# Patient Record
Sex: FEMALE | Race: WHITE | Employment: OTHER | URBAN - METROPOLITAN AREA
[De-identification: names, ages, dates, MRNs, and addresses within clinical notes are randomized per-mention and may not be internally consistent; named-entity substitution may affect disease eponyms.]

---

## 2017-01-09 ENCOUNTER — OFFICE VISIT (OUTPATIENT)
Dept: NEUROLOGY | Age: 72
End: 2017-01-09

## 2017-01-09 DIAGNOSIS — G45.9 TRANSIENT CEREBRAL ISCHEMIA, UNSPECIFIED TYPE: Primary | ICD-10-CM

## 2017-01-09 NOTE — PROCEDURES
EEG:      Date:  01/09/17    Requesting Physician:   Bull Harding     An EEG is requested in this 73 y.o. lady with TIA/paroxysms to evaluate for epileptiform abnormality. Her medications are listed as Norco, Nasonex, Proventil, Xanax, Midrin, Wellbutrin, Topiramate, Synthroid, Bumex, Zofran, Cordarone, Prilosec. This tracing is obtained during the awake state. During wakefulness, the background consists of diffuse low voltage fast frequency beta wave activity. Hyperventilation is not performed secondary to medical history. Intermittent photic stimulation induces symmetric posterior driving responses. Sleep is not obtained. Interpretation: This EEG recorded during the awake state is normal.  No epileptiform abnormalities are seen.

## 2017-02-22 RX ORDER — PROCHLORPERAZINE 25 MG
25 SUPPOSITORY, RECTAL RECTAL
Qty: 12 SUPPOSITORY | Refills: 2 | Status: SHIPPED | OUTPATIENT
Start: 2017-02-22 | End: 2018-05-10

## 2017-04-10 ENCOUNTER — OFFICE VISIT (OUTPATIENT)
Dept: NEUROLOGY | Age: 72
End: 2017-04-10

## 2017-04-10 VITALS
DIASTOLIC BLOOD PRESSURE: 70 MMHG | TEMPERATURE: 98.4 F | OXYGEN SATURATION: 98 % | HEART RATE: 67 BPM | BODY MASS INDEX: 33.7 KG/M2 | WEIGHT: 197.4 LBS | SYSTOLIC BLOOD PRESSURE: 134 MMHG | RESPIRATION RATE: 18 BRPM | HEIGHT: 64 IN

## 2017-04-10 DIAGNOSIS — G43.019 REFRACTORY MIGRAINE WITHOUT AURA: Primary | ICD-10-CM

## 2017-04-10 NOTE — PATIENT INSTRUCTIONS
[facility]   Statement to Patients  April 1, 2014      In an effort to ensure the large volume of patient prescription refills is processed in the most efficient and expeditious manner, we are asking our patients to assist us by calling your Pharmacy for all prescription refills, this will include also your  Mail Order Pharmacy. The pharmacy will contact our office electronically to continue the refill process. Please do not wait until the last minute to call your pharmacy. We need at least 48 hours (2days) to fill prescriptions. We also encourage you to call your pharmacy before going to  your prescription to make sure it is ready. With regard to controlled substance prescription refill requests (narcotic refills) that need to be picked up at our office, we ask your cooperation by providing us with at least 72 hours (3days) notice that you will need a refill. We will not refill narcotic prescription refill requests after 4:00pm on any weekday, Monday through Thursday, or after 2:00pm on Fridays, or on the weekends. We encourage everyone to explore another way of getting your prescription refill request processed using ShotSpotter, our patient web portal through our electronic medical record system. ShotSpotter is an efficient and effective way to communicate your medication request directly to the office and  downloadable as an gwen on your smart phone . ShotSpotter also features a review functionality that allows you to view your medication list as well as leave messages for your physician. Are you ready to get connected? If so please review the attatched instructions or speak to any of our staff to get you set up right away! Thank you so much for your cooperation. Should you have any questions please contact our Practice Administrator.     The Physicians and Staff,  [facility] [address]  What is a living will? A living will is a legal form you use to write down the kind of care you want at the end of your life. It is used by the health professionals who will treat you if you aren't able to decide for yourself. If you put your wishes in writing, your loved ones and others will know what kind of care you want. They won't need to guess. This can ease your mind and be helpful to others. A living will is not the same as an estate or property will. An estate will explains what you want to happen with your money and property after you die. Is a living will a legal document? A living will is a legal document. Each state has its own laws about living renner. If you move to another state, make sure that your living will is legal in the state where you now live. Or you might use a universal form that has been approved by many states. This kind of form can sometimes be completed and stored online. Your electronic copy will then be available wherever you have a connection to the Internet. In most cases, doctors will respect your wishes even if you have a form from a different state. · You don't need an  to complete a living will. But legal advice can be helpful if your state's laws are unclear, your health history is complicated, or your family can't agree on what should be in your living will. · You can change your living will at any time. Some people find that their wishes about end-of-life care change as their health changes. · In addition to making a living will, think about completing a medical power of  form. This form lets you name the person you want to make end-of-life treatment decisions for you (your \"health care agent\") if you're not able to. Many hospitals and nursing homes will give you the forms you need to complete a living will and a medical power of . · Your living will is used only if you can't make or communicate decisions for yourself anymore.  If you become able to make decisions again, you can accept or refuse any treatment, no matter what you wrote in your living will. · Your state may offer an online registry. This is a place where you can store your living will online so the doctors and nurses who need to treat you can find it right away. What should you think about when creating a living will? Talk about your end-of-life wishes with your family members and your doctor. Let them know what you want. That way the people making decisions for you won't be surprised by your choices. Think about these questions as you make your living will:  · Do you know enough about life support methods that might be used? If not, talk to your doctor so you know what might be done if you can't breathe on your own, your heart stops, or you're unable to swallow. · What things would you still want to be able to do after you receive life-support methods? Would you want to be able to walk? To speak? To eat on your own? To live without the help of machines? · If you have a choice, where do you want to be cared for? In your home? At a hospital or nursing home? · Do you want certain Yazidi practices performed if you become very ill? · If you have a choice at the end of your life, where would you prefer to die? At home? In a hospital or nursing home? Somewhere else? · Would you prefer to be buried or cremated? · Do you want your organs to be donated after you die? What should you do with your living will? · Make sure that your family members and your health care agent have copies of your living will. · Give your doctor a copy of your living will to keep in your medical record. If you have more than one doctor, make sure that each one has a copy. · You may want to put a copy of your living will where it can be easily found. Where can you learn more? Go to http://capri-ekaterina.info/. Enter R482 in the search box to learn more about \"Learning About Living Perobdulio. \"  Current as of: February 24, 2016  Content Version: 11.2  © 2744-8881 [employer] Incorporated. Care instructions adapted under license by [employer] (which disclaims liability or warranty for this information). If you have questions about a medical condition or this instruction, always ask your healthcare professional. [address] any warranty or liability for your use of this information.

## 2017-04-10 NOTE — PROGRESS NOTES
[address] [facility]. [facility] 91   [facility] 53 [address] [place], [facility] 57    [facility]   [phone] Fax         Chief Complaint   Patient presents with    Results     follow up    Migraine     follow up, Improved with medication     A 73year old female presents for migraine follow up. Recently weaned off Topamax in January and has been completely off Topamax for several months. She notes her cardiologist wanted her to wean off this medication. She is overall feeling well. Headaches have gotten better and she reports only one headache day since January. She has [address] for abortive headache treatment and has not used it since January 2017. Denies LOC, lightheadedness, numbness, visual changes, weakness, or falls. She has been traveling and keeping active. Her family is planning a camping trip out [address] [place] [address].     Past Medical History:   Diagnosis Date    Arrhythmia     Atrial fibrillation ([address]     CAD (coronary artery disease)     atrial fibrillation    Gastrointestinal disorder     GERD    GERD (gastroesophageal reflux disease)     Headache     Hypertension     Hypothyroid     Neurological disorder     migraines    Other ill-defined conditions migraine    Psychiatric disorder previous hx of depression       Past Surgical History:   Procedure Laterality Date    HX CHOLECYSTECTOMY      HX HYSTERECTOMY      HX ORTHOPAEDIC      Right shoulder surgery    HX PACEMAKER      HX ELIA AND BSO      RI ANESTH,SURGERY OF SHOULDER      left       Current Outpatient Prescriptions   Medication Sig Dispense Refill    prochlorperazine (COMPRO) 25 mg suppository Insert 1 Suppository into rectum every twelve (12) hours as needed for Nausea. 12 Suppository 2    clobetasol (TEMOVATE) 0.05 % ointment Apply  to affected area two (2) times a day.  loratadine 10 mg cap Take 10 mg by mouth daily.  biotin 2,500 mcg tab Take 500 mcg by mouth daily.       ISOMETHEPT/DICHLPHN/ACETAMINOP (MIDRIN PO) Take 4 mg by mouth as needed.  buPROPion SR (WELLBUTRIN SR) 150 mg SR tablet Take 150 mg by mouth two (2) times a day.  calcium-cholecalciferol, D3, (CALTRATE 600+D) tablet Take 1 Tab by mouth daily.  therapeutic multivitamin (THERAGRAN) tablet Take 1 Tab by mouth daily.  nebivolol (BYSTOLIC) 20 mg tablet Take 20 mg by mouth daily.  levothyroxine (SYNTHROID) 25 mcg tablet Take 25 mcg by mouth nightly.  olmesartan (BENICAR) 40 mg tablet Take 40 mg by mouth nightly.  potassium chloride (K-DUR, KLOR-CON) 20 mEq tablet Take 1 tablet by mouth two  times daily 60 Tab 0    XARELTO 20 mg tab tablet Take 1 tablet by mouth  daily (Patient taking differently: Take 1 tablet by mouth  daily with breakfast) 90 Tab 0    Diclofenac Potassium (CAMBIA) 50 mg pwpk 1 at HA onset and repeat in 2 hours x 1 PRN 9 Packet 3    amiodarone (CORDARONE) 200 mg tablet Take 1 Tab by mouth every other day. 45 Tab 3    bumetanide (BUMEX) 1 mg tablet Take 1 Tab by mouth daily. 90 Tab 3    ondansetron hcl (ZOFRAN) 4 mg tablet Take 1 Tab by mouth every eight (8) hours as needed for Nausea. 10 Tab 0    ascorbic acid (VITAMIN C) 1,000 mg tablet Take 1,000 mg by mouth nightly.  omeprazole (PRILOSEC) 20 mg capsule Take 20 mg by mouth daily. Indications: GASTROESOPHAGEAL REFLUX          No Known Allergies    Social History   Substance Use Topics    Smoking status: Former Smoker    Smokeless tobacco: Never Used      Comment: quit 30 years ago    Alcohol use No      Comment: social drinker       Family History   Problem Relation Age of Onset    Hypertension Other     Stroke Other     Heart Attack Other        Review of Systems  As above.  Remainder of comprehensive exam normal.    Examination  Visit Vitals    /70    Pulse 67    Temp 98.4 °F (36.9 °C) (Oral)    Resp 18    Ht 5' 4\" (1.626 m)    Wt 89.5 kg (197 lb 6.4 oz)    SpO2 98%    BMI 33.88 kg/m2 A 73year old female who is pleasant, well appearing. No icterus. Oropharynx clear. Heart regular. No murmur. No edema. No pronator drift. Finger to nose normal. Steady gait. Impression/Plan  Headaches are well controlled since she has tapered off the Topamax. One headache reported since January. Has Cambia and Midrin for abortive therapy. As long as headaches do not worsen we will follow up in 6 months. Main Peterson NP    I have reviewed the documentation provided by the nurse practitioner, Ms. Tr Frank, and we have discussed her findings and the clinical impression. I have formulated with her the proposed management plans for this patient. Additionally,  I have personally evaluated the patient to verify the history and to confirm physical findings. Below are my additional comments:  Very pleasant 73-year-old lady who has successfully weaned off Topamax. She says that her cardiologist raised the question about the dose and we discussed that Topamax has no cardiac effects. In any regard she is off Topamax. She has not had any headaches except for her yearly bad headaches she gets in the fall and she may have had one small one in January. She is happy with how she is doing. She is also under a lot of stress at home and still no headaches. She likewise has not had any further episodes of loss or alteration in consciousness. Nothing to suggest syncope or seizure. She is pleasant awake alert oriented and conversant with normal speech and language. Her gait is steady. We will maintain her with abortive therapy only and we did provide her with some extra Cambia today. She notes that she has a good supply of Midrin at home. She will follow in 6 months unless circumstances dictate otherwise. KATERYNA Jacobs MD    This note will not be viewable in [employer].

## 2017-04-10 NOTE — MR AVS SNAPSHOT
Visit Information Date & Time Provider Department Dept. Phone Encounter #  
 4/10/2017  3:40 PM Lacie Christensen MD Neurology [facility] [phone] Follow-up Instructions Return in about 6 months (around 10/10/2017). Upcoming Health Maintenance Date Due Hepatitis C Screening 1945 DTaP/Tdap/Td series (1 - Tdap) 7/21/1968 BREAST CANCER SCRN MAMMOGRAM 7/21/1997 FOBT Q 1 YEAR AGE 50-75 7/21/1997 ZOSTER VACCINE AGE 60> 7/21/2007 GLAUCOMA SCREENING Q2Y 7/21/2012 OSTEOPOROSIS SCREENING (DEXA) 7/21/2012 Pneumococcal 65+ Low/Medium Risk (1 of 2 - PCV13) 7/21/2012 MEDICARE YEARLY EXAM 7/21/2012 INFLUENZA AGE 9 TO ADULT 8/1/2016 Allergies as of 4/10/2017  Review Complete On: 4/10/2017 By: Donta Carrasquillo NP No Known Allergies Current Immunizations  Reviewed on 11/17/2012 No immunizations on file. Not reviewed this visit Vitals BP Pulse Temp Resp Height(growth percentile) Weight(growth percentile) 134/70 67 98.4 °F (36.9 °C) (Oral) 18 5' 4\" (1.626 m) 197 lb 6.4 oz (89.5 kg) SpO2 BMI OB Status Smoking Status 98% 33.88 kg/m2 Hysterectomy Former Smoker Vitals History BMI and BSA Data Body Mass Index Body Surface Area  
 33.88 kg/m 2 2.01 m 2 Preferred Pharmacy Pharmacy Name Phone [id number] [address] [facility], [address] [place] [address]  [phone] Your Updated Medication List  
  
   
This list is accurate as of: 4/10/17  4:36 PM.  Always use your most recent med list.  
  
  
  
  
 amiodarone 200 mg tablet Commonly known as:  CORDARONE Take 1 Tab by mouth every other day. BENICAR 40 mg tablet Generic drug:  olmesartan Take 40 mg by mouth nightly. biotin 2,500 mcg Tab Take 500 mcg by mouth daily. bumetanide 1 mg tablet Commonly known as:  Shirmolly Carvalhoch Take 1 Tab by mouth daily. buPROPion  mg SR tablet Commonly known as:  Aydin Roseggjolynn Take 150 mg by mouth two (2) times a day. calcium-cholecalciferol (D3) tablet Commonly known as:  CALTRATE 600+D Take 1 Tab by mouth daily. clobetasol 0.05 % ointment Commonly known as:  Rosea Melissa Apply  to affected area two (2) times a day. Diclofenac Potassium 50 mg Pwpk Commonly known as:  CAMBIA  
1 at HA onset and repeat in 2 hours x 1 PRN  
  
 levothyroxine 25 mcg tablet Commonly known as:  SYNTHROID Take 25 mcg by mouth nightly. loratadine 10 mg Cap Take 10 mg by mouth daily. MIDRIN PO Take 4 mg by mouth as needed. nebivolol 20 mg tablet Commonly known as:  BYSTOLIC Take 20 mg by mouth daily. ondansetron hcl 4 mg tablet Commonly known as:  ZOFRAN (AS HYDROCHLORIDE) Take 1 Tab by mouth every eight (8) hours as needed for Nausea. potassium chloride 20 mEq tablet Commonly known as:  K-DUR, KLOR-CON Take 1 tablet by mouth two  times daily PriLOSEC 20 mg capsule Generic drug:  omeprazole Take 20 mg by mouth daily. Indications: GASTROESOPHAGEAL REFLUX  
  
 prochlorperazine 25 mg suppository Commonly known as:  Velasquez Pen Insert 1 Suppository into rectum every twelve (12) hours as needed for Nausea. therapeutic multivitamin tablet Commonly known as:  [facility] Take 1 Tab by mouth daily. VITAMIN C 1,000 mg tablet Generic drug:  ascorbic acid (vitamin C) Take 1,000 mg by mouth nightly. XARELTO 20 mg Tab tablet Generic drug:  rivaroxaban Take 1 tablet by mouth  daily Follow-up Instructions Return in about 6 months (around 10/10/2017). Patient Instructions PRESCRIPTION REFILL POLICY [facility] Statement to Patients April 1, 2014 In an effort to ensure the large volume of patient prescription refills is processed in the most efficient and expeditious manner, we are asking our patients to assist us by calling your Pharmacy for all prescription refills, this will include also your  Mail Order Pharmacy. The pharmacy will contact our office electronically to continue the refill process. Please do not wait until the last minute to call your pharmacy. We need at least 48 hours (2days) to fill prescriptions. We also encourage you to call your pharmacy before going to  your prescription to make sure it is ready. With regard to controlled substance prescription refill requests (narcotic refills) that need to be picked up at our office, we ask your cooperation by providing us with at least 72 hours (3days) notice that you will need a refill. We will not refill narcotic prescription refill requests after 4:00pm on any weekday, Monday through Thursday, or after 2:00pm on Fridays, or on the weekends. We encourage everyone to explore another way of getting your prescription refill request processed using [employer] Systems, our patient web portal through our electronic medical record system. [employer] is an efficient and effective way to communicate your medication request directly to the office and  downloadable as an gwen on your smart phone . [employer] also features a review functionality that allows you to view your medication list as well as leave messages for your physician. Are you ready to get connected? If so please review the attatched instructions or speak to any of our staff to get you set up right away! Thank you so much for your cooperation. Should you have any questions please contact our Practice Administrator. The Physicians and Staff,  [facility] Nery Kimble [address] What is a living will? A living will is a legal form you use to write down the kind of care you want at the end of your life. It is used by the health professionals who will treat you if you aren't able to decide for yourself. If you put your wishes in writing, your loved ones and others will know what kind of care you want. They won't need to guess. This can ease your mind and be helpful to others. A living will is not the same as an estate or property will. An estate will explains what you want to happen with your money and property after you die. Is a living will a legal document? A living will is a legal document. Each state has its own laws about living renner. If you move to another state, make sure that your living will is legal in the state where you now live. Or you might use a universal form that has been approved by many states. This kind of form can sometimes be completed and stored online. Your electronic copy will then be available wherever you have a connection to the Internet. In most cases, doctors will respect your wishes even if you have a form from a different state. · You don't need an  to complete a living will. But legal advice can be helpful if your state's laws are unclear, your health history is complicated, or your family can't agree on what should be in your living will. · You can change your living will at any time. Some people find that their wishes about end-of-life care change as their health changes. · In addition to making a living will, think about completing a medical power of  form. This form lets you name the person you want to make end-of-life treatment decisions for you (your \"health care agent\") if you're not able to. Many hospitals and nursing homes will give you the forms you need to complete a living will and a medical power of . · Your living will is used only if you can't make or communicate decisions for yourself anymore. If you become able to make decisions again, you can accept or refuse any treatment, no matter what you wrote in your living will. · Your state may offer an online registry.  This is a place where you can store your living will online so the doctors and nurses who need to treat you can find it right away. What should you think about when creating a living will? Talk about your end-of-life wishes with your family members and your doctor. Let them know what you want. That way the people making decisions for you won't be surprised by your choices. Think about these questions as you make your living will: · Do you know enough about life support methods that might be used? If not, talk to your doctor so you know what might be done if you can't breathe on your own, your heart stops, or you're unable to swallow. · What things would you still want to be able to do after you receive life-support methods? Would you want to be able to walk? To speak? To eat on your own? To live without the help of machines? · If you have a choice, where do you want to be cared for? In your home? At a hospital or nursing home? · Do you want certain Spiritism practices performed if you become very ill? · If you have a choice at the end of your life, where would you prefer to die? At home? In a hospital or nursing home? Somewhere else? · Would you prefer to be buried or cremated? · Do you want your organs to be donated after you die? What should you do with your living will? · Make sure that your family members and your health care agent have copies of your living will. · Give your doctor a copy of your living will to keep in your medical record. If you have more than one doctor, make sure that each one has a copy. · You may want to put a copy of your living will where it can be easily found. Where can you learn more? Go to http://capri-ekaterina.info/. Enter K933 in the search box to learn more about \"Learning About Living Perrodavid. \" Current as of: February 24, 2016 Content Version: 11.2 © 8710-7369 idealista.com, Incorporated.  Care instructions adapted under license by [address] (which disclaims liability or warranty for this information). If you have questions about a medical condition or this instruction, always ask your healthcare professional. [address] any warranty or liability for your use of this information. Introducing [facility]! Dear Wilfrid Munoz: 
Thank you for requesting a Protein Bar account. Our records indicate that you already have an active Protein Bar account. You can access your account anytime at [email or web address] Did you know that you can access your hospital and ER discharge instructions at any time in Protein Bar? You can also review all of your test results from your hospital stay or ER visit. Additional Information If you have questions, please visit the Frequently Asked Questions section of the Protein Bar website at [email or web address] Remember, Protein Bar is NOT to be used for urgent needs. For medical emergencies, dial 911. Now available from your iPhone and Android! Please provide this summary of care documentation to your next provider. Your primary care clinician is listed as Lauren Oreilly. If you have any questions after today's visit, please call [phone].

## 2017-07-18 ENCOUNTER — TELEPHONE (OUTPATIENT)
Dept: CARDIOLOGY CLINIC | Age: 72
End: 2017-07-18

## 2017-07-18 NOTE — TELEPHONE ENCOUNTER
Spoke to pt & she wanted to know if she brought us the copy of the pt remote letter she received from her other cardiologist if we can do the MRI form. Explained to her that the form needs to be done by whomever is currently following her as they have the most up to date info. Gave her MRI dept ph# & informed her I have faxed back the form to them stating who her new Dr is.

## 2017-07-18 NOTE — TELEPHONE ENCOUNTER
Received a fax from [facility] stating pt is [facility] for 7/21 MRI. Pt not checked by our clinic since 2015. Called pt to [facility] overdue appt before we can do order. She stated she is now being followed by Dr Tiffanie Hernandez at [facility]. Informed [facility] that they need to contact them for order.

## 2017-07-26 ENCOUNTER — TELEPHONE (OUTPATIENT)
Dept: NEUROLOGY | Age: 72
End: 2017-07-26

## 2017-07-26 NOTE — TELEPHONE ENCOUNTER
----- Message from Carmen Hayes sent at 7/25/2017  5:15 PM EDT -----  Regarding: Dr. Won Godfrey [phone]        Pt would like a call back from the nurse regarding refills needed for Cambia to be sent to [phone]. In addition, a refill for Isomethept-Dichloralp/ACE to be sent to the [employer] at the . [place] [address] [phone].

## 2017-07-26 NOTE — TELEPHONE ENCOUNTER
Pt would like a call back from the nurse regarding refills needed for Cambia to be sent to [address] [phone]. In addition, a refill for Isomethept-Dichloralp/ACE to be sent to the [employer] at the [facility] [phone].

## 2017-07-27 ENCOUNTER — HOSPITAL ENCOUNTER (OUTPATIENT)
Dept: MRI IMAGING | Age: 72
Discharge: HOME | End: 2017-07-27
Attending: PHYSICAL MEDICINE & REHABILITATION
Payer: MEDICARE

## 2017-07-27 DIAGNOSIS — M54.17 LUMBOSACRAL RADICULOPATHY: ICD-10-CM

## 2017-07-27 DIAGNOSIS — M54.16 LUMBAR RADICULOPATHY: ICD-10-CM

## 2017-07-27 PROCEDURE — 72148 MRI LUMBAR SPINE W/O DYE: CPT

## 2017-07-27 NOTE — TELEPHONE ENCOUNTER
Message left for patient that Dr Elis Gu was out of office this week and refill request were sent to another provider. Luís was escribed to [employer], waiting on midrin to be approved. Call office with questions.

## 2017-07-31 ENCOUNTER — TELEPHONE (OUTPATIENT)
Dept: NEUROLOGY | Age: 72
End: 2017-07-31

## 2017-07-31 ENCOUNTER — PATIENT MESSAGE (OUTPATIENT)
Dept: NEUROLOGY | Age: 72
End: 2017-07-31

## 2017-07-31 NOTE — TELEPHONE ENCOUNTER
Spoke to Mika Koroma. @ [facility] office and asked that she fax script for Midrin to [facility] per patient request.

## 2017-07-31 NOTE — TELEPHONE ENCOUNTER
Patient in to  samples of Cambia. [place] of the [place] for 3 months. Waiting on prior authorization of Luís.

## 2017-08-11 ENCOUNTER — TELEPHONE (OUTPATIENT)
Dept: NEUROLOGY | Age: 72
End: 2017-08-11

## 2017-08-11 NOTE — TELEPHONE ENCOUNTER
Received [facility] SHAYNE MÉNDEZ appeal approval letter regarding Cambia 50 mg #9/30 days  AUth# [id number]  Effective 7/28/17-12/31/17    Taina min informed of this matter

## 2017-10-13 ENCOUNTER — HOSPITAL ENCOUNTER (EMERGENCY)
Age: 72
Discharge: HOME | End: 2017-10-13
Attending: EMERGENCY MEDICINE
Payer: MEDICARE

## 2017-10-13 VITALS
BODY MASS INDEX: 34.14 KG/M2 | OXYGEN SATURATION: 97 % | WEIGHT: 199.96 LBS | DIASTOLIC BLOOD PRESSURE: 87 MMHG | HEART RATE: 69 BPM | RESPIRATION RATE: 18 BRPM | TEMPERATURE: 98.7 F | HEIGHT: 64 IN | SYSTOLIC BLOOD PRESSURE: 178 MMHG

## 2017-10-13 DIAGNOSIS — R60.9 SUBMANDIBULAR GLAND SWELLING: Primary | ICD-10-CM

## 2017-10-13 LAB — DEPRECATED S PYO AG THROAT QL EIA: NEGATIVE

## 2017-10-13 PROCEDURE — 87070 CULTURE OTHR SPECIMN AEROBIC: CPT | Performed by: EMERGENCY MEDICINE

## 2017-10-13 PROCEDURE — 87880 STREP A ASSAY W/OPTIC: CPT | Performed by: EMERGENCY MEDICINE

## 2017-10-13 PROCEDURE — 99282 EMERGENCY DEPT VISIT SF MDM: CPT

## 2017-10-13 RX ORDER — GABAPENTIN 300 MG/1
600 CAPSULE ORAL 3 TIMES DAILY
COMMUNITY

## 2017-10-13 NOTE — ED NOTES
Patient discharged home after receiving discharge instructions from MD.  Patient voiced understanding and doesn't have any questions at this time. Patient in no distress at this time.

## 2017-10-13 NOTE — ED PROVIDER NOTES
HPI Comments: 72-year-old female who presents with right-sided neck swelling. Has been going on for about 2 days. Recently got back from out [facility] and states that she didn't have the problem until she returned. She denies any fevers but states that she is been somewhat cold. She's had no drooling or trismus. She denies any redness or rash. She has had a runny nose and sore throat. Patient is a 81 y.o. female presenting with neck swelling. The history is provided by the patient. Neck Swelling    Pertinent negatives include no chest pain and no headaches. Past Medical History:   Diagnosis Date    Arrhythmia     Atrial fibrillation ([address]     CAD (coronary artery disease)     atrial fibrillation    Gastrointestinal disorder     GERD    GERD (gastroesophageal reflux disease)     Headache(784.0)     Hypertension     Hypothyroid     Neurological disorder     migraines    Other ill-defined conditions(799.89) migraine    Psychiatric disorder previous hx of depression       Past Surgical History:   Procedure Laterality Date    HX CHOLECYSTECTOMY      HX HYSTERECTOMY      HX ORTHOPAEDIC      Right shoulder surgery    HX PACEMAKER      HX ELIA AND BSO      IN ANESTH,SURGERY OF SHOULDER      left         Family History:   Problem Relation Age of Onset    Hypertension Other     Stroke Other     Heart Attack Other        Social History     Social History    Marital status:      Spouse name: N/A    Number of children: N/A    Years of education: N/A     Occupational History    Not on file. Social History Main Topics    Smoking status: Former Smoker    Smokeless tobacco: Never Used      Comment: quit 30 years ago    Alcohol use Yes      Comment: social drinker    Drug use: No    Sexual activity: Not on file     Other Topics Concern    Not on file     Social History Narrative         ALLERGIES: Review of patient's allergies indicates no known allergies.     Review of Systems Constitutional: Negative for chills and fever. HENT: Negative for ear pain and sore throat. Eyes: Negative for pain. Respiratory: Negative for chest tightness and shortness of breath. Cardiovascular: Negative for chest pain and leg swelling. Gastrointestinal: Negative for abdominal pain, nausea and vomiting. Genitourinary: Negative for dysuria and flank pain. Musculoskeletal: Negative for back pain. Skin: Negative for rash. Neurological: Negative for headaches. All other systems reviewed and are negative. Vitals:    10/13/17 1112   BP: 178/87   Pulse: 69   Resp: 18   Temp: 98.7 °F (37.1 °C)   SpO2: 97%   Weight: 90.7 kg (199 lb 15.3 oz)   Height: 5' 4\" (1.626 m)            Physical Exam   Constitutional: She appears well-developed and well-nourished. HENT:   Head: Normocephalic and atraumatic. Mouth/Throat: Oropharynx is clear and moist.   Eyes: Conjunctivae and EOM are normal. Pupils are equal, round, and reactive to light. No scleral icterus. Neck: Neck supple. No tracheal deviation present. Non-specific swelling of right submandibular    Cardiovascular: Normal rate, regular rhythm, normal heart sounds and intact distal pulses. Pulmonary/Chest: Effort normal and breath sounds normal. No respiratory distress. Abdominal: Soft. She exhibits no distension. There is no tenderness. There is no rebound and no guarding. Genitourinary:   Genitourinary Comments: deferred   Musculoskeletal: She exhibits no edema. Neurological: She is alert. Skin: Skin is warm and dry. Psychiatric: She has a normal mood and affect. Nursing note and vitals reviewed. MDM  Number of Diagnoses or Management Options  Submandibular gland swelling:   Diagnosis management comments: Diff dx: salivary stone, sialadenitis, lymphadenitis      ED Course       Procedures      12:03 PM  The patient has been reevaluated. The patient is ready for discharge.  The patient's signs, symptoms, diagnosis, and discharge instructions have been discussed and the patient/ family has conveyed their understanding. The patient is to follow up as recommended or return to the ED should their symptoms worsen. Plan has been discussed and the patient is in agreement. LABORATORY TESTS:  Recent Results (from the past 12 hour(s))   STREP AG SCREEN, GROUP A    Collection Time: 10/13/17 11:38 AM   Result Value Ref Range    Group A Strep Ag ID NEGATIVE  NEG         IMAGING RESULTS:  No orders to display     No results found. MEDICATIONS GIVEN:  Medications - No data to display    IMPRESSION:  1. Submandibular gland swelling        PLAN:  1. Current Discharge Medication List        2. Follow-up Information     Follow up With Details Comments Anthony Bhandari MD Call today  Samantha Shantelle  8.  [phone]      Vinita Libman, MD Call today  [address]  [facility] [address] [address] [phone]      [facility] EMERGENCY DEPT  If symptoms worsen [facility] [address] [place] [address]  [phone]            Return to ED for new or worsening symptoms       Real Schilder.  Josh Olivares MD

## 2017-10-13 NOTE — ED TRIAGE NOTES
Pt noticed lump to right side of neck x 2 days. Pt c/o it being sore. Has been traveling over the past 2 months and experienced sore throat intermittently (thought it was d/t it being dry on the [address] [facility] where she was traveling). No fever.  No trouble swallowing or talking

## 2017-10-15 LAB
BACTERIA SPEC CULT: NORMAL
SERVICE CMNT-IMP: NORMAL

## 2017-11-02 ENCOUNTER — OFFICE VISIT (OUTPATIENT)
Dept: NEUROLOGY | Age: 72
End: 2017-11-02

## 2017-11-02 VITALS
TEMPERATURE: 98 F | HEIGHT: 64 IN | WEIGHT: 194 LBS | SYSTOLIC BLOOD PRESSURE: 138 MMHG | HEART RATE: 64 BPM | BODY MASS INDEX: 33.12 KG/M2 | DIASTOLIC BLOOD PRESSURE: 96 MMHG | RESPIRATION RATE: 17 BRPM | OXYGEN SATURATION: 95 %

## 2017-11-02 DIAGNOSIS — G43.019 REFRACTORY MIGRAINE WITHOUT AURA: Primary | ICD-10-CM

## 2017-11-02 RX ORDER — TOPIRAMATE 100 MG/1
100 TABLET, FILM COATED ORAL
Qty: 30 TAB | Refills: 3 | Status: SHIPPED | OUTPATIENT
Start: 2017-11-02 | End: 2018-05-10 | Stop reason: SDUPTHER

## 2017-11-02 RX ORDER — TOPIRAMATE 25 MG/1
TABLET ORAL
Qty: 42 TAB | Refills: 0 | Status: SHIPPED | OUTPATIENT
Start: 2017-11-02 | End: 2018-01-05

## 2017-11-02 NOTE — MR AVS SNAPSHOT
Visit Information Date & Time Provider Department Dept. Phone Encounter #  
 11/2/2017  2:40 PM Radha Ortiz MD [facility] [phone] [id number] Follow-up Instructions Return in about 2 months (around 1/2/2018). Follow-up and Disposition History Upcoming Health Maintenance Date Due Hepatitis C Screening 1945 DTaP/Tdap/Td series (1 - Tdap) 7/21/1968 BREAST CANCER SCRN MAMMOGRAM 7/21/1997 FOBT Q 1 YEAR AGE 50-75 7/21/1997 ZOSTER VACCINE AGE 60> 5/21/2007 GLAUCOMA SCREENING Q2Y 7/21/2012 OSTEOPOROSIS SCREENING (DEXA) 7/21/2012 Pneumococcal 65+ Low/Medium Risk (1 of 2 - PCV13) 7/21/2012 MEDICARE YEARLY EXAM 7/21/2012 INFLUENZA AGE 9 TO ADULT 8/1/2017 Allergies as of 11/2/2017  Review Complete On: 11/2/2017 By: Radha Ortiz MD  
 No Known Allergies Current Immunizations  Reviewed on 11/17/2012 No immunizations on file. Not reviewed this visit You Were Diagnosed With   
  
 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 
ICD-9-CM: 346.11 Vitals BP Pulse Temp Resp Height(growth percentile) Weight(growth percentile) (!) 138/96 64 98 °F (36.7 °C) 17 5' 4\" (1.626 m) 194 lb (88 kg) SpO2 BMI OB Status Smoking Status 95% 33.3 kg/m2 Hysterectomy Former Smoker Vitals History BMI and BSA Data Body Mass Index Body Surface Area  
 33.3 kg/m 2 1.99 m 2 Preferred Pharmacy Pharmacy Name Phone [facility] [address] [phone] Your Updated Medication List  
  
   
This list is accurate as of: 11/2/17  3:14 PM.  Always use your most recent med list.  
  
  
  
  
 amiodarone 200 mg tablet Commonly known as:  CORDARONE Take 1 Tab by mouth every other day. BENICAR 40 mg tablet Generic drug:  olmesartan Take 40 mg by mouth nightly. biotin 2,500 mcg Tab Take 500 mcg by mouth daily. bumetanide 1 mg tablet Commonly known as:  Bunestor Peterson Take 1 Tab by mouth daily. buPROPion  mg SR tablet Commonly known as:  Alexandra Coaster Take 150 mg by mouth two (2) times a day. calcium-cholecalciferol (D3) tablet Commonly known as:  CALTRATE 600+D Take 1 Tab by mouth daily. clobetasol 0.05 % ointment Commonly known as:  Marthenia Milton Apply  to affected area two (2) times a day. Diclofenac Potassium 50 mg Pwpk Commonly known as:  CAMBIA  
1 at HA onset and repeat in 2 hours x 1 PRN  
  
 gabapentin 300 mg capsule Commonly known as:  NEURONTIN Take 300 mg by mouth three (3) times daily. jbessqg-rxydyqiaw-kyjkiajyfitn -325 mg capsule Commonly known as:  Keisha Joe Take 1 Cap by mouth four (4) times daily as needed. Max Daily Amount: 4 Caps. levothyroxine 25 mcg tablet Commonly known as:  SYNTHROID Take 25 mcg by mouth nightly. loratadine 10 mg Cap Take 10 mg by mouth daily. nebivolol 20 mg tablet Commonly known as:  BYSTOLIC Take 20 mg by mouth daily. ondansetron hcl 4 mg tablet Commonly known as:  ZOFRAN (AS HYDROCHLORIDE) Take 1 Tab by mouth every eight (8) hours as needed for Nausea. potassium chloride 20 mEq tablet Commonly known as:  K-DUR, KLOR-CON Take 1 tablet by mouth two  times daily PriLOSEC 20 mg capsule Generic drug:  omeprazole Take 20 mg by mouth daily. Indications: GASTROESOPHAGEAL REFLUX  
  
 prochlorperazine 25 mg suppository Commonly known as:  Sylvia Salcedo Insert 1 Suppository into rectum every twelve (12) hours as needed for Nausea. therapeutic multivitamin tablet Commonly known as:  [facility] Take 1 Tab by mouth daily. * topiramate 25 mg tablet Commonly known as:  TOPAMAX 25 mg nightly for 1 week then, 50 mg nightly for 1 week then, 75 mg nightly for 1 week then, 100mg nightly thereafter. * topiramate 100 mg tablet Commonly known as:  TOPAMAX Take 1 Tab by mouth nightly. VITAMIN C 1,000 mg tablet Generic drug:  ascorbic acid (vitamin C) Take 1,000 mg by mouth nightly. XARELTO 20 mg Tab tablet Generic drug:  rivaroxaban Take 1 tablet by mouth  daily * Notice: This list has 2 medication(s) that are the same as other medications prescribed for you. Read the directions carefully, and ask your doctor or other care provider to review them with you. Prescriptions Printed Refills  
 dygaayr-lirpnlpzx-tpgiqflwblel (MIDRIN) -325 mg capsule 2 Sig: Take 1 Cap by mouth four (4) times daily as needed. Max Daily Amount: 4 Caps. Class: Print Route: Oral  
  
Prescriptions Sent to Pharmacy Refills Diclofenac Potassium (CAMBIA) 50 mg pwpk 3 Si at HA onset and repeat in 2 hours x 1 PRN Class: Normal  
 Pharmacy: [facility] AT [place] [address] Ph #: [phone]  
 topiramate (TOPAMAX) 25 mg tablet 0 Si mg nightly for 1 week then, 50 mg nightly for 1 week then, 75 mg nightly for 1 week then, 100mg nightly thereafter. Class: Normal  
 Pharmacy: [facility] AT [place] [address] Ph #: [phone]  
 topiramate (TOPAMAX) 100 mg tablet 3 Sig: Take 1 Tab by mouth nightly. Class: Normal  
 Pharmacy: [facility] AT [place] [address] Ph #: [phone] Route: Oral  
  
Follow-up Instructions Return in about 2 months (around 2018). Patient Instructions Information Regarding Testing If you have physican order for a test or a medication denied by your insurance company, this does not mean the test or medication is not appropriate for you as that is a medical decision, not a decision to be made by an insurance company representative or by an [address] [place] [address] who has not interviewed and examined you. This is a decision to be made between you and your physician. The denial of services is a contractual matter between you and your insurance company, not an issue between your physician and the insurance company. If your test or medication is denied, you can take the following steps to help resolve the issue: 1. File a complaint with the [facility] regarding your insurance company's denial of services ordered for you. You can do this either by calling them directly or by completing an on-line complaint form on the [employer]. This can be found at www.[place].[employer]ronic Systems 2. Also file a formal complaint with your insurance company and ask to have the name of the person denying the service so that you may explore a legal option should you be harmed by this denial of service. Again, the fact the insurance company will not pay for the service does not mean it is not medically necessary and I would encourage you to follow through with the plan that was made with your physician 3. File a written complaint with your employer so your employer and benefit manager is aware of the poor coverage they are providing their employees. If you have medicare/medicaid, complain to your representative in the House and to your [place] Santos. PRESCRIPTION REFILL POLICY [facility] Statement to Patients April 1, 2014 In an effort to ensure the large volume of patient prescription refills is processed in the most efficient and expeditious manner, we are asking our patients to assist us by calling your Pharmacy for all prescription refills, this will include also your  Mail Order Pharmacy. The pharmacy will contact our office electronically to continue the refill process. Please do not wait until the last minute to call your pharmacy. We need at least 48 hours (2days) to fill prescriptions. We also encourage you to call your pharmacy before going to  your prescription to make sure it is ready. With regard to controlled substance prescription refill requests (narcotic refills) that need to be picked up at our office, we ask your cooperation by providing us with at least 72 hours (3days) notice that you will need a refill. We will not refill narcotic prescription refill requests after 4:00pm on any weekday, Monday through Thursday, or after 2:00pm on Fridays, or on the weekends. We encourage everyone to explore another way of getting your prescription refill request processed using [employer], our patient web portal through our electronic medical record system. [employer] is an efficient and effective way to communicate your medication request directly to the office and  downloadable as an gwen on your smart phone . [employer] also features a review functionality that allows you to view your medication list as well as leave messages for your physician. Are you ready to get connected? If so please review the attatched instructions or speak to any of our staff to get you set up right away! Thank you so much for your cooperation. Should you have any questions please contact our Practice Administrator. The Physicians and Staff,  [facility] If we have ordered testing for you, we do not call patients with results and we do not give test results over the phone. We schedule follow up appointments so that your results can be discussed in person and any questions you have regarding them may be addressed. If something of concern is revealed on your test, we will call you for a sooner follow up appointment.   Additionally, results may be found by using the My Chart feature and one of our patient service representatives at the  can give you instructions on how to access this feature of our electronic medical record system. Nery Kimble 17[address] What is a living will? A living will is a legal form you use to write down the kind of care you want at the end of your life. It is used by the health professionals who will treat you if you aren't able to decide for yourself. If you put your wishes in writing, your loved ones and others will know what kind of care you want. They won't need to guess. This can ease your mind and be helpful to others. A living will is not the same as an estate or property will. An estate will explains what you want to happen with your money and property after you die. Is a living will a legal document? A living will is a legal document. Each state has its own laws about living renner. If you move to another state, make sure that your living will is legal in the state where you now live. Or you might use a universal form that has been approved by many states. This kind of form can sometimes be completed and stored online. Your electronic copy will then be available wherever you have a connection to the Internet. In most cases, doctors will respect your wishes even if you have a form from a different state. · You don't need an  to complete a living will. But legal advice can be helpful if your state's laws are unclear, your health history is complicated, or your family can't agree on what should be in your living will. · You can change your living will at any time. Some people find that their wishes about end-of-life care change as their health changes. · In addition to making a living will, think about completing a medical power of  form. This form lets you name the person you want to make end-of-life treatment decisions for you (your \"health care agent\") if you're not able to.  Many hospitals and nursing homes will give you the forms you need to complete a living will and a medical power of . · Your living will is used only if you can't make or communicate decisions for yourself anymore. If you become able to make decisions again, you can accept or refuse any treatment, no matter what you wrote in your living will. · Your state may offer an online registry. This is a place where you can store your living will online so the doctors and nurses who need to treat you can find it right away. What should you think about when creating a living will? Talk about your end-of-life wishes with your family members and your doctor. Let them know what you want. That way the people making decisions for you won't be surprised by your choices. Think about these questions as you make your living will: · Do you know enough about life support methods that might be used? If not, talk to your doctor so you know what might be done if you can't breathe on your own, your heart stops, or you're unable to swallow. · What things would you still want to be able to do after you receive life-support methods? Would you want to be able to walk? To speak? To eat on your own? To live without the help of machines? · If you have a choice, where do you want to be cared for? In your home? At a hospital or nursing home? · Do you want certain Yarsanism practices performed if you become very ill? · If you have a choice at the end of your life, where would you prefer to die? At home? In a hospital or nursing home? Somewhere else? · Would you prefer to be buried or cremated? · Do you want your organs to be donated after you die? What should you do with your living will? · Make sure that your family members and your health care agent have copies of your living will. · Give your doctor a copy of your living will to keep in your medical record. If you have more than one doctor, make sure that each one has a copy. · You may want to put a copy of your living will where it can be easily found. Where can you learn more? Go to http://capri-ekaterina.info/. Enter U511 in the search box to learn more about \"Learning About Living Manju. \" Current as of: September 24, 2016 Content Version: 11.4 © 7790-0774 [employer]. Care instructions adapted under license by [employer] (which disclaims liability or warranty for this information). If you have questions about a medical condition or this instruction, always ask your healthcare professional. [address] any warranty or liability for your use of this information. Patient Instructions History Introducing [facility]! Dear Kristy Eastman: 
Thank you for requesting a Anacomp account. Our records indicate that you already have an active Anacomp account. You can access your account anytime at [email or web address] Did you know that you can access your hospital and ER discharge instructions at any time in Anacomp? You can also review all of your test results from your hospital stay or ER visit. Additional Information If you have questions, please visit the Frequently Asked Questions section of the Anacomp website at [email or web address] Remember, Anacomp is NOT to be used for urgent needs. For medical emergencies, dial 911. Now available from your iPhone and Android! Please provide this summary of care documentation to your next provider. Your primary care clinician is listed as Lauren Oreilly. If you have any questions after today's visit, please call [phone].

## 2017-11-02 NOTE — PATIENT INSTRUCTIONS
Information Regarding Testing     If you have physican order for a test or a medication denied by your insurance company, this does not mean the test or medication is not appropriate for you as that is a medical decision, not a decision to be made by an insurance company representative or by an [facility] physician who has not interviewed and examined you. This is a decision to be made between you and your physician. The denial of services is a contractual matter between you and your insurance company, not an issue between your physician and the insurance company. If your test or medication is denied, you can take the following steps to help resolve the issue:    1. File a complaint with the [facility] regarding your insurance company's denial of services ordered for you. You can do this either by calling them directly or by completing an on-line complaint form on the [place]. This can be found at [email or web address]    2. Also file a formal complaint with your insurance company and ask to have the name of the person denying the service so that you may explore a legal option should you be harmed by this denial of service. Again, the fact the insurance company will not pay for the service does not mean it is not medically necessary and I would encourage you to follow through with the plan that was made with your physician    3. File a written complaint with your employer so your employer and benefit manager is aware of the poor coverage they are providing their employees. If you have medicare/medicaid, complain to your representative in the House and to your [place] Santos.     10 [facility]   Statement to Patients  April 1, 2014      In an effort to ensure the large volume of patient prescription refills is processed in the most efficient and expeditious manner, we are asking our patients to assist us by calling your Pharmacy for all prescription refills, this will include also your  Mail Order Pharmacy. The pharmacy will contact our office electronically to continue the refill process. Please do not wait until the last minute to call your pharmacy. We need at least 48 hours (2days) to fill prescriptions. We also encourage you to call your pharmacy before going to  your prescription to make sure it is ready. With regard to controlled substance prescription refill requests (narcotic refills) that need to be picked up at our office, we ask your cooperation by providing us with at least 72 hours (3days) notice that you will need a refill. We will not refill narcotic prescription refill requests after 4:00pm on any weekday, Monday through Thursday, or after 2:00pm on Fridays, or on the weekends. We encourage everyone to explore another way of getting your prescription refill request processed using [employer], our patient web portal through our electronic medical record system. [employer] is an efficient and effective way to communicate your medication request directly to the office and  downloadable as an gwen on your smart phone . [employer] also features a review functionality that allows you to view your medication list as well as leave messages for your physician. Are you ready to get connected? If so please review the attatched instructions or speak to any of our staff to get you set up right away! Thank you so much for your cooperation. Should you have any questions please contact our Practice Administrator. The Physicians and Staff,  [facility]     If we have ordered testing for you, we do not call patients with results and we do not give test results over the phone. We schedule follow up appointments so that your results can be discussed in person and any questions you have regarding them may be addressed.   If something of concern is revealed on your test, we will call you for a sooner follow up appointment. Additionally, results may be found by using the My Chart feature and one of our patient service representatives at the  can give you instructions on how to access this feature of our electronic medical record system. Learning About Living Manju  What is a living will? A living will is a legal form you use to write down the kind of care you want at the end of your life. It is used by the health professionals who will treat you if you aren't able to decide for yourself. If you put your wishes in writing, your loved ones and others will know what kind of care you want. They won't need to guess. This can ease your mind and be helpful to others. A living will is not the same as an estate or property will. An estate will explains what you want to happen with your money and property after you die. Is a living will a legal document? A living will is a legal document. Each state has its own laws about living renner. If you move to another state, make sure that your living will is legal in the state where you now live. Or you might use a universal form that has been approved by many states. This kind of form can sometimes be completed and stored online. Your electronic copy will then be available wherever you have a connection to the Internet. In most cases, doctors will respect your wishes even if you have a form from a different state. · You don't need an  to complete a living will. But legal advice can be helpful if your state's laws are unclear, your health history is complicated, or your family can't agree on what should be in your living will. · You can change your living will at any time. Some people find that their wishes about end-of-life care change as their health changes. · In addition to making a living will, think about completing a medical power of  form.  This form lets you name the person you want to make end-of-life treatment decisions for you (your \"health care agent\") if you're not able to. Many hospitals and nursing homes will give you the forms you need to complete a living will and a medical power of . · Your living will is used only if you can't make or communicate decisions for yourself anymore. If you become able to make decisions again, you can accept or refuse any treatment, no matter what you wrote in your living will. · Your state may offer an online registry. This is a place where you can store your living will online so the doctors and nurses who need to treat you can find it right away. What should you think about when creating a living will? Talk about your end-of-life wishes with your family members and your doctor. Let them know what you want. That way the people making decisions for you won't be surprised by your choices. Think about these questions as you make your living will:  · Do you know enough about life support methods that might be used? If not, talk to your doctor so you know what might be done if you can't breathe on your own, your heart stops, or you're unable to swallow. · What things would you still want to be able to do after you receive life-support methods? Would you want to be able to walk? To speak? To eat on your own? To live without the help of machines? · If you have a choice, where do you want to be cared for? In your home? At a hospital or nursing home? · Do you want certain Yazidi practices performed if you become very ill? · If you have a choice at the end of your life, where would you prefer to die? At home? In a hospital or nursing home? Somewhere else? · Would you prefer to be buried or cremated? · Do you want your organs to be donated after you die? What should you do with your living will? · Make sure that your family members and your health care agent have copies of your living will. · Give your doctor a copy of your living will to keep in your medical record.  If you have more than one doctor, make sure that each one has a copy. · You may want to put a copy of your living will where it can be easily found. Where can you learn more? Go to http://capri-ekaterina.info/. Enter W657 in the search box to learn more about \"Learning About Living [place]. \"  Current as of: September 24, 2016  Content Version: 11.4  © 6805-3747 [employer]. Care instructions adapted under license by [employer] (which disclaims liability or warranty for this information). If you have questions about a medical condition or this instruction, always ask your healthcare professional. [address] any warranty or liability for your use of this information.

## 2017-11-02 NOTE — PROGRESS NOTES
Follow up for headaches. Reports 3-5 headache days per month lasting 4 hours up to 3 days. No acute problems.

## 2017-11-02 NOTE — PROGRESS NOTES
[address] [facility] [address]   [place] [address]   [place], [address]   [phone] [facility] [place]   [phone] Fax               Chief Complaint   Patient presents with    Headache     follow up     Current Outpatient Prescriptions   Medication Sig Dispense Refill    gabapentin (NEURONTIN) 300 mg capsule Take 300 mg by mouth three (3) times daily.  tzjxqjh-jojstrefd-qfabdehucthw (MIDRIN) -325 mg capsule Take 1 Cap by mouth four (4) times daily as needed. Max Daily Amount: 4 Caps. 30 Cap 2    Diclofenac Potassium (CAMBIA) 50 mg pwpk 1 at HA onset and repeat in 2 hours x 1 PRN 9 Packet 3    prochlorperazine (COMPRO) 25 mg suppository Insert 1 Suppository into rectum every twelve (12) hours as needed for Nausea. 12 Suppository 2    clobetasol (TEMOVATE) 0.05 % ointment Apply  to affected area two (2) times a day.  loratadine 10 mg cap Take 10 mg by mouth daily.  biotin 2,500 mcg tab Take 500 mcg by mouth daily.  buPROPion SR (WELLBUTRIN SR) 150 mg SR tablet Take 150 mg by mouth two (2) times a day.  calcium-cholecalciferol, D3, (CALTRATE 600+D) tablet Take 1 Tab by mouth daily.  therapeutic multivitamin (THERAGRAN) tablet Take 1 Tab by mouth daily.  nebivolol (BYSTOLIC) 20 mg tablet Take 20 mg by mouth daily.  levothyroxine (SYNTHROID) 25 mcg tablet Take 25 mcg by mouth nightly.  olmesartan (BENICAR) 40 mg tablet Take 40 mg by mouth nightly.  potassium chloride (K-DUR, KLOR-CON) 20 mEq tablet Take 1 tablet by mouth two  times daily 60 Tab 0    XARELTO 20 mg tab tablet Take 1 tablet by mouth  daily (Patient taking differently: Take 1 tablet by mouth  daily with breakfast) 90 Tab 0    amiodarone (CORDARONE) 200 mg tablet Take 1 Tab by mouth every other day. 45 Tab 3    bumetanide (BUMEX) 1 mg tablet Take 1 Tab by mouth daily.  90 Tab 3    ondansetron hcl (ZOFRAN) 4 mg tablet Take 1 Tab by mouth every eight (8) hours as needed for Nausea. 10 Tab 0    ascorbic acid (VITAMIN C) 1,000 mg tablet Take 1,000 mg by mouth nightly.  omeprazole (PRILOSEC) 20 mg capsule Take 20 mg by mouth daily. Indications: GASTROESOPHAGEAL REFLUX      Diclofenac Potassium (CAMBIA) 50 mg pwpk As directed. 9 Packet 0      No Known Allergies  Social History   Substance Use Topics    Smoking status: Former Smoker    Smokeless tobacco: Never Used      Comment: quit 30 years ago    Alcohol use Yes      Comment: social drinker   Patient returns today for follow-up migraine headaches. In the past she has been on Topamax. She successfully weaned off of that and was doing well on her last visit back in April. She returns today noting that she has started to have increasing headaches once again. She is reporting 3-5 headache days per month. She notes that she was on a 2 month cross-country vacation type trip and RV and notes that she had increased headache frequency. She notes that she is able to abort them with the Cambia/Midrin. No medication side effects. Started on some gabapentin for low back pain and sees a spine doctor in the next week or so. She has not had any focal deficits. No loss or alteration in consciousness. No palpitations chest pain shortness of breath or fever. No injury. She does note that while she was on vacation she had an almost unremitting type headache in that she was having those so frequently. Has gotten a bit better since his come back but again intolerable for her as they are now. Review of systems  Pertinent positives and negatives are as noted above otherwise comprehensive systems review is negative    Examination  Visit Vitals    BP (!) 138/96    Pulse 64    Temp 98 °F (36.7 °C)    Resp 17    Ht 5' 4\" (1.626 m)    Wt 88 kg (194 lb)    SpO2 95%    BMI 33.3 kg/m2     She is a pleasant lady. She has appropriate dress grooming and affect. No icterus. No edema. Symmetric pulses. No bruit.   Intact cranial nerves II-XII. No nystagmus. No pronation or drift. Reflexes are symmetrical upper and lower extremities bilaterally. No ataxia. Gait is steady. Impression/Plan  Migraine headaches with increase in migraine frequency as noted above and at this point we discussed options. She is already on gabapentin 300 mg 3 times daily for her back discomfort and she thinks that she is having some perceived side effects from that so one option will be to increase the gabapentin but secondary to these procedures side effects will defer. We discussed her previous use of Topamax and the fact that it was quite efficacious. We will titrate her back onto Topamax in a customary fashion up to 100 mg nightly. Discussed administration, potential side effects, potential benefits as well as alternatives. Continue Cambia and Midrin for abortive therapy  Track headaches and bring that on a calendar each appointment  Follow-up in 2 months        This note was created using voice recognition software. Despite editing, there may be syntax errors. This note will not be viewable in [address]

## 2017-12-30 ENCOUNTER — HOSPITAL ENCOUNTER (EMERGENCY)
Age: 72
Discharge: HOME | End: 2017-12-30
Attending: STUDENT IN AN ORGANIZED HEALTH CARE EDUCATION/TRAINING PROGRAM
Payer: MEDICARE

## 2017-12-30 VITALS
SYSTOLIC BLOOD PRESSURE: 164 MMHG | DIASTOLIC BLOOD PRESSURE: 67 MMHG | HEIGHT: 64 IN | BODY MASS INDEX: 34.21 KG/M2 | RESPIRATION RATE: 16 BRPM | WEIGHT: 200.4 LBS | HEART RATE: 72 BPM | TEMPERATURE: 98.4 F | OXYGEN SATURATION: 96 %

## 2017-12-30 DIAGNOSIS — B34.9 VIRAL ILLNESS: Primary | ICD-10-CM

## 2017-12-30 PROCEDURE — 99282 EMERGENCY DEPT VISIT SF MDM: CPT

## 2017-12-30 NOTE — DISCHARGE INSTRUCTIONS
Viral Infections: Care Instructions  Your Care Instructions    You don't feel well, but it's not clear what's causing it. You may have a viral infection. Viruses cause many illnesses, such as the common cold, influenza, fever, rashes, and the diarrhea, nausea, and vomiting that are often called \"stomach flu. \" You may wonder if antibiotic medicines could make you feel better. But antibiotics only treat infections caused by bacteria. They don't work on viruses. The good news is that viral infections usually aren't serious. Most will go away in a few days without medical treatment. In the meantime, there are a few things you can do to make yourself more comfortable. Follow-up care is a key part of your treatment and safety. Be sure to make and go to all appointments, and call your doctor if you are having problems. It's also a good idea to know your test results and keep a list of the medicines you take. How can you care for yourself at home? · Get plenty of rest if you feel tired. · Take an over-the-counter pain medicine if needed, such as acetaminophen (Tylenol), ibuprofen (Advil, Motrin), or naproxen (Aleve). Read and follow all instructions on the label. · Be careful when taking over-the-counter cold or flu medicines and Tylenol at the same time. Many of these medicines have acetaminophen, which is Tylenol. Read the labels to make sure that you are not taking more than the recommended dose. Too much acetaminophen (Tylenol) can be harmful. · Drink plenty of fluids, enough so that your urine is light yellow or clear like water. If you have kidney, heart, or liver disease and have to limit fluids, talk with your doctor before you increase the amount of fluids you drink. · Stay home from work, school, and other public places while you have a fever. When should you call for help? Call 911 anytime you think you may need emergency care. For example, call if:  ? · You have severe trouble breathing.    ? · You passed out (lost consciousness). ?Call your doctor now or seek immediate medical care if:  ? · You seem to be getting much sicker. ? · You have a new or higher fever. ? · You have blood in your stools. ? · You have new belly pain, or your pain gets worse. ? · You have a new rash. ? Watch closely for changes in your health, and be sure to contact your doctor if:  ? · You start to get better and then get worse. ? · You do not get better as expected. Where can you learn more? Go to http://capri-ekaterina.info/. Enter L272 in the search box to learn more about \"Viral Infections: Care Instructions. \"  Current as of: March 3, 2017  Content Version: 11.4  © 5762-1176 [employer]. Care instructions adapted under license by [employer] (which disclaims liability or warranty for this information). If you have questions about a medical condition or this instruction, always ask your healthcare professional. [address] any warranty or liability for your use of this information.

## 2017-12-30 NOTE — ED TRIAGE NOTES
Pt presents to ED with c/o sore throat since yesterday. Pt states she has had two episodes of diarrhea since yesterday. Pt states fatigued with body aches.

## 2017-12-30 NOTE — ED PROVIDER NOTES
Patient is a 81 y.o. female presenting with sore throat and diarrhea. The history is provided by the patient. Sore Throat    This is a new problem. The current episode started 2 days ago. Patient reports a subjective fever - was not measured. Associated symptoms include diarrhea, congestion, headaches and cough. Pertinent negatives include no vomiting, no shortness of breath and no trouble swallowing. She has tried nothing for the symptoms. Diarrhea    This is a new problem. The current episode started yesterday. The problem has not changed since onset. The pain is associated with sick contacts. Pain location: no abd pain. The quality of the pain is cramping. The pain is at a severity of 0/10. The patient is experiencing no pain. Associated symptoms include diarrhea, headaches and myalgias. Pertinent negatives include no fever, no vomiting, no dysuria, no chest pain and no back pain. Nothing worsens the pain. The pain is relieved by one or more OTC medications (imodium).         Past Medical History:   Diagnosis Date    Arrhythmia     Atrial fibrillation ([address]     CAD (coronary artery disease)     atrial fibrillation    Gastrointestinal disorder     GERD    GERD (gastroesophageal reflux disease)     Headache(784.0)     Hypertension     Hypothyroid     Neurological disorder     migraines    Other ill-defined conditions(799.89) migraine    Psychiatric disorder previous hx of depression       Past Surgical History:   Procedure Laterality Date    HX CHOLECYSTECTOMY      HX HYSTERECTOMY      HX ORTHOPAEDIC      Right shoulder surgery    HX PACEMAKER      HX ELIA AND BSO      [place] ANESTH,SURGERY OF SHOULDER      left         Family History:   Problem Relation Age of Onset    Hypertension Other     Stroke Other     Heart Attack Other        Social History     Social History    Marital status:      Spouse name: N/A    Number of children: N/A    Years of education: N/A     Occupational History    Not on file. Social History Main Topics    Smoking status: Former Smoker    Smokeless tobacco: Never Used      Comment: quit 30 years ago    Alcohol use Yes      Comment: social drinker    Drug use: No    Sexual activity: Not on file     Other Topics Concern    Not on file     Social History Narrative         ALLERGIES: Review of patient's allergies indicates no known allergies. Review of Systems   Constitutional: Negative for chills and fever. HENT: Positive for congestion, rhinorrhea and sore throat. Negative for trouble swallowing and voice change. Respiratory: Positive for cough. Negative for shortness of breath. Cardiovascular: Negative for chest pain. Gastrointestinal: Positive for diarrhea. Negative for abdominal pain and vomiting. Genitourinary: Negative for dysuria. Musculoskeletal: Positive for myalgias. Negative for back pain. Skin: Negative for rash. Neurological: Positive for headaches. Negative for syncope. Psychiatric/Behavioral: Negative for confusion. All other systems reviewed and are negative. Vitals:    12/30/17 1311   BP: 164/67   Pulse: 72   Resp: 16   Temp: 98.4 °F (36.9 °C)   SpO2: 96%   Weight: 90.9 kg (200 lb 6.4 oz)   Height: 5' 4\" (1.626 m)            Physical Exam   Constitutional: She is oriented to person, place, and time. She appears well-developed. No distress. HENT:   Head: Normocephalic and atraumatic. Mouth/Throat: Posterior oropharyngeal erythema (mild) present. No oropharyngeal exudate. Eyes: Conjunctivae and EOM are normal. Pupils are equal, round, and reactive to light. Neck: Normal range of motion. Neck supple. Cardiovascular: Normal rate, regular rhythm and normal heart sounds. No murmur heard. Pulmonary/Chest: Effort normal and breath sounds normal. No respiratory distress. Abdominal: Soft. Bowel sounds are normal. She exhibits no distension. There is no tenderness. There is no rebound.    Musculoskeletal: Normal range of motion. She exhibits no edema. Neurological: She is alert and oriented to person, place, and time. No cranial nerve deficit. She exhibits normal muscle tone. Coordination normal.   Skin: Skin is warm and dry. No rash noted. Psychiatric: She has a normal mood and affect. Her behavior is normal.   Nursing note and vitals reviewed.        [facility]  ED Course       Procedures

## 2018-01-05 ENCOUNTER — OFFICE VISIT (OUTPATIENT)
Dept: NEUROLOGY | Age: 73
End: 2018-01-05

## 2018-01-05 VITALS
DIASTOLIC BLOOD PRESSURE: 86 MMHG | HEIGHT: 64 IN | SYSTOLIC BLOOD PRESSURE: 140 MMHG | WEIGHT: 196 LBS | BODY MASS INDEX: 33.46 KG/M2

## 2018-01-05 DIAGNOSIS — G43.019 REFRACTORY MIGRAINE WITHOUT AURA: Primary | ICD-10-CM

## 2018-01-05 RX ORDER — PROCHLORPERAZINE MALEATE 5 MG
TABLET ORAL
Qty: 30 TAB | Refills: 5 | Status: SHIPPED | OUTPATIENT
Start: 2018-01-05 | End: 2020-12-20

## 2018-01-05 NOTE — PROGRESS NOTES
Patient is here for migraine follow up. She gets 2-3 migraines per month lasting 2-3 hours if she uses the cambia and midrin. She wants to get a nausea medication in tablet form instead of the suppository.

## 2018-01-05 NOTE — PATIENT INSTRUCTIONS
[facility]   Statement to Patients  April 1, 2014      In an effort to ensure the large volume of patient prescription refills is processed in the most efficient and expeditious manner, we are asking our patients to assist us by calling your Pharmacy for all prescription refills, this will include also your  Mail Order Pharmacy. The pharmacy will contact our office electronically to continue the refill process. Please do not wait until the last minute to call your pharmacy. We need at least 48 hours (2days) to fill prescriptions. We also encourage you to call your pharmacy before going to  your prescription to make sure it is ready. With regard to controlled substance prescription refill requests (narcotic refills) that need to be picked up at our office, we ask your cooperation by providing us with at least 72 hours (3days) notice that you will need a refill. We will not refill narcotic prescription refill requests after 4:00pm on any weekday, Monday through Thursday, or after 2:00pm on Fridays, or on the weekends. We encourage everyone to explore another way of getting your prescription refill request processed using [employer], our patient web portal through our electronic medical record system. [employer] is an efficient and effective way to communicate your medication request directly to the office and  downloadable as an gwen on your smart phone . [employer] also features a review functionality that allows you to view your medication list as well as leave messages for your physician. Are you ready to get connected? If so please review the attatched instructions or speak to any of our staff to get you set up right away! Thank you so much for your cooperation. Should you have any questions please contact our Practice Administrator.     The Physicians and Staff,  [facility]

## 2018-01-05 NOTE — MR AVS SNAPSHOT
Visit Information Date & Time Provider Department Dept. Phone Encounter #  
 1/5/2018  1:30 PM [facility] [phone] [id number] Follow-up Instructions Return in about 3 months (around 4/5/2018). Upcoming Health Maintenance Date Due Hepatitis C Screening 1945 DTaP/Tdap/Td series (1 - Tdap) 7/21/1968 BREAST CANCER SCRN MAMMOGRAM 7/21/1997 FOBT Q 1 YEAR AGE 50-75 7/21/1997 ZOSTER VACCINE AGE 60> 5/21/2007 GLAUCOMA SCREENING Q2Y 7/21/2012 OSTEOPOROSIS SCREENING (DEXA) 7/21/2012 Pneumococcal 65+ Low/Medium Risk (1 of 2 - PCV13) 7/21/2012 MEDICARE YEARLY EXAM 7/21/2012 Influenza Age 5 to Adult 8/1/2017 Allergies as of 1/5/2018  Review Complete On: 1/5/2018 By: Zainab Pitts No Known Allergies Current Immunizations  Reviewed on 11/17/2012 No immunizations on file. Not reviewed this visit Vitals BP Height(growth percentile) Weight(growth percentile) BMI OB Status Smoking Status 140/86 5' 4\" (1.626 m) 196 lb (88.9 kg) 33.64 kg/m2 Hysterectomy Former Smoker Vitals History BMI and BSA Data Body Mass Index Body Surface Area  
 33.64 kg/m 2 2 m 2 Preferred Pharmacy Pharmacy Name Phone [facility] [address] AT  [address]  [phone] Your Updated Medication List  
  
   
This list is accurate as of: 1/5/18  2:14 PM.  Always use your most recent med list.  
  
  
  
  
 amiodarone 200 mg tablet Commonly known as:  CORDARONE Take 1 Tab by mouth every other day. BENICAR 40 mg tablet Generic drug:  olmesartan Take 40 mg by mouth nightly. biotin 2,500 mcg Tab Take 500 mcg by mouth daily. bumetanide 1 mg tablet Commonly known as:  Loretha Patricia Take 1 Tab by mouth daily. buPROPion  mg SR tablet Commonly known as:  Velasquez [facility]  
 Take 150 mg by mouth two (2) times a day. calcium-cholecalciferol (D3) tablet Commonly known as:  CALTRATE 600+D Take 1 Tab by mouth daily. clobetasol 0.05 % ointment Commonly known as:  Sky Brash Apply  to affected area two (2) times a day. Diclofenac Potassium 50 mg Pwpk Commonly known as:  CAMBIA  
1 at HA onset and repeat in 2 hours x 1 PRN  
  
 gabapentin 300 mg capsule Commonly known as:  NEURONTIN Take 300 mg by mouth three (3) times daily. djpbakj-sybfxwefm-fcztlnbhmjcc -325 mg capsule Commonly known as:  Teryl Blade Take 1 Cap by mouth four (4) times daily as needed. Max Daily Amount: 4 Caps. levothyroxine 25 mcg tablet Commonly known as:  SYNTHROID Take 25 mcg by mouth nightly. loratadine 10 mg Cap Take 10 mg by mouth daily. nebivolol 20 mg tablet Commonly known as:  BYSTOLIC Take 20 mg by mouth daily. potassium chloride 20 mEq tablet Commonly known as:  K-DUR, KLOR-CON Take 1 tablet by mouth two  times daily PriLOSEC 20 mg capsule Generic drug:  omeprazole Take 20 mg by mouth daily. Indications: GASTROESOPHAGEAL REFLUX  
  
 prochlorperazine 25 mg suppository Commonly known as:  Wyline Pull Insert 1 Suppository into rectum every twelve (12) hours as needed for Nausea. prochlorperazine 5 mg tablet Commonly known as:  COMPAZINE Take 1-2 tablets by mouth every 6-8 hours PRN  
  
 therapeutic multivitamin tablet Commonly known as:  [facility] Take 1 Tab by mouth daily. topiramate 100 mg tablet Commonly known as:  TOPAMAX Take 1 Tab by mouth nightly. VITAMIN C 1,000 mg tablet Generic drug:  ascorbic acid (vitamin C) Take 1,000 mg by mouth nightly. XARELTO 20 mg Tab tablet Generic drug:  rivaroxaban Take 1 tablet by mouth  daily Prescriptions Sent to Pharmacy  Refills  
 prochlorperazine (COMPAZINE) 5 mg tablet 5  
 Sig: Take 1-2 tablets by mouth every 6-8 hours PRN Class: Normal  
 Pharmacy: [facility] AT [address]  #: [phone] Follow-up Instructions Return in about 3 months (around 4/5/2018). Patient Instructions PRESCRIPTION REFILL POLICY [facility] Statement to Patients April 1, 2014 In an effort to ensure the large volume of patient prescription refills is processed in the most efficient and expeditious manner, we are asking our patients to assist us by calling your Pharmacy for all prescription refills, this will include also your  Mail Order Pharmacy. The pharmacy will contact our office electronically to continue the refill process. Please do not wait until the last minute to call your pharmacy. We need at least 48 hours (2days) to fill prescriptions. We also encourage you to call your pharmacy before going to  your prescription to make sure it is ready. With regard to controlled substance prescription refill requests (narcotic refills) that need to be picked up at our office, we ask your cooperation by providing us with at least 72 hours (3days) notice that you will need a refill. We will not refill narcotic prescription refill requests after 4:00pm on any weekday, Monday through Thursday, or after 2:00pm on Fridays, or on the weekends. We encourage everyone to explore another way of getting your prescription refill request processed using [employer], our patient web portal through our electronic medical record system. [employer] is an efficient and effective way to communicate your medication request directly to the office and  downloadable as an gwen on your smart phone . [employer] also features a review functionality that allows you to view your medication list as well as leave messages for your physician. Are you ready to get connected?  If so please review the attatched instructions or speak to any of our staff to get you set up right away! Thank you so much for your cooperation. Should you have any questions please contact our Practice Administrator. The Physicians and Staff,  [facility] Introducing [facility] SERVICES! Dear Janette George: 
Thank you for requesting a Constant [employer] account. Our records indicate that you already have an active Constant Contact account. You can access your account anytime at [email or web address] Did you know that you can access your hospital and ER discharge instructions at any time in [employer]? You can also review all of your test results from your hospital stay or ER visit. Additional Information If you have questions, please visit the Frequently Asked Questions section of the Constant [employer] website at [email or web address] Remember, Constant [employer] is NOT to be used for urgent needs. For medical emergencies, dial 911. Now available from your iPhone and Android! Please provide this summary of care documentation to your next provider. Your primary care clinician is listed as Lauren Oreilly. If you have any questions after today's visit, please call [phone].

## 2018-01-09 NOTE — PROGRESS NOTES
Usha Toney is a 81 y.o. female who presents with the following  Chief Complaint   Patient presents with    Follow-up     migraines       HPI Patient comes in for a follow up for migraines. Since instituting topamax she has only had about 2-3 migraines a month lasting only about 2-3 hours each. Will use cambia, midrin or both for these and abortive therapy. She states her headaches are usually located unilaterally. Has light, sound, smell sensitve and some nausea. She is using compazine suppositories and wants to switch to pills if she could for her nausea. She states nothing else has changed and she is happy with her current treatments. Also on Gabapentin 300 mg TID     No Known Allergies    Current Outpatient Prescriptions   Medication Sig    prochlorperazine (COMPAZINE) 5 mg tablet Take 1-2 tablets by mouth every 6-8 hours PRN    Diclofenac Potassium (CAMBIA) 50 mg pwpk 1 at HA onset and repeat in 2 hours x 1 PRN    jpmkrqf-ukjqsdwxz-dkbxjvoutwee (MIDRIN) -325 mg capsule Take 1 Cap by mouth four (4) times daily as needed. Max Daily Amount: 4 Caps.  topiramate (TOPAMAX) 100 mg tablet Take 1 Tab by mouth nightly.  gabapentin (NEURONTIN) 300 mg capsule Take 300 mg by mouth three (3) times daily.  prochlorperazine (COMPRO) 25 mg suppository Insert 1 Suppository into rectum every twelve (12) hours as needed for Nausea.  clobetasol (TEMOVATE) 0.05 % ointment Apply  to affected area two (2) times a day.  loratadine 10 mg cap Take 10 mg by mouth daily.  biotin 2,500 mcg tab Take 500 mcg by mouth daily.  buPROPion SR (WELLBUTRIN SR) 150 mg SR tablet Take 150 mg by mouth two (2) times a day.  calcium-cholecalciferol, D3, (CALTRATE 600+D) tablet Take 1 Tab by mouth daily.  therapeutic multivitamin (THERAGRAN) tablet Take 1 Tab by mouth daily.  nebivolol (BYSTOLIC) 20 mg tablet Take 20 mg by mouth daily.  levothyroxine (SYNTHROID) 25 mcg tablet Take 25 mcg by mouth nightly.     olmesartan (BENICAR) 40 mg tablet Take 40 mg by mouth nightly.  potassium chloride (K-DUR, KLOR-CON) 20 mEq tablet Take 1 tablet by mouth two  times daily    XARELTO 20 mg tab tablet Take 1 tablet by mouth  daily (Patient taking differently: Take 1 tablet by mouth  daily with breakfast)    amiodarone (CORDARONE) 200 mg tablet Take 1 Tab by mouth every other day. (Patient taking differently: Take 100 mg by mouth every other day.)    bumetanide (BUMEX) 1 mg tablet Take 1 Tab by mouth daily.  ascorbic acid (VITAMIN C) 1,000 mg tablet Take 1,000 mg by mouth nightly.  omeprazole (PRILOSEC) 20 mg capsule Take 20 mg by mouth daily. Indications: GASTROESOPHAGEAL REFLUX     No current facility-administered medications for this visit.         History   Smoking Status    Former Smoker   Smokeless Tobacco    Never Used     Comment: quit 30 years ago       Past Medical History:   Diagnosis Date    Arrhythmia     Atrial fibrillation ([address]     CAD (coronary artery disease)     atrial fibrillation    Gastrointestinal disorder     GERD    GERD (gastroesophageal reflux disease)     Headache(784.0)     Hypertension     Hypothyroid     Neurological disorder     migraines    Other ill-defined conditions(799.89) migraine    Psychiatric disorder previous hx of depression       Past Surgical History:   Procedure Laterality Date    HX CHOLECYSTECTOMY      HX HYSTERECTOMY      HX ORTHOPAEDIC      Right shoulder surgery    HX PACEMAKER      HX ELIA AND BSO      MS ANESTH,SURGERY OF SHOULDER      left       Family History   Problem Relation Age of Onset    Hypertension Other     Stroke Other     Heart Attack Other        Social History     Social History    Marital status:      Spouse name: N/A    Number of children: N/A    Years of education: N/A     Social History Main Topics    Smoking status: Former Smoker    Smokeless tobacco: Never Used      Comment: quit 30 years ago    Alcohol use Yes Comment: social drinker    Drug use: No    Sexual activity: Not Asked     Other Topics Concern    None     Social History Narrative       Review of Systems   Eyes: Positive for blurred vision and photophobia. Respiratory: Negative for shortness of breath and wheezing. Cardiovascular: Negative for chest pain and palpitations. Gastrointestinal: Positive for nausea and vomiting. Neurological: Positive for headaches. Negative for dizziness, tingling, tremors, seizures and loss of consciousness. Remainder of comprehensive review is negative. Physical Exam :    Visit Vitals    /86    Ht 5' 4\" (1.626 m)    Wt 88.9 kg (196 lb)    BMI 33.64 kg/m2       General: Well defined, nourished, and groomed individual in no acute distress.    Neck: Supple, nontender, no bruits, no pain with resistance to active range of motion.    Heart: Regular rate and rhythm, no murmurs, rub, or gallop. Normal S1S2. Lungs: Clear to auscultation bilaterally with equal chest expansion, no cough, no wheeze  Musculoskeletal: Extremities revealed no edema and had full range of motion of joints.    Psych: Good mood and bright affect    NEUROLOGICAL EXAMINATION:    Mental Status: Alert and oriented to person, place, and time    Cranial Nerves:    II, III, IV, VI: Visual acuity grossly intact. Visual fields are normal.    Pupils are equal, round, and reactive to light and accommodation.    Extra-ocular movements are full and fluid. Fundoscopic exam was benign, no ptosis or nystagmus.    V-XII: Hearing is grossly intact. Facial features are symmetric, with normal sensation and strength. The palate rises symmetrically and the tongue protrudes midline. Sternocleidomastoids 5/5. Motor Examination: Normal tone, bulk, and strength, 5/5 muscle strength throughout. Coordination: Finger to nose was normal. No resting or intention tremor    Gait and Station: Steady while walking. Normal arm swing. No pronator drift.  No muscle wasting or fasiculations noted. Reflexes: DTRs 2+ throughout. Results for orders placed or performed during the hospital encounter of 10/13/17   STREP AG SCREEN, GROUP A   Result Value Ref Range    Group A Strep Ag ID NEGATIVE  NEG     CULTURE, THROAT   Result Value Ref Range    Special Requests: NO SPECIAL REQUESTS      Culture result: NORMAL RESPIRATORY THEODORA/NO BETA STREP ISOLATED         Orders Placed This Encounter    prochlorperazine (COMPAZINE) 5 mg tablet     Sig: Take 1-2 tablets by mouth every 6-8 hours PRN     Dispense:  30 Tab     Refill:  5       No diagnosis found. Follow-up Disposition:  Return in about 3 months (around 4/5/2018). Migraines. Better with  Topamax. Keep 100 mg nightly as this has definitely helped decrease symptoms across the board. Keep Cambia vs. Midrin or both for abortive therapy. She can identify when to use these and will have no issues with abortive therapy. Switch to Compazine for nausea PRN. We discussed POC and side effects and no questions.        This note will not be viewable in [employer]

## 2018-01-13 ENCOUNTER — HOSPITAL ENCOUNTER (EMERGENCY)
Age: 73
Discharge: HOME | End: 2018-01-13
Attending: EMERGENCY MEDICINE
Payer: MEDICARE

## 2018-01-13 ENCOUNTER — APPOINTMENT (OUTPATIENT)
Dept: CT IMAGING | Age: 73
End: 2018-01-13
Attending: EMERGENCY MEDICINE
Payer: MEDICARE

## 2018-01-13 VITALS
RESPIRATION RATE: 20 BRPM | DIASTOLIC BLOOD PRESSURE: 78 MMHG | TEMPERATURE: 98.1 F | HEIGHT: 64 IN | OXYGEN SATURATION: 98 % | WEIGHT: 200.62 LBS | SYSTOLIC BLOOD PRESSURE: 148 MMHG | BODY MASS INDEX: 34.25 KG/M2 | HEART RATE: 62 BPM

## 2018-01-13 DIAGNOSIS — N10 ACUTE PYELONEPHRITIS: Primary | ICD-10-CM

## 2018-01-13 LAB
ALBUMIN SERPL-MCNC: 3.5 G/DL (ref 3.5–5)
ALBUMIN/GLOB SERPL: 1.3 {RATIO} (ref 1.1–2.2)
ALP SERPL-CCNC: 47 U/L (ref 45–117)
ALT SERPL-CCNC: 33 U/L (ref 12–78)
ANION GAP SERPL CALC-SCNC: 8 MMOL/L (ref 5–15)
APPEARANCE UR: ABNORMAL
AST SERPL-CCNC: 16 U/L (ref 15–37)
BACTERIA URNS QL MICRO: ABNORMAL /HPF
BASOPHILS # BLD: 0.1 K/UL (ref 0–0.1)
BASOPHILS NFR BLD: 1 % (ref 0–1)
BILIRUB SERPL-MCNC: 0.2 MG/DL (ref 0.2–1)
BILIRUB UR QL: NEGATIVE
BUN SERPL-MCNC: 38 MG/DL (ref 6–20)
BUN/CREAT SERPL: 28 (ref 12–20)
CALCIUM SERPL-MCNC: 8.9 MG/DL (ref 8.5–10.1)
CHLORIDE SERPL-SCNC: 110 MMOL/L (ref 97–108)
CO2 SERPL-SCNC: 26 MMOL/L (ref 21–32)
COLOR UR: ABNORMAL
CREAT SERPL-MCNC: 1.36 MG/DL (ref 0.55–1.02)
DIFFERENTIAL METHOD BLD: ABNORMAL
EOSINOPHIL # BLD: 0.1 K/UL (ref 0–0.4)
EOSINOPHIL NFR BLD: 1 % (ref 0–7)
EPITH CASTS URNS QL MICRO: ABNORMAL /LPF
ERYTHROCYTE [DISTWIDTH] IN BLOOD BY AUTOMATED COUNT: 14.1 % (ref 11.5–14.5)
GLOBULIN SER CALC-MCNC: 2.6 G/DL (ref 2–4)
GLUCOSE SERPL-MCNC: 139 MG/DL (ref 65–100)
GLUCOSE UR STRIP.AUTO-MCNC: NEGATIVE MG/DL
HCT VFR BLD AUTO: 38.3 % (ref 35–47)
HGB BLD-MCNC: 12.2 G/DL (ref 11.5–16)
HGB UR QL STRIP: ABNORMAL
HYALINE CASTS URNS QL MICRO: ABNORMAL /LPF (ref 0–5)
KETONES UR QL STRIP.AUTO: NEGATIVE MG/DL
LACTATE BLD-SCNC: 0.6 MMOL/L (ref 0.4–2)
LEUKOCYTE ESTERASE UR QL STRIP.AUTO: ABNORMAL
LYMPHOCYTES # BLD: 1.2 K/UL (ref 0.8–3.5)
LYMPHOCYTES NFR BLD: 10 % (ref 12–49)
MCH RBC QN AUTO: 32.2 PG (ref 26–34)
MCHC RBC AUTO-ENTMCNC: 31.9 G/DL (ref 30–36.5)
MCV RBC AUTO: 101.1 FL (ref 80–99)
MONOCYTES # BLD: 1 K/UL (ref 0–1)
MONOCYTES NFR BLD: 8 % (ref 5–13)
NEUTS SEG # BLD: 10 K/UL (ref 1.8–8)
NEUTS SEG NFR BLD: 80 % (ref 32–75)
NITRITE UR QL STRIP.AUTO: POSITIVE
PH UR STRIP: 5.5 [PH] (ref 5–8)
PLATELET # BLD AUTO: 218 K/UL (ref 150–400)
POTASSIUM SERPL-SCNC: 4.9 MMOL/L (ref 3.5–5.1)
PROT SERPL-MCNC: 6.1 G/DL (ref 6.4–8.2)
PROT UR STRIP-MCNC: NEGATIVE MG/DL
RBC # BLD AUTO: 3.79 M/UL (ref 3.8–5.2)
RBC #/AREA URNS HPF: ABNORMAL /HPF (ref 0–5)
SODIUM SERPL-SCNC: 144 MMOL/L (ref 136–145)
SP GR UR REFRACTOMETRY: 1.01 (ref 1–1.03)
UA: UC IF INDICATED,UAUC: ABNORMAL
UROBILINOGEN UR QL STRIP.AUTO: 0.2 EU/DL (ref 0.2–1)
WBC # BLD AUTO: 12.3 K/UL (ref 3.6–11)
WBC URNS QL MICRO: >100 /HPF (ref 0–4)

## 2018-01-13 PROCEDURE — 87186 SC STD MICRODIL/AGAR DIL: CPT | Performed by: EMERGENCY MEDICINE

## 2018-01-13 PROCEDURE — 74176 CT ABD & PELVIS W/O CONTRAST: CPT

## 2018-01-13 PROCEDURE — 80053 COMPREHEN METABOLIC PANEL: CPT | Performed by: EMERGENCY MEDICINE

## 2018-01-13 PROCEDURE — 99283 EMERGENCY DEPT VISIT LOW MDM: CPT

## 2018-01-13 PROCEDURE — 85025 COMPLETE CBC W/AUTO DIFF WBC: CPT | Performed by: EMERGENCY MEDICINE

## 2018-01-13 PROCEDURE — 74011000258 HC RX REV CODE- 258: Performed by: EMERGENCY MEDICINE

## 2018-01-13 PROCEDURE — 87077 CULTURE AEROBIC IDENTIFY: CPT | Performed by: EMERGENCY MEDICINE

## 2018-01-13 PROCEDURE — 87086 URINE CULTURE/COLONY COUNT: CPT | Performed by: EMERGENCY MEDICINE

## 2018-01-13 PROCEDURE — 83605 ASSAY OF LACTIC ACID: CPT

## 2018-01-13 PROCEDURE — 96361 HYDRATE IV INFUSION ADD-ON: CPT

## 2018-01-13 PROCEDURE — 81001 URINALYSIS AUTO W/SCOPE: CPT | Performed by: EMERGENCY MEDICINE

## 2018-01-13 PROCEDURE — 74011250636 HC RX REV CODE- 250/636: Performed by: EMERGENCY MEDICINE

## 2018-01-13 PROCEDURE — 36415 COLL VENOUS BLD VENIPUNCTURE: CPT | Performed by: EMERGENCY MEDICINE

## 2018-01-13 PROCEDURE — 96365 THER/PROPH/DIAG IV INF INIT: CPT

## 2018-01-13 RX ORDER — CEFDINIR 300 MG/1
300 CAPSULE ORAL 2 TIMES DAILY
Qty: 20 CAP | Refills: 0 | Status: SHIPPED | OUTPATIENT
Start: 2018-01-13 | End: 2018-01-23

## 2018-01-13 RX ORDER — HYDROCODONE BITARTRATE AND ACETAMINOPHEN 5; 325 MG/1; MG/1
2 TABLET ORAL
COMMUNITY
End: 2018-05-10

## 2018-01-13 RX ADMIN — CEFTRIAXONE SODIUM 1 G: 1 INJECTION, POWDER, FOR SOLUTION INTRAMUSCULAR; INTRAVENOUS at 17:36

## 2018-01-13 RX ADMIN — SODIUM CHLORIDE 1000 ML: 900 INJECTION, SOLUTION INTRAVENOUS at 17:36

## 2018-01-13 NOTE — ED TRIAGE NOTES
Pt presents to ED with c/o two day hx of low back pain. Pt states about wo hours ago she began having blood in her urine. Pt denies any fevers nausea or vomiting.

## 2018-01-13 NOTE — DISCHARGE INSTRUCTIONS
Kidney Infection: Care Instructions  Your Care Instructions    A kidney infection (pyelonephritis) is a type of urinary tract infection, or UTI. Most UTIs are bladder infections. Kidney infections tend to make people much sicker than bladder infections do. A kidney infection is also more serious because it can cause lasting damage if it is not treated quickly. Follow-up care is a key part of your treatment and safety. Be sure to make and go to all appointments, and call your doctor if you are having problems. It's also a good idea to know your test results and keep a list of the medicines you take. How can you care for yourself at home? · Take your antibiotics as directed. Do not stop taking them just because you feel better. You need to take the full course of antibiotics. · Drink plenty of water, enough so that your urine is light yellow or clear like water. This may help wash out bacteria that are causing the infection. If you have kidney, heart, or liver disease and have to limit fluids, talk with your doctor before you increase the amount of fluids you drink. · Urinate often. Try to empty your bladder each time. · To relieve pain, take a hot shower or lay a heating pad (set on low) over your lower belly. Never go to sleep with a heating pad in place. Put a thin cloth between the heating pad and your skin. To help prevent kidney infections  · Drink plenty of water each day. This helps you urinate often, which clears bacteria from your system. If you have kidney, heart, or liver disease and have to limit fluids, talk with your doctor before you increase the amount of fluids you drink. · Urinate when you have the urge. Do not hold your urine for a long time. Urinate before you go to sleep. · If you have symptoms of a bladder infection, such as burning when you urinate or having to urinate often, call your doctor so you can treat the problem before it gets worse.  If you do not treat a bladder infection quickly, it can spread to the kidney. · Men should keep the tip of the penis clean. If you are a woman, keep these ideas in mind:  · Urinate right after you have sex. · Change sanitary pads often. Avoid douches, feminine hygiene sprays, and other feminine hygiene products that have deodorants. · After going to the bathroom, wipe from front to back. When should you call for help? Call your doctor now or seek immediate medical care if:  ? · You have symptoms that a kidney infection is getting worse. These may include:  ¨ Pain or burning when you urinate. ¨ A frequent need to urinate without being able to pass much urine. ¨ Pain in the flank, which is just below the rib cage and above the waist on either side of the back. ¨ Blood in the urine. ¨ A fever. ? · You are vomiting or nauseated. ? Watch closely for changes in your health, and be sure to contact your doctor if:  ? · You do not get better as expected. Where can you learn more? Go to http://capri-ekaterina.info/. Enter W928 in the search box to learn more about \"Kidney Infection: Care Instructions. \"  Current as of: May 12, 2017  Content Version: 11.4  © 0925-4260 [employer]. Care instructions adapted under license by [employer] (which disclaims liability or warranty for this information). If you have questions about a medical condition or this instruction, always ask your healthcare professional. [place] [address] any warranty or liability for your use of this information. We hope that we have addressed all of your medical concerns. The examination and treatment you received in the Emergency Department were for an emergent problem and were not intended as complete care. It is important that you follow up with your healthcare provider(s) for ongoing care.  If your symptoms worsen or do not improve as expected, and you are unable to reach your usual health care provider(s), you should return to the Emergency Department. Today's healthcare is undergoing tremendous change, and patient satisfaction surveys are one of the many tools to assess the quality of medical care. You may receive a survey from the [employer] organization regarding your experience in the Emergency Department. I hope that your experience has been completely positive, particularly the medical care that I provided. As such, please participate in the survey; anything less than excellent does not meet my expectations or intentions. [facility] and [facility] participate in nationally recognized quality of care measures. If your blood pressure is greater than 120/80, as reported below, we urge that you seek medical care to address the potential of high blood pressure, commonly known as hypertension. Hypertension can be hereditary or can be caused by certain medical conditions, pain, stress, or \"white coat syndrome. \"       Please make an appointment with your health care provider(s) for follow up of your Emergency Department visit. VITALS:   Patient Vitals for the past 8 hrs:   Temp Pulse Resp BP SpO2   01/13/18 1634 - 64 16 135/75 97 %   01/13/18 1452 98.1 °F (36.7 °C) 77 16 145/71 99 %          Thank you for allowing us to provide you with medical care today. We realize that you have many choices for your emergency care needs. Please choose us in the future for any continued health care needs. Goran Carrero, [address] [place] [address] 20.   Office: [phone]            Recent Results (from the past 24 hour(s))   URINALYSIS W/ REFLEX CULTURE    Collection Time: 01/13/18  3:10 PM   Result Value Ref Range    Color YELLOW/STRAW      Appearance HAZY (A) CLEAR      Specific gravity 1.010 1.003 - 1.030      pH (UA) 5.5 5.0 - 8.0      Protein NEGATIVE  NEG mg/dL    Glucose NEGATIVE  NEG mg/dL    Ketone NEGATIVE  NEG mg/dL    Bilirubin NEGATIVE  NEG      Blood LARGE (A) NEG      Urobilinogen 0.2 0.2 - 1.0 EU/dL    Nitrites POSITIVE (A) NEG      Leukocyte Esterase MODERATE (A) NEG      WBC >100 (H) 0 - 4 /hpf    RBC 10-20 0 - 5 /hpf    Epithelial cells FEW FEW /lpf    Bacteria 3+ (A) NEG /hpf    UA:UC IF INDICATED URINE CULTURE ORDERED (A) CNI      Hyaline cast 0-2 0 - 5 /lpf   CBC WITH AUTOMATED DIFF    Collection Time: 01/13/18  3:37 PM   Result Value Ref Range    WBC 12.3 (H) 3.6 - 11.0 K/uL    RBC 3.79 (L) 3.80 - 5.20 M/uL    HGB 12.2 11.5 - 16.0 g/dL    HCT 38.3 35.0 - 47.0 %    .1 (H) 80.0 - 99.0 FL    MCH 32.2 26.0 - 34.0 PG    MCHC 31.9 30.0 - 36.5 g/dL    RDW 14.1 11.5 - 14.5 %    PLATELET 871 108 - 906 K/uL    NEUTROPHILS 80 (H) 32 - 75 %    LYMPHOCYTES 10 (L) 12 - 49 %    MONOCYTES 8 5 - 13 %    EOSINOPHILS 1 0 - 7 %    BASOPHILS 1 0 - 1 %    ABS. NEUTROPHILS 10.0 (H) 1.8 - 8.0 K/UL    ABS. LYMPHOCYTES 1.2 0.8 - 3.5 K/UL    ABS. MONOCYTES 1.0 0.0 - 1.0 K/UL    ABS. EOSINOPHILS 0.1 0.0 - 0.4 K/UL    ABS. BASOPHILS 0.1 0.0 - 0.1 K/UL    DF AUTOMATED     METABOLIC PANEL, COMPREHENSIVE    Collection Time: 01/13/18  3:37 PM   Result Value Ref Range    Sodium 144 136 - 145 mmol/L    Potassium 4.9 3.5 - 5.1 mmol/L    Chloride 110 (H) 97 - 108 mmol/L    CO2 26 21 - 32 mmol/L    Anion gap 8 5 - 15 mmol/L    Glucose 139 (H) 65 - 100 mg/dL    BUN 38 (H) 6 - 20 MG/DL    Creatinine 1.36 (H) 0.55 - 1.02 MG/DL    BUN/Creatinine ratio 28 (H) 12 - 20      GFR est AA 47 (L) >60 ml/min/1.73m2    GFR est non-AA 38 (L) >60 ml/min/1.73m2    Calcium 8.9 8.5 - 10.1 MG/DL    Bilirubin, total 0.2 0.2 - 1.0 MG/DL    ALT (SGPT) 33 12 - 78 U/L    AST (SGOT) 16 15 - 37 U/L    Alk.  phosphatase 47 45 - 117 U/L    Protein, total 6.1 (L) 6.4 - 8.2 g/dL    Albumin 3.5 3.5 - 5.0 g/dL    Globulin 2.6 2.0 - 4.0 g/dL    A-G Ratio 1.3 1.1 - 2.2     POC LACTIC ACID    Collection Time: 01/13/18  5:30 PM   Result Value Ref Range    Lactic Acid (POC) 0.6 0.4 - 2.0 mmol/L       Ct Abd Pelv Wo Cont    Result Date: 1/13/2018  INDICATION: uti back pain hematuria COMPARISON: CONTRAST:  None. TECHNIQUE: Thin axial images were obtained through the abdomen and pelvis. Coronal and sagittal reconstructions were generated. Oral contrast was not administered. CT dose reduction was achieved through use of a standardized protocol tailored for this examination and automatic exposure control for dose modulation. Adaptive statistical iterative reconstruction (ASIR) was utilized. The absence of intravenous contrast material reduces the sensitivity for evaluation of the solid parenchymal organs of the abdomen. FINDINGS: LUNG BASES: Minor basilar atelectasis/scarring. INCIDENTALLY IMAGED HEART AND MEDIASTINUM: Unremarkable. LIVER: No mass or biliary dilatation. GALLBLADDER: Surgically absent SPLEEN: No mass. PANCREAS: No mass or ductal dilatation. ADRENALS: There is a 2.4 x 1.6 cm left adrenal nodule which was present on a CT chest dated 2010 and is stable KIDNEYS/URETERS: No mass, calculus, or hydronephrosis. STOMACH: Unremarkable. SMALL BOWEL: No dilatation or wall thickening. COLON: No dilatation or wall thickening. APPENDIX: Unremarkable. PERITONEUM: No ascites or pneumoperitoneum. RETROPERITONEUM: No lymphadenopathy or aortic aneurysm. REPRODUCTIVE ORGANS: Patient status post hysterectomy URINARY BLADDER: No mass or calculus. BONES: No destructive bone lesion. There are degenerative changes in lumbar spine with slight scoliosis ADDITIONAL COMMENTS: N/A     IMPRESSION: No acute abnormality identified. There is a 2.4 x 1.6 cm left adrenal nodule which is stable when compared to CT dated 2010. We hope that we have addressed all of your medical concerns. The examination and treatment you received in the Emergency Department were for an emergent problem and were not intended as complete care.  It is important that you follow up with your healthcare provider(s) for ongoing care. If your symptoms worsen or do not improve as expected, and you are unable to reach your usual health care provider(s), you should return to the Emergency Department. Today's healthcare is undergoing tremendous change, and patient satisfaction surveys are one of the many tools to assess the quality of medical care. You may receive a survey from the [employer] organization regarding your experience in the Emergency Department. I hope that your experience has been completely positive, particularly the medical care that I provided. As such, please participate in the survey; anything less than excellent does not meet my expectations or intentions. [facility] and [facility] participate in nationally recognized quality of care measures. If your blood pressure is greater than 120/80, as reported below, we urge that you seek medical care to address the potential of high blood pressure, commonly known as hypertension. Hypertension can be hereditary or can be caused by certain medical conditions, pain, stress, or \"white coat syndrome. \"       Please make an appointment with your health care provider(s) for follow up of your Emergency Department visit. VITALS:   Patient Vitals for the past 8 hrs:   Temp Pulse Resp BP SpO2   01/13/18 1634 - 64 16 135/75 97 %   01/13/18 1452 98.1 °F (36.7 °C) 77 16 145/71 99 %          Thank you for allowing us to provide you with medical care today. We realize that you have many choices for your emergency care needs. Please choose us in the future for any continued health care needs. Carito Hua, [address] [place] [address] 20.   Office: [phone]            Recent Results (from the past 24 hour(s))   URINALYSIS W/ REFLEX CULTURE    Collection Time: 01/13/18  3:10 PM   Result Value Ref Range    Color YELLOW/STRAW      Appearance HAZY (A) CLEAR      Specific gravity 1.010 1.003 - 1.030      pH (UA) 5.5 5.0 - 8.0      Protein NEGATIVE  NEG mg/dL    Glucose NEGATIVE  NEG mg/dL    Ketone NEGATIVE  NEG mg/dL    Bilirubin NEGATIVE  NEG      Blood LARGE (A) NEG      Urobilinogen 0.2 0.2 - 1.0 EU/dL    Nitrites POSITIVE (A) NEG      Leukocyte Esterase MODERATE (A) NEG      WBC >100 (H) 0 - 4 /hpf    RBC 10-20 0 - 5 /hpf    Epithelial cells FEW FEW /lpf    Bacteria 3+ (A) NEG /hpf    UA:UC IF INDICATED URINE CULTURE ORDERED (A) CNI      Hyaline cast 0-2 0 - 5 /lpf   CBC WITH AUTOMATED DIFF    Collection Time: 01/13/18  3:37 PM   Result Value Ref Range    WBC 12.3 (H) 3.6 - 11.0 K/uL    RBC 3.79 (L) 3.80 - 5.20 M/uL    HGB 12.2 11.5 - 16.0 g/dL    HCT 38.3 35.0 - 47.0 %    .1 (H) 80.0 - 99.0 FL    MCH 32.2 26.0 - 34.0 PG    MCHC 31.9 30.0 - 36.5 g/dL    RDW 14.1 11.5 - 14.5 %    PLATELET 432 374 - 474 K/uL    NEUTROPHILS 80 (H) 32 - 75 %    LYMPHOCYTES 10 (L) 12 - 49 %    MONOCYTES 8 5 - 13 %    EOSINOPHILS 1 0 - 7 %    BASOPHILS 1 0 - 1 %    ABS. NEUTROPHILS 10.0 (H) 1.8 - 8.0 K/UL    ABS. LYMPHOCYTES 1.2 0.8 - 3.5 K/UL    ABS. MONOCYTES 1.0 0.0 - 1.0 K/UL    ABS. EOSINOPHILS 0.1 0.0 - 0.4 K/UL    ABS. BASOPHILS 0.1 0.0 - 0.1 K/UL    DF AUTOMATED     METABOLIC PANEL, COMPREHENSIVE    Collection Time: 01/13/18  3:37 PM   Result Value Ref Range    Sodium 144 136 - 145 mmol/L    Potassium 4.9 3.5 - 5.1 mmol/L    Chloride 110 (H) 97 - 108 mmol/L    CO2 26 21 - 32 mmol/L    Anion gap 8 5 - 15 mmol/L    Glucose 139 (H) 65 - 100 mg/dL    BUN 38 (H) 6 - 20 MG/DL    Creatinine 1.36 (H) 0.55 - 1.02 MG/DL    BUN/Creatinine ratio 28 (H) 12 - 20      GFR est AA 47 (L) >60 ml/min/1.73m2    GFR est non-AA 38 (L) >60 ml/min/1.73m2    Calcium 8.9 8.5 - 10.1 MG/DL    Bilirubin, total 0.2 0.2 - 1.0 MG/DL    ALT (SGPT) 33 12 - 78 U/L    AST (SGOT) 16 15 - 37 U/L    Alk.  phosphatase 47 45 - 117 U/L    Protein, total 6.1 (L) 6.4 - 8.2 g/dL    Albumin 3.5 3.5 - 5.0 g/dL    Globulin 2.6 2.0 - 4.0 g/dL    A-G Ratio 1.3 1.1 - 2.2     POC LACTIC ACID    Collection Time: 01/13/18  5:30 PM   Result Value Ref Range    Lactic Acid (POC) 0.6 0.4 - 2.0 mmol/L       Ct Abd Pelv Wo Cont    Result Date: 1/13/2018  INDICATION: uti back pain hematuria COMPARISON: CONTRAST:  None. TECHNIQUE: Thin axial images were obtained through the abdomen and pelvis. Coronal and sagittal reconstructions were generated. Oral contrast was not administered. CT dose reduction was achieved through use of a standardized protocol tailored for this examination and automatic exposure control for dose modulation. Adaptive statistical iterative reconstruction (ASIR) was utilized. The absence of intravenous contrast material reduces the sensitivity for evaluation of the solid parenchymal organs of the abdomen. FINDINGS: LUNG BASES: Minor basilar atelectasis/scarring. INCIDENTALLY IMAGED HEART AND MEDIASTINUM: Unremarkable. LIVER: No mass or biliary dilatation. GALLBLADDER: Surgically absent SPLEEN: No mass. PANCREAS: No mass or ductal dilatation. ADRENALS: There is a 2.4 x 1.6 cm left adrenal nodule which was present on a CT chest dated 2010 and is stable KIDNEYS/URETERS: No mass, calculus, or hydronephrosis. STOMACH: Unremarkable. SMALL BOWEL: No dilatation or wall thickening. COLON: No dilatation or wall thickening. APPENDIX: Unremarkable. PERITONEUM: No ascites or pneumoperitoneum. RETROPERITONEUM: No lymphadenopathy or aortic aneurysm. REPRODUCTIVE ORGANS: Patient status post hysterectomy URINARY BLADDER: No mass or calculus. BONES: No destructive bone lesion. There are degenerative changes in lumbar spine with slight scoliosis ADDITIONAL COMMENTS: N/A     IMPRESSION: No acute abnormality identified. There is a 2.4 x 1.6 cm left adrenal nodule which is stable when compared to CT dated 2010.

## 2018-01-13 NOTE — ED PROVIDER NOTES
Patient is a 81 y.o. female presenting with back pain and hematuria. Back Pain      Blood in Urine    Associated symptoms include back pain. The patient is a 22-year-old white female who presents to the emergency with acute onset of low back pain which is bilateral and radiates into the groin associated with hematuria. She also has UTI symptoms of frequency burning and urgency. The urine also appears cloudy. She has no history of kidney stone. She feels like she had a UTI which started Thursday      Past Medical History:   Diagnosis Date    Arrhythmia     Atrial fibrillation ([address]     CAD (coronary artery disease)     atrial fibrillation    Gastrointestinal disorder     GERD    GERD (gastroesophageal reflux disease)     Headache(784.0)     Hypertension     Hypothyroid     Neurological disorder     migraines    Other ill-defined conditions(799.89) migraine    Psychiatric disorder previous hx of depression       Past Surgical History:   Procedure Laterality Date    HX CHOLECYSTECTOMY      HX HYSTERECTOMY      HX ORTHOPAEDIC      Right shoulder surgery    HX PACEMAKER      HX ELIA AND BSO      OR ANESTH,SURGERY OF SHOULDER      left         Family History:   Problem Relation Age of Onset    Hypertension Other     Stroke Other     Heart Attack Other        Social History     Social History    Marital status:      Spouse name: N/A    Number of children: N/A    Years of education: N/A     Occupational History    Not on file. Social History Main Topics    Smoking status: Former Smoker    Smokeless tobacco: Never Used      Comment: quit 30 years ago    Alcohol use Yes      Comment: social drinker    Drug use: No    Sexual activity: Not on file     Other Topics Concern    Not on file     Social History Narrative         ALLERGIES: Review of patient's allergies indicates no known allergies. Review of Systems   Musculoskeletal: Positive for back pain.    All other systems reviewed and are negative. Vitals:    01/13/18 1452   BP: 145/71   Pulse: 77   Resp: 16   Temp: 98.1 °F (36.7 °C)   SpO2: 99%   Weight: 91 kg (200 lb 9.9 oz)   Height: 5' 4\" (1.626 m)            Physical Exam   Constitutional: She is oriented to person, place, and time. She appears well-developed and well-nourished. HENT:   Head: Normocephalic and atraumatic. Mouth/Throat: Oropharynx is clear and moist. No oropharyngeal exudate. Eyes: Conjunctivae are normal. No scleral icterus. Neck: Neck supple. No thyromegaly present. Cardiovascular: Normal rate, regular rhythm and normal heart sounds. Exam reveals no gallop and no friction rub. No murmur heard. Pulmonary/Chest: Effort normal and breath sounds normal. No stridor. No respiratory distress. She has no wheezes. She has no rales. Abdominal: Soft. Bowel sounds are normal. There is no tenderness. There is no rebound and no guarding. Musculoskeletal: Normal range of motion. Lymphadenopathy:     She has no cervical adenopathy. Neurological: She is alert and oriented to person, place, and time. Skin: Skin is warm and dry. Psychiatric: She has a normal mood and affect.         [facility]  ED Course       Procedures

## 2018-01-13 NOTE — ED NOTES
Discharge note: The patient was discharged home in stable condition, accompanied by family member. The patient is alert and oriented, is in no respiratory distress and has vital signs within normal limits. The patient's diagnosis, condition and treatment were explained to patient by Dr Eliseo Cervantes and reinforced by nurse. The patient expressed understanding of discharge instructions, prescriptions, and plan of care. A discharge plan has been developed. A  was not involved in the process. Patient offered a wheelchair to ED lobby for discharge but declined at this time. Patient ambulatory to ED lobby to go home with family member.

## 2018-01-15 LAB
BACTERIA SPEC CULT: ABNORMAL
CC UR VC: ABNORMAL
SERVICE CMNT-IMP: ABNORMAL

## 2018-05-10 ENCOUNTER — OFFICE VISIT (OUTPATIENT)
Dept: NEUROLOGY | Age: 73
End: 2018-05-10

## 2018-05-10 VITALS
HEART RATE: 64 BPM | WEIGHT: 201.4 LBS | OXYGEN SATURATION: 98 % | HEIGHT: 64 IN | SYSTOLIC BLOOD PRESSURE: 134 MMHG | BODY MASS INDEX: 34.38 KG/M2 | DIASTOLIC BLOOD PRESSURE: 78 MMHG | RESPIRATION RATE: 18 BRPM

## 2018-05-10 DIAGNOSIS — Z86.73 HISTORY OF TIA (TRANSIENT ISCHEMIC ATTACK): ICD-10-CM

## 2018-05-10 DIAGNOSIS — G25.0 ESSENTIAL TREMOR: ICD-10-CM

## 2018-05-10 DIAGNOSIS — Z79.01 CHRONIC ANTICOAGULATION: ICD-10-CM

## 2018-05-10 DIAGNOSIS — G43.019 REFRACTORY MIGRAINE WITHOUT AURA: Primary | ICD-10-CM

## 2018-05-10 DIAGNOSIS — R09.89 LEFT CAROTID BRUIT: ICD-10-CM

## 2018-05-10 RX ORDER — TOPIRAMATE 50 MG/1
TABLET, FILM COATED ORAL
Qty: 90 TAB | Refills: 3 | Status: SHIPPED | OUTPATIENT
Start: 2018-05-10 | End: 2018-06-26 | Stop reason: SDUPTHER

## 2018-05-10 RX ORDER — CETIRIZINE HCL 10 MG
10 TABLET ORAL DAILY
COMMUNITY
End: 2020-02-16

## 2018-05-10 NOTE — PROGRESS NOTES
[address] [place] [address] [place] [address]   [place] [address], [address]   [phone] [facility]   [phone] Fax               Chief Complaint   Patient presents with    Migraine     3 mo f/u yesterday was the first migraine pt has had in months and only had to take one dose of Cambia and midrin      Current Outpatient Prescriptions   Medication Sig Dispense Refill    cetirizine (ZYRTEC) 10 mg tablet Take 10 mg by mouth daily.  prochlorperazine (COMPAZINE) 5 mg tablet Take 1-2 tablets by mouth every 6-8 hours PRN 30 Tab 5    Diclofenac Potassium (CAMBIA) 50 mg pwpk 1 at HA onset and repeat in 2 hours x 1 PRN 9 Packet 3    sinsrde-jlyikrzux-zzdxeqidwrhs (MIDRIN) -325 mg capsule Take 1 Cap by mouth four (4) times daily as needed. Max Daily Amount: 4 Caps. 30 Cap 2    topiramate (TOPAMAX) 100 mg tablet Take 1 Tab by mouth nightly. 30 Tab 3    gabapentin (NEURONTIN) 300 mg capsule Take 300 mg by mouth three (3) times daily.  clobetasol (TEMOVATE) 0.05 % ointment Apply  to affected area two (2) times a day.  loratadine 10 mg cap Take 10 mg by mouth daily.  biotin 2,500 mcg tab Take 500 mcg by mouth daily.  buPROPion SR (WELLBUTRIN SR) 150 mg SR tablet Take 150 mg by mouth two (2) times a day.  calcium-cholecalciferol, D3, (CALTRATE 600+D) tablet Take 1 Tab by mouth daily.  therapeutic multivitamin (THERAGRAN) tablet Take 1 Tab by mouth daily.  nebivolol (BYSTOLIC) 20 mg tablet Take 20 mg by mouth daily.  levothyroxine (SYNTHROID) 25 mcg tablet Take 25 mcg by mouth nightly.  olmesartan (BENICAR) 40 mg tablet Take 40 mg by mouth nightly.       potassium chloride (K-DUR, KLOR-CON) 20 mEq tablet Take 1 tablet by mouth two  times daily 60 Tab 0    XARELTO 20 mg tab tablet Take 1 tablet by mouth  daily (Patient taking differently: Take 1 tablet by mouth  daily with breakfast) 90 Tab 0    amiodarone (CORDARONE) 200 mg tablet Take 1 Tab by mouth every other day. (Patient taking differently: Take 100 mg by mouth every other day.) 45 Tab 3    bumetanide (BUMEX) 1 mg tablet Take 1 Tab by mouth daily. 90 Tab 3    ascorbic acid (VITAMIN C) 1,000 mg tablet Take 1,000 mg by mouth nightly.  omeprazole (PRILOSEC) 20 mg capsule Take 20 mg by mouth daily. Indications: GASTROESOPHAGEAL REFLUX        No Known Allergies  Social History   Substance Use Topics    Smoking status: Former Smoker    Smokeless tobacco: Never Used      Comment: quit 30 years ago    Alcohol use Yes      Comment: social drinker   Patient returns today for follow-up migraine headaches. She has been maintained on Topamax for preventative therapy and Cambia/Midrin for abortive therapy. Since her last visit 3 months ago she had her first migraine yesterday. Her abortive medicines were effective. No focal deficits. No perceived side effects. She is happy with how her headaches are going at this point. She believes that whether may have triggered the recent headache or perhaps even some stress. No symptom of cerebrovascular ischemia. She does have history of TIA. She continues to attempt to modify modifiable risk factors. Given her history of TIA she needs to avoid triptan medications for her migraine abortive treatment. New complaint today and that is of tremor. She notes that her bilateral hands will shake. This is an action tremor. Interferes with activities at times. Become more bothersome. She does not notice any head trauma. No vocal tremor. Grandfather had same tremor. No resting tremor. No difficulty with gait or balance. No falls.     Review of systems  Pertinent positives and negatives are as noted above otherwise comprehensive systems review is negative    Examination  Visit Vitals    /78 (BP 1 Location: Left arm, BP Patient Position: Sitting)    Pulse 64    Resp 18    Ht 5' 4\" (1.626 m)    Wt 91.4 kg (201 lb 6.4 oz)    SpO2 98%    BMI 34.57 kg/m2   Pleasant lady. She is overweight. No icterus. Appropriately dressed in a properly groomed. A bit of a flat affect. She has no edema. Symmetric pulses. Awake alert oriented and conversant with normal speech-language cognition and attention. Cranial nerves are intact 2-12. No nystagmus. No afferent pupillary defect. She has no pronation or drift. She has postural tremor of the outstretched hands with intention on finger-nose-finger. Minimal head tremor. No vocal tremor. Steady gait. No ataxia. Impression/Plan  Migraine headaches doing well and we will continue with the Topamax and Cambia. New complaint of tremor and examination today does demonstrate essential tremor. We discussed options. Given the fact that she is already on Topamax and tolerating that we discussed all Topamax can be used to help with tremor as well. In that regard we will increase her dose to 150 mg daily. Discussed administration, potential side effects, potential benefits and alternatives. This I think would be a good choice in that we would not have to worry about any interactions with her chronic anticoagulation of Xarelto. Follow-up in 6 weeks and we will gauge if that has made any difference    Examination with left carotid bruit. Given her history as above we will check carotid Doppler. Shanice Leblanc MD      This note was created using voice recognition software. Despite editing, there may be syntax errors. This note will not be viewable in [address]

## 2018-05-10 NOTE — PROGRESS NOTES
Chief Complaint   Patient presents with    Migraine     3 mo f/u yesterday was the first migraine pt has had in months and only had to take one dose of Cambia and midrin        Coordination of Care Questions    1. Have you been to the ER, urgent care clinic since your last visit? No       Hospitalized since your last visit? No    2. Have you seen or consulted any other health care providers outside of the [address] [place] [address] since your last visit? Include any pap smears or colon screening.  Pcp, ortho

## 2018-05-10 NOTE — MR AVS SNAPSHOT
[address] [facility] [address] [facility] [id number] [phone] Patient: Carole Payan MRN: [id number] :1945 Visit Information Date & Time Provider Department Dept. Phone Encounter #  
 5/10/2018 10:40 AM Kaylen Salcedo MD [facility] Neurology [facility] [phone] [id number] Follow-up Instructions Return in about 6 weeks (around 2018). Upcoming Health Maintenance Date Due Hepatitis C Screening 1945 DTaP/Tdap/Td series (1 - Tdap) 1968 BREAST CANCER SCRN MAMMOGRAM 1997 FOBT Q 1 YEAR AGE 50-75 1997 ZOSTER VACCINE AGE 60> 2007 GLAUCOMA SCREENING Q2Y 2012 Bone Densitometry (Dexa) Screening 2012 Pneumococcal 65+ Low/Medium Risk (1 of 2 - PCV13) 2012 MEDICARE YEARLY EXAM 3/14/2018 Influenza Age 5 to Adult 2018 Allergies as of 5/10/2018  Review Complete On: 5/10/2018 By: Kaylen Salcedo MD  
 No Known Allergies Current Immunizations  Reviewed on 2012 No immunizations on file. Not reviewed this visit You Were Diagnosed With   
  
 Codes Comments Refractory migraine without aura    -  Primary ICD-10-CM: O62.654 
ICD-9-CM: 346.11 Left carotid bruit     ICD-10-CM: R09.89 ICD-9-CM: 513. 9 Vitals BP Pulse Resp Height(growth percentile) Weight(growth percentile) SpO2  
 134/78 (BP 1 Location: Left arm, BP Patient Position: Sitting) 64 18 5' 4\" (1.626 m) 201 lb 6.4 oz (91.4 kg) 98% BMI OB Status Smoking Status 34.57 kg/m2 Hysterectomy Former Smoker Vitals History BMI and BSA Data Body Mass Index Body Surface Area 34.57 kg/m 2 2.03 m 2 Preferred Pharmacy Pharmacy Name Phone [facility] [address] AT  Nisa Almaraz  [phone] Your Updated Medication List  
  
   
 This list is accurate as of 5/10/18 10:57 AM.  Always use your most recent med list.  
  
  
  
  
 amiodarone 200 mg tablet Commonly known as:  CORDARONE Take 1 Tab by mouth every other day. BENICAR 40 mg tablet Generic drug:  olmesartan Take 40 mg by mouth nightly. biotin 2,500 mcg Tab Take 500 mcg by mouth daily. bumetanide 1 mg tablet Commonly known as:  Devin Ledesma Take 1 Tab by mouth daily. buPROPion  mg SR tablet Commonly known as:  Betha Lamer Take 150 mg by mouth two (2) times a day. calcium-cholecalciferol (D3) tablet Commonly known as:  CALTRATE 600+D Take 1 Tab by mouth daily. cetirizine 10 mg tablet Commonly known as:  ZYRTEC Take 10 mg by mouth daily. clobetasol 0.05 % ointment Commonly known as:  Víctor Dire Apply  to affected area two (2) times a day. Diclofenac Potassium 50 mg Pwpk Commonly known as:  CAMBIA  
1 at HA onset and repeat in 2 hours x 1 PRN  
  
 gabapentin 300 mg capsule Commonly known as:  NEURONTIN Take 300 mg by mouth three (3) times daily. jdubrkt-gziuqewvq-deptidhmazok -325 mg capsule Commonly known as:  Marvin Rocio Take 1 Cap by mouth four (4) times daily as needed. Max Daily Amount: 4 Caps. levothyroxine 25 mcg tablet Commonly known as:  SYNTHROID Take 25 mcg by mouth nightly. loratadine 10 mg Cap Take 10 mg by mouth daily. nebivolol 20 mg tablet Commonly known as:  BYSTOLIC Take 20 mg by mouth daily. potassium chloride 20 mEq tablet Commonly known as:  K-DUR, KLOR-CON Take 1 tablet by mouth two  times daily PriLOSEC 20 mg capsule Generic drug:  omeprazole Take 20 mg by mouth daily. Indications: GASTROESOPHAGEAL REFLUX  
  
 prochlorperazine 5 mg tablet Commonly known as:  COMPAZINE Take 1-2 tablets by mouth every 6-8 hours PRN  
  
 therapeutic multivitamin tablet Commonly known as:  [facility] Take 1 Tab by mouth daily. topiramate 50 mg tablet Commonly known as:  TOPAMAX 3 po qhs  
  
 VITAMIN C 1,000 mg tablet Generic drug:  ascorbic acid (vitamin C) Take 1,000 mg by mouth nightly. XARELTO 20 mg Tab tablet Generic drug:  rivaroxaban Take 1 tablet by mouth  daily Prescriptions Printed Refills  
 wocehpd-lhmnnmsse-rpggcryqchzu (MIDRIN) -325 mg capsule 2 Sig: Take 1 Cap by mouth four (4) times daily as needed. Max Daily Amount: 4 Caps. Class: Print Route: Oral  
  
Prescriptions Sent to Pharmacy Refills Diclofenac Potassium (CAMBIA) 50 mg pwpk 3 Si at HA onset and repeat in 2 hours x 1 PRN Class: Normal  
 Pharmacy: [facility] [address] AT [place] [address] Ph #: [phone]  
 topiramate (TOPAMAX) 50 mg tablet 3 Sig: 3 po qhs  
 Class: Normal  
 Pharmacy: [facility] AT [place] [address] Ph #: [phone] Follow-up Instructions Return in about 6 weeks (around 2018). To-Do List   
 05/10/2018 Imaging:  DUPLEX CAROTID BILATERAL AMB NEURO Introducing [facility]! Dear Alvah Gaucher: 
Thank you for requesting a [facility] account. Our records indicate that you already have an active [facility] account. You can access your account anytime at [email or web address] Did you know that you can access your hospital and ER discharge instructions at any time in [facility]? You can also review all of your test results from your hospital stay or ER visit. Additional Information If you have questions, please visit the Frequently Asked Questions section of the [employer] website at [email or web address]!/[email or web address] Remember, [facility] is NOT to be used for urgent needs. For medical emergencies, dial 911. Now available from your iPhone and Android! Please provide this summary of care documentation to your next provider. Your primary care clinician is listed as Lauren Oreilly. If you have any questions after today's visit, please call [phone].

## 2018-05-16 ENCOUNTER — OFFICE VISIT (OUTPATIENT)
Dept: NEUROLOGY | Age: 73
End: 2018-05-16

## 2018-05-16 DIAGNOSIS — R09.89 LEFT CAROTID BRUIT: Primary | ICD-10-CM

## 2018-05-23 NOTE — PROCEDURES
Carotid Doppler:     Date:  5/16/2018    Requesting Physician:  Henny Pederson MD     Indication:  Left carotid bruit, evaluate for stenosis     B-mode imaging reveals no mild plaque at the bifurcations bilaterally. Doppler spectral analysis reveals no significant velocity shifts. Vertebral artery flow antegrade bilaterally. Interpretation:    1. Minimal plaque. 2. No significant stenosis.

## 2018-05-24 ENCOUNTER — TELEPHONE (OUTPATIENT)
Dept: NEUROLOGY | Age: 73
End: 2018-05-24

## 2018-05-24 NOTE — TELEPHONE ENCOUNTER
----- Message from Savanah Stoner sent at 5/24/2018 10:39 AM EDT -----  Regarding: Dr. David Perry  Pt called concerning speaking with the nurse Rachel Anand and would like a call back.  Pt best contact number [phone] or [phone]

## 2018-05-25 ENCOUNTER — TELEPHONE (OUTPATIENT)
Dept: NEUROLOGY | Age: 73
End: 2018-05-25

## 2018-05-25 NOTE — TELEPHONE ENCOUNTER
----- Message from Lucy Drummond sent at 5/25/2018  2:54 PM EDT -----  Regarding: Dr Cielo Bradley  Pt (p) [phone], would like a return call back from the nurse,regarding getting her medication straighten out.

## 2018-06-21 ENCOUNTER — TELEPHONE (OUTPATIENT)
Dept: NEUROLOGY | Age: 73
End: 2018-06-21

## 2018-06-26 ENCOUNTER — OFFICE VISIT (OUTPATIENT)
Dept: NEUROLOGY | Age: 73
End: 2018-06-26

## 2018-06-26 VITALS
OXYGEN SATURATION: 98 % | WEIGHT: 207 LBS | BODY MASS INDEX: 35.34 KG/M2 | HEIGHT: 64 IN | RESPIRATION RATE: 20 BRPM | DIASTOLIC BLOOD PRESSURE: 82 MMHG | HEART RATE: 67 BPM | SYSTOLIC BLOOD PRESSURE: 144 MMHG

## 2018-06-26 DIAGNOSIS — G43.019 REFRACTORY MIGRAINE WITHOUT AURA: Primary | ICD-10-CM

## 2018-06-26 DIAGNOSIS — G25.0 ESSENTIAL TREMOR: ICD-10-CM

## 2018-06-26 PROBLEM — E66.01 SEVERE OBESITY (BMI 35.0-39.9): Status: ACTIVE | Noted: 2018-06-26

## 2018-06-26 RX ORDER — TOPIRAMATE 50 MG/1
TABLET, FILM COATED ORAL
Qty: 270 TAB | Refills: 3 | Status: SHIPPED | OUTPATIENT
Start: 2018-06-26 | End: 2019-03-17 | Stop reason: SDUPTHER

## 2018-06-26 NOTE — MR AVS SNAPSHOT
[address] [place], [place] [address] [address] [id number] [phone] Patient: Reyna German MRN: [id number] :1945 Visit Information Date & Time Provider Department Dept. Phone Encounter #  
 2018  3:00 PM DANELLE Garcia Neurology [facility] [phone] [id number] Your Appointments 2018 11:40 AM  
Follow Up with Lilliana Dietrich MD  
[facility]) Appt Note: 6 wk f/u; 6 wk f/u  
 [address] [id number] [phone]  
  
   
 [facility]  [address] [address] [address] Upcoming Health Maintenance Date Due Hepatitis C Screening 1945 DTaP/Tdap/Td series (1 - Tdap) 1968 BREAST CANCER SCRN MAMMOGRAM 1997 FOBT Q 1 YEAR AGE 50-75 1997 ZOSTER VACCINE AGE 60> 2007 GLAUCOMA SCREENING Q2Y 2012 Bone Densitometry (Dexa) Screening 2012 Pneumococcal 65+ Low/Medium Risk (1 of 2 - PCV13) 2012 MEDICARE YEARLY EXAM 3/14/2018 Influenza Age 5 to Adult 2018 Allergies as of 2018  Review Complete On: 2018 By: Betty Martinez NP No Known Allergies Current Immunizations  Reviewed on 2012 No immunizations on file. Not reviewed this visit You Were Diagnosed With   
  
 Codes Comments Refractory migraine without aura    -  Primary ICD-10-CM: K41.603 
ICD-9-CM: 346.11 Essential tremor     ICD-10-CM: G25.0 ICD-9-CM: 333.1 Vitals BP Pulse Resp Height(growth percentile) Weight(growth percentile) SpO2  
 144/82 67 20 5' 4\" (1.626 m) 207 lb (93.9 kg) 98% BMI OB Status Smoking Status 35.53 kg/m2 Hysterectomy Former Smoker Vitals History BMI and BSA Data Body Mass Index Body Surface Area 35.53 kg/m 2 2.06 m 2 Preferred Pharmacy Pharmacy Name Phone [facility], [address] [facility] [address] Tao Vazquez Your Updated Medication List  
  
   
This list is accurate as of 6/26/18  3:54 PM.  Always use your most recent med list.  
  
  
  
  
 amiodarone 200 mg tablet Commonly known as:  CORDARONE Take 1 Tab by mouth every other day. BENICAR 40 mg tablet Generic drug:  olmesartan Take 40 mg by mouth nightly. biotin 2,500 mcg Tab Take 500 mcg by mouth daily. bumetanide 1 mg tablet Commonly known as:  D[place] Take 1 Tab by mouth daily. buPROPion  mg SR tablet Commonly known as:  Toni Von Take 150 mg by mouth two (2) times a day. calcium-cholecalciferol (D3) tablet Commonly known as:  CALTRATE 600+D Take 1 Tab by mouth daily. cetirizine 10 mg tablet Commonly known as:  ZYRTEC Take 10 mg by mouth daily. clobetasol 0.05 % ointment Commonly known as:  Alonna Gear Apply  to affected area two (2) times a day. Diclofenac Potassium 50 mg Pwpk Commonly known as:  CAMBIA  
1 at HA onset and repeat in 2 hours x 1 PRN  
  
 gabapentin 300 mg capsule Commonly known as:  NEURONTIN Take 300 mg by mouth three (3) times daily. frfsesk-ktqvcxxxg-zvmusipeqmfi -325 mg capsule Commonly known as:  Elyce Narinder Take 1 Cap by mouth four (4) times daily as needed. Max Daily Amount: 4 Caps. levothyroxine 25 mcg tablet Commonly known as:  SYNTHROID Take 25 mcg by mouth nightly. nebivolol 20 mg tablet Commonly known as:  BYSTOLIC Take 20 mg by mouth daily. potassium chloride 20 mEq tablet Commonly known as:  K-DUR, KLOR-CON Take 1 tablet by mouth two  times daily PriLOSEC 20 mg capsule Generic drug:  omeprazole Take 20 mg by mouth daily. Indications: GASTROESOPHAGEAL REFLUX  
  
 prochlorperazine 5 mg tablet Commonly known as:  COMPAZINE Take 1-2 tablets by mouth every 6-8 hours PRN  
  
 therapeutic multivitamin tablet Commonly known as:  [facility] Take 1 Tab by mouth daily. topiramate 50 mg tablet Commonly known as:  TOPAMAX 3 po qhs  
  
 VITAMIN C 1,000 mg tablet Generic drug:  ascorbic acid (vitamin C) Take 1,000 mg by mouth nightly. XARELTO 20 mg Tab tablet Generic drug:  rivaroxaban Take 1 tablet by mouth  daily Prescriptions Sent to Pharmacy Refills  
 topiramate (TOPAMAX) 50 mg tablet 3 Sig: 3 po qhs  
 Class: Normal  
 Pharmacy: [facility] Ph #: [phone] Patient Instructions A Healthy Lifestyle: Care Instructions Your Care Instructions A healthy lifestyle can help you feel good, stay at a healthy weight, and have plenty of energy for both work and play. A healthy lifestyle is something you can share with your whole family. A healthy lifestyle also can lower your risk for serious health problems, such as high blood pressure, heart disease, and diabetes. You can follow a few steps listed below to improve your health and the health of your family. Follow-up care is a key part of your treatment and safety. Be sure to make and go to all appointments, and call your doctor if you are having problems. It's also a good idea to know your test results and keep a list of the medicines you take. How can you care for yourself at home? · Do not eat too much sugar, fat, or fast foods. You can still have dessert and treats now and then. The goal is moderation. · Start small to improve your eating habits. Pay attention to portion sizes, drink less juice and soda pop, and eat more fruits and vegetables. ¨ Eat a healthy amount of food. A 3-ounce serving of meat, for example, is about the size of a deck of cards. Fill the rest of your plate with vegetables and whole grains. ¨ Limit the amount of soda and sports drinks you have every day. Drink more water when you are thirsty. ¨ Eat at least 5 servings of fruits and vegetables every day. It may seem like a lot, but it is not hard to reach this goal. A serving or helping is 1 piece of fruit, 1 cup of vegetables, or 2 cups of leafy, raw vegetables. Have an apple or some carrot sticks as an afternoon snack instead of a candy bar. Try to have fruits and/or vegetables at every meal. 
· Make exercise part of your daily routine. You may want to start with simple activities, such as walking, bicycling, or slow swimming. Try to be active 30 to 60 minutes every day. You do not need to do all 30 to 60 minutes all at once. For example, you can exercise 3 times a day for 10 or 20 minutes. Moderate exercise is safe for most people, but it is always a good idea to talk to your doctor before starting an exercise program. 
· Keep moving. Renelle Freshwater the lawn, work in the garden, or [place]. Take the stairs instead of the elevator at work. · If you smoke, quit. People who smoke have an increased risk for heart attack, stroke, cancer, and other lung illnesses. Quitting is hard, but there are ways to boost your chance of quitting tobacco for good. ¨ Use nicotine gum, patches, or lozenges. ¨ Ask your doctor about stop-smoking programs and medicines. ¨ Keep trying. In addition to reducing your risk of diseases in the future, you will notice some benefits soon after you stop using tobacco. If you have shortness of breath or asthma symptoms, they will likely get better within a few weeks after you quit. · Limit how much alcohol you drink. Moderate amounts of alcohol (up to 2 drinks a day for men, 1 drink a day for women) are okay. But drinking too much can lead to liver problems, high blood pressure, and other health problems. Family health If you have a family, there are many things you can do together to improve your health. · Eat meals together as a family as often as possible. · Eat healthy foods.  This includes fruits, vegetables, lean meats and dairy, and whole grains. · Include your family in your fitness plan. Most people think of activities such as jogging or tennis as the way to fitness, but there are many ways you and your family can be more active. Anything that makes you breathe hard and gets your heart pumping is exercise. Here are some tips: 
¨ Walk to do errands or to take your child to school or the bus. ¨ Go for a family bike ride after dinner instead of watching TV. Where can you learn more? Go to http://capri-ekaterina.info/. Enter I808 in the search box to learn more about \"A Healthy Lifestyle: Care Instructions. \" Current as of: May 12, 2017 Content Version: 11.4 © 0372-0034 [employer]. Care instructions adapted under license by [employer] (which disclaims liability or warranty for this information). If you have questions about a medical condition or this instruction, always ask your healthcare professional. [place] [address] any warranty or liability for your use of this information. Introducing [facility]! Dear Victor Hugo Doing: 
Thank you for requesting a [employer] account. Our records indicate that you already have an active [employer] account. You can access your account anytime at [email or web address] Did you know that you can access your hospital and ER discharge instructions at any time in [employer]? You can also review all of your test results from your hospital stay or ER visit. Additional Information If you have questions, please visit the Frequently Asked Questions section of the [employer] website at [email or web address] Remember, [employer] is NOT to be used for urgent needs. For medical emergencies, dial 911. Now available from your iPhone and Android! Please provide this summary of care documentation to your next provider. Your primary care clinician is listed as Lauren Oreilly.  If you have any questions after today's visit, please call [phone].

## 2018-06-26 NOTE — PROGRESS NOTES
She had seen Dr. Jeri Becker in May 2018, was on topamax 150 mg until about 3 weeks ago, and she ran out of the 100 mg tablets so she started taking the 200 mg that was at her home they are old and out of date   She would like a new Rx for the 100 mgs

## 2018-06-26 NOTE — PATIENT INSTRUCTIONS

## 2018-06-26 NOTE — PROGRESS NOTES
Date:  18     Name:  Shayna Henderson  :  1945  MRN:  [id number]     PCP:  Willian Mcrae MD    Chief Complaint   Patient presents with    Migraine     HISTORY OF PRESENT ILLNESS: Follow up for migraine and tremor. At her last office visit, she was instructed to increase the Topamax to 150mg to help treat the tremor. She indicates that this really did not help. In the meantime, she ran out of the 100mg dose and since she had some old 200mg tablets from last year, she has been taking this. She very rarely has a migraine even on the 100mg of the Topamax. When she does have a migraine, these are easily aborted with the Cambia and the Midrin. The tremor is worse in the morning when she gets up. In the afternoon, the tremor is not bothersome. She feels that the higher dose of the Topamax has helped because she is not noticing it in the afternoon as much. She has been getting a lot of bruising which is being worked up by her PCP. She has been getting steroid injections for her back. She has to stop the Xarelto for five days before the injections. She resumes the Xarelto the same day as the injection. Except as noted above, denies  fever, chills, cough. No CP or SOB. No dysuria, loss of bowel or bladder control. No Weight loss. Appetite good. Sleeping well. No sweats. No edema. No bruising or bleeding. No nausea or vomit. No diarrhea. No frequency, urgency, No depressive sxs. No anxiety. Denies sore throat, nasal congestion, nasal discharge, epistaxis, tinnitus, hearing loss, back pain, muscle pain, or joint pain. Current Outpatient Prescriptions   Medication Sig    cetirizine (ZYRTEC) 10 mg tablet Take 10 mg by mouth daily.  Diclofenac Potassium (CAMBIA) 50 mg pwpk 1 at HA onset and repeat in 2 hours x 1 PRN    gzronrt-ofosfklez-fnvwiuabtwju (MIDRIN) -325 mg capsule Take 1 Cap by mouth four (4) times daily as needed. Max Daily Amount: 4 Caps.     topiramate (TOPAMAX) 50 mg tablet 3 po qhs    prochlorperazine (COMPAZINE) 5 mg tablet Take 1-2 tablets by mouth every 6-8 hours PRN    gabapentin (NEURONTIN) 300 mg capsule Take 300 mg by mouth three (3) times daily.  clobetasol (TEMOVATE) 0.05 % ointment Apply  to affected area two (2) times a day.  biotin 2,500 mcg tab Take 500 mcg by mouth daily.  buPROPion SR (WELLBUTRIN SR) 150 mg SR tablet Take 150 mg by mouth two (2) times a day.  calcium-cholecalciferol, D3, (CALTRATE 600+D) tablet Take 1 Tab by mouth daily.  therapeutic multivitamin (THERAGRAN) tablet Take 1 Tab by mouth daily.  nebivolol (BYSTOLIC) 20 mg tablet Take 20 mg by mouth daily.  levothyroxine (SYNTHROID) 25 mcg tablet Take 25 mcg by mouth nightly.  olmesartan (BENICAR) 40 mg tablet Take 40 mg by mouth nightly.  potassium chloride (K-DUR, KLOR-CON) 20 mEq tablet Take 1 tablet by mouth two  times daily    XARELTO 20 mg tab tablet Take 1 tablet by mouth  daily (Patient taking differently: Take 1 tablet by mouth  daily with breakfast)    amiodarone (CORDARONE) 200 mg tablet Take 1 Tab by mouth every other day. (Patient taking differently: Take 100 mg by mouth every other day.)    bumetanide (BUMEX) 1 mg tablet Take 1 Tab by mouth daily.  ascorbic acid (VITAMIN C) 1,000 mg tablet Take 1,000 mg by mouth nightly.  omeprazole (PRILOSEC) 20 mg capsule Take 20 mg by mouth daily. Indications: GASTROESOPHAGEAL REFLUX     No current facility-administered medications for this visit.       No Known Allergies  Past Medical History:   Diagnosis Date    Arrhythmia     Atrial fibrillation (HCC)     CAD (coronary artery disease)     atrial fibrillation    Gastrointestinal disorder     GERD    GERD (gastroesophageal reflux disease)     Headache(784.0)     Hypertension     Hypothyroid     Neurological disorder     migraines    Other ill-defined conditions(799.89) migraine    Psychiatric disorder previous hx of depression     Past Surgical History:   Procedure Laterality Date    HX CHOLECYSTECTOMY      HX HYSTERECTOMY      HX ORTHOPAEDIC      Right shoulder surgery    HX PACEMAKER      HX ELIA AND BSO      [place] ANESTH,SURGERY OF SHOULDER      left     Social History     Social History    Marital status:      Spouse name: N/A    Number of children: N/A    Years of education: N/A     Occupational History    Not on file. Social History Main Topics    Smoking status: Former Smoker    Smokeless tobacco: Never Used      Comment: quit 30 years ago    Alcohol use Yes      Comment: social drinker    Drug use: No    Sexual activity: Not on file     Other Topics Concern    Not on file     Social History Narrative     Family History   Problem Relation Age of Onset    Hypertension Other     Stroke Other     Heart Attack Other     Migraines Mother     Cancer Mother      melanoma    Heart Disease Father     Hypertension Father     Stroke Father     Heart Attack Father        PHYSICAL EXAMINATION:    Visit Vitals    /82    Pulse 67    Resp 20    Ht 5' 4\" (1.626 m)    Wt 93.9 kg (207 lb)    SpO2 98%    BMI 35.53 kg/m2     General:  Well defined, nourished, and groomed individual in no acute distress. Neck:             Supple, nontender, no bruits, no pain with resistance to active range of motion. Heart:            Regular rate and rhythm, no murmurs, rub, or gallop. Normal S1S2. Lungs:  Clear to auscultation bilaterally with equal chest expansion, no cough, no wheeze  Musculoskeletal:  Extremities revealed no edema and had full range of motion of joints. Psych:  Good mood and bright affect     NEUROLOGICAL EXAMINATION:     Mental Status:   Alert and oriented to person, place, and time with recent and remote memory intact. Attention span and concentration are normal. Speech is fluent with a full fund of knowledge.       Cranial Nerves:    II, III, IV, VI:  Visual acuity grossly intact.  Visual fields are normal.    Pupils are equal, round, and reactive to light and accommodation. Extra-ocular movements are full and fluid. Fundoscopic exam was benign, no ptosis or nystagmus. V-XII:             Hearing is grossly intact. Facial features are symmetric, with normal sensation and strength. The palate rises symmetrically and the tongue protrudes midline. Sternocleidomastoids 5/5.      Motor Examination:               Normal tone, bulk, and strength, 5/5 muscle strength throughout.       Coordination:  Finger to nose was normal.   No resting or intention tremor     Gait and Station:  Steady while walking. Normal arm swing. No pronator drift. No muscle wasting or fasiculations noted.       Reflexes:  DTRs 2+ throughout. ASSESSMENT AND PLAN    ICD-10-CM ICD-9-CM    1. 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 Essential tremor G25.0 333.1 topiramate (TOPAMAX) 50 mg tablet     Patient presented today for follow-up evaluation of migraines and essential tremor. When she was last seen, the Topamax was increased to 150 mg nightly but she ran out of the medication and thought she needed an appointment to have it refilled. She did think that the essential tremor was slightly better with the increased dose. A new prescription was provided. Follow-up as already scheduled. Theresa Alarcon

## 2019-03-17 DIAGNOSIS — G43.019 REFRACTORY MIGRAINE WITHOUT AURA: ICD-10-CM

## 2019-03-17 DIAGNOSIS — G25.0 ESSENTIAL TREMOR: ICD-10-CM

## 2019-03-18 RX ORDER — TOPIRAMATE 50 MG/1
TABLET, FILM COATED ORAL
Qty: 270 TAB | Refills: 3 | Status: SHIPPED | OUTPATIENT
Start: 2019-03-18 | End: 2019-12-20

## 2019-06-08 ENCOUNTER — APPOINTMENT (OUTPATIENT)
Dept: CT IMAGING | Age: 74
End: 2019-06-08
Attending: EMERGENCY MEDICINE
Payer: MEDICARE

## 2019-06-08 ENCOUNTER — HOSPITAL ENCOUNTER (EMERGENCY)
Age: 74
Discharge: HOME | End: 2019-06-08
Attending: EMERGENCY MEDICINE
Payer: MEDICARE

## 2019-06-08 VITALS
DIASTOLIC BLOOD PRESSURE: 70 MMHG | WEIGHT: 184.3 LBS | RESPIRATION RATE: 16 BRPM | OXYGEN SATURATION: 98 % | BODY MASS INDEX: 31.47 KG/M2 | HEIGHT: 64 IN | HEART RATE: 60 BPM | SYSTOLIC BLOOD PRESSURE: 136 MMHG | TEMPERATURE: 98.5 F

## 2019-06-08 DIAGNOSIS — N30.01 ACUTE CYSTITIS WITH HEMATURIA: ICD-10-CM

## 2019-06-08 DIAGNOSIS — R31.0 GROSS HEMATURIA: Primary | ICD-10-CM

## 2019-06-08 LAB
ANION GAP SERPL CALC-SCNC: 8 MMOL/L (ref 5–15)
APPEARANCE UR: ABNORMAL
BACTERIA URNS QL MICRO: ABNORMAL /HPF
BASOPHILS # BLD: 0.1 K/UL (ref 0–0.1)
BASOPHILS NFR BLD: 1 % (ref 0–1)
BILIRUB UR QL: NEGATIVE
BUN SERPL-MCNC: 28 MG/DL (ref 6–20)
BUN/CREAT SERPL: 20 (ref 12–20)
CALCIUM SERPL-MCNC: 9.1 MG/DL (ref 8.5–10.1)
CHLORIDE SERPL-SCNC: 107 MMOL/L (ref 97–108)
CO2 SERPL-SCNC: 28 MMOL/L (ref 21–32)
COLOR UR: ABNORMAL
CREAT SERPL-MCNC: 1.37 MG/DL (ref 0.55–1.02)
DIFFERENTIAL METHOD BLD: ABNORMAL
EOSINOPHIL # BLD: 0.1 K/UL (ref 0–0.4)
EOSINOPHIL NFR BLD: 1 % (ref 0–7)
EPITH CASTS URNS QL MICRO: ABNORMAL /LPF
ERYTHROCYTE [DISTWIDTH] IN BLOOD BY AUTOMATED COUNT: 13.6 % (ref 11.5–14.5)
GLUCOSE SERPL-MCNC: 86 MG/DL (ref 65–100)
GLUCOSE UR STRIP.AUTO-MCNC: NEGATIVE MG/DL
HCT VFR BLD AUTO: 40.3 % (ref 35–47)
HGB BLD-MCNC: 13 G/DL (ref 11.5–16)
HGB UR QL STRIP: ABNORMAL
KETONES UR QL STRIP.AUTO: ABNORMAL MG/DL
LEUKOCYTE ESTERASE UR QL STRIP.AUTO: ABNORMAL
LYMPHOCYTES # BLD: 1.3 K/UL (ref 0.8–3.5)
LYMPHOCYTES NFR BLD: 10 % (ref 12–49)
MCH RBC QN AUTO: 31.5 PG (ref 26–34)
MCHC RBC AUTO-ENTMCNC: 32.3 G/DL (ref 30–36.5)
MCV RBC AUTO: 97.6 FL (ref 80–99)
MONOCYTES # BLD: 0.9 K/UL (ref 0–1)
MONOCYTES NFR BLD: 7 % (ref 5–13)
NEUTS SEG # BLD: 10.2 K/UL (ref 1.8–8)
NEUTS SEG NFR BLD: 81 % (ref 32–75)
NITRITE UR QL STRIP.AUTO: NEGATIVE
PH UR STRIP: 7 [PH] (ref 5–8)
PLATELET # BLD AUTO: 246 K/UL (ref 150–400)
PMV BLD AUTO: 10.5 FL (ref 8.9–12.9)
POTASSIUM SERPL-SCNC: 4 MMOL/L (ref 3.5–5.1)
PROT UR STRIP-MCNC: >300 MG/DL
RBC # BLD AUTO: 4.13 M/UL (ref 3.8–5.2)
RBC #/AREA URNS HPF: >100 /HPF (ref 0–5)
SODIUM SERPL-SCNC: 143 MMOL/L (ref 136–145)
SP GR UR REFRACTOMETRY: 1.02 (ref 1–1.03)
UA: UC IF INDICATED,UAUC: ABNORMAL
UROBILINOGEN UR QL STRIP.AUTO: 0.2 EU/DL (ref 0.2–1)
WBC # BLD AUTO: 12.5 K/UL (ref 3.6–11)
WBC URNS QL MICRO: ABNORMAL /HPF (ref 0–4)

## 2019-06-08 PROCEDURE — 74011250636 HC RX REV CODE- 250/636: Performed by: EMERGENCY MEDICINE

## 2019-06-08 PROCEDURE — 85025 COMPLETE CBC W/AUTO DIFF WBC: CPT

## 2019-06-08 PROCEDURE — 77030005514 HC CATH URETH FOL14 BARD -A

## 2019-06-08 PROCEDURE — 81001 URINALYSIS AUTO W/SCOPE: CPT

## 2019-06-08 PROCEDURE — 80048 BASIC METABOLIC PNL TOTAL CA: CPT

## 2019-06-08 PROCEDURE — 51703 INSERT BLADDER CATH COMPLEX: CPT

## 2019-06-08 PROCEDURE — 87086 URINE CULTURE/COLONY COUNT: CPT

## 2019-06-08 PROCEDURE — 36415 COLL VENOUS BLD VENIPUNCTURE: CPT

## 2019-06-08 PROCEDURE — 87186 SC STD MICRODIL/AGAR DIL: CPT

## 2019-06-08 PROCEDURE — 96374 THER/PROPH/DIAG INJ IV PUSH: CPT

## 2019-06-08 PROCEDURE — 96361 HYDRATE IV INFUSION ADD-ON: CPT

## 2019-06-08 PROCEDURE — 99283 EMERGENCY DEPT VISIT LOW MDM: CPT

## 2019-06-08 PROCEDURE — 74176 CT ABD & PELVIS W/O CONTRAST: CPT

## 2019-06-08 PROCEDURE — 87077 CULTURE AEROBIC IDENTIFY: CPT

## 2019-06-08 PROCEDURE — 74011000250 HC RX REV CODE- 250: Performed by: EMERGENCY MEDICINE

## 2019-06-08 RX ORDER — CEPHALEXIN 500 MG/1
500 CAPSULE ORAL 3 TIMES DAILY
Qty: 21 CAP | Refills: 0 | Status: SHIPPED | OUTPATIENT
Start: 2019-06-08 | End: 2019-06-15

## 2019-06-08 RX ADMIN — WATER 1 G: 1 INJECTION INTRAMUSCULAR; INTRAVENOUS; SUBCUTANEOUS at 11:08

## 2019-06-08 RX ADMIN — SODIUM CHLORIDE 500 ML: 900 INJECTION, SOLUTION INTRAVENOUS at 11:08

## 2019-06-08 NOTE — ED TRIAGE NOTES
Pt presents to ED with c/o dysuria and hematuria since 2300. Pt denies fevers, vomiting pain in abdomen. Pt states some back pain last night.

## 2019-06-08 NOTE — DISCHARGE INSTRUCTIONS
Patient Education        Blood in the Urine: Care Instructions  Your Care Instructions    Blood in the urine, or hematuria, may make the urine look red, brown, or pink. There may be blood every time you urinate or just from time to time. You cannot always see blood in the urine, but it will show up in a urine test.  Blood in the urine may be serious. It should always be checked by a doctor. Your doctor may recommend more tests, including an X-ray, a CT scan, or a cystoscopy (which lets a doctor look inside the urethra and bladder). Blood in the urine can be a sign of another problem. Common causes are bladder infections and kidney stones. An injury to your groin or your genital area can also cause bleeding in the urinary tract. Very hard exercise--such as running a marathon--can cause blood in the urine. Blood in the urine can also be a sign of kidney disease or cancer in the bladder or kidney. Many cases of blood in the urine are caused by a harmless condition that runs in families. This is called benign familial hematuria. It does not need any treatment. Sometimes your urine may look red or brown even though it does not contain blood. For example, not getting enough fluids (dehydration), taking certain medicines, or having a liver problem can change the color of your urine. Eating foods such as beets, rhubarb, or blackberries or foods with red food coloring can make your urine look red or pink. Follow-up care is a key part of your treatment and safety. Be sure to make and go to all appointments, and call your doctor if you are having problems. It's also a good idea to know your test results and keep a list of the medicines you take. When should you call for help? Call your doctor now or seek immediate medical care if:    · You have symptoms of a urinary infection. For example:  ? You have pus in your urine. ? You have pain in your back just below your rib cage. This is called flank pain. ?  You have a fever, chills, or body aches. ? It hurts to urinate. ? You have groin or belly pain.     · You have more blood in your urine.    Watch closely for changes in your health, and be sure to contact your doctor if:    · You have new urination problems.     · You do not get better as expected. Where can you learn more? Go to http://capri-ekaterina.info/. Enter A770 in the search box to learn more about \"Blood in the Urine: Care Instructions. \"  Current as of: March 20, 2018  Content Version: 11.9  © 5126-7610 [employer]. Care instructions adapted under license by [employer] (which disclaims liability or warranty for this information). If you have questions about a medical condition or this instruction, always ask your healthcare professional. [address] any warranty or liability for your use of this information. Patient Education        Urinary Tract Infection in Women: Care Instructions  Your Care Instructions    A urinary tract infection, or UTI, is a general term for an infection anywhere between the kidneys and the urethra (where urine comes out). Most UTIs are bladder infections. They often cause pain or burning when you urinate. UTIs are caused by bacteria and can be cured with antibiotics. Be sure to complete your treatment so that the infection goes away. Follow-up care is a key part of your treatment and safety. Be sure to make and go to all appointments, and call your doctor if you are having problems. It's also a good idea to know your test results and keep a list of the medicines you take. How can you care for yourself at home? · Take your antibiotics as directed. Do not stop taking them just because you feel better. You need to take the full course of antibiotics. · Drink extra water and other fluids for the next day or two. This may help wash out the bacteria that are causing the infection.  (If you have kidney, heart, or liver disease and have to limit fluids, talk with your doctor before you increase your fluid intake.)  · Avoid drinks that are carbonated or have caffeine. They can irritate the bladder. · Urinate often. Try to empty your bladder each time. · To relieve pain, take a hot bath or lay a heating pad set on low over your lower belly or genital area. Never go to sleep with a heating pad in place. To prevent UTIs  · Drink plenty of water each day. This helps you urinate often, which clears bacteria from your system. (If you have kidney, heart, or liver disease and have to limit fluids, talk with your doctor before you increase your fluid intake.)  · Urinate when you need to. · Urinate right after you have sex. · Change sanitary pads often. · Avoid douches, bubble baths, feminine hygiene sprays, and other feminine hygiene products that have deodorants. · After going to the bathroom, wipe from front to back. When should you call for help? Call your doctor now or seek immediate medical care if:    · Symptoms such as fever, chills, nausea, or vomiting get worse or appear for the first time.     · You have new pain in your back just below your rib cage. This is called flank pain.     · There is new blood or pus in your urine.     · You have any problems with your antibiotic medicine.    Watch closely for changes in your health, and be sure to contact your doctor if:    · You are not getting better after taking an antibiotic for 2 days.     · Your symptoms go away but then come back. Where can you learn more? Go to http://capri-ekaterina.info/. Enter A883 in the search box to learn more about \"Urinary Tract Infection in Women: Care Instructions. \"  Current as of: March 20, 2018  Content Version: 11.9  © 9563-6313 [employer]. Care instructions adapted under license by [employer] (which disclaims liability or warranty for this information).  If you have questions about a medical condition or this instruction, always ask your healthcare professional. [place] [address] any warranty or liability for your use of this information.

## 2019-06-08 NOTE — ED PROVIDER NOTES
71 yo female with hx afib, cad on xarelto presents with c/o hematuria. Reports began at 2300 last night and she spent most of the night using the restroom frequently. She did have some lower abdominal discomfort and low back discomfort during the night but both resolved. She reports some blood in the urine this morning but reports not nearly as much. She is unsure if she passed any clots.  Reports happened many years ago with a bladder infection in the past. She denies fevers, chills, n/v           Past Medical History:   Diagnosis Date    Arrhythmia     Atrial fibrillation ([address]     CAD (coronary artery disease)     atrial fibrillation    Gastrointestinal disorder     GERD    GERD (gastroesophageal reflux disease)     Headache(784.0)     Hypertension     Hypothyroid     Neurological disorder     migraines    Other ill-defined conditions(799.89) migraine    Psychiatric disorder previous hx of depression       Past Surgical History:   Procedure Laterality Date    HX CHOLECYSTECTOMY      HX HYSTERECTOMY      HX ORTHOPAEDIC      Right shoulder surgery    HX PACEMAKER      HX ELIA AND BSO      [place] ANESTH,SURGERY OF SHOULDER      left         Family History:   Problem Relation Age of Onset    Hypertension Other     Stroke Other     Heart Attack Other     Migraines Mother     Cancer Mother         melanoma    Heart Disease Father     Hypertension Father     Stroke Father     Heart Attack Father        Social History     Socioeconomic History    Marital status:      Spouse name: Not on file    Number of children: Not on file    Years of education: Not on file    Highest education level: Not on file   Occupational History    Not on file   Social Needs    Financial resource strain: Not on file    Food insecurity:     Worry: Not on file     Inability: Not on file    Transportation needs:     Medical: Not on file     Non-medical: Not on file   Tobacco Use    Smoking status: Former Smoker  Smokeless tobacco: Never Used    Tobacco comment: quit 30 years ago   Substance and Sexual Activity    Alcohol use: Yes     Comment: social drinker    Drug use: No    Sexual activity: Not on file   Lifestyle    Physical activity:     Days per week: Not on file     Minutes per session: Not on file    Stress: Not on file   Relationships    Social connections:     Talks on phone: Not on file     Gets together: Not on file     Attends Orthodox service: Not on file     Active member of club or organization: Not on file     Attends meetings of clubs or organizations: Not on file     Relationship status: Not on file    Intimate partner violence:     Fear of current or ex partner: Not on file     Emotionally abused: Not on file     Physically abused: Not on file     Forced sexual activity: Not on file   Other Topics Concern    Not on file   Social History Narrative    Not on file         ALLERGIES: Patient has no known allergies. Review of Systems   Respiratory: Negative for shortness of breath. Cardiovascular: Negative for chest pain. Gastrointestinal: Negative for nausea and vomiting. Genitourinary: Positive for hematuria. All other systems reviewed and are negative. Vitals:    06/08/19 1019   BP: 128/61   Pulse: 67   Resp: 14   Temp: 98.5 °F (36.9 °C)   SpO2: 99%   Weight: 83.6 kg (184 lb 4.9 oz)   Height: 5' 4\" (1.626 m)            Physical Exam   Constitutional: She is oriented to person, place, and time. She appears well-developed and well-nourished. HENT:   Head: Normocephalic and atraumatic. Eyes: Conjunctivae are normal.   Neck: Normal range of motion. Cardiovascular: Normal rate, normal heart sounds and intact distal pulses. No murmur heard. Irregular rhythm   Pulmonary/Chest: Effort normal and breath sounds normal. No stridor. No respiratory distress. She has no wheezes. She has no rales. Abdominal: Soft. Bowel sounds are normal. She exhibits no distension and no mass. There is no tenderness. There is no guarding. Musculoskeletal: Normal range of motion. Neurological: She is alert and oriented to person, place, and time. Skin: Skin is warm and dry. Nursing note and vitals reviewed. MDM  Number of Diagnoses or Management Options  Acute cystitis with hematuria:   Gross hematuria:   Diagnosis management comments: Could be infectious vs hematuria from xarelto. Check creatinine. If ammenable with age do ct ivp     Discussed with pt and  she will need to follow up with urology regardless for cystoscopy to look for bladder mass        Amount and/or Complexity of Data Reviewed  Clinical lab tests: ordered and reviewed  Tests in the radiology section of CPT®: ordered and reviewed  Obtain history from someone other than the patient: yes ()    Patient Progress  Patient progress: stable         Procedures    11:12 AM  Spoke with dr Jackie Joya radiology rec ct without contrast given renal fxn       11:23 AM  Spoke with Dr Alexandra Gonsalez cardiology covering for Dr Shashi Head, he agrees with holding xarelto for now and will have dr Tesfaye Yousif follow up with pt to discuss restarting this coming week       Pt had mcgee placed and irrigated. Minimal blood with clots. Cleared with less than 200 ml. Mcgee removed. Treat utti. Stop xarelto for now and discussed risk of stroke but pt with gross hematuria.  She will follow up with urology and discussed signs of urinary retention as reasons to return

## 2019-06-10 LAB
BACTERIA SPEC CULT: ABNORMAL
CC UR VC: ABNORMAL
SERVICE CMNT-IMP: ABNORMAL

## 2019-06-28 ENCOUNTER — HOSPITAL ENCOUNTER (EMERGENCY)
Age: 74
Discharge: HOME | End: 2019-06-28
Attending: EMERGENCY MEDICINE
Payer: MEDICARE

## 2019-06-28 VITALS
HEART RATE: 66 BPM | SYSTOLIC BLOOD PRESSURE: 165 MMHG | BODY MASS INDEX: 31.41 KG/M2 | RESPIRATION RATE: 16 BRPM | DIASTOLIC BLOOD PRESSURE: 77 MMHG | TEMPERATURE: 98.9 F | OXYGEN SATURATION: 100 % | WEIGHT: 184 LBS | HEIGHT: 64 IN

## 2019-06-28 DIAGNOSIS — R30.0 DYSURIA: Primary | ICD-10-CM

## 2019-06-28 LAB
APPEARANCE UR: CLEAR
BACTERIA URNS QL MICRO: NEGATIVE /HPF
BILIRUB UR QL: NEGATIVE
COLOR UR: ABNORMAL
EPITH CASTS URNS QL MICRO: ABNORMAL /LPF
GLUCOSE UR STRIP.AUTO-MCNC: NEGATIVE MG/DL
HGB UR QL STRIP: ABNORMAL
KETONES UR QL STRIP.AUTO: NEGATIVE MG/DL
LEUKOCYTE ESTERASE UR QL STRIP.AUTO: ABNORMAL
NITRITE UR QL STRIP.AUTO: NEGATIVE
PH UR STRIP: 5.5 [PH] (ref 5–8)
PROT UR STRIP-MCNC: NEGATIVE MG/DL
RBC #/AREA URNS HPF: ABNORMAL /HPF (ref 0–5)
SP GR UR REFRACTOMETRY: 1.01 (ref 1–1.03)
UR CULT HOLD, URHOLD: NORMAL
UROBILINOGEN UR QL STRIP.AUTO: 0.2 EU/DL (ref 0.2–1)
WBC URNS QL MICRO: ABNORMAL /HPF (ref 0–4)

## 2019-06-28 PROCEDURE — 74011250637 HC RX REV CODE- 250/637: Performed by: EMERGENCY MEDICINE

## 2019-06-28 PROCEDURE — 87086 URINE CULTURE/COLONY COUNT: CPT

## 2019-06-28 PROCEDURE — 81001 URINALYSIS AUTO W/SCOPE: CPT

## 2019-06-28 PROCEDURE — 99283 EMERGENCY DEPT VISIT LOW MDM: CPT

## 2019-06-28 RX ORDER — NITROFURANTOIN 25; 75 MG/1; MG/1
100 CAPSULE ORAL
Status: COMPLETED | OUTPATIENT
Start: 2019-06-28 | End: 2019-06-28

## 2019-06-28 RX ORDER — NITROFURANTOIN 25; 75 MG/1; MG/1
100 CAPSULE ORAL 2 TIMES DAILY
Qty: 10 CAP | Refills: 0 | Status: SHIPPED | OUTPATIENT
Start: 2019-06-28 | End: 2019-07-03

## 2019-06-28 RX ADMIN — NITROFURANTOIN MONOHYDRATE/MACROCRYSTALLINE 100 MG: 25; 75 CAPSULE ORAL at 19:18

## 2019-06-28 NOTE — ED TRIAGE NOTES
5 PM today had blood in her urine, large amount. Had this earlier in month, had UTI and followed up with urology. Takes Xarelto, was restarted 2 weeks ago.

## 2019-06-28 NOTE — DISCHARGE INSTRUCTIONS
Patient Education        Painful Urination (Dysuria): Care Instructions  Your Care Instructions  Burning pain with urination (dysuria) is a common symptom of a urinary tract infection or other urinary problems. The bladder may become inflamed. This can cause pain when the bladder fills and empties. You may also feel pain if the tube that carries urine from the bladder to the outside of the body (urethra) gets irritated or infected. Sexually transmitted infections (STIs) also may cause pain when you urinate. Sometimes the pain can be caused by things other than an infection. The urethra can be irritated by soaps, perfumes, or foreign objects in the urethra. Kidney stones can cause pain when they pass through the urethra. The cause may be hard to find. You may need tests. Treatment for painful urination depends on the cause. Follow-up care is a key part of your treatment and safety. Be sure to make and go to all appointments, and call your doctor if you are having problems. It's also a good idea to know your test results and keep a list of the medicines you take. How can you care for yourself at home? · Drink extra water for the next day or two. This will help make the urine less concentrated. (If you have kidney, heart, or liver disease and have to limit fluids, talk with your doctor before you increase the amount of fluids you drink.)  · Avoid drinks that are carbonated or have caffeine. They can irritate the bladder. · Urinate often. Try to empty your bladder each time. For women:  · Urinate right after you have sex. · After going to the bathroom, wipe from front to back. · Avoid douches, bubble baths, and feminine hygiene sprays. And avoid other feminine hygiene products that have deodorants. When should you call for help? Call your doctor now or seek immediate medical care if:    · You have new symptoms, such as fever, nausea, or vomiting.     · You have new or worse symptoms of a urinary problem.  For example:  ? You have blood or pus in your urine. ? You have chills or body aches. ? It hurts worse to urinate. ? You have groin or belly pain. ? You have pain in your back just below your rib cage (the flank area).    Watch closely for changes in your health, and be sure to contact your doctor if you have any problems. Where can you learn more? Go to http://capri-ekaterina.info/. Enter W666 in the search box to learn more about \"Painful Urination (Dysuria): Care Instructions. \"  Current as of: March 20, 2018  Content Version: 11.9  © 9528-7716 [employer]. Care instructions adapted under license by [employer] (which disclaims liability or warranty for this information). If you have questions about a medical condition or this instruction, always ask your healthcare professional. [place] [address] any warranty or liability for your use of this information.

## 2019-06-30 LAB
BACTERIA SPEC CULT: NORMAL
CC UR VC: NORMAL
SERVICE CMNT-IMP: NORMAL

## 2019-09-17 ENCOUNTER — HOSPITAL ENCOUNTER (EMERGENCY)
Age: 74
Discharge: HOME | End: 2019-09-17
Attending: EMERGENCY MEDICINE
Payer: MEDICARE

## 2019-09-17 ENCOUNTER — APPOINTMENT (OUTPATIENT)
Dept: GENERAL RADIOLOGY | Age: 74
End: 2019-09-17
Attending: EMERGENCY MEDICINE
Payer: MEDICARE

## 2019-09-17 VITALS
BODY MASS INDEX: 31.5 KG/M2 | SYSTOLIC BLOOD PRESSURE: 143 MMHG | HEIGHT: 64 IN | RESPIRATION RATE: 18 BRPM | OXYGEN SATURATION: 99 % | TEMPERATURE: 99.2 F | HEART RATE: 75 BPM | DIASTOLIC BLOOD PRESSURE: 49 MMHG | WEIGHT: 184.53 LBS

## 2019-09-17 DIAGNOSIS — J20.9 ACUTE BRONCHITIS, UNSPECIFIED ORGANISM: Primary | ICD-10-CM

## 2019-09-17 LAB
ALBUMIN SERPL-MCNC: 3.9 G/DL (ref 3.5–5)
ALBUMIN/GLOB SERPL: 1.1 {RATIO} (ref 1.1–2.2)
ALP SERPL-CCNC: 92 U/L (ref 45–117)
ALT SERPL-CCNC: 26 U/L (ref 12–78)
ANION GAP SERPL CALC-SCNC: 7 MMOL/L (ref 5–15)
AST SERPL-CCNC: 21 U/L (ref 15–37)
BASOPHILS # BLD: 0.1 K/UL (ref 0–0.1)
BASOPHILS NFR BLD: 1 % (ref 0–1)
BILIRUB SERPL-MCNC: 0.2 MG/DL (ref 0.2–1)
BNP SERPL-MCNC: 435 PG/ML (ref 0–125)
BUN SERPL-MCNC: 25 MG/DL (ref 6–20)
BUN/CREAT SERPL: 18 (ref 12–20)
CALCIUM SERPL-MCNC: 9 MG/DL (ref 8.5–10.1)
CHLORIDE SERPL-SCNC: 105 MMOL/L (ref 97–108)
CO2 SERPL-SCNC: 30 MMOL/L (ref 21–32)
COMMENT, HOLDF: NORMAL
CREAT SERPL-MCNC: 1.38 MG/DL (ref 0.55–1.02)
DIFFERENTIAL METHOD BLD: ABNORMAL
EOSINOPHIL # BLD: 0.2 K/UL (ref 0–0.4)
EOSINOPHIL NFR BLD: 2 % (ref 0–7)
ERYTHROCYTE [DISTWIDTH] IN BLOOD BY AUTOMATED COUNT: 13 % (ref 11.5–14.5)
GLOBULIN SER CALC-MCNC: 3.4 G/DL (ref 2–4)
GLUCOSE SERPL-MCNC: 95 MG/DL (ref 65–100)
HCT VFR BLD AUTO: 39 % (ref 35–47)
HGB BLD-MCNC: 12.6 G/DL (ref 11.5–16)
IMM GRANULOCYTES # BLD AUTO: 0 K/UL (ref 0–0.04)
IMM GRANULOCYTES NFR BLD AUTO: 0 % (ref 0–0.5)
LYMPHOCYTES # BLD: 1.3 K/UL (ref 0.8–3.5)
LYMPHOCYTES NFR BLD: 12 % (ref 12–49)
MCH RBC QN AUTO: 32.2 PG (ref 26–34)
MCHC RBC AUTO-ENTMCNC: 32.3 G/DL (ref 30–36.5)
MCV RBC AUTO: 99.7 FL (ref 80–99)
MONOCYTES # BLD: 0.9 K/UL (ref 0–1)
MONOCYTES NFR BLD: 9 % (ref 5–13)
NEUTS SEG # BLD: 8 K/UL (ref 1.8–8)
NEUTS SEG NFR BLD: 76 % (ref 32–75)
PLATELET # BLD AUTO: 256 K/UL (ref 150–400)
PMV BLD AUTO: 11 FL (ref 8.9–12.9)
POTASSIUM SERPL-SCNC: 3.6 MMOL/L (ref 3.5–5.1)
PROT SERPL-MCNC: 7.3 G/DL (ref 6.4–8.2)
RBC # BLD AUTO: 3.91 M/UL (ref 3.8–5.2)
RBC MORPH BLD: ABNORMAL
SAMPLES BEING HELD,HOLD: NORMAL
SODIUM SERPL-SCNC: 142 MMOL/L (ref 136–145)
TROPONIN I SERPL-MCNC: <0.05 NG/ML
UR CULT HOLD, URHOLD: NORMAL
WBC # BLD AUTO: 10.5 K/UL (ref 3.6–11)
WBC MORPH BLD: ABNORMAL

## 2019-09-17 PROCEDURE — A9270 NON-COVERED ITEM OR SERVICE: HCPCS | Performed by: EMERGENCY MEDICINE

## 2019-09-17 PROCEDURE — 71046 X-RAY EXAM CHEST 2 VIEWS: CPT

## 2019-09-17 PROCEDURE — 85025 COMPLETE CBC W/AUTO DIFF WBC: CPT

## 2019-09-17 PROCEDURE — 84484 ASSAY OF TROPONIN QUANT: CPT

## 2019-09-17 PROCEDURE — 99285 EMERGENCY DEPT VISIT HI MDM: CPT

## 2019-09-17 PROCEDURE — 74011000250 HC RX REV CODE- 250: Performed by: EMERGENCY MEDICINE

## 2019-09-17 PROCEDURE — 83880 ASSAY OF NATRIURETIC PEPTIDE: CPT

## 2019-09-17 PROCEDURE — 80053 COMPREHEN METABOLIC PANEL: CPT

## 2019-09-17 PROCEDURE — 74011636637 HC RX REV CODE- 636/637: Performed by: EMERGENCY MEDICINE

## 2019-09-17 PROCEDURE — 93005 ELECTROCARDIOGRAM TRACING: CPT

## 2019-09-17 PROCEDURE — 36415 COLL VENOUS BLD VENIPUNCTURE: CPT

## 2019-09-17 PROCEDURE — 94640 AIRWAY INHALATION TREATMENT: CPT

## 2019-09-17 PROCEDURE — 74011250637 HC RX REV CODE- 250/637: Performed by: EMERGENCY MEDICINE

## 2019-09-17 PROCEDURE — 77030013140 HC MSK NEB VYRM -A

## 2019-09-17 RX ORDER — SODIUM CHLORIDE 0.9 % (FLUSH) 0.9 %
5-40 SYRINGE (ML) INJECTION EVERY 8 HOURS
Status: DISCONTINUED | OUTPATIENT
Start: 2019-09-17 | End: 2019-09-18 | Stop reason: HOSPADM

## 2019-09-17 RX ORDER — ALBUTEROL SULFATE 90 UG/1
2 AEROSOL, METERED RESPIRATORY (INHALATION)
Qty: 1 INHALER | Refills: 0 | Status: SHIPPED | OUTPATIENT
Start: 2019-09-17 | End: 2019-10-23

## 2019-09-17 RX ORDER — SODIUM CHLORIDE 0.9 % (FLUSH) 0.9 %
5-40 SYRINGE (ML) INJECTION AS NEEDED
Status: DISCONTINUED | OUTPATIENT
Start: 2019-09-17 | End: 2019-09-18 | Stop reason: HOSPADM

## 2019-09-17 RX ORDER — PREDNISONE 50 MG/1
50 TABLET ORAL DAILY
Qty: 3 TAB | Refills: 0 | Status: SHIPPED | OUTPATIENT
Start: 2019-09-17 | End: 2019-09-20

## 2019-09-17 RX ORDER — DOXYCYCLINE HYCLATE 100 MG
100 TABLET ORAL
Status: COMPLETED | OUTPATIENT
Start: 2019-09-17 | End: 2019-09-17

## 2019-09-17 RX ORDER — DOXYCYCLINE HYCLATE 100 MG
100 TABLET ORAL 2 TIMES DAILY
Qty: 14 TAB | Refills: 0 | Status: SHIPPED | OUTPATIENT
Start: 2019-09-17 | End: 2019-09-24

## 2019-09-17 RX ADMIN — ALBUTEROL SULFATE 1 DOSE: 2.5 SOLUTION RESPIRATORY (INHALATION) at 22:06

## 2019-09-17 RX ADMIN — PREDNISONE 50 MG: 5 TABLET ORAL at 22:53

## 2019-09-17 RX ADMIN — DOXYCYCLINE HYCLATE 100 MG: 100 TABLET, COATED ORAL at 22:53

## 2019-09-18 LAB
ATRIAL RATE: 62 BPM
CALCULATED R AXIS, ECG10: -15 DEGREES
CALCULATED T AXIS, ECG11: 25 DEGREES
DIAGNOSIS, 93000: NORMAL
P-R INTERVAL, ECG05: 204 MS
Q-T INTERVAL, ECG07: 424 MS
QRS DURATION, ECG06: 82 MS
QTC CALCULATION (BEZET), ECG08: 430 MS
VENTRICULAR RATE, ECG03: 62 BPM

## 2019-09-18 NOTE — ED NOTES
The patient was discharged home by  [facility] and Luis Hutchins rn in stable condition, accompanied by family. The patient is alert and oriented, is in no respiratory distress and has vital signs within normal limits . The patient's diagnosis, condition and treatment were explained to patient. The patient expressed understanding. Prescriptions given to pt. No work/school note given to pt. A discharge plan has been developed. A  was not involved in the process. Aftercare instructions were given to the patient. Pt's saline lock removed without complications. Family will transport pt home.

## 2019-09-18 NOTE — ED PROVIDER NOTES
This is a 74-year-old female comes emergency room with chief complaint of shortness of breath and chest congestion. Patient states that her symptoms started a few days ago. Patient states that she has had a cough with minimally productive. Patient denies any fever. Patient states that she gets a little winded when she walks. Patient does have a history of coronary artery disease, however, the patient states that she has not had any true chest pain. Patient denies any nausea or vomiting. Patient does state that she has had some chills. The history is provided by the patient and the spouse. No  was used. Shortness of Breath   This is a new problem. The current episode started more than 2 days ago. The problem has been gradually worsening. Associated symptoms include cough, sputum production and leg swelling (Chronic). Pertinent negatives include no fever, no headaches, no rhinorrhea, no ear pain, no wheezing, no chest pain, no vomiting, no abdominal pain and no rash. Treatments tried: Over-the-counter antihistamines. The treatment provided no relief. Associated medical issues include CAD and heart failure. Associated medical issues do not include asthma, COPD or pneumonia.         Past Medical History:   Diagnosis Date    Arrhythmia     Atrial fibrillation ([facility] [address]     CAD (coronary artery disease)     atrial fibrillation    Gastrointestinal disorder     GERD    GERD (gastroesophageal reflux disease)     Headache(784.0)     Hypertension     Hypothyroid     Neurological disorder     migraines    Other ill-defined conditions(799.89) migraine    Psychiatric disorder previous hx of depression       Past Surgical History:   Procedure Laterality Date    HX CHOLECYSTECTOMY      HX HYSTERECTOMY      HX ORTHOPAEDIC      Right shoulder surgery    HX PACEMAKER      HX ELIA AND BSO      MD ANESTH,SURGERY OF SHOULDER      left         Family History:   Problem Relation Age of Onset    Hypertension Other     Stroke Other     Heart Attack Other     Migraines Mother     Cancer Mother         melanoma    Heart Disease Father     Hypertension Father     Stroke Father     Heart Attack Father        Social History     Socioeconomic History    Marital status:      Spouse name: Not on file    Number of children: Not on file    Years of education: Not on file    Highest education level: Not on file   Occupational History    Not on file   Social Needs    Financial resource strain: Not on file    Food insecurity:     Worry: Not on file     Inability: Not on file    Transportation needs:     Medical: Not on file     Non-medical: Not on file   Tobacco Use    Smoking status: Former Smoker    Smokeless tobacco: Never Used    Tobacco comment: quit 30 years ago   Substance and Sexual Activity    Alcohol use: Yes     Comment: social drinker    Drug use: No    Sexual activity: Not on file   Lifestyle    Physical activity:     Days per week: Not on file     Minutes per session: Not on file    Stress: Not on file   Relationships    Social connections:     Talks on phone: Not on file     Gets together: Not on file     Attends Anabaptism service: Not on file     Active member of club or organization: Not on file     Attends meetings of clubs or organizations: Not on file     Relationship status: Not on file    Intimate partner violence:     Fear of current or ex partner: Not on file     Emotionally abused: Not on file     Physically abused: Not on file     Forced sexual activity: Not on file   Other Topics Concern    Not on file   Social History Narrative    Not on file     ALLERGIES: Patient has no known allergies. Review of Systems   Constitutional: Positive for chills. Negative for appetite change, fever and unexpected weight change. HENT: Negative for ear pain, hearing loss, rhinorrhea and trouble swallowing. Eyes: Negative for pain and visual disturbance.    Respiratory: Positive for cough, sputum production and shortness of breath. Negative for chest tightness and wheezing. Cardiovascular: Positive for leg swelling (Chronic). Negative for chest pain and palpitations. Gastrointestinal: Negative for abdominal distention, abdominal pain, blood in stool and vomiting. Genitourinary: Negative for dysuria, hematuria and urgency. Musculoskeletal: Negative for back pain and myalgias. Skin: Negative for rash. Neurological: Negative for dizziness, syncope, weakness, numbness and headaches. Psychiatric/Behavioral: Negative for confusion and suicidal ideas. All other systems reviewed and are negative. Vitals:    09/17/19 2235 09/17/19 2236 09/17/19 2245 09/17/19 2300   BP: 139/58  127/48 143/49   Pulse:  67 85 75   Resp:  16 16 18   Temp:       SpO2:  98% 100% 99%   Weight:       Height:                Physical Exam   Constitutional: She is oriented to person, place, and time. She appears well-developed and well-nourished. No distress. HENT:   Head: Normocephalic and atraumatic. Right Ear: External ear normal.   Left Ear: External ear normal.   Nose: Nose normal.   Mouth/Throat: Oropharynx is clear and moist. No oropharyngeal exudate. Eyes: Pupils are equal, round, and reactive to light. Conjunctivae and EOM are normal. Right eye exhibits no discharge. Left eye exhibits no discharge. No scleral icterus. Neck: Normal range of motion. Neck supple. No JVD present. No tracheal deviation present. Cardiovascular: Normal rate, regular rhythm, normal heart sounds and intact distal pulses. Exam reveals no gallop and no friction rub. No murmur heard. Pulmonary/Chest: Effort normal. No stridor. No respiratory distress. She has decreased breath sounds in the right lower field and the left lower field. She has no wheezes. She has no rhonchi. She has no rales. She exhibits no tenderness. Prolonged exhalation   Abdominal: Soft.  Bowel sounds are normal. She exhibits no distension. There is no tenderness. There is no rebound and no guarding. Musculoskeletal: Normal range of motion. She exhibits no tenderness. Right lower leg: She exhibits edema (1+). Left lower leg: She exhibits edema (1+). Neurological: She is alert and oriented to person, place, and time. She has normal strength and normal reflexes. She displays normal reflexes. No cranial nerve deficit or sensory deficit. She exhibits normal muscle tone. Coordination normal. GCS eye subscore is 4. GCS verbal subscore is 5. GCS motor subscore is 6. Skin: Skin is warm and dry. Capillary refill takes less than 2 seconds. No rash noted. She is not diaphoretic. No erythema. No pallor. Psychiatric: She has a normal mood and affect. Her behavior is normal. Judgment and thought content normal.   Nursing note and vitals reviewed. MDM  Number of Diagnoses or Management Options  Acute bronchitis, unspecified organism:      Amount and/or Complexity of Data Reviewed  Clinical lab tests: ordered and reviewed  Tests in the radiology section of CPT®: ordered and reviewed  Tests in the medicine section of CPT®: ordered and reviewed  Independent visualization of images, tracings, or specimens: yes (EKG)    Risk of Complications, Morbidity, and/or Mortality  Presenting problems: moderate  Diagnostic procedures: low  Management options: moderate    Patient Progress  Patient progress: improved         Procedures    Chief Complaint   Patient presents with    Shortness of Breath    Nasal Congestion       The patient's presenting problems have been discussed, and they are in agreement with the care plan formulated and outlined with them. I have encouraged them to ask questions as they arise throughout their visit.     MEDICATIONS GIVEN:  Medications   sodium chloride (NS) flush 5-40 mL (has no administration in time range)   sodium chloride (NS) flush 5-40 mL (has no administration in time range)   albuterol 5mg / ipratropium 0.5mg neb solution (1 Dose Nebulization Given 9/17/19 2206)   predniSONE (DELTASONE) tablet 50 mg (50 mg Oral Given 9/17/19 2253)   doxycycline (VIBRA-TABS) tablet 100 mg (100 mg Oral Given 9/17/19 2253)       LABS REVIEWED:  Recent Results (from the past 24 hour(s))   EKG, 12 LEAD, INITIAL    Collection Time: 09/17/19  9:44 PM   Result Value Ref Range    Ventricular Rate 62 BPM    Atrial Rate 62 BPM    P-R Interval 204 ms    QRS Duration 82 ms    Q-T Interval 424 ms    QTC Calculation (Bezet) 430 ms    Calculated R Axis -15 degrees    Calculated T Axis 25 degrees    Diagnosis       Atrial-paced rhythm  Low voltage QRS  Cannot rule out Anterior infarct (cited on or before 19-NOV-2012)  Abnormal ECG  When compared with ECG of 14-OCT-2016 19:51,  No significant change was found     SAMPLES BEING HELD    Collection Time: 09/17/19  9:58 PM   Result Value Ref Range    SAMPLES BEING HELD 1RED 1BLUE UA     COMMENT        Add-on orders for these samples will be processed based on acceptable specimen integrity and analyte stability, which may vary by analyte. CBC WITH AUTOMATED DIFF    Collection Time: 09/17/19  9:58 PM   Result Value Ref Range    WBC 10.5 3.6 - 11.0 K/uL    RBC 3.91 3.80 - 5.20 M/uL    HGB 12.6 11.5 - 16.0 g/dL    HCT 39.0 35.0 - 47.0 %    MCV 99.7 (H) 80.0 - 99.0 FL    MCH 32.2 26.0 - 34.0 PG    MCHC 32.3 30.0 - 36.5 g/dL    RDW 13.0 11.5 - 14.5 %    PLATELET 844 609 - 902 K/uL    MPV 11.0 8.9 - 12.9 FL    NEUTROPHILS 76 (H) 32 - 75 %    LYMPHOCYTES 12 12 - 49 %    MONOCYTES 9 5 - 13 %    EOSINOPHILS 2 0 - 7 %    BASOPHILS 1 0 - 1 %    IMMATURE GRANULOCYTES 0 0.0 - 0.5 %    ABS. NEUTROPHILS 8.0 1.8 - 8.0 K/UL    ABS. LYMPHOCYTES 1.3 0.8 - 3.5 K/UL    ABS. MONOCYTES 0.9 0.0 - 1.0 K/UL    ABS. EOSINOPHILS 0.2 0.0 - 0.4 K/UL    ABS. BASOPHILS 0.1 0.0 - 0.1 K/UL    ABS. IMM.  GRANS. 0.0 0.00 - 0.04 K/UL    DF MANUAL      RBC COMMENTS MACROCYTOSIS  1+        WBC COMMENTS REACTIVE LYMPHS METABOLIC PANEL, COMPREHENSIVE    Collection Time: 09/17/19  9:58 PM   Result Value Ref Range    Sodium 142 136 - 145 mmol/L    Potassium 3.6 3.5 - 5.1 mmol/L    Chloride 105 97 - 108 mmol/L    CO2 30 21 - 32 mmol/L    Anion gap 7 5 - 15 mmol/L    Glucose 95 65 - 100 mg/dL    BUN 25 (H) 6 - 20 MG/DL    Creatinine 1.38 (H) 0.55 - 1.02 MG/DL    BUN/Creatinine ratio 18 12 - 20      GFR est AA 46 (L) >60 ml/min/1.73m2    GFR est non-AA 38 (L) >60 ml/min/1.73m2    Calcium 9.0 8.5 - 10.1 MG/DL    Bilirubin, total 0.2 0.2 - 1.0 MG/DL    ALT (SGPT) 26 12 - 78 U/L    AST (SGOT) 21 15 - 37 U/L    Alk. phosphatase 92 45 - 117 U/L    Protein, total 7.3 6.4 - 8.2 g/dL    Albumin 3.9 3.5 - 5.0 g/dL    Globulin 3.4 2.0 - 4.0 g/dL    A-G Ratio 1.1 1.1 - 2.2     NT-PRO BNP    Collection Time: 09/17/19  9:58 PM   Result Value Ref Range    NT pro- (H) 0 - 125 PG/ML   TROPONIN I    Collection Time: 09/17/19  9:58 PM   Result Value Ref Range    Troponin-I, Qt. <0.05 <0.05 ng/mL   URINE CULTURE HOLD SAMPLE    Collection Time: 09/17/19  9:58 PM   Result Value Ref Range    Urine culture hold        URINE ON HOLD IN MICROBIOLOGY DEPT FOR 3 DAYS. IF UNPRESERVED URINE IS SUBMITTED, IT CANNOT BE USED FOR ADDITIONAL TESTING AFTER 24 HRS, RECOLLECTION WILL BE REQUIRED. VITAL SIGNS:  Patient Vitals for the past 24 hrs:   Temp Pulse Resp BP SpO2   09/17/19 2300  75 18 143/49 99 %   09/17/19 2245  85 16 127/48 100 %   09/17/19 2236  67 16  98 %   09/17/19 2235    139/58    09/17/19 2215  62 21 131/61 100 %   09/17/19 2200  60 14 143/75 100 %   09/17/19 2148 99.2 °F (37.3 °C) 61 18 173/77 100 %       RADIOLOGY RESULTS:  The following have been ordered and reviewed:  Xr Chest Pa Lat    Result Date: 9/17/2019  CLINICAL HISTORY: Cough INDICATION: Cough COMPARISON: 6-15 FINDINGS: PA and lateral views of the chest are obtained. The cardiopericardial silhouette is within normal limits.  There is no pleural effusion, pneumothorax or focal consolidation present. Cardiac pacer unchanged in appearance. IMPRESSION: No acute intrathoracic disease. ED EKG interpretation:  Rhythm: Atrially paced; and regular . Rate (approx.): 62; Axis: normal; P wave: normal; QRS interval: Low voltage; ST/T wave: normal; Other findings: Abnormal with anterior infarct. This EKG was interpreted by Ricardo Jenkins DO, ED Provider. PROGRESS NOTES:  Discussed results and plan with patient and spouse. Patient will be discharged home with PCP follow up. Patient instructed to return to the emergency room for any worsening symptoms or any other concerns. DIAGNOSIS:    1. Acute bronchitis, unspecified organism        PLAN:  Follow-up Information     Follow up With Specialties Details Why Contact Info    Trent Lowry MD Family Practice Schedule an appointment as soon as possible for a visit  [id number] [id number] [phone]      [address] [place] [address] DEPT Emergency Medicine  If symptoms worsen [facility] [address] [phone]        Discharge Medication List as of 9/17/2019 10:46 PM      START taking these medications    Details   predniSONE (DELTASONE) 50 mg tablet Take 1 Tab by mouth daily for 3 days. , Print, Disp-3 Tab, R-0      doxycycline (VIBRA-TABS) 100 mg tablet Take 1 Tab by mouth two (2) times a day for 7 days. , Print, Disp-14 Tab, R-0      albuterol (PROVENTIL HFA, VENTOLIN HFA, PROAIR HFA) 90 mcg/actuation inhaler Take 2 Puffs by inhalation every four (4) hours as needed for Wheezing or Shortness of Breath., Print, Disp-1 Inhaler, R-0         CONTINUE these medications which have NOT CHANGED    Details   rivaroxaban (XARELTO) 20 mg tab tablet Take  by mouth daily. , Historical Med      topiramate (TOPAMAX) 50 mg tablet TAKE 3 TABLETS BY MOUTH  EVERY NIGHT AT BEDTIME, Normal, Disp-270 Tab, R-3      cetirizine (ZYRTEC) 10 mg tablet Take 10 mg by mouth daily., Historical Med      Diclofenac Potassium (CAMBIA) 50 mg pwpk 1 at HA onset and repeat in 2 hours x 1 PRN, Normal, Disp-9 Packet, R-3      gabapentin (NEURONTIN) 300 mg capsule Take 300 mg by mouth three (3) times daily. , Historical Med      biotin 2,500 mcg tab Take 500 mcg by mouth daily. , Historical Med      buPROPion SR (WELLBUTRIN SR) 150 mg SR tablet Take 150 mg by mouth two (2) times a day., Historical Med      calcium-cholecalciferol, D3, (CALTRATE 600+D) tablet Take 1 Tab by mouth daily. , Historical Med      therapeutic multivitamin (THERAGRAN) tablet Take 1 Tab by mouth daily. , Historical Med      levothyroxine (SYNTHROID) 25 mcg tablet Take 25 mcg by mouth nightly., Historical Med      olmesartan (BENICAR) 40 mg tablet Take 40 mg by mouth nightly., Historical Med      potassium chloride (K-DUR, KLOR-CON) 20 mEq tablet Take 1 tablet by mouth two  times daily, Normal, Disp-60 Tab, R-0      amiodarone (CORDARONE) 200 mg tablet Take 1 Tab by mouth every other day., Normal, Disp-45 Tab, R-3      bumetanide (BUMEX) 1 mg tablet Take 1 Tab by mouth daily. , Normal, Disp-90 Tab, R-3      ascorbic acid (VITAMIN C) 1,000 mg tablet Take 1,000 mg by mouth nightly., Historical Med      omeprazole (PRILOSEC) 20 mg capsule Take 20 mg by mouth daily. Indications: GASTROESOPHAGEAL REFLUX, Historical Med      rurlyih-mwiarsasy-imntlvvooemt (MIDRIN) -325 mg capsule Take 1 Cap by mouth four (4) times daily as needed. Max Daily Amount: 4 Caps., Print, Disp-30 Cap, R-2      prochlorperazine (COMPAZINE) 5 mg tablet Take 1-2 tablets by mouth every 6-8 hours PRN, Normal, Disp-30 Tab, R-5      clobetasol (TEMOVATE) 0.05 % ointment Apply  to affected area two (2) times a day., Historical Med      nebivolol (BYSTOLIC) 20 mg tablet Take 20 mg by mouth daily. , Historical Med             ED COURSE: The patient's hospital course has been uncomplicated.

## 2019-09-18 NOTE — ED NOTES
Pt presents with  with complaint of chest tightness and productive cough with slight sputum. Pt denies fever. Pt has been taking antihistamines without relief. Pt has a pacemaker in upper rt chest. Pt on monitor x3. Call bell within reach.

## 2019-09-18 NOTE — ED TRIAGE NOTES
Pt ambulatory to room. Pt states for the past few days has had chest congestion, cough, and shortness of breath.   Productive cough

## 2019-09-18 NOTE — DISCHARGE INSTRUCTIONS
We hope that we have addressed all of your medical concerns. The examination and treatment you received in the Emergency Department were for an emergent problem and were not intended as complete care. It is important that you follow up with your healthcare provider(s) for ongoing care. If your symptoms worsen or do not improve as expected, and you are unable to reach your usual health care provider(s), you should return to the Emergency Department. Today's healthcare is undergoing tremendous change, and patient satisfaction surveys are one of the many tools to assess the quality of medical care. You may receive a survey from the [employer] organization regarding your experience in the Emergency Department. I hope that your experience has been completely positive, particularly the medical care that I provided. As such, please participate in the survey; anything less than excellent does not meet my expectations or intentions. [facility] and 8 [facility] participate in nationally recognized quality of care measures. If your blood pressure is greater than 120/80, as reported below, we urge that you seek medical care to address the potential of high blood pressure, commonly known as hypertension. Hypertension can be hereditary or can be caused by certain medical conditions, pain, stress, or \"white coat syndrome. \"       Please make an appointment with your health care provider(s) for follow up of your Emergency Department visit. VITALS:   Patient Vitals for the past 8 hrs:   Temp Pulse Resp BP SpO2   09/17/19 2236 -- 67 16 -- 98 %   09/17/19 2235 -- -- -- 139/58 --   09/17/19 2215 -- 62 21 131/61 100 %   09/17/19 2200 -- 60 14 143/75 100 %   09/17/19 2148 99.2 °F (37.3 °C) 61 18 173/77 100 %          Thank you for allowing us to provide you with medical care today. We realize that you have many choices for your emergency care needs.   Please choose us in the future for any continued health care needs. Angelique Vides, [address] [place] [address] [address]   Office: [phone]            Recent Results (from the past 24 hour(s))   EKG, 12 LEAD, INITIAL    Collection Time: 09/17/19  9:44 PM   Result Value Ref Range    Ventricular Rate 62 BPM    Atrial Rate 62 BPM    P-R Interval 204 ms    QRS Duration 82 ms    Q-T Interval 424 ms    QTC Calculation (Bezet) 430 ms    Calculated R Axis -15 degrees    Calculated T Axis 25 degrees    Diagnosis       Atrial-paced rhythm  Low voltage QRS  Cannot rule out Anterior infarct (cited on or before 19-NOV-2012)  Abnormal ECG  When compared with ECG of 14-OCT-2016 19:51,  No significant change was found     SAMPLES BEING HELD    Collection Time: 09/17/19  9:58 PM   Result Value Ref Range    SAMPLES BEING HELD 1RED 1BLUE UA     COMMENT        Add-on orders for these samples will be processed based on acceptable specimen integrity and analyte stability, which may vary by analyte. CBC WITH AUTOMATED DIFF    Collection Time: 09/17/19  9:58 PM   Result Value Ref Range    WBC 10.5 3.6 - 11.0 K/uL    RBC 3.91 3.80 - 5.20 M/uL    HGB 12.6 11.5 - 16.0 g/dL    HCT 39.0 35.0 - 47.0 %    MCV 99.7 (H) 80.0 - 99.0 FL    MCH 32.2 26.0 - 34.0 PG    MCHC 32.3 30.0 - 36.5 g/dL    RDW 13.0 11.5 - 14.5 %    PLATELET 350 301 - 060 K/uL    MPV 11.0 8.9 - 12.9 FL    NRBC PENDING  WBC    ABSOLUTE NRBC PENDING K/uL    NEUTROPHILS 76 (H) 32 - 75 %    LYMPHOCYTES 12 12 - 49 %    MONOCYTES 9 5 - 13 %    EOSINOPHILS 2 0 - 7 %    BASOPHILS 1 0 - 1 %    IMMATURE GRANULOCYTES 0 0.0 - 0.5 %    ABS. NEUTROPHILS 8.0 1.8 - 8.0 K/UL    ABS. LYMPHOCYTES 1.3 0.8 - 3.5 K/UL    ABS. MONOCYTES 0.9 0.0 - 1.0 K/UL    ABS. EOSINOPHILS 0.2 0.0 - 0.4 K/UL    ABS. BASOPHILS 0.1 0.0 - 0.1 K/UL    ABS. IMM.  GRANS. 0.0 0.00 - 0.04 K/UL    DF MANUAL      RBC COMMENTS MACROCYTOSIS  1+        WBC COMMENTS REACTIVE LYMPHS     METABOLIC PANEL, COMPREHENSIVE    Collection Time: 09/17/19  9:58 PM   Result Value Ref Range    Sodium 142 136 - 145 mmol/L    Potassium 3.6 3.5 - 5.1 mmol/L    Chloride 105 97 - 108 mmol/L    CO2 30 21 - 32 mmol/L    Anion gap 7 5 - 15 mmol/L    Glucose 95 65 - 100 mg/dL    BUN 25 (H) 6 - 20 MG/DL    Creatinine 1.38 (H) 0.55 - 1.02 MG/DL    BUN/Creatinine ratio 18 12 - 20      GFR est AA 46 (L) >60 ml/min/1.73m2    GFR est non-AA 38 (L) >60 ml/min/1.73m2    Calcium 9.0 8.5 - 10.1 MG/DL    Bilirubin, total 0.2 0.2 - 1.0 MG/DL    ALT (SGPT) 26 12 - 78 U/L    AST (SGOT) 21 15 - 37 U/L    Alk. phosphatase 92 45 - 117 U/L    Protein, total 7.3 6.4 - 8.2 g/dL    Albumin 3.9 3.5 - 5.0 g/dL    Globulin 3.4 2.0 - 4.0 g/dL    A-G Ratio 1.1 1.1 - 2.2     NT-PRO BNP    Collection Time: 09/17/19  9:58 PM   Result Value Ref Range    NT pro- (H) 0 - 125 PG/ML   TROPONIN I    Collection Time: 09/17/19  9:58 PM   Result Value Ref Range    Troponin-I, Qt. <0.05 <0.05 ng/mL   URINE CULTURE HOLD SAMPLE    Collection Time: 09/17/19  9:58 PM   Result Value Ref Range    Urine culture hold        URINE ON HOLD IN MICROBIOLOGY DEPT FOR 3 DAYS. IF UNPRESERVED URINE IS SUBMITTED, IT CANNOT BE USED FOR ADDITIONAL TESTING AFTER 24 HRS, RECOLLECTION WILL BE REQUIRED. Xr Chest Pa Lat    Result Date: 9/17/2019  CLINICAL HISTORY: Cough INDICATION: Cough COMPARISON: 6-15 FINDINGS: PA and lateral views of the chest are obtained. The cardiopericardial silhouette is within normal limits. There is no pleural effusion, pneumothorax or focal consolidation present. Cardiac pacer unchanged in appearance. IMPRESSION: No acute intrathoracic disease. Patient Education        Bronchitis: Care Instructions  Your Care Instructions    Bronchitis is inflammation of the bronchial tubes, which carry air to the lungs. The tubes swell and produce mucus, or phlegm. The mucus and inflamed bronchial tubes make you cough.  You may have trouble breathing. Most cases of bronchitis are caused by viruses like those that cause colds. Antibiotics usually do not help and they may be harmful. Bronchitis usually develops rapidly and lasts about 2 to 3 weeks in otherwise healthy people. Follow-up care is a key part of your treatment and safety. Be sure to make and go to all appointments, and call your doctor if you are having problems. It's also a good idea to know your test results and keep a list of the medicines you take. How can you care for yourself at home? · Take all medicines exactly as prescribed. Call your doctor if you think you are having a problem with your medicine. · Get some extra rest.  · Take an over-the-counter pain medicine, such as acetaminophen (Tylenol), ibuprofen (Advil, Motrin), or naproxen (Aleve) to reduce fever and relieve body aches. Read and follow all instructions on the label. · Do not take two or more pain medicines at the same time unless the doctor told you to. Many pain medicines have acetaminophen, which is Tylenol. Too much acetaminophen (Tylenol) can be harmful. · Take an over-the-counter cough medicine that contains dextromethorphan to help quiet a dry, hacking cough so that you can sleep. Avoid cough medicines that have more than one active ingredient. Read and follow all instructions on the label. · Breathe moist air from a humidifier, hot shower, or sink filled with hot water. The heat and moisture will thin mucus so you can cough it out. · Do not smoke. Smoking can make bronchitis worse. If you need help quitting, talk to your doctor about stop-smoking programs and medicines. These can increase your chances of quitting for good. When should you call for help? Call 911 anytime you think you may need emergency care.  For example, call if:    · You have severe trouble breathing.    Call your doctor now or seek immediate medical care if:    · You have new or worse trouble breathing.     · You cough up dark brown or bloody mucus (sputum).     · You have a new or higher fever.     · You have a new rash.    Watch closely for changes in your health, and be sure to contact your doctor if:    · You cough more deeply or more often, especially if you notice more mucus or a change in the color of your mucus.     · You are not getting better as expected. Where can you learn more? Go to http://capri-ekaterina.info/. Enter H333 in the search box to learn more about \"Bronchitis: Care Instructions. \"  Current as of: September 5, 2018  Content Version: 12.1  © 1333-4208 [employer]. Care instructions adapted under license by [employer] (which disclaims liability or warranty for this information). If you have questions about a medical condition or this instruction, always ask your healthcare professional. [address] any warranty or liability for your use of this information.

## 2019-10-23 ENCOUNTER — HOSPITAL ENCOUNTER (EMERGENCY)
Age: 74
Discharge: HOME | End: 2019-10-23
Attending: EMERGENCY MEDICINE
Payer: MEDICARE

## 2019-10-23 ENCOUNTER — APPOINTMENT (OUTPATIENT)
Dept: CT IMAGING | Age: 74
End: 2019-10-23
Attending: EMERGENCY MEDICINE
Payer: MEDICARE

## 2019-10-23 VITALS
TEMPERATURE: 98 F | RESPIRATION RATE: 16 BRPM | HEIGHT: 64 IN | OXYGEN SATURATION: 97 % | DIASTOLIC BLOOD PRESSURE: 72 MMHG | SYSTOLIC BLOOD PRESSURE: 153 MMHG | HEART RATE: 71 BPM | BODY MASS INDEX: 32.41 KG/M2 | WEIGHT: 189.82 LBS

## 2019-10-23 DIAGNOSIS — G43.809 OTHER MIGRAINE WITHOUT STATUS MIGRAINOSUS, NOT INTRACTABLE: Primary | ICD-10-CM

## 2019-10-23 PROCEDURE — 99283 EMERGENCY DEPT VISIT LOW MDM: CPT

## 2019-10-23 PROCEDURE — 96375 TX/PRO/DX INJ NEW DRUG ADDON: CPT

## 2019-10-23 PROCEDURE — 96374 THER/PROPH/DIAG INJ IV PUSH: CPT

## 2019-10-23 PROCEDURE — 70450 CT HEAD/BRAIN W/O DYE: CPT

## 2019-10-23 PROCEDURE — 74011250636 HC RX REV CODE- 250/636: Performed by: EMERGENCY MEDICINE

## 2019-10-23 RX ORDER — DIPHENHYDRAMINE HYDROCHLORIDE 50 MG/ML
25 INJECTION, SOLUTION INTRAMUSCULAR; INTRAVENOUS
Status: COMPLETED | OUTPATIENT
Start: 2019-10-23 | End: 2019-10-23

## 2019-10-23 RX ORDER — DEXAMETHASONE SODIUM PHOSPHATE 4 MG/ML
10 INJECTION, SOLUTION INTRA-ARTICULAR; INTRALESIONAL; INTRAMUSCULAR; INTRAVENOUS; SOFT TISSUE
Status: COMPLETED | OUTPATIENT
Start: 2019-10-23 | End: 2019-10-23

## 2019-10-23 RX ORDER — PROCHLORPERAZINE EDISYLATE 5 MG/ML
10 INJECTION INTRAMUSCULAR; INTRAVENOUS
Status: COMPLETED | OUTPATIENT
Start: 2019-10-23 | End: 2019-10-23

## 2019-10-23 RX ADMIN — DIPHENHYDRAMINE HYDROCHLORIDE 25 MG: 50 INJECTION, SOLUTION INTRAMUSCULAR; INTRAVENOUS at 18:22

## 2019-10-23 RX ADMIN — PROCHLORPERAZINE EDISYLATE 10 MG: 5 INJECTION INTRAMUSCULAR; INTRAVENOUS at 18:20

## 2019-10-23 RX ADMIN — DEXAMETHASONE SODIUM PHOSPHATE 10 MG: 4 INJECTION, SOLUTION INTRAMUSCULAR; INTRAVENOUS at 18:23

## 2019-10-23 RX ADMIN — SODIUM CHLORIDE 1000 ML: 900 INJECTION, SOLUTION INTRAVENOUS at 18:22

## 2019-10-23 NOTE — ED PROVIDER NOTES
HPI   The patient is a 81-year-old white female with a history of atrial fibrillation coronary artery disease recurrent migraine headaches hypertension who presents with acute onset of another typical migraine headache with left parietal occipital throbbing headache associated with nausea but no vomiting and not associated with any neurologic deficit.   Past Medical History:   Diagnosis Date    Arrhythmia     Atrial fibrillation ([address]     CAD (coronary artery disease)     atrial fibrillation    Gastrointestinal disorder     GERD    GERD (gastroesophageal reflux disease)     Headache(784.0)     Hypertension     Hypothyroid     Neurological disorder     migraines    Other ill-defined conditions(799.89) migraine    Psychiatric disorder previous hx of depression       Past Surgical History:   Procedure Laterality Date    HX CHOLECYSTECTOMY      HX HYSTERECTOMY      HX ORTHOPAEDIC      Right shoulder surgery    HX PACEMAKER      HX ELIA AND BSO      MD ANESTH,SURGERY OF SHOULDER      left         Family History:   Problem Relation Age of Onset    Hypertension Other     Stroke Other     Heart Attack Other     Migraines Mother     Cancer Mother         melanoma    Heart Disease Father     Hypertension Father     Stroke Father     Heart Attack Father        Social History     Socioeconomic History    Marital status:      Spouse name: Not on file    Number of children: Not on file    Years of education: Not on file    Highest education level: Not on file   Occupational History    Not on file   Social Needs    Financial resource strain: Not on file    Food insecurity:     Worry: Not on file     Inability: Not on file    Transportation needs:     Medical: Not on file     Non-medical: Not on file   Tobacco Use    Smoking status: Former Smoker    Smokeless tobacco: Never Used    Tobacco comment: quit 30 years ago   Substance and Sexual Activity    Alcohol use: Yes     Comment: social drinker    Drug use: No    Sexual activity: Not on file   Lifestyle    Physical activity:     Days per week: Not on file     Minutes per session: Not on file    Stress: Not on file   Relationships    Social connections:     Talks on phone: Not on file     Gets together: Not on file     Attends Holiness service: Not on file     Active member of club or organization: Not on file     Attends meetings of clubs or organizations: Not on file     Relationship status: Not on file    Intimate partner violence:     Fear of current or ex partner: Not on file     Emotionally abused: Not on file     Physically abused: Not on file     Forced sexual activity: Not on file   Other Topics Concern    Not on file   Social History Narrative    Not on file         ALLERGIES: Patient has no known allergies. Review of Systems   All other systems reviewed and are negative. Vitals:    10/23/19 1755   BP: 167/77   Pulse: 67   Resp: 16   Temp: 98 °F (36.7 °C)   SpO2: 100%   Weight: 86.1 kg (189 lb 13.1 oz)   Height: 5' 4\" (1.626 m)            Physical Exam   Constitutional: She is oriented to person, place, and time. She appears well-developed and well-nourished. HENT:   Head: Normocephalic and atraumatic. Mouth/Throat: Oropharynx is clear and moist. No oropharyngeal exudate. Eyes: Conjunctivae are normal. No scleral icterus. Neck: Neck supple. No thyromegaly present. Cardiovascular: Normal rate, regular rhythm and normal heart sounds. Exam reveals no gallop and no friction rub. No murmur heard. Pulmonary/Chest: Effort normal and breath sounds normal. No stridor. No respiratory distress. She has no wheezes. She has no rales. Abdominal: Soft. Bowel sounds are normal. There is no tenderness. There is no rebound and no guarding. Musculoskeletal: Normal range of motion. Lymphadenopathy:     She has no cervical adenopathy. Neurological: She is alert and oriented to person, place, and time.    Skin: Skin is warm and dry. Psychiatric: She has a normal mood and affect. Nursing note and vitals reviewed.        MDM       Procedures

## 2019-10-23 NOTE — ED TRIAGE NOTES
Pt c/o migraine headache that began yesterday.  Pt states she has tried her home meds without relief. +photophobia + nausea

## 2019-10-23 NOTE — DISCHARGE INSTRUCTIONS
Patient Education        Migraine Headache: Care Instructions  Your Care Instructions  Migraines are painful, throbbing headaches that often start on one side of the head. They may cause nausea and vomiting and make you sensitive to light, sound, or smell. Without treatment, migraines can last from 4 hours to a few days. Medicines can help prevent migraines or stop them after they have started. Your doctor can help you find which ones work best for you. Follow-up care is a key part of your treatment and safety. Be sure to make and go to all appointments, and call your doctor if you are having problems. It's also a good idea to know your test results and keep a list of the medicines you take. How can you care for yourself at home? · Do not drive if you have taken a prescription pain medicine. · Rest in a quiet, dark room until your headache is gone. Close your eyes, and try to relax or go to sleep. Don't watch TV or read. · Put a cold, moist cloth or cold pack on the painful area for 10 to 20 minutes at a time. Put a thin cloth between the cold pack and your skin. · Use a warm, moist towel or a heating pad set on low to relax tight shoulder and neck muscles. · Have someone gently massage your neck and shoulders. · Take your medicines exactly as prescribed. Call your doctor if you think you are having a problem with your medicine. You will get more details on the specific medicines your doctor prescribes. · Be careful not to take pain medicine more often than the instructions allow. You could get worse or more frequent headaches when the medicine wears off. To prevent migraines  · Keep a headache diary so you can figure out what triggers your headaches. Avoiding triggers may help you prevent headaches. Record when each headache began, how long it lasted, and what the pain was like.  (Was it throbbing, aching, stabbing, or dull?) Write down any other symptoms you had with the headache, such as nausea, flashing lights or dark spots, or sensitivity to bright light or loud noise. Note if the headache occurred near your period. List anything that might have triggered the headache. Triggers may include certain foods (chocolate, cheese, wine) or odors, smoke, bright light, stress, or lack of sleep. · If your doctor has prescribed medicine for your migraines, take it as directed. You may have medicine that you take only when you get a migraine and medicine that you take all the time to help prevent migraines. ? If your doctor has prescribed medicine for when you get a headache, take it at the first sign of a migraine, unless your doctor has given you other instructions. ? If your doctor has prescribed medicine to prevent migraines, take it exactly as prescribed. Call your doctor if you think you are having a problem with your medicine. · Find healthy ways to deal with stress. Migraines are most common during or right after stressful times. Take time to relax before and after you do something that has caused a migraine in the past.  · Try to keep your muscles relaxed by keeping good posture. Check your jaw, face, neck, and shoulder muscles for tension. Try to relax them. When you sit at a desk, change positions often. And make sure to stretch for 30 seconds each hour. · Get plenty of sleep and exercise. · Eat meals on a regular schedule. Avoid foods and drinks that often trigger migraines. These include chocolate, alcohol (especially red wine and port), aspartame, monosodium glutamate (MSG), and some additives found in foods (such as hot dogs, yadav, cold cuts, aged cheeses, and pickled foods). · Limit caffeine. Don't drink too much coffee, tea, or soda. But don't quit caffeine suddenly. That can also give you migraines. · Do not smoke or allow others to smoke around you. If you need help quitting, talk to your doctor about stop-smoking programs and medicines.  These can increase your chances of quitting for good.  · If you are taking birth control pills or hormone therapy, talk to your doctor about whether they are triggering your migraines. When should you call for help? Call 911 anytime you think you may need emergency care. For example, call if:    · You have signs of a stroke. These may include:  ? Sudden numbness, paralysis, or weakness in your face, arm, or leg, especially on only one side of your body. ? Sudden vision changes. ? Sudden trouble speaking. ? Sudden confusion or trouble understanding simple statements. ? Sudden problems with walking or balance. ? A sudden, severe headache that is different from past headaches.    Call your doctor now or seek immediate medical care if:    · You have new or worse nausea and vomiting.     · You have a new or higher fever.     · Your headache gets much worse.    Watch closely for changes in your health, and be sure to contact your doctor if:    · You are not getting better after 2 days (48 hours). Where can you learn more? Go to http://capri-ekaterina.info/. Enter V345 in the search box to learn more about \"Migraine Headache: Care Instructions. \"  Current as of: March 28, 2019  Content Version: 12.2  © 8687-3175 [employer]. Care instructions adapted under license by [employer] (which disclaims liability or warranty for this information). If you have questions about a medical condition or this instruction, always ask your healthcare professional. [place] [address] any warranty or liability for your use of this information.

## 2020-02-16 ENCOUNTER — HOSPITAL ENCOUNTER (EMERGENCY)
Age: 75
Discharge: HOME | End: 2020-02-16
Attending: EMERGENCY MEDICINE
Payer: MEDICARE

## 2020-02-16 ENCOUNTER — APPOINTMENT (OUTPATIENT)
Dept: GENERAL RADIOLOGY | Age: 75
End: 2020-02-16
Attending: EMERGENCY MEDICINE
Payer: MEDICARE

## 2020-02-16 VITALS
RESPIRATION RATE: 15 BRPM | BODY MASS INDEX: 33.42 KG/M2 | HEIGHT: 64 IN | HEART RATE: 64 BPM | OXYGEN SATURATION: 98 % | DIASTOLIC BLOOD PRESSURE: 78 MMHG | TEMPERATURE: 99.1 F | WEIGHT: 195.77 LBS | SYSTOLIC BLOOD PRESSURE: 190 MMHG

## 2020-02-16 DIAGNOSIS — J20.9 ACUTE BRONCHITIS, UNSPECIFIED ORGANISM: Primary | ICD-10-CM

## 2020-02-16 DIAGNOSIS — R05.9 COUGH: ICD-10-CM

## 2020-02-16 LAB
ALBUMIN SERPL-MCNC: 3.5 G/DL (ref 3.5–5)
ALBUMIN/GLOB SERPL: 1 {RATIO} (ref 1.1–2.2)
ALP SERPL-CCNC: 154 U/L (ref 45–117)
ALT SERPL-CCNC: 71 U/L (ref 12–78)
ANION GAP SERPL CALC-SCNC: 10 MMOL/L (ref 5–15)
AST SERPL-CCNC: 23 U/L (ref 15–37)
BASOPHILS # BLD: 0.1 K/UL (ref 0–0.1)
BASOPHILS NFR BLD: 2 % (ref 0–1)
BILIRUB SERPL-MCNC: 0.3 MG/DL (ref 0.2–1)
BUN SERPL-MCNC: 19 MG/DL (ref 6–20)
BUN/CREAT SERPL: 19 (ref 12–20)
CALCIUM SERPL-MCNC: 9.1 MG/DL (ref 8.5–10.1)
CHLORIDE SERPL-SCNC: 108 MMOL/L (ref 97–108)
CO2 SERPL-SCNC: 25 MMOL/L (ref 21–32)
COMMENT, HOLDF: NORMAL
CREAT SERPL-MCNC: 0.99 MG/DL (ref 0.55–1.02)
DIFFERENTIAL METHOD BLD: ABNORMAL
EOSINOPHIL # BLD: 0.2 K/UL (ref 0–0.4)
EOSINOPHIL NFR BLD: 3 % (ref 0–7)
ERYTHROCYTE [DISTWIDTH] IN BLOOD BY AUTOMATED COUNT: 13.2 % (ref 11.5–14.5)
FLUAV AG NPH QL IA: NEGATIVE
FLUBV AG NOSE QL IA: NEGATIVE
GLOBULIN SER CALC-MCNC: 3.5 G/DL (ref 2–4)
GLUCOSE SERPL-MCNC: 93 MG/DL (ref 65–100)
HCT VFR BLD AUTO: 38.6 % (ref 35–47)
HGB BLD-MCNC: 12 G/DL (ref 11.5–16)
LACTATE SERPL-SCNC: 0.7 MMOL/L (ref 0.4–2)
LYMPHOCYTES # BLD: 2.4 K/UL (ref 0.8–3.5)
LYMPHOCYTES NFR BLD: 29 % (ref 12–49)
MCH RBC QN AUTO: 30.4 PG (ref 26–34)
MCHC RBC AUTO-ENTMCNC: 31.1 G/DL (ref 30–36.5)
MCV RBC AUTO: 97.7 FL (ref 80–99)
MONOCYTES # BLD: 1 K/UL (ref 0–1)
MONOCYTES NFR BLD: 12 % (ref 5–13)
NEUTS SEG # BLD: 4.5 K/UL (ref 1.8–8)
NEUTS SEG NFR BLD: 54 % (ref 32–75)
PLATELET # BLD AUTO: 288 K/UL (ref 150–400)
PMV BLD AUTO: 10.3 FL (ref 8.9–12.9)
POTASSIUM SERPL-SCNC: 4.1 MMOL/L (ref 3.5–5.1)
PROT SERPL-MCNC: 7 G/DL (ref 6.4–8.2)
RBC # BLD AUTO: 3.95 M/UL (ref 3.8–5.2)
SAMPLES BEING HELD,HOLD: NORMAL
SODIUM SERPL-SCNC: 143 MMOL/L (ref 136–145)
UR CULT HOLD, URHOLD: NORMAL
WBC # BLD AUTO: 8.2 K/UL (ref 3.6–11)

## 2020-02-16 PROCEDURE — 85025 COMPLETE CBC W/AUTO DIFF WBC: CPT

## 2020-02-16 PROCEDURE — 83605 ASSAY OF LACTIC ACID: CPT

## 2020-02-16 PROCEDURE — 87040 BLOOD CULTURE FOR BACTERIA: CPT

## 2020-02-16 PROCEDURE — 94762 N-INVAS EAR/PLS OXIMTRY CONT: CPT

## 2020-02-16 PROCEDURE — 80053 COMPREHEN METABOLIC PANEL: CPT

## 2020-02-16 PROCEDURE — 77030013140 HC MSK NEB VYRM -A

## 2020-02-16 PROCEDURE — 94640 AIRWAY INHALATION TREATMENT: CPT

## 2020-02-16 PROCEDURE — 87804 INFLUENZA ASSAY W/OPTIC: CPT

## 2020-02-16 PROCEDURE — 71046 X-RAY EXAM CHEST 2 VIEWS: CPT

## 2020-02-16 PROCEDURE — 74011000250 HC RX REV CODE- 250: Performed by: EMERGENCY MEDICINE

## 2020-02-16 PROCEDURE — 36415 COLL VENOUS BLD VENIPUNCTURE: CPT

## 2020-02-16 PROCEDURE — 99284 EMERGENCY DEPT VISIT MOD MDM: CPT

## 2020-02-16 RX ORDER — ALBUTEROL SULFATE 90 UG/1
2 AEROSOL, METERED RESPIRATORY (INHALATION)
Qty: 1 INHALER | Refills: 0 | Status: SHIPPED | OUTPATIENT
Start: 2020-02-16 | End: 2020-12-20

## 2020-02-16 RX ORDER — AZITHROMYCIN 250 MG/1
TABLET, FILM COATED ORAL
Qty: 6 TAB | Refills: 0 | Status: SHIPPED | OUTPATIENT
Start: 2020-02-16 | End: 2020-10-05

## 2020-02-16 RX ORDER — SODIUM CHLORIDE 0.9 % (FLUSH) 0.9 %
5-40 SYRINGE (ML) INJECTION AS NEEDED
Status: DISCONTINUED | OUTPATIENT
Start: 2020-02-16 | End: 2020-02-17 | Stop reason: HOSPADM

## 2020-02-16 RX ORDER — BENZONATATE 100 MG/1
100 CAPSULE ORAL
Qty: 30 CAP | Refills: 0 | Status: SHIPPED | OUTPATIENT
Start: 2020-02-16 | End: 2020-02-23

## 2020-02-16 RX ORDER — SODIUM CHLORIDE 0.9 % (FLUSH) 0.9 %
5-40 SYRINGE (ML) INJECTION EVERY 8 HOURS
Status: DISCONTINUED | OUTPATIENT
Start: 2020-02-16 | End: 2020-02-17 | Stop reason: HOSPADM

## 2020-02-16 RX ADMIN — Medication 10 ML: at 22:30

## 2020-02-16 RX ADMIN — ALBUTEROL SULFATE 1 DOSE: 2.5 SOLUTION RESPIRATORY (INHALATION) at 22:22

## 2020-02-17 NOTE — DISCHARGE INSTRUCTIONS
We hope that we have addressed all of your medical concerns. The examination and treatment you received in the Emergency Department were for an emergent problem and were not intended as complete care. It is important that you follow up with your healthcare provider(s) for ongoing care. If your symptoms worsen or do not improve as expected, and you are unable to reach your usual health care provider(s), you should return to the Emergency Department. Today's healthcare is undergoing tremendous change, and patient satisfaction surveys are one of the many tools to assess the quality of medical care. You may receive a survey from the [employer] regarding your experience in the Emergency Department. I hope that your experience has been completely positive, particularly the medical care that I provided. As such, please participate in the survey; anything less than excellent does not meet my expectations or intentions. [facility] and [address] [place] [address] participate in nationally recognized quality of care measures. If your blood pressure is greater than 120/80, as reported below, we urge that you seek medical care to address the potential of high blood pressure, commonly known as hypertension. Hypertension can be hereditary or can be caused by certain medical conditions, pain, stress, or \"white coat syndrome. \"       Please make an appointment with your health care provider(s) for follow up of your Emergency Department visit. VITALS:   Patient Vitals for the past 8 hrs:   Temp Pulse Resp BP SpO2   02/16/20 2300 -- 64 16 185/71 97 %   02/16/20 2230 -- 60 14 (!) 204/86 97 %   02/16/20 2204 99.1 °F (37.3 °C) 64 16 (!) 211/100 99 %          Thank you for allowing us to provide you with medical care today. We realize that you have many choices for your emergency care needs. Please choose us in the future for any continued health care needs. Everardo Oden, [address] [place], [place] [address]y [address].   Office: [phone]            Recent Results (from the past 24 hour(s))   CBC WITH AUTOMATED DIFF    Collection Time: 02/16/20 10:44 PM   Result Value Ref Range    WBC 8.2 3.6 - 11.0 K/uL    RBC 3.95 3.80 - 5.20 M/uL    HGB 12.0 11.5 - 16.0 g/dL    HCT 38.6 35.0 - 47.0 %    MCV 97.7 80.0 - 99.0 FL    MCH 30.4 26.0 - 34.0 PG    MCHC 31.1 30.0 - 36.5 g/dL    RDW 13.2 11.5 - 14.5 %    PLATELET 243 744 - 074 K/uL    MPV 10.3 8.9 - 12.9 FL    NEUTROPHILS 54 32 - 75 %    LYMPHOCYTES 29 12 - 49 %    MONOCYTES 12 5 - 13 %    EOSINOPHILS 3 0 - 7 %    BASOPHILS 2 (H) 0 - 1 %    ABS. NEUTROPHILS 4.5 1.8 - 8.0 K/UL    ABS. LYMPHOCYTES 2.4 0.8 - 3.5 K/UL    ABS. MONOCYTES 1.0 0.0 - 1.0 K/UL    ABS. EOSINOPHILS 0.2 0.0 - 0.4 K/UL    ABS. BASOPHILS 0.1 0.0 - 0.1 K/UL    DF AUTOMATED     METABOLIC PANEL, COMPREHENSIVE    Collection Time: 02/16/20 10:44 PM   Result Value Ref Range    Sodium 143 136 - 145 mmol/L    Potassium 4.1 3.5 - 5.1 mmol/L    Chloride 108 97 - 108 mmol/L    CO2 25 21 - 32 mmol/L    Anion gap 10 5 - 15 mmol/L    Glucose 93 65 - 100 mg/dL    BUN 19 6 - 20 MG/DL    Creatinine 0.99 0.55 - 1.02 MG/DL    BUN/Creatinine ratio 19 12 - 20      GFR est AA >60 >60 ml/min/1.73m2    GFR est non-AA 55 (L) >60 ml/min/1.73m2    Calcium 9.1 8.5 - 10.1 MG/DL    Bilirubin, total 0.3 0.2 - 1.0 MG/DL    ALT (SGPT) 71 12 - 78 U/L    AST (SGOT) 23 15 - 37 U/L    Alk.  phosphatase 154 (H) 45 - 117 U/L    Protein, total 7.0 6.4 - 8.2 g/dL    Albumin 3.5 3.5 - 5.0 g/dL    Globulin 3.5 2.0 - 4.0 g/dL    A-G Ratio 1.0 (L) 1.1 - 2.2     INFLUENZA A+B VIRAL AGS    Collection Time: 02/16/20 10:44 PM   Result Value Ref Range    Influenza A Antigen NEGATIVE  NEG      Influenza B Antigen NEGATIVE  NEG     LACTIC ACID    Collection Time: 02/16/20 10:44 PM   Result Value Ref Range    Lactic acid 0.7 0.4 - 2.0 MMOL/L   SAMPLES BEING HELD    Collection Time: 02/16/20 10:48 PM   Result Value Ref Range    SAMPLES BEING HELD 1 UA     COMMENT        Add-on orders for these samples will be processed based on acceptable specimen integrity and analyte stability, which may vary by analyte. URINE CULTURE HOLD SAMPLE    Collection Time: 02/16/20 10:48 PM   Result Value Ref Range    Urine culture hold        Urine on hold in Microbiology dept for 2 days. If unpreserved urine is submitted, it cannot be used for addtional testing after 24 hours, recollection will be required. Xr Chest Pa Lat    Result Date: 2/16/2020  INDICATION: Pneumonia COMPARISON: September 17, 2019 FINDINGS: PA and lateral views of the chest demonstrate a stable cardiomediastinal silhouette and clear lungs bilaterally. Right-sided pacemaker is stable in position. The visualized osseous structures are unremarkable. IMPRESSION: No acute process. No significant change from the prior study. Patient Education        Bronchitis: Care Instructions  Your Care Instructions    Bronchitis is inflammation of the bronchial tubes, which carry air to the lungs. The tubes swell and produce mucus, or phlegm. The mucus and inflamed bronchial tubes make you cough. You may have trouble breathing. Most cases of bronchitis are caused by viruses like those that cause colds. Antibiotics usually do not help and they may be harmful. Bronchitis usually develops rapidly and lasts about 2 to 3 weeks in otherwise healthy people. Follow-up care is a key part of your treatment and safety. Be sure to make and go to all appointments, and call your doctor if you are having problems. It's also a good idea to know your test results and keep a list of the medicines you take. How can you care for yourself at home? · Take all medicines exactly as prescribed. Call your doctor if you think you are having a problem with your medicine.   · Get some extra rest.  · Take an over-the-counter pain medicine, such as acetaminophen (Tylenol), ibuprofen (Advil, Motrin), or naproxen (Aleve) to reduce fever and relieve body aches. Read and follow all instructions on the label. · Do not take two or more pain medicines at the same time unless the doctor told you to. Many pain medicines have acetaminophen, which is Tylenol. Too much acetaminophen (Tylenol) can be harmful. · Take an over-the-counter cough medicine that contains dextromethorphan to help quiet a dry, hacking cough so that you can sleep. Avoid cough medicines that have more than one active ingredient. Read and follow all instructions on the label. · Breathe moist air from a humidifier, hot shower, or sink filled with hot water. The heat and moisture will thin mucus so you can cough it out. · Do not smoke. Smoking can make bronchitis worse. If you need help quitting, talk to your doctor about stop-smoking programs and medicines. These can increase your chances of quitting for good. When should you call for help? Call 911 anytime you think you may need emergency care. For example, call if:    · You have severe trouble breathing.    Call your doctor now or seek immediate medical care if:    · You have new or worse trouble breathing.     · You cough up dark brown or bloody mucus (sputum).     · You have a new or higher fever.     · You have a new rash.    Watch closely for changes in your health, and be sure to contact your doctor if:    · You cough more deeply or more often, especially if you notice more mucus or a change in the color of your mucus.     · You are not getting better as expected. Where can you learn more? Go to http://capri-ekaterina.info/. Enter H333 in the search box to learn more about \"Bronchitis: Care Instructions. \"  Current as of: June 9, 2019  Content Version: 12.2  © 0146-0878 [employer]. Care instructions adapted under license by [employer] (which disclaims liability or warranty for this information).  If you have questions about a medical condition or this instruction, always ask your healthcare professional. [address] any warranty or liability for your use of this information. Patient Education        Cough: Care Instructions  Your Care Instructions    A cough is your body's response to something that bothers your throat or airways. Many things can cause a cough. You might cough because of a cold or the flu, bronchitis, or asthma. Smoking, postnasal drip, allergies, and stomach acid that backs up into your throat also can cause coughs. A cough is a symptom, not a disease. Most coughs stop when the cause, such as a cold, goes away. You can take a few steps at home to cough less and feel better. Follow-up care is a key part of your treatment and safety. Be sure to make and go to all appointments, and call your doctor if you are having problems. It's also a good idea to know your test results and keep a list of the medicines you take. How can you care for yourself at home? · Drink lots of water and other fluids. This helps thin the mucus and soothes a dry or sore throat. Honey or lemon juice in hot water or tea may ease a dry cough. · Take cough medicine as directed by your doctor. · Prop up your head on pillows to help you breathe and ease a dry cough. · Try cough drops to soothe a dry or sore throat. Cough drops don't stop a cough. Medicine-flavored cough drops are no better than candy-flavored drops or hard candy. · Do not smoke. Avoid secondhand smoke. If you need help quitting, talk to your doctor about stop-smoking programs and medicines. These can increase your chances of quitting for good. When should you call for help? Call 911 anytime you think you may need emergency care.  For example, call if:    · You have severe trouble breathing.    Call your doctor now or seek immediate medical care if:    · You cough up blood.     · You have new or worse trouble breathing.     · You have a new or higher fever.     · You have a new rash.    Watch closely for changes in your health, and be sure to contact your doctor if:    · You cough more deeply or more often, especially if you notice more mucus or a change in the color of your mucus.     · You have new symptoms, such as a sore throat, an earache, or sinus pain.     · You do not get better as expected. Where can you learn more? Go to http://capri-ekaterina.info/. Enter D279 in the search box to learn more about \"Cough: Care Instructions. \"  Current as of: June 9, 2019  Content Version: 12.2  © 4951-6575 [employer]. Care instructions adapted under license by [employer] (which disclaims liability or warranty for this information). If you have questions about a medical condition or this instruction, always ask your healthcare professional. [address] any warranty or liability for your use of this information.

## 2020-02-17 NOTE — ED NOTES
The patient was discharged home by [facility], RN  in stable condition. The patient is alert and oriented, in no respiratory distress and discharge vital signs obtained. The patient's diagnosis, condition and treatment were explained. The patient expressed understanding. prescriptions given. No work/school note given. A discharge plan has been developed. A  was not involved in the process. Aftercare instructions were given. Pt ambulatory out of the ED with family.

## 2020-02-17 NOTE — ED PROVIDER NOTES
Pt returned from a [address]. For the last 4 days with cough, chest congestion, unknown fever. Generalized body aches. Cough   This is a new problem. The current episode started more than 2 days ago. The problem occurs every few minutes. The problem has not changed since onset. The cough is productive of sputum. Patient reports a subjective fever - was not measured. The fever has been present for 3 - 4 days. Associated symptoms include chills, myalgias, shortness of breath and wheezing. Pertinent negatives include no chest pain, no ear pain, no rhinorrhea, no nausea, no vomiting and no confusion. She has tried nothing for the symptoms. She is not a smoker. Her past medical history does not include COPD, asthma or CHF.         Past Medical History:   Diagnosis Date    Arrhythmia     Atrial fibrillation ([facility] [address]     CAD (coronary artery disease)     atrial fibrillation    Gastrointestinal disorder     GERD    GERD (gastroesophageal reflux disease)     Headache(784.0)     Hypertension     Hypothyroid     Neurological disorder     migraines    Other ill-defined conditions(799.89) migraine    Psychiatric disorder previous hx of depression       Past Surgical History:   Procedure Laterality Date    HX CHOLECYSTECTOMY      HX HYSTERECTOMY      HX ORTHOPAEDIC      Right shoulder surgery    HX PACEMAKER      HX ELIA AND BSO      GA ANESTH,SURGERY OF SHOULDER      left         Family History:   Problem Relation Age of Onset    Hypertension Other     Stroke Other     Heart Attack Other     Migraines Mother     Cancer Mother         melanoma    Heart Disease Father     Hypertension Father     Stroke Father     Heart Attack Father        Social History     Socioeconomic History    Marital status:      Spouse name: Not on file    Number of children: Not on file    Years of education: Not on file    Highest education level: Not on file   Occupational History    Not on file Social Needs    Financial resource strain: Not on file    Food insecurity:     Worry: Not on file     Inability: Not on file    Transportation needs:     Medical: Not on file     Non-medical: Not on file   Tobacco Use    Smoking status: Former Smoker    Smokeless tobacco: Never Used    Tobacco comment: quit 30 years ago   Substance and Sexual Activity    Alcohol use: Yes     Comment: social drinker    Drug use: No    Sexual activity: Not on file   Lifestyle    Physical activity:     Days per week: Not on file     Minutes per session: Not on file    Stress: Not on file   Relationships    Social connections:     Talks on phone: Not on file     Gets together: Not on file     Attends Confucianism service: Not on file     Active member of club or organization: Not on file     Attends meetings of clubs or organizations: Not on file     Relationship status: Not on file    Intimate partner violence:     Fear of current or ex partner: Not on file     Emotionally abused: Not on file     Physically abused: Not on file     Forced sexual activity: Not on file   Other Topics Concern    Not on file   Social History Narrative    Not on file     ALLERGIES: Patient has no known allergies. Review of Systems   Constitutional: Positive for chills. Negative for appetite change, fever and unexpected weight change. HENT: Negative for ear pain, hearing loss, rhinorrhea and trouble swallowing. Eyes: Negative for pain and visual disturbance. Respiratory: Positive for cough, shortness of breath and wheezing. Negative for chest tightness. Cardiovascular: Negative for chest pain and palpitations. Gastrointestinal: Negative for abdominal distention, abdominal pain, blood in stool, nausea and vomiting. Genitourinary: Negative for dysuria, hematuria and urgency. Musculoskeletal: Positive for myalgias. Negative for back pain. Skin: Negative for rash.    Neurological: Negative for dizziness, syncope, weakness and numbness. Psychiatric/Behavioral: Negative for confusion and suicidal ideas. All other systems reviewed and are negative. Vitals:    02/16/20 2204 02/16/20 2230 02/16/20 2300 02/16/20 2330   BP: (!) 211/100 (!) 204/86 185/71 190/78   Pulse: 64 60 64 64   Resp: 16 14 16 15   Temp: 99.1 °F (37.3 °C)      SpO2: 99% 97% 97% 98%   Weight: 88.8 kg (195 lb 12.3 oz)      Height: 5' 4\" (1.626 m)               Physical Exam  Vitals signs and nursing note reviewed. Constitutional:       General: She is not in acute distress. Appearance: Normal appearance. She is well-developed. She is not ill-appearing, toxic-appearing or diaphoretic. HENT:      Head: Normocephalic and atraumatic. Right Ear: External ear normal.      Left Ear: External ear normal.      Nose: Nose normal.      Mouth/Throat:      Pharynx: No oropharyngeal exudate. Eyes:      General: No scleral icterus. Right eye: No discharge. Left eye: No discharge. Extraocular Movements: Extraocular movements intact. Conjunctiva/sclera: Conjunctivae normal.      Pupils: Pupils are equal, round, and reactive to light. Neck:      Musculoskeletal: Normal range of motion and neck supple. Vascular: No JVD. Trachea: No tracheal deviation. Cardiovascular:      Rate and Rhythm: Normal rate and regular rhythm. Heart sounds: Normal heart sounds. No murmur. No friction rub. No gallop. Pulmonary:      Effort: Pulmonary effort is normal. Prolonged expiration present. No respiratory distress. Breath sounds: No stridor. Examination of the left-lower field reveals decreased breath sounds. Decreased breath sounds present. No wheezing, rhonchi or rales. Chest:      Chest wall: No tenderness. Abdominal:      General: Bowel sounds are normal. There is no distension. Palpations: Abdomen is soft. Tenderness: There is no abdominal tenderness. There is no guarding or rebound.    Musculoskeletal: Normal range of motion. General: No tenderness. Right lower leg: No edema. Left lower leg: No edema. Skin:     General: Skin is warm and dry. Capillary Refill: Capillary refill takes less than 2 seconds. Coloration: Skin is not pale. Findings: No erythema or rash. Neurological:      General: No focal deficit present. Mental Status: She is alert and oriented to person, place, and time. GCS: GCS eye subscore is 4. GCS verbal subscore is 5. GCS motor subscore is 6. Cranial Nerves: No cranial nerve deficit. Sensory: No sensory deficit. Motor: No weakness or abnormal muscle tone. Coordination: Coordination normal.      Deep Tendon Reflexes: Reflexes are normal and symmetric. Reflexes normal.   Psychiatric:         Mood and Affect: Mood normal.         Behavior: Behavior normal.         Thought Content: Thought content normal.         Judgment: Judgment normal.          MDM  Number of Diagnoses or Management Options  Acute bronchitis, unspecified organism:   Cough:      Amount and/or Complexity of Data Reviewed  Clinical lab tests: ordered and reviewed  Tests in the radiology section of CPT®: ordered and reviewed    Risk of Complications, Morbidity, and/or Mortality  Presenting problems: moderate  Diagnostic procedures: moderate  Management options: moderate    Patient Progress  Patient progress: improved         Procedures    Chief Complaint   Patient presents with    Flu Like Symptoms       The patient's presenting problems have been discussed, and they are in agreement with the care plan formulated and outlined with them. I have encouraged them to ask questions as they arise throughout their visit.     MEDICATIONS GIVEN:  Medications   sodium chloride (NS) flush 5-40 mL (10 mL IntraVENous Given 2/16/20 2230)   sodium chloride (NS) flush 5-40 mL (has no administration in time range)   albuterol 5mg / ipratropium 0.5mg neb solution (1 Dose Nebulization Given 2/16/20 3260)       LABS REVIEWED:  Recent Results (from the past 24 hour(s))   CBC WITH AUTOMATED DIFF    Collection Time: 02/16/20 10:44 PM   Result Value Ref Range    WBC 8.2 3.6 - 11.0 K/uL    RBC 3.95 3.80 - 5.20 M/uL    HGB 12.0 11.5 - 16.0 g/dL    HCT 38.6 35.0 - 47.0 %    MCV 97.7 80.0 - 99.0 FL    MCH 30.4 26.0 - 34.0 PG    MCHC 31.1 30.0 - 36.5 g/dL    RDW 13.2 11.5 - 14.5 %    PLATELET 700 035 - 754 K/uL    MPV 10.3 8.9 - 12.9 FL    NEUTROPHILS 54 32 - 75 %    LYMPHOCYTES 29 12 - 49 %    MONOCYTES 12 5 - 13 %    EOSINOPHILS 3 0 - 7 %    BASOPHILS 2 (H) 0 - 1 %    ABS. NEUTROPHILS 4.5 1.8 - 8.0 K/UL    ABS. LYMPHOCYTES 2.4 0.8 - 3.5 K/UL    ABS. MONOCYTES 1.0 0.0 - 1.0 K/UL    ABS. EOSINOPHILS 0.2 0.0 - 0.4 K/UL    ABS. BASOPHILS 0.1 0.0 - 0.1 K/UL    DF AUTOMATED     METABOLIC PANEL, COMPREHENSIVE    Collection Time: 02/16/20 10:44 PM   Result Value Ref Range    Sodium 143 136 - 145 mmol/L    Potassium 4.1 3.5 - 5.1 mmol/L    Chloride 108 97 - 108 mmol/L    CO2 25 21 - 32 mmol/L    Anion gap 10 5 - 15 mmol/L    Glucose 93 65 - 100 mg/dL    BUN 19 6 - 20 MG/DL    Creatinine 0.99 0.55 - 1.02 MG/DL    BUN/Creatinine ratio 19 12 - 20      GFR est AA >60 >60 ml/min/1.73m2    GFR est non-AA 55 (L) >60 ml/min/1.73m2    Calcium 9.1 8.5 - 10.1 MG/DL    Bilirubin, total 0.3 0.2 - 1.0 MG/DL    ALT (SGPT) 71 12 - 78 U/L    AST (SGOT) 23 15 - 37 U/L    Alk.  phosphatase 154 (H) 45 - 117 U/L    Protein, total 7.0 6.4 - 8.2 g/dL    Albumin 3.5 3.5 - 5.0 g/dL    Globulin 3.5 2.0 - 4.0 g/dL    A-G Ratio 1.0 (L) 1.1 - 2.2     INFLUENZA A+B VIRAL AGS    Collection Time: 02/16/20 10:44 PM   Result Value Ref Range    Influenza A Antigen NEGATIVE  NEG      Influenza B Antigen NEGATIVE  NEG     LACTIC ACID    Collection Time: 02/16/20 10:44 PM   Result Value Ref Range    Lactic acid 0.7 0.4 - 2.0 MMOL/L   SAMPLES BEING HELD    Collection Time: 02/16/20 10:48 PM   Result Value Ref Range    SAMPLES BEING HELD 1 UA     COMMENT Add-on orders for these samples will be processed based on acceptable specimen integrity and analyte stability, which may vary by analyte. URINE CULTURE HOLD SAMPLE    Collection Time: 02/16/20 10:48 PM   Result Value Ref Range    Urine culture hold        Urine on hold in Microbiology dept for 2 days. If unpreserved urine is submitted, it cannot be used for addtional testing after 24 hours, recollection will be required. VITAL SIGNS:  Patient Vitals for the past 24 hrs:   Temp Pulse Resp BP SpO2   02/16/20 2330  64 15 190/78 98 %   02/16/20 2300  64 16 185/71 97 %   02/16/20 2230  60 14 (!) 204/86 97 %   02/16/20 2204 99.1 °F (37.3 °C) 64 16 (!) 211/100 99 %       RADIOLOGY RESULTS:  The following have been ordered and reviewed:  Xr Chest Pa Lat    Result Date: 2/16/2020  INDICATION: Pneumonia COMPARISON: September 17, 2019 FINDINGS: PA and lateral views of the chest demonstrate a stable cardiomediastinal silhouette and clear lungs bilaterally. Right-sided pacemaker is stable in position. The visualized osseous structures are unremarkable. IMPRESSION: No acute process. No significant change from the prior study. PROGRESS NOTES:  Pt feeling better. Discussed results and plan with patient and spouse. Patient will be discharged home with PCP follow up. Patient instructed to return to the emergency room for any worsening symptoms or any other concerns. DIAGNOSIS:    1. Acute bronchitis, unspecified organism    2.  Cough        PLAN:  Follow-up Information     Follow up With Specialties Details Why Contact Info    Dinah Kign MD Family Practice Schedule an appointment as soon as possible for a visit  [address] [address]  [phone] [facility] DEPT Emergency Medicine  If symptoms worsen  [address]  [phone]        Discharge Medication List as of 2/16/2020 11:34 PM      START taking these medications    Details   azithromycin (ZITHROMAX) 250 mg tablet Take two tablets today then one tablet daily, Print, Disp-6 Tab, R-0      albuterol (PROVENTIL HFA, VENTOLIN HFA, PROAIR HFA) 90 mcg/actuation inhaler Take 2 Puffs by inhalation every four (4) hours as needed for Wheezing or Shortness of Breath., Print, Disp-1 Inhaler, R-0      benzonatate (TESSALON PERLES) 100 mg capsule Take 1 Cap by mouth three (3) times daily as needed for Cough for up to 7 days. , Print, Disp-30 Cap, R-0         CONTINUE these medications which have NOT CHANGED    Details   topiramate (TOPAMAX) 50 mg tablet TAKE 3 TABLETS BY MOUTH  EVERY NIGHT AT BEDTIME, Normal, Disp-270 Tab, R-0Follow up needed LOV 2018      rivaroxaban (XARELTO) 20 mg tab tablet Take  by mouth daily. , Historical Med      gabapentin (NEURONTIN) 300 mg capsule Take 600 mg by mouth three (3) times daily. , Historical Med      biotin 2,500 mcg tab Take 500 mcg by mouth daily. , Historical Med      buPROPion SR (WELLBUTRIN SR) 150 mg SR tablet Take 150 mg by mouth two (2) times a day., Historical Med      calcium-cholecalciferol, D3, (CALTRATE 600+D) tablet Take 1 Tab by mouth daily. , Historical Med      therapeutic multivitamin (THERAGRAN) tablet Take 1 Tab by mouth daily. , Historical Med      nebivolol (BYSTOLIC) 20 mg tablet Take 20 mg by mouth daily. , Historical Med      levothyroxine (SYNTHROID) 25 mcg tablet Take 25 mcg by mouth nightly., Historical Med      olmesartan (BENICAR) 40 mg tablet Take 40 mg by mouth nightly., Historical Med      potassium chloride (K-DUR, KLOR-CON) 20 mEq tablet Take 1 tablet by mouth two  times daily, Normal, Disp-60 Tab, R-0      amiodarone (CORDARONE) 200 mg tablet Take 1 Tab by mouth every other day., Normal, Disp-45 Tab, R-3      bumetanide (BUMEX) 1 mg tablet Take 1 Tab by mouth daily. , Normal, Disp-90 Tab, R-3      ascorbic acid (VITAMIN C) 1,000 mg tablet Take 1,000 mg by mouth nightly., Historical Med omeprazole (PRILOSEC) 20 mg capsule Take 20 mg by mouth daily. Indications: GASTROESOPHAGEAL REFLUX, Historical Med      Diclofenac Potassium (CAMBIA) 50 mg pwpk 1 at HA onset and repeat in 2 hours x 1 PRN, Normal, Disp-9 Packet, R-3      lilxwrs-xcblzayyp-ojocdziuqxyk (MIDRIN) -325 mg capsule Take 1 Cap by mouth four (4) times daily as needed. Max Daily Amount: 4 Caps., Print, Disp-30 Cap, R-2      prochlorperazine (COMPAZINE) 5 mg tablet Take 1-2 tablets by mouth every 6-8 hours PRN, Normal, Disp-30 Tab, R-5      clobetasol (TEMOVATE) 0.05 % ointment Apply  to affected area two (2) times a day., Historical Med             ED COURSE: The patient's hospital course has been uncomplicated.

## 2020-02-17 NOTE — ED TRIAGE NOTES
Pt returned from a [address]. For the last 4 days with cough, chest congestion, unknown fever. Generalized body aches.

## 2020-02-23 LAB
BACTERIA SPEC CULT: NORMAL
SERVICE CMNT-IMP: NORMAL

## 2020-09-17 ENCOUNTER — OFFICE VISIT (OUTPATIENT)
Dept: SLEEP MEDICINE | Age: 75
End: 2020-09-17
Payer: MEDICARE

## 2020-09-17 ENCOUNTER — HOSPITAL ENCOUNTER (OUTPATIENT)
Dept: SLEEP MEDICINE | Age: 75
Discharge: HOME | End: 2020-09-17
Payer: MEDICARE

## 2020-09-17 VITALS — TEMPERATURE: 97.3 F | HEIGHT: 64 IN | BODY MASS INDEX: 32.44 KG/M2 | WEIGHT: 190 LBS

## 2020-09-17 DIAGNOSIS — G47.33 OSA (OBSTRUCTIVE SLEEP APNEA): Primary | ICD-10-CM

## 2020-09-17 DIAGNOSIS — I48.0 PAROXYSMAL ATRIAL FIBRILLATION (HCC): ICD-10-CM

## 2020-09-17 DIAGNOSIS — G47.33 OSA (OBSTRUCTIVE SLEEP APNEA): ICD-10-CM

## 2020-09-17 PROCEDURE — G8536 NO DOC ELDER MAL SCRN: HCPCS | Performed by: SPECIALIST

## 2020-09-17 PROCEDURE — 3017F COLORECTAL CA SCREEN DOC REV: CPT | Performed by: SPECIALIST

## 2020-09-17 PROCEDURE — G9717 DOC PT DX DEP/BP F/U NT REQ: HCPCS | Performed by: SPECIALIST

## 2020-09-17 PROCEDURE — 1090F PRES/ABSN URINE INCON ASSESS: CPT | Performed by: SPECIALIST

## 2020-09-17 PROCEDURE — 99204 OFFICE O/P NEW MOD 45 MIN: CPT | Performed by: SPECIALIST

## 2020-09-17 PROCEDURE — G8400 PT W/DXA NO RESULTS DOC: HCPCS | Performed by: SPECIALIST

## 2020-09-17 PROCEDURE — 95810 POLYSOM 6/> YRS 4/> PARAM: CPT | Performed by: SPECIALIST

## 2020-09-17 PROCEDURE — 1101F PT FALLS ASSESS-DOCD LE1/YR: CPT | Performed by: SPECIALIST

## 2020-09-17 PROCEDURE — G8419 CALC BMI OUT NRM PARAM NOF/U: HCPCS | Performed by: SPECIALIST

## 2020-09-17 PROCEDURE — G8427 DOCREV CUR MEDS BY ELIG CLIN: HCPCS | Performed by: SPECIALIST

## 2020-09-17 RX ORDER — IRBESARTAN 300 MG/1
300 TABLET ORAL
COMMUNITY
Start: 2020-09-03

## 2020-09-17 RX ORDER — AMLODIPINE BESYLATE 2.5 MG/1
2.5 TABLET ORAL DAILY
COMMUNITY
Start: 2020-09-03

## 2020-09-17 NOTE — PROGRESS NOTES
[facility]. [place], [address]  Tel.  [phone]  Fax. 100 [facility] [address]  [place], [address] [facility]  Tel.  [phone]  Fax. [phone] [address] [facility] Susy Lam   Tel.  [phone]  Fax. [phone]       Chief Complaint       No chief complaint on file. CHICO Mansfield is 70 y.o. female seen for evaluation of a sleep disorder. She has a history of snoring. Snoring is described as loud. Her , uses CPAP, has had to wear earplugs due to snoring. He normally retires at 10: 30 p.m. and will awaken between 6-7 AM.  She may awaken frequently throughout the night. She notes vivid dreaming/nightmares, bruxism and sleep talking. She denies sleepwalking, abnormal arm movements, hypnagogic hallucinations, sleep paralysis or cataplexy. She notes daytime fatigue. She may doze if she is seated and inactive such as reading or watching TV as well as riding as a passenger. The patient has not undergone diagnostic testing for the current problems. [place] Sleepiness Score: 13   Modified FOSQ: 19       No Known Allergies    Current Outpatient Medications   Medication Sig Dispense Refill    amLODIPine (NORVASC) 2.5 mg tablet       irbesartan (AVAPRO) 300 mg tablet       vit A/vit C/vit E/zinc/copper (PRESERVISION AREDS PO) Take  by mouth.  albuterol (PROVENTIL HFA, VENTOLIN HFA, PROAIR HFA) 90 mcg/actuation inhaler Take 2 Puffs by inhalation every four (4) hours as needed for Wheezing or Shortness of Breath. 1 Inhaler 0    topiramate (TOPAMAX) 50 mg tablet TAKE 3 TABLETS BY MOUTH  EVERY NIGHT AT BEDTIME 270 Tab 0    rivaroxaban (XARELTO) 20 mg tab tablet Take  by mouth daily.  Diclofenac Potassium (CAMBIA) 50 mg pwpk 1 at HA onset and repeat in 2 hours x 1 PRN 9 Packet 3    jjwhsqa-zbhifmint-uadsmkunrnzr (MIDRIN) -325 mg capsule Take 1 Cap by mouth four (4) times daily as needed.  Max Daily Amount: 4 Caps. 30 Cap 2    prochlorperazine (COMPAZINE) 5 mg tablet Take 1-2 tablets by mouth every 6-8 hours PRN 30 Tab 5    gabapentin (NEURONTIN) 300 mg capsule Take 600 mg by mouth three (3) times daily.  clobetasol (TEMOVATE) 0.05 % ointment Apply  to affected area two (2) times a day.  biotin 2,500 mcg tab Take 500 mcg by mouth daily.  buPROPion SR (WELLBUTRIN SR) 150 mg SR tablet Take 150 mg by mouth two (2) times a day.  calcium-cholecalciferol, D3, (CALTRATE 600+D) tablet Take 1 Tab by mouth daily.  therapeutic multivitamin (THERAGRAN) tablet Take 1 Tab by mouth daily.  nebivolol (BYSTOLIC) 20 mg tablet Take 20 mg by mouth daily.  levothyroxine (SYNTHROID) 25 mcg tablet Take 25 mcg by mouth nightly.  potassium chloride (K-DUR, KLOR-CON) 20 mEq tablet Take 1 tablet by mouth two  times daily 60 Tab 0    amiodarone (CORDARONE) 200 mg tablet Take 1 Tab by mouth every other day. (Patient taking differently: Take 100 mg by mouth every other day.) 45 Tab 3    bumetanide (BUMEX) 1 mg tablet Take 1 Tab by mouth daily. 90 Tab 3    ascorbic acid (VITAMIN C) 1,000 mg tablet Take 1,000 mg by mouth nightly.  omeprazole (PRILOSEC) 20 mg capsule Take 20 mg by mouth daily. Indications: GASTROESOPHAGEAL REFLUX      azithromycin (ZITHROMAX) 250 mg tablet Take two tablets today then one tablet daily (Patient not taking: Reported on 9/17/2020) 6 Tab 0    olmesartan (BENICAR) 40 mg tablet Take 40 mg by mouth nightly. She  has a past medical history of Arrhythmia, Atrial fibrillation ([address] CAD (coronary artery disease), Gastrointestinal disorder, GERD (gastroesophageal reflux disease), Headache(784.0), Hypertension, Hypothyroid, Neurological disorder, Other ill-defined conditions(799.89) (migraine), and Psychiatric disorder (previous hx of depression).     She  has a past surgical history that includes hx cholecystectomy; hx amira and bso; pr anesth,surgery of shoulder; hx pacemaker; hx orthopaedic; and hx hysterectomy. She family history includes Cancer in her mother; Heart Attack in her father and another family member; Heart Disease in her father; Hypertension in her father and another family member; Migraines in her mother; Stroke in her father and another family member. She  reports that she has quit smoking. She has never used smokeless tobacco. She reports current alcohol use. She reports that she does not use drugs. Review of Systems:  Review of Systems   Constitutional: Negative for chills and fever. HENT: Positive for hearing loss and tinnitus. Eyes: Negative for blurred vision and double vision. Respiratory: Negative for cough and shortness of breath. Cardiovascular: Negative for chest pain and palpitations. Gastrointestinal: Negative for abdominal pain and heartburn. Genitourinary: Negative for frequency and urgency. Musculoskeletal: Positive for back pain. Negative for neck pain. Skin: Negative for itching and rash. Neurological: Negative for dizziness and headaches. Endo/Heme/Allergies: Bruises/bleeds easily. Psychiatric/Behavioral: Negative for depression. The patient is not nervous/anxious. Objective:     Visit Vitals  Temp 97.3 °F (36.3 °C) (Temporal)   Ht 5' 4\" (1.626 m)   Wt 190 lb (86.2 kg)   BMI 32.61 kg/m²     Body mass index is 32.61 kg/m². General:   Conversant, cooperative   Eyes:  Pupils equal and reactive, no nystagmus   Oropharynx:   Mallampati score IV, tongue normal       Neck:   No carotid bruits; Chest/Lungs:  Clear on auscultation    CVS:  Normal rate, regular rhythm   Skin:  Warm to touch; no obvious rashes   Neuro:  Speech fluent, face symmetrical, tongue movement normal   Psych:  Normal affect,  normal countenance        Assessment:       ICD-10-CM ICD-9-CM    1.  COLE (obstructive sleep apnea)  G47.33 327.23 NOVEL CORONAVIRUS (COVID-19)      POLYSOMNOGRAPHY 1 NIGHT      CANCELED: SPLIT CPAP/PSG 2. Paroxysmal atrial fibrillation (HCC)  I48.0 427.31      History of snoring, associated apnea consistent with sleep disordered breathing. She will be evaluated with a nocturnal polysomnogram.  Results to be reviewed with her. Plan:     Orders Placed This Encounter    NOVEL CORONAVIRUS (COVID-19)     Scheduling Instructions:      1) Due to current limited availability of the COVID-19 PCR test, tests will be prioritized and may not be completed.              2) Order only if the test result will change clinical management or necessary for a return to mission-critical employment decision.              3) Print and instruct patient to adhere to CDC home isolation program. (Link Above)              4) Set up or refer patient for a monitoring program.              5) Have patient sign up for and leverage [employer] (if not previously done). Order Specific Question:   Is this test for diagnosis or screening? Answer:   Screening     Order Specific Question:   Symptomatic for COVID-19 as defined by CDC? Answer:   No     Order Specific Question:   Hospitalized for COVID-19? Answer:   No     Order Specific Question:   Admitted to ICU for COVID-19? Answer:   No     Order Specific Question:   Employed in healthcare setting? Answer:   Unknown     Order Specific Question:   Resident in a congregate (group) care setting? Answer:   No     Order Specific Question:   Pregnant? Answer:   No     Order Specific Question:   Previously tested for COVID-19? Answer:   Unknown    POLYSOMNOGRAPHY 1 NIGHT     Standing Status:   Future     Standing Expiration Date:   3/20/2021     Order Specific Question:   Reason for Exam     Answer:   snoring, fatigue       * Patient has a history and examination consistent with the diagnosis of sleep apnea. * Sleep testing was ordered for initial evaluation.     * She was provided information on sleep apnea including corresponding risk factors and the importance of proper treatment. * Treatment options if indicated were reviewed today. Instructions:    o Do not engage in activities requiring a normal degree of alertness if fatigue is present. o The patient understands that untreated or undertreated sleep apnea could impair judgement and the ability to function normally during the day.  o Call or return if symptoms worsen or persist.          Ney Valencia MD, FAA  Electronically signed 09/17/20       This note was created using voice recognition software. Despite editing, there may be syntax errors. This note will not be viewable in [address]

## 2020-09-18 ENCOUNTER — DOCUMENTATION ONLY (OUTPATIENT)
Dept: SLEEP MEDICINE | Age: 75
End: 2020-09-18

## 2020-09-18 VITALS
HEIGHT: 64 IN | WEIGHT: 190 LBS | DIASTOLIC BLOOD PRESSURE: 77 MMHG | SYSTOLIC BLOOD PRESSURE: 145 MMHG | BODY MASS INDEX: 32.44 KG/M2 | TEMPERATURE: 98.5 F

## 2020-09-18 NOTE — PROGRESS NOTES
[facility]. [place], [address]  Tel.  [phone]  Fax. 100 [facility] 60  [place], [address] [facility]  Tel.  [phone]  Fax. [phone] [address] [facility] [address] Lam   Tel.  [phone]  Fax. [phone]     Sleep Study Technical Notes        PRE-Test:  Roula Stockton (: 1945) arrived in the lobby. Patient was greeted, temperature checked (98.5 °) and screening questions asked. The patient was taken to the Sleep Center and taken directly to his/her room. BP (145/77) and SaO2 (99) were taken. Scale currently not available. Procedure explained to the patient and questions were answered. The patient expressed understanding of the procedure. Electrodes were applied without incident. The patient was placed in bed and the study was started. Acquisition Notes:   Lights off: 9:41pm     Respiratory events: COLE, HYPOPNEA,  RR-14-20   ECG:  NORMAL SINUS RHYTHM 59-60 bpm   PAP titration: none   Other comments: apnea, in REM SLEEP   Desensitization Mask(s) Used: NONE    POST Test:   Patient was awakened. Electrodes were removed. The patient was discharged after answering the Post Questionnaire. Patient stated that he/she was alert and ok to drive.  Equipment and room cleaned per infection control policy.

## 2020-09-20 ENCOUNTER — HOSPITAL ENCOUNTER (EMERGENCY)
Age: 75
Discharge: HOME | End: 2020-09-20
Attending: EMERGENCY MEDICINE
Payer: MEDICARE

## 2020-09-20 ENCOUNTER — HOSPITAL ENCOUNTER (EMERGENCY)
Dept: GENERAL RADIOLOGY | Age: 75
Discharge: HOME | End: 2020-09-20
Attending: EMERGENCY MEDICINE
Payer: MEDICARE

## 2020-09-20 VITALS
TEMPERATURE: 97.6 F | SYSTOLIC BLOOD PRESSURE: 155 MMHG | HEIGHT: 64 IN | HEART RATE: 62 BPM | BODY MASS INDEX: 35.04 KG/M2 | RESPIRATION RATE: 16 BRPM | WEIGHT: 205.25 LBS | OXYGEN SATURATION: 97 % | DIASTOLIC BLOOD PRESSURE: 93 MMHG

## 2020-09-20 DIAGNOSIS — S46.912A STRAIN OF LEFT SHOULDER, INITIAL ENCOUNTER: Primary | ICD-10-CM
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
## 2020-09-20 PROCEDURE — 73000 X-RAY EXAM OF COLLAR BONE: CPT

## 2020-09-20 PROCEDURE — 99284 EMERGENCY DEPT VISIT MOD MDM: CPT

## 2020-09-20 PROCEDURE — 74011250637 HC RX REV CODE- 250/637: Performed by: EMERGENCY MEDICINE

## 2020-09-20 PROCEDURE — 73030 X-RAY EXAM OF SHOULDER: CPT

## 2020-09-20 RX ORDER — METHOCARBAMOL 750 MG/1
750 TABLET, FILM COATED ORAL
Qty: 20 TAB | Refills: 0 | Status: SHIPPED | OUTPATIENT
Start: 2020-09-20 | End: 2020-12-20

## 2020-09-20 RX ORDER — OXYCODONE AND ACETAMINOPHEN 5; 325 MG/1; MG/1
1 TABLET ORAL ONCE
Status: COMPLETED | OUTPATIENT
Start: 2020-09-20 | End: 2020-09-20

## 2020-09-20 RX ORDER — TRAMADOL HYDROCHLORIDE 50 MG/1
50 TABLET ORAL
Qty: 15 TAB | Refills: 0 | Status: SHIPPED | OUTPATIENT
Start: 2020-09-20 | End: 2020-09-23

## 2020-09-20 RX ADMIN — OXYCODONE HYDROCHLORIDE AND ACETAMINOPHEN 1 TABLET: 5; 325 TABLET ORAL at 13:32

## 2020-09-20 NOTE — ED PROVIDER NOTES
74-year-old female with a history of atrial fibrillation, GERD, HTN, migraines, presents to the emergency department stating that today she was trying to coax her cat down the stairs with her foot and in doing so she slipped on the stairs falling on her rear end and when she fell she grabbed the left handrail and yanked her left shoulder up in a rapid hyperextended type of movement. She denies any direct trauma to her shoulder, no head injury nor LOC. Injury occurred about an hour prior to arrival and since this fall she notes significant left shoulder pain with movement or palpation. She has not taken anything for her symptoms. On arrival she notes moderate left shoulder and left clavicular area pain and expresses concern for fracture. She denies any distal numbness or weakness in her arm or any other injuries.            Past Medical History:   Diagnosis Date    Arrhythmia     Atrial fibrillation ([address]     CAD (coronary artery disease)     atrial fibrillation    Gastrointestinal disorder     GERD    GERD (gastroesophageal reflux disease)     Headache(784.0)     Hypertension     Hypothyroid     Neurological disorder     migraines    Other ill-defined conditions(799.89) migraine    Psychiatric disorder previous hx of depression       Past Surgical History:   Procedure Laterality Date    HX CHOLECYSTECTOMY      HX HYSTERECTOMY      HX ORTHOPAEDIC      Right shoulder surgery    HX PACEMAKER      HX ELIA AND BSO      [place] ANESTH,SURGERY OF SHOULDER      left         Family History:   Problem Relation Age of Onset    Hypertension Other     Stroke Other     Heart Attack Other     Migraines Mother     Cancer Mother         melanoma    Heart Disease Father     Hypertension Father     Stroke Father     Heart Attack Father        Social History     Socioeconomic History    Marital status:      Spouse name: Not on file    Number of children: Not on file    Years of education: Not on file  Highest education level: Not on file   Occupational History    Not on file   Social Needs    Financial resource strain: Not on file    Food insecurity     Worry: Not on file     Inability: Not on file    Transportation needs     Medical: Not on file     Non-medical: Not on file   Tobacco Use    Smoking status: Former Smoker    Smokeless tobacco: Never Used    Tobacco comment: quit 30 years ago   Substance and Sexual Activity    Alcohol use: Yes     Comment: social drinker    Drug use: No    Sexual activity: Not on file   Lifestyle    Physical activity     Days per week: Not on file     Minutes per session: Not on file    Stress: Not on file   Relationships    Social connections     Talks on phone: Not on file     Gets together: Not on file     Attends Taoism service: Not on file     Active member of club or organization: Not on file     Attends meetings of clubs or organizations: Not on file     Relationship status: Not on file    Intimate partner violence     Fear of current or ex partner: Not on file     Emotionally abused: Not on file     Physically abused: Not on file     Forced sexual activity: Not on file   Other Topics Concern    Not on file   Social History Narrative    Not on file         ALLERGIES: Patient has no known allergies. Review of Systems   Constitutional: Positive for activity change. Negative for appetite change, chills and fever. HENT: Negative for congestion, rhinorrhea, sinus pressure, sneezing and sore throat. Eyes: Negative for photophobia and visual disturbance. Respiratory: Negative for cough and shortness of breath. Cardiovascular: Negative for chest pain. Gastrointestinal: Negative for abdominal pain, blood in stool, constipation, diarrhea, nausea and vomiting. Genitourinary: Negative for difficulty urinating, dysuria, flank pain, frequency, hematuria, menstrual problem, urgency, vaginal bleeding and vaginal discharge.    Musculoskeletal: Positive for arthralgias (Left shoulder). Negative for back pain, myalgias and neck pain. Skin: Negative for rash and wound. Neurological: Negative for syncope, weakness, numbness and headaches. Psychiatric/Behavioral: Negative for self-injury and suicidal ideas. All other systems reviewed and are negative. Vitals:    09/20/20 1318 09/20/20 1330 09/20/20 1406 09/20/20 1430   BP: (!) 201/90 (!) 185/79 (!) 168/70 (!) 155/93   Pulse: 62      Resp: 16      Temp: 97.6 °F (36.4 °C)      SpO2: 100% 99%  97%   Weight: 93.1 kg (205 lb 4 oz)      Height: 5' 4\" (1.626 m)               Physical Exam  Vitals signs and nursing note reviewed. Constitutional:       General: She is not in acute distress. Appearance: Normal appearance. She is well-developed. She is not diaphoretic. HENT:      Head: Normocephalic and atraumatic. Nose: Nose normal.   Eyes:      Extraocular Movements: Extraocular movements intact. Conjunctiva/sclera: Conjunctivae normal.      Pupils: Pupils are equal, round, and reactive to light. Neck:      Musculoskeletal: Neck supple. Cardiovascular:      Rate and Rhythm: Normal rate and regular rhythm. Heart sounds: Normal heart sounds. Pulmonary:      Effort: Pulmonary effort is normal.      Breath sounds: Normal breath sounds. Abdominal:      General: There is no distension. Palpations: Abdomen is soft. Tenderness: There is no abdominal tenderness. Musculoskeletal:         General: Tenderness present. Left shoulder: She exhibits decreased range of motion, tenderness, bony tenderness and pain. She exhibits no crepitus, no deformity, no laceration and normal pulse. Arms:       Comments: No midline C/T/L-spine tenderness, no crepitus or deformity. Pelvis stable, nontender. Skin:     General: Skin is warm and dry. Neurological:      General: No focal deficit present. Mental Status: She is alert and oriented to person, place, and time.       Cranial Nerves: No cranial nerve deficit. Sensory: No sensory deficit. Motor: No weakness. Coordination: Coordination normal.          MDM   30-year-old female presents with left shoulder strain, possible fracture versus rotator cuff injury or hyperextension injury. Pain was treated on arrival with p.o. Percocet to good effect    X-rays of left clavicle and left shoulder were reviewed by myself and read by radiology showing no acute bony abnormality. There is mild acromioclavicular osteoarthritis. sHe was Rx'd Robaxin and tramadol for further symptomatic relief, and recommended gentle range of motion exercises and orthopedic follow-up for further evaluation. Return precautions were given for worsening or concerns.     Procedures

## 2020-09-20 NOTE — ED TRIAGE NOTES
Pt ambulates to treatment area with steady gait she states that an hour ago she was shooing her cat down the stairs with her foot and she slipped on the stairs falling onto her bottom. She notes that she grabbed the rail with her left hand and yanked her left shoulder. She is pain and minimal movement with the left shoulder.   No LOC did not hit her head

## 2020-09-25 ENCOUNTER — TELEPHONE (OUTPATIENT)
Dept: SLEEP MEDICINE | Age: 75
End: 2020-09-25

## 2020-09-25 DIAGNOSIS — G47.33 OSA (OBSTRUCTIVE SLEEP APNEA): Primary | ICD-10-CM

## 2020-09-25 NOTE — TELEPHONE ENCOUNTER
Nocturnal polysomnogram performed for potential sleep disordered breathing. 264.5 minutes recorded of which 269.5 minutes spent asleep with a sleep efficiency of 58%. Sleep onset at 106.8 minutes; REM onset at 108.5 minutes with total REM representing 6.7% of sleep time. 72 respiratory events occurred of which 52 hypopnea and 20 obstructive apnea. The overall AHI was 16/h. Events more prominent in REM sleep with the REM-related AHI of  60 per hour. Minimal SaO2 79%. Mild-moderate snoring noted. Recommendation: APAP 5-15 cm. Sleep technologist: Please review study results with the patient. APAP 5-15 cm order has been entered. Please schedule first compliance appointment.

## 2020-10-02 ENCOUNTER — DOCUMENTATION ONLY (OUTPATIENT)
Dept: SLEEP MEDICINE | Age: 75
End: 2020-10-02

## 2020-10-05 ENCOUNTER — HOSPITAL ENCOUNTER (EMERGENCY)
Age: 75
Discharge: HOME | End: 2020-10-05
Attending: STUDENT IN AN ORGANIZED HEALTH CARE EDUCATION/TRAINING PROGRAM
Payer: MEDICARE

## 2020-10-05 VITALS
SYSTOLIC BLOOD PRESSURE: 189 MMHG | DIASTOLIC BLOOD PRESSURE: 86 MMHG | WEIGHT: 208.78 LBS | TEMPERATURE: 98.2 F | HEART RATE: 72 BPM | BODY MASS INDEX: 35.64 KG/M2 | OXYGEN SATURATION: 98 % | HEIGHT: 64 IN | RESPIRATION RATE: 15 BRPM

## 2020-10-05 DIAGNOSIS — N30.91 CYSTITIS WITH HEMATURIA: Primary | ICD-10-CM

## 2020-10-05 LAB
APPEARANCE UR: ABNORMAL
BACTERIA URNS QL MICRO: ABNORMAL /HPF
BILIRUB UR QL CFM: NEGATIVE
COLOR UR: ABNORMAL
EPITH CASTS URNS QL MICRO: ABNORMAL /LPF
GLUCOSE UR STRIP.AUTO-MCNC: 100 MG/DL
HGB UR QL STRIP: ABNORMAL
KETONES UR QL STRIP.AUTO: 15 MG/DL
LEUKOCYTE ESTERASE UR QL STRIP.AUTO: ABNORMAL
NITRITE UR QL STRIP.AUTO: POSITIVE
PH UR STRIP: 7.5 [PH] (ref 5–8)
PROT UR STRIP-MCNC: >300 MG/DL
RBC #/AREA URNS HPF: >100 /HPF (ref 0–5)
SP GR UR REFRACTOMETRY: 1.01 (ref 1–1.03)
UR CULT HOLD, URHOLD: NORMAL
UROBILINOGEN UR QL STRIP.AUTO: 2 EU/DL (ref 0.2–1)
WBC URNS QL MICRO: >100 /HPF (ref 0–4)

## 2020-10-05 PROCEDURE — 87077 CULTURE AEROBIC IDENTIFY: CPT

## 2020-10-05 PROCEDURE — 99284 EMERGENCY DEPT VISIT MOD MDM: CPT

## 2020-10-05 PROCEDURE — 87186 SC STD MICRODIL/AGAR DIL: CPT

## 2020-10-05 PROCEDURE — 81001 URINALYSIS AUTO W/SCOPE: CPT

## 2020-10-05 PROCEDURE — 87086 URINE CULTURE/COLONY COUNT: CPT

## 2020-10-05 RX ORDER — CEPHALEXIN 500 MG/1
500 CAPSULE ORAL 3 TIMES DAILY
Qty: 30 CAP | Refills: 0 | Status: SHIPPED | OUTPATIENT
Start: 2020-10-05 | End: 2020-10-15

## 2020-10-05 RX ORDER — PHENAZOPYRIDINE HYDROCHLORIDE 200 MG/1
200 TABLET, FILM COATED ORAL 3 TIMES DAILY
Qty: 6 TAB | Refills: 0 | Status: SHIPPED | OUTPATIENT
Start: 2020-10-05 | End: 2020-10-07

## 2020-10-05 NOTE — ED TRIAGE NOTES
Pt ambulated to the treatment area with a steady gait. Pt states \"This morning I started a little pain with urination and then an hour ago I saw blood in the urine. I think I have a bladder infection. Pt denies fevers. Pt denies other symptoms. Pt appears in no distress at this time.

## 2020-10-05 NOTE — ED PROVIDER NOTES
Blood in Urine    This is a new problem. The current episode started 3 to 5 hours ago. The problem occurs every urination. The problem has not changed since onset. The quality of the pain is described as burning. There has been no fever. Associated symptoms include urgency and abdominal pain (Suprapubic). Pertinent negatives include no chills, no sweats, no frequency, no hematuria, no flank pain and no back pain. The patient is not pregnant. She has tried nothing for the symptoms. Her past medical history is significant for recurrent UTIs (  Very infrequently).         Past Medical History:   Diagnosis Date    Arrhythmia     Atrial fibrillation ([address]     CAD (coronary artery disease)     atrial fibrillation    Gastrointestinal disorder     GERD    GERD (gastroesophageal reflux disease)     Headache(784.0)     Hypertension     Hypothyroid     Neurological disorder     migraines    Other ill-defined conditions(799.89) migraine    Psychiatric disorder previous hx of depression       Past Surgical History:   Procedure Laterality Date    HX CHOLECYSTECTOMY      HX HYSTERECTOMY      HX ORTHOPAEDIC      Right shoulder surgery    HX PACEMAKER      HX ELIA AND BSO      [place] ANESTH,SURGERY OF SHOULDER      left         Family History:   Problem Relation Age of Onset    Hypertension Other     Stroke Other     Heart Attack Other     Migraines Mother     Cancer Mother         melanoma    Heart Disease Father     Hypertension Father     Stroke Father     Heart Attack Father        Social History     Socioeconomic History    Marital status:      Spouse name: Not on file    Number of children: Not on file    Years of education: Not on file    Highest education level: Not on file   Occupational History    Not on file   Social Needs    Financial resource strain: Not on file    Food insecurity     Worry: Not on file     Inability: Not on file    Transportation needs     Medical: Not on file Non-medical: Not on file   Tobacco Use    Smoking status: Former Smoker    Smokeless tobacco: Never Used    Tobacco comment: quit 30 years ago   Substance and Sexual Activity    Alcohol use: Yes     Comment: social drinker    Drug use: No    Sexual activity: Not on file   Lifestyle    Physical activity     Days per week: Not on file     Minutes per session: Not on file    Stress: Not on file   Relationships    Social connections     Talks on phone: Not on file     Gets together: Not on file     Attends Orthodox service: Not on file     Active member of club or organization: Not on file     Attends meetings of clubs or organizations: Not on file     Relationship status: Not on file    Intimate partner violence     Fear of current or ex partner: Not on file     Emotionally abused: Not on file     Physically abused: Not on file     Forced sexual activity: Not on file   Other Topics Concern    Not on file   Social History Narrative    Not on file         ALLERGIES: Patient has no known allergies. Review of Systems   Constitutional: Negative for chills and fever. Eyes: Negative for photophobia. Respiratory: Negative for shortness of breath. Cardiovascular: Negative for chest pain. Gastrointestinal: Positive for abdominal pain (Suprapubic). Genitourinary: Positive for urgency. Negative for dysuria, flank pain, frequency and hematuria. Musculoskeletal: Negative for back pain. Neurological: Negative for headaches. Psychiatric/Behavioral: Negative for confusion. All other systems reviewed and are negative. Vitals:    10/05/20 1409   BP: (!) 189/86   Pulse: 72   Resp: 15   Temp: 98.2 °F (36.8 °C)   SpO2: 98%   Weight: 94.7 kg (208 lb 12.4 oz)   Height: 5' 4\" (1.626 m)            Physical Exam  Vitals signs reviewed. Constitutional:       General: She is not in acute distress. HENT:      Head: Normocephalic and atraumatic.       Mouth/Throat:      Mouth: Mucous membranes are moist. Pharynx: Oropharynx is clear. Cardiovascular:      Rate and Rhythm: Normal rate and regular rhythm. Heart sounds: Normal heart sounds. Pulmonary:      Effort: Pulmonary effort is normal.      Breath sounds: Normal breath sounds. Abdominal:      Tenderness: There is abdominal tenderness ( Mild suprapubic tenderness). There is no right CVA tenderness, left CVA tenderness, guarding or rebound. Musculoskeletal: Normal range of motion. Right lower leg: Edema present. Left lower leg: Edema present. Comments: Trace lower extremity edema bilateral   Skin:     General: Skin is warm and dry. Capillary Refill: Capillary refill takes less than 2 seconds. Neurological:      General: No focal deficit present. Mental Status: She is alert and oriented to person, place, and time. Psychiatric:         Mood and Affect: Mood normal.          MDM  Number of Diagnoses or Management Options  Cystitis with hematuria:   Kb Clifton is a 70 y.o. female presenting with dysuria. Differential includes UTI, cystitis, pyonephritis, less likely STI. Course: Patient is well-appearing, vitals normal except for hypertension, will recheck. No evidence of sepsis. She does not have any systemic signs of illness. Has an appointment in 2 days with urology. Will treat for cystitis given urinalysis finding and return if she has any worsening symptoms. She has not seen any clots in the urine. She has no obstructive urinary symptoms at this time. Dispo: discharge.            Procedures

## 2020-10-05 NOTE — ED NOTES
Pt was discharged and given instructions by Dr Tiffany Rodgers . Pt verbalized good understanding of all discharge instructions,prescriptions and F/U care. All questions answered. Pt in stable condition on discharge.

## 2020-10-08 LAB
BACTERIA SPEC CULT: ABNORMAL
CC UR VC: ABNORMAL
SERVICE CMNT-IMP: ABNORMAL

## 2020-12-16 ENCOUNTER — VIRTUAL VISIT (OUTPATIENT)
Dept: SLEEP MEDICINE | Age: 75
End: 2020-12-16
Payer: MEDICARE

## 2020-12-16 ENCOUNTER — DOCUMENTATION ONLY (OUTPATIENT)
Dept: SLEEP MEDICINE | Age: 75
End: 2020-12-16

## 2020-12-16 DIAGNOSIS — G47.33 OSA (OBSTRUCTIVE SLEEP APNEA): Primary | ICD-10-CM

## 2020-12-16 PROCEDURE — 1101F PT FALLS ASSESS-DOCD LE1/YR: CPT | Performed by: SPECIALIST

## 2020-12-16 PROCEDURE — 3017F COLORECTAL CA SCREEN DOC REV: CPT | Performed by: SPECIALIST

## 2020-12-16 PROCEDURE — G8428 CUR MEDS NOT DOCUMENT: HCPCS | Performed by: SPECIALIST

## 2020-12-16 PROCEDURE — 1090F PRES/ABSN URINE INCON ASSESS: CPT | Performed by: SPECIALIST

## 2020-12-16 PROCEDURE — G9717 DOC PT DX DEP/BP F/U NT REQ: HCPCS | Performed by: SPECIALIST

## 2020-12-16 PROCEDURE — G8400 PT W/DXA NO RESULTS DOC: HCPCS | Performed by: SPECIALIST

## 2020-12-16 PROCEDURE — 99213 OFFICE O/P EST LOW 20 MIN: CPT | Performed by: SPECIALIST

## 2020-12-16 NOTE — PATIENT INSTRUCTIONS

## 2020-12-16 NOTE — PROGRESS NOTES
[address] [place], [address]  Tel.  [phone]  Fax. [address]  [place], [address] [facility] [facility]  Tel.  [phone]  Fax. [phone] [address] [facility] [address]   Tel.  [phone]  Fax. [phone]     Purvi Gonzalez is a 70 y.o. female who was seen by synchronous (real-time) audio-video technology on 12/16/2020. Consent:  She and/or her healthcare decision maker is aware that this patient-initiated Telehealth encounter is a billable service, with coverage as determined by her insurance carrier. She is aware that she may receive a bill and has provided verbal consent to proceed: Yes    I was in the office while conducting this encounter. Chief Complaint       No chief complaint on file. HPI        Purvi Gonzalez is a 70 y.o. female seen for follow-up. Nocturnal polysomnogram performed for potential sleep disordered breathing. 264.5 minutes recorded of which 269.5 minutes spent asleep with a sleep efficiency of 58%. Sleep onset at 106.8 minutes; REM onset at 108.5 minutes with total REM representing 6.7% of sleep time.     72 respiratory events occurred of which 52 hypopnea and 20 obstructive apnea. The overall AHI was 16/h. Events more prominent in REM sleep with the REM-related AHI of  60 per hour. Minimal SaO2 79%. Mildmoderate snoring noted.     Recommendation: APAP 5-15 cm. . Compliance data downloaded and reviewed in detail with the patient today. During the past 30 days, APAP used during 30 days with the average daily use of 6.15 hours. CMS compliance criteria 87%. AHI 2 per hour. She notes ongoing mask issues. She was provided a nasal pillow mask by her DME company which she had difficulty tolerating. Potential oral venting using the nasal pillow mask. She was then provided a Resp[employer]ics DreamWear full facemask. She noted that this mask was uncomfortable.     No Known Allergies    Current Outpatient Medications   Medication Sig Dispense Refill    methocarbamoL (Robaxin-750) 750 mg tablet Take 1 Tab by mouth three (3) times daily as needed for Muscle Spasm(s). 20 Tab 0    amLODIPine (NORVASC) 2.5 mg tablet       irbesartan (AVAPRO) 300 mg tablet       vit A/vit C/vit E/zinc/copper (PRESERVISION AREDS PO) Take  by mouth.  albuterol (PROVENTIL HFA, VENTOLIN HFA, PROAIR HFA) 90 mcg/actuation inhaler Take 2 Puffs by inhalation every four (4) hours as needed for Wheezing or Shortness of Breath. 1 Inhaler 0    topiramate (TOPAMAX) 50 mg tablet TAKE 3 TABLETS BY MOUTH  EVERY NIGHT AT BEDTIME (Patient taking differently: Take 50 mg by mouth nightly.) 270 Tab 0    rivaroxaban (XARELTO) 20 mg tab tablet Take  by mouth daily.  Diclofenac Potassium (CAMBIA) 50 mg pwpk 1 at HA onset and repeat in 2 hours x 1 PRN 9 Packet 3    yebvnjy-idztqjply-kqbtuefxqeom (MIDRIN) -325 mg capsule Take 1 Cap by mouth four (4) times daily as needed. Max Daily Amount: 4 Caps. 30 Cap 2    prochlorperazine (COMPAZINE) 5 mg tablet Take 1-2 tablets by mouth every 6-8 hours PRN 30 Tab 5    gabapentin (NEURONTIN) 300 mg capsule Take 600 mg by mouth three (3) times daily.  clobetasol (TEMOVATE) 0.05 % ointment Apply  to affected area two (2) times a day.  biotin 2,500 mcg tab Take 500 mcg by mouth daily.  buPROPion SR (WELLBUTRIN SR) 150 mg SR tablet Take 150 mg by mouth two (2) times a day.  calcium-cholecalciferol, D3, (CALTRATE 600+D) tablet Take 1 Tab by mouth daily.  therapeutic multivitamin (THERAGRAN) tablet Take 1 Tab by mouth daily.  nebivolol (BYSTOLIC) 20 mg tablet Take 20 mg by mouth daily.  levothyroxine (SYNTHROID) 25 mcg tablet Take 25 mcg by mouth nightly.  olmesartan (BENICAR) 40 mg tablet Take 40 mg by mouth nightly.       potassium chloride (K-DUR, KLOR-CON) 20 mEq tablet Take 1 tablet by mouth two  times daily 60 Tab 0    amiodarone (CORDARONE) 200 mg tablet Take 1 Tab by mouth every other day. (Patient taking differently: Take 100 mg by mouth every other day.) 45 Tab 3    bumetanide (BUMEX) 1 mg tablet Take 1 Tab by mouth daily. 90 Tab 3    ascorbic acid (VITAMIN C) 1,000 mg tablet Take 1,000 mg by mouth nightly.  omeprazole (PRILOSEC) 20 mg capsule Take 20 mg by mouth daily. Indications: GASTROESOPHAGEAL REFLUX          She  has a past medical history of Arrhythmia, Atrial fibrillation ([address] CAD (coronary artery disease), Gastrointestinal disorder, GERD (gastroesophageal reflux disease), Headache(784.0), Hypertension, Hypothyroid, Neurological disorder, Other ill-defined conditions(799.89) (migraine), and Psychiatric disorder (previous hx of depression). She  has a past surgical history that includes hx cholecystectomy; hx amira and bso; pr anesth,surgery of shoulder; hx pacemaker; hx orthopaedic; and hx hysterectomy. She family history includes Cancer in her mother; Heart Attack in her father and another family member; Heart Disease in her father; Hypertension in her father and another family member; Migraines in her mother; Stroke in her father and another family member. She  reports that she has quit smoking. She has never used smokeless tobacco. She reports current alcohol use. She reports that she does not use drugs. Review of Systems:  Unchanged per patient    Due to this being a telemedicine evaluation, certain elements of the physical examination are unable to be assessed. Objective:      Weight: 208 lb  BMI: 35.84  General:   Conversant, cooperative   Eyes:   no nystagmus                            Neuro:  Speech fluent, face symmetrical             Assessment:       ICD-10-CM ICD-9-CM    1. COLE (obstructive sleep apnea)  G47.33 327.23      Sleep disordered breathing with ongoing mask issues. She will be scheduled for a CPAP clinic. She is to bring her CPAP masks with her at that time.     she is compliant with PAP therapy and PAP continues to benefit patient and remains necessary for control of her sleep apnea. Plan:   No orders of the defined types were placed in this encounter. * Patient has a history and examination consistent with the diagnosis of sleep apnea. *CPAP clinic was ordered for further management. * She was provided information on sleep apnea including corresponding risk factors and the importance of proper treatment. * Treatment options if indicated were reviewed today. Potential benefit of weight reduction       Lashaun Mckeon MD, [facility]  Electronically signed 12/16/20      Pursuant to the emergency declaration under the [facility] waiver authority and the [employer] Act, this Virtual  Visit was conducted, with patient's consent, to reduce the patient's risk of exposure to COVID-19 and provide continuity of care for an established patient. Services were provided through a video synchronous discussion virtually to substitute for in-person clinic visit. Nalini Montes MD     This note was created using voice recognition software. Despite editing, there may be syntax errors. This note will not be viewable in [address] Greater than 15 minutes was spent: In direct video patient care, planning and chart review.

## 2020-12-18 ENCOUNTER — DOCUMENTATION ONLY (OUTPATIENT)
Dept: SLEEP MEDICINE | Age: 75
End: 2020-12-18

## 2020-12-20 ENCOUNTER — HOSPITAL ENCOUNTER (EMERGENCY)
Age: 75
Discharge: HOME | End: 2020-12-20
Attending: EMERGENCY MEDICINE
Payer: MEDICARE

## 2020-12-20 VITALS
DIASTOLIC BLOOD PRESSURE: 66 MMHG | TEMPERATURE: 97.5 F | WEIGHT: 209 LBS | SYSTOLIC BLOOD PRESSURE: 122 MMHG | HEIGHT: 64 IN | BODY MASS INDEX: 35.68 KG/M2 | OXYGEN SATURATION: 98 % | HEART RATE: 62 BPM | RESPIRATION RATE: 16 BRPM

## 2020-12-20 DIAGNOSIS — T14.8XXA HEMATOMA: Primary | ICD-10-CM

## 2020-12-20 LAB — D DIMER PPP FEU-MCNC: 1.29 MG/L FEU (ref 0–0.65)

## 2020-12-20 PROCEDURE — 99283 EMERGENCY DEPT VISIT LOW MDM: CPT

## 2020-12-20 PROCEDURE — 85379 FIBRIN DEGRADATION QUANT: CPT

## 2020-12-20 PROCEDURE — 36415 COLL VENOUS BLD VENIPUNCTURE: CPT

## 2020-12-20 RX ORDER — TRAMADOL HYDROCHLORIDE 50 MG/1
TABLET ORAL
COMMUNITY
Start: 2020-09-20 | End: 2020-12-20

## 2020-12-20 RX ORDER — ONDANSETRON 4 MG/1
4 TABLET, FILM COATED ORAL
COMMUNITY
End: 2020-12-20

## 2020-12-20 RX ORDER — HYDROCODONE BITARTRATE AND ACETAMINOPHEN 7.5; 325 MG/1; MG/1
1 TABLET ORAL
COMMUNITY
End: 2020-12-20

## 2020-12-20 RX ORDER — DICYCLOMINE HYDROCHLORIDE 10 MG/1
CAPSULE ORAL
COMMUNITY
Start: 2020-10-02 | End: 2020-12-20

## 2020-12-20 RX ORDER — LYSINE HCL 500 MG
1 TABLET ORAL DAILY
COMMUNITY

## 2020-12-20 NOTE — ED TRIAGE NOTES
Pt rpts pain and soreness to left chin since yesterday. Pt rpts discoloration to leg is normal.  Denies injury.

## 2020-12-20 NOTE — ED PROVIDER NOTES
21-year-old female with a history of A. fib on Xarelto presents with left lower extremity swelling and pain. She is noted discoloration to the anterior shin. She denies any chest pain or shortness of breath. She is never had a blood clot before. She denies any trauma to the leg. She rates her pain 4 out of 10 describes it as throbbing. She denies any fevers or chills.       Leg Pain          Past Medical History:   Diagnosis Date    Arrhythmia     Atrial fibrillation ([address]     CAD (coronary artery disease)     atrial fibrillation    Gastrointestinal disorder     GERD    GERD (gastroesophageal reflux disease)     Headache(784.0)     Hypertension     Hypothyroid     Neurological disorder     migraines    Other ill-defined conditions(799.89) migraine    Psychiatric disorder previous hx of depression       Past Surgical History:   Procedure Laterality Date    HX CHOLECYSTECTOMY      HX HYSTERECTOMY      HX ORTHOPAEDIC      Right shoulder surgery    HX PACEMAKER      HX ELIA AND BSO      [place] ANESTH,SURGERY OF SHOULDER      left         Family History:   Problem Relation Age of Onset    Hypertension Other     Stroke Other     Heart Attack Other     Migraines Mother     Cancer Mother         melanoma    Heart Disease Father     Hypertension Father     Stroke Father     Heart Attack Father        Social History     Socioeconomic History    Marital status:      Spouse name: Not on file    Number of children: Not on file    Years of education: Not on file    Highest education level: Not on file   Occupational History    Not on file   Social Needs    Financial resource strain: Not on file    Food insecurity     Worry: Not on file     Inability: Not on file    Transportation needs     Medical: Not on file     Non-medical: Not on file   Tobacco Use    Smoking status: Former Smoker    Smokeless tobacco: Never Used    Tobacco comment: quit 30 years ago   Substance and Sexual Activity  Alcohol use: Yes     Comment: social drinker    Drug use: No    Sexual activity: Not on file   Lifestyle    Physical activity     Days per week: Not on file     Minutes per session: Not on file    Stress: Not on file   Relationships    Social connections     Talks on phone: Not on file     Gets together: Not on file     Attends [employer] service: Not on file     Active member of club or organization: Not on file     Attends meetings of clubs or organizations: Not on file     Relationship status: Not on file    Intimate partner violence     Fear of current or ex partner: Not on file     Emotionally abused: Not on file     Physically abused: Not on file     Forced sexual activity: Not on file   Other Topics Concern    Not on file   Social History Narrative    Not on file         ALLERGIES: Patient has no known allergies. Review of Systems   All other systems reviewed and are negative. Vitals:    12/20/20 1400   BP: (!) 154/73   Pulse: 62   Resp: 16   Temp: 97.5 °F (36.4 °C)   SpO2: 99%   Weight: 94.8 kg (208 lb 15.9 oz)   Height: 5' 4\" (1.626 m)            Physical Exam  Constitutional:       Appearance: She is well-developed. HENT:      Head: Normocephalic and atraumatic. Eyes:      General: No scleral icterus. Neck:      Musculoskeletal: No neck rigidity. Trachea: No tracheal deviation. Cardiovascular:      Rate and Rhythm: Normal rate. Pulmonary:      Effort: Pulmonary effort is normal. No respiratory distress. Abdominal:      General: There is no distension. Genitourinary:     Comments: deferred  Musculoskeletal:         General: Tenderness (Left shin) present. No deformity. Left lower leg: Edema present. Skin:     General: Skin is dry. Findings: Bruising present. Neurological:      General: No focal deficit present. Mental Status: She is alert.    Psychiatric:         Mood and Affect: Mood normal.          [facility]       Bedside US    Date/Time: 12/20/2020 3:55 PM  Performed by: Loni Lewis MD  Authorized by: Loni Lewis MD     Verbal consent obtained: Yes    Given by:  Patient  Performed by: Attending  Type of procedure: Focused lower extremity venous  Indications:  Leg pain  Left saphenofemoral junction:  Adequate  Left common femoral vein:  Adequate  Left femoral vein[de-identified]  Adequate  Left popliteal vein:  Adequate  Left popliteal trifurcation:  Adequate  Left leg:     Saphenofemoral junction:  Compressible    Common femoral vein:  Compressible    Femoral vein:  Compressible    Popliteal vein:  Compressible    Popliteal trifurcation:  Compressible    Interpretation:  No sonographic evidence of deep vein thrombosis  Left eye:     Confirmation study:  I have advised the patient to obtain a confirmatory study as an outpatient. Bedside US    Date/Time: 12/20/2020 3:55 PM  Performed by: Loni Lewis MD  Authorized by: Loni Lewis MD     Verbal consent obtained: Yes    Performed by: Attending  Type of procedure: Focused soft tissue  Left leg: Indications:  Swelling    Skin and subcutaneous tissue:  Adequate    Cobblestoning:  Normal    Subcutaneous Collection:  Absent  Left eye:     Confirmation study:  I have advised the patient to obtain a confirmatory study as an outpatient. Fluid collection was noted. Considered abscess but given ecchymosis overlying and history of using blood thinners, hematoma much more likely. Does not warrant I&D which would likely worsen her condition. 3:55 PM  Patient re-evaluated. All questions answered. Patient appropriate for discharge. Given return precautions and follow up instructions. LABORATORY TESTS:  Labs Reviewed   D DIMER - Abnormal; Notable for the following components:       Result Value    D-dimer 1.29 (*)     All other components within normal limits       IMAGING RESULTS:  No orders to display       MEDICATIONS GIVEN:  Medications - No data to display    IMPRESSION:  1. Hematoma        PLAN:  1. Current Discharge Medication List        2. Follow-up Information     Follow up With Specialties Details Why Contact Info    Sury Thomas MD Family Medicine Go in 1 week for repeat duplex ultrasound and recheck today's symptoms [id number] [id number]  [phone]      [address] [place] [address] DEPT Emergency Medicine  If symptoms worsen or new concerns [facility] [address]  [phone]        3. Return to ED for new or worsening symptoms       Darcie Soriano.  Hernando Canela MD

## 2020-12-20 NOTE — ED NOTES
Pt given discharge instructions by Dr Antonio Fierro she verbalizes an understanding pt stable at time of discharge

## 2020-12-21 ENCOUNTER — OFFICE VISIT (OUTPATIENT)
Dept: SLEEP MEDICINE | Age: 75
End: 2020-12-21

## 2020-12-21 DIAGNOSIS — G47.33 OSA (OBSTRUCTIVE SLEEP APNEA): Primary | ICD-10-CM

## 2020-12-21 NOTE — PROGRESS NOTES
[facility] [place], [address]  Tel.  [phone]  Fax. [address] [facility]  [place], [address] [facility] [address] [facility]  Tel.  [phone]  Fax. [phone] [address]   Tel.  [phone]  Fax. [phone]       S>Lita Stallworth is a 70 y.o. female seen today to receive a home sleep testing unit (HST). · Patient was educated on proper hookup and operation of the HST. · Instruction forms and documentation were reviewed and signed. · The patient demonstrated good understanding of the HST.    O>    There were no vitals taken for this visit. A>  No diagnosis found. P>  · General information regarding operations and maintenance of the device was provided. · She was provided information on sleep apnea including coresponding risk factors and the importance of proper treatment. · Follow-up appointment was made to return the HST. She will be contacted once the results have been reviewed. · She was asked to contact our office for any problems regarding her home sleep test study.

## 2020-12-21 NOTE — PROGRESS NOTES
Patient came in for a mask evaluation and was fitted properly with a Shanda Reddish / Brandi Piña / Zorita Primas. No leaks, whatsoever.  Tech will remotely download patient's data in 2 weeks

## 2021-01-07 ENCOUNTER — OFFICE VISIT (OUTPATIENT)
Dept: SLEEP MEDICINE | Age: 76
End: 2021-01-07

## 2021-01-07 DIAGNOSIS — G47.33 OSA (OBSTRUCTIVE SLEEP APNEA): Primary | ICD-10-CM

## 2021-01-07 NOTE — PROGRESS NOTES
Patient came in today, complaining of mask leakage which leads her to waking up. Technologist gave patient some suggestions and per Drs order the APAP pressure was changed 7-15cm H20. Will follow up with a DL in 2 weeks. Patient will also need an order for a chin trap.  If issues persist, she will call and make an appt with Dr. Melvin Altamirano

## 2021-01-08 ENCOUNTER — DOCUMENTATION ONLY (OUTPATIENT)
Dept: SLEEP MEDICINE | Age: 76
End: 2021-01-08

## 2021-05-07 ENCOUNTER — HOSPITAL ENCOUNTER (EMERGENCY)
Age: 76
Discharge: HOME | End: 2021-05-07
Attending: EMERGENCY MEDICINE
Payer: MEDICARE

## 2021-05-07 VITALS
WEIGHT: 201.5 LBS | DIASTOLIC BLOOD PRESSURE: 66 MMHG | SYSTOLIC BLOOD PRESSURE: 154 MMHG | BODY MASS INDEX: 34.4 KG/M2 | HEART RATE: 65 BPM | OXYGEN SATURATION: 95 % | TEMPERATURE: 98.2 F | RESPIRATION RATE: 16 BRPM | HEIGHT: 64 IN

## 2021-05-07 DIAGNOSIS — H57.89 EYE SWELLING, RIGHT: ICD-10-CM

## 2021-05-07 DIAGNOSIS — R21 RASH AND OTHER NONSPECIFIC SKIN ERUPTION: Primary | ICD-10-CM

## 2021-05-07 PROCEDURE — 74011250637 HC RX REV CODE- 250/637: Performed by: EMERGENCY MEDICINE

## 2021-05-07 PROCEDURE — 99283 EMERGENCY DEPT VISIT LOW MDM: CPT

## 2021-05-07 PROCEDURE — A9270 NON-COVERED ITEM OR SERVICE: HCPCS | Performed by: EMERGENCY MEDICINE

## 2021-05-07 PROCEDURE — 74011636637 HC RX REV CODE- 636/637: Performed by: EMERGENCY MEDICINE

## 2021-05-07 RX ORDER — PREDNISONE 5 MG/1
TABLET ORAL
Qty: 21 TAB | Refills: 0 | Status: SHIPPED | OUTPATIENT
Start: 2021-05-07

## 2021-05-07 RX ORDER — PREDNISONE 20 MG/1
60 TABLET ORAL
Status: COMPLETED | OUTPATIENT
Start: 2021-05-07 | End: 2021-05-07

## 2021-05-07 RX ORDER — DIPHENHYDRAMINE HCL 25 MG
50 CAPSULE ORAL
Status: COMPLETED | OUTPATIENT
Start: 2021-05-07 | End: 2021-05-07

## 2021-05-07 RX ORDER — FAMOTIDINE 20 MG/1
20 TABLET, FILM COATED ORAL
Status: COMPLETED | OUTPATIENT
Start: 2021-05-07 | End: 2021-05-07

## 2021-05-07 RX ADMIN — PREDNISONE 60 MG: 20 TABLET ORAL at 12:18

## 2021-05-07 RX ADMIN — FAMOTIDINE 20 MG: 20 TABLET, FILM COATED ORAL at 12:18

## 2021-05-07 RX ADMIN — DIPHENHYDRAMINE HYDROCHLORIDE 50 MG: 25 CAPSULE ORAL at 12:18

## 2021-05-07 NOTE — ED NOTES
Pt was discharged and given instructions by Dr Guillaume Guerra. Pt verbalized good understanding of all discharge instructions,prescriptions and F/U care. All questions answered. Pt in stable condition on discharge.

## 2021-05-07 NOTE — ED TRIAGE NOTES
Pt ambulated to the treatment area with a steady gait. Pt states \"yesterday morning I woke up with my eye lid swollen I went to my PCP he did not find anything so he said just put warm compresses. This morning the top lid is more swollen. There is no pain no drainage and my vision is fine. \" Pt notes \"I also started a rash on my chest yesterday my doctor gave me cream for it and it is not itchy. \"

## 2021-05-07 NOTE — ED PROVIDER NOTES
Please note that this dictation was completed with [employer], the computer voice recognition software.  Quite often unanticipated grammatical, syntax, homophones, and other interpretive errors are inadvertently transcribed by the computer software.  Please disregard these errors.  Please excuse any errors that have escaped final proofreading. 53-year-old female past medical history markable for A. fib, coronary artery disease, GERD, migraines, hypertension, hypothyroidism, and a psychiatric disorder presents the ER complaining of \" right eye puffiness. \"  Patient states she saw Dr. Ariel Martin her ophthalmologist on Monday. Everything was fine and she noticed yesterday she started having a little bit of right eye puffiness went to see her PCP. Examiner stated nothing for me to do he recommended her putting heat on it. She states she applied a fair amount of heat through the evening last night woke up this morning and the eye was more puffy than it was yesterday. It seems to improve since then she has not put any heat on it today. I discussed the benefits of using cold instead of heat as he would tend to make the eyes swell more. She has had no eye pain, no vision changes, no rash to the face, no eye redness and no eye discharge. She denies any trauma to the area. She has not had a runny nose or had a headache. There has been no numbness no tingling no facial droop no slurred speech. It does not itch; patient states the rash on her chest that was there yesterday is also now present on bilateral forehead bilateral face. She has never had it before.     pt denies HA, vison changes, diff swallowing, CP, SOB, Abd pain, F/Ch, N/V, D/Cons or other current systemic complaints    Social/ PSH reviewed in EMR    EMR Chart Reviewed           Past Medical History:   Diagnosis Date    Arrhythmia     Atrial fibrillation ([address]     CAD (coronary artery disease)     atrial fibrillation    Gastrointestinal disorder     GERD  GERD (gastroesophageal reflux disease)     Headache(784.0)     Hypertension     Hypothyroid     Neurological disorder     migraines    Other ill-defined conditions(799.89) migraine    Psychiatric disorder previous hx of depression       Past Surgical History:   Procedure Laterality Date    HX CHOLECYSTECTOMY      HX HYSTERECTOMY      HX ORTHOPAEDIC      Right shoulder surgery    HX PACEMAKER      HX ELIA AND BSO      [place] ANESTH,SURGERY OF SHOULDER      left         Family History:   Problem Relation Age of Onset    Hypertension Other     Stroke Other     Heart Attack Other     Migraines Mother     Cancer Mother         melanoma    Heart Disease Father     Hypertension Father     Stroke Father     Heart Attack Father        Social History     Socioeconomic History    Marital status:      Spouse name: Not on file    Number of children: Not on file    Years of education: Not on file    Highest education level: Not on file   Occupational History    Not on file   Social Needs    Financial resource strain: Not on file    Food insecurity     Worry: Not on file     Inability: Not on file    Transportation needs     Medical: Not on file     Non-medical: Not on file   Tobacco Use    Smoking status: Former Smoker    Smokeless tobacco: Never Used    Tobacco comment: quit 30 years ago   Substance and Sexual Activity    Alcohol use: Yes     Comment: social drinker    Drug use: No    Sexual activity: Not on file   Lifestyle    Physical activity     Days per week: Not on file     Minutes per session: Not on file    Stress: Not on file   Relationships    Social connections     Talks on phone: Not on file     Gets together: Not on file     Attends [employer] service: Not on file     Active member of club or organization: Not on file     Attends meetings of clubs or organizations: Not on file     Relationship status: Not on file    Intimate partner violence     Fear of current or ex partner: Not on file     Emotionally abused: Not on file     Physically abused: Not on file     Forced sexual activity: Not on file   Other Topics Concern    Not on file   Social History Narrative    Not on file         ALLERGIES: Patient has no known allergies. Review of Systems   Eyes: Negative for photophobia, pain, discharge, redness, itching and visual disturbance. Skin: Positive for rash. All other systems reviewed and are negative. There were no vitals filed for this visit. Physical Exam  Vitals signs and nursing note reviewed. Constitutional:       General: She is not in acute distress. Appearance: Normal appearance. She is well-developed. She is obese. She is not ill-appearing, toxic-appearing or diaphoretic. Comments: NAD, AxOx4, speaking in complete sentences       HENT:      Head: Normocephalic and atraumatic. Comments: Cn intact    EOMI/ PERRL/A     Right Ear: External ear normal.      Left Ear: External ear normal.   Eyes:      General: No scleral icterus. Right eye: No discharge. Left eye: No discharge. Extraocular Movements: Extraocular movements intact. Conjunctiva/sclera: Conjunctivae normal.      Pupils: Pupils are equal, round, and reactive to light. Comments: Noted R eye puffiness; Neck:      Musculoskeletal: Normal range of motion and neck supple. No neck rigidity or muscular tenderness. Vascular: No JVD. Trachea: No tracheal deviation. Cardiovascular:      Rate and Rhythm: Normal rate and regular rhythm. Pulses: Normal pulses. Heart sounds: Normal heart sounds. No murmur. No friction rub. No gallop. Pulmonary:      Effort: Pulmonary effort is normal. No respiratory distress. Breath sounds: Normal breath sounds. No stridor. No wheezing, rhonchi or rales. Chest:      Chest wall: No tenderness. Abdominal:      General: Bowel sounds are normal.      Palpations: Abdomen is soft.       Tenderness: There is no abdominal tenderness. There is no guarding or rebound. Genitourinary:     Vagina: No vaginal discharge. Musculoskeletal: Normal range of motion. General: No swelling, tenderness, deformity or signs of injury. Right lower leg: No edema. Left lower leg: No edema. Skin:     General: Skin is warm and dry. Capillary Refill: Capillary refill takes less than 2 seconds. Coloration: Skin is not jaundiced or pale. Findings: Rash present. No bruising, erythema or lesion. Comments: Noted diffuse ant chest wall M-P rash/ no itching/ vesicles/ bilat ant chest wall/ face; no oral lesions/ palms; no urticaria;    Neurological:      General: No focal deficit present. Mental Status: She is alert and oriented to person, place, and time. Cranial Nerves: No cranial nerve deficit. Sensory: No sensory deficit. Motor: No weakness or abnormal muscle tone. Coordination: Coordination normal.      Gait: Gait normal.      Deep Tendon Reflexes: Reflexes normal.   Psychiatric:         Behavior: Behavior normal.         Thought Content: Thought content normal.          MDM       Procedures          12:21 PM  Romina Brito's  results have been reviewed with her. She has been counseled regarding her diagnosis. She verbally conveys understanding and agreement of the signs, symptoms, diagnosis, treatment and prognosis and additionally agrees to Call/ Arrange follow up as recommended with Dr. Edwin Montanez MD in 24 - 48 hours. She also agrees with the care-plan and conveys that all of her questions have been answered. I have also put together some discharge instructions for her that include: 1) educational information regarding their diagnosis, 2) how to care for their diagnosis at home, as well a 3) list of reasons why they would want to return to the ED prior to their follow-up appointment, should their condition change or for concerns.

## 2021-05-07 NOTE — DISCHARGE INSTRUCTIONS
Thank you for allowing us to provide you with medical care today. We realize that you have many choices for your emergency care needs. We thank you for choosing [facility]. Please choose us in the future for any continued health care needs. We hope we addressed all of your medical concerns. We strive to provide excellent quality care in the Emergency Department. Anything less than excellent does not meet our expectations. The exam and treatment you received in the Emergency Department were for an emergent problem and are not intended as complete care. It is important that you follow up with a doctor, nurse practitioner, or [address] [place] [address] assistant for ongoing care. If your symptoms worsen or you do not improve as expected and you are unable to reach your usual health care provider, you should return to the Emergency Department. We are available 24 hours a day. Take this sheet with you when you go to your follow-up visit. If you have any problem arranging the follow-up visit, contact the Emergency Department immediately. Make an appointment your family doctor for follow up of this visit. Return to the ER if you are unable to be seen in a timely manner.

## 2022-03-23 ENCOUNTER — HOSPITAL ENCOUNTER (OUTPATIENT)
Dept: GENERAL RADIOLOGY | Age: 77
Discharge: HOME | End: 2022-03-23
Attending: FAMILY MEDICINE
Payer: MEDICARE

## 2022-03-23 ENCOUNTER — TRANSCRIBE ORDER (OUTPATIENT)
Dept: MRI IMAGING | Age: 77
End: 2022-03-23

## 2022-03-23 DIAGNOSIS — R05.9 COUGH: ICD-10-CM

## 2022-03-23 DIAGNOSIS — R05.9 COUGH: Primary | ICD-10-CM

## 2022-03-23 PROCEDURE — 71046 X-RAY EXAM CHEST 2 VIEWS: CPT

## 2022-06-01 ENCOUNTER — TRANSCRIBE ORDER (OUTPATIENT)
Dept: SCHEDULING | Age: 77
End: 2022-06-01

## 2022-06-01 DIAGNOSIS — M54.50 LUMBAR SPINE PAIN: Primary | ICD-10-CM

## 2022-06-09 ENCOUNTER — HOSPITAL ENCOUNTER (OUTPATIENT)
Dept: MRI IMAGING | Age: 77
Discharge: HOME | End: 2022-06-09
Attending: ORTHOPAEDIC SURGERY

## 2022-06-09 DIAGNOSIS — M54.50 LUMBAR SPINE PAIN: ICD-10-CM

## 2022-06-21 ENCOUNTER — HOSPITAL ENCOUNTER (OUTPATIENT)
Dept: MRI IMAGING | Age: 77
Discharge: HOME | End: 2022-06-21
Attending: ORTHOPAEDIC SURGERY
Payer: MEDICARE

## 2022-06-21 PROCEDURE — 72148 MRI LUMBAR SPINE W/O DYE: CPT

## 2022-07-10 ENCOUNTER — APPOINTMENT (OUTPATIENT)
Dept: GENERAL RADIOLOGY | Age: 77
End: 2022-07-10
Attending: EMERGENCY MEDICINE
Payer: MEDICARE

## 2022-07-10 ENCOUNTER — HOSPITAL ENCOUNTER (EMERGENCY)
Age: 77
Discharge: HOME | End: 2022-07-10
Attending: EMERGENCY MEDICINE
Payer: MEDICARE

## 2022-07-10 VITALS
BODY MASS INDEX: 36.89 KG/M2 | HEIGHT: 64 IN | OXYGEN SATURATION: 95 % | RESPIRATION RATE: 16 BRPM | DIASTOLIC BLOOD PRESSURE: 67 MMHG | WEIGHT: 216.05 LBS | TEMPERATURE: 98.7 F | SYSTOLIC BLOOD PRESSURE: 128 MMHG | HEART RATE: 72 BPM

## 2022-07-10 DIAGNOSIS — S80.11XA LEG HEMATOMA, RIGHT, INITIAL ENCOUNTER: Primary | ICD-10-CM

## 2022-07-10 PROCEDURE — 74011250637 HC RX REV CODE- 250/637: Performed by: EMERGENCY MEDICINE

## 2022-07-10 PROCEDURE — 99283 EMERGENCY DEPT VISIT LOW MDM: CPT

## 2022-07-10 PROCEDURE — 73562 X-RAY EXAM OF KNEE 3: CPT

## 2022-07-10 PROCEDURE — 73590 X-RAY EXAM OF LOWER LEG: CPT

## 2022-07-10 RX ORDER — ACETAMINOPHEN 500 MG
1000 TABLET ORAL ONCE
Status: COMPLETED | OUTPATIENT
Start: 2022-07-10 | End: 2022-07-10

## 2022-07-10 RX ORDER — TRAMADOL HYDROCHLORIDE 50 MG/1
50 TABLET ORAL
COMMUNITY

## 2022-07-10 RX ORDER — VALSARTAN 320 MG/1
320 TABLET ORAL
COMMUNITY

## 2022-07-10 RX ADMIN — ACETAMINOPHEN 1000 MG: 500 TABLET ORAL at 13:51

## 2022-07-10 NOTE — ED PROVIDER NOTES
HPI   68-year-old female with a past medical history of atrial fibrillation on Xarelto, hypertension, hypothyroidism, and migraines who presents to the emergency department due to right lower extremity pain, swelling, and bruising. On the fifth and sixth of this month the patient was getting out of her shower and hit her right tibial area on the sliding shower door. Triage notes indicate that the patient fell, but the patient tells me she never actually fell. There is a handle in the shower that the patient grabbed each time. After she struck her leg, she was able to catch her self and never actually fell to the ground. She developed pain and swelling with associated bruising over the right tibia distal to the knee. She says this area is sore and it hurts when she walks. She states she has been icing the area and elevating it but is still bruised and swollen. Her  was concerned that she had a blood clot so she came to the emergency department for further evaluation. She denies any other symptoms and has no other complaints.     Past Medical History:   Diagnosis Date    Arrhythmia     Atrial fibrillation ([address]     CAD (coronary artery disease)     atrial fibrillation    Gastrointestinal disorder     GERD    GERD (gastroesophageal reflux disease)     Headache(784.0)     Hypertension     Hypothyroid     Neurological disorder     migraines    Other ill-defined conditions(799.89) migraine    Psychiatric disorder previous hx of depression       Past Surgical History:   Procedure Laterality Date    HX CHOLECYSTECTOMY      HX HYSTERECTOMY      HX ORTHOPAEDIC      Right shoulder surgery    HX PACEMAKER      HX ELIA AND BSO      [place] ANESTH,SURGERY OF SHOULDER      left         Family History:   Problem Relation Age of Onset    Hypertension Other     Stroke Other     Heart Attack Other     Migraines Mother     Cancer Mother         melanoma    Heart Disease Father     Hypertension Father  Stroke Father     Heart Attack Father        Social History     Socioeconomic History    Marital status:      Spouse name: Not on file    Number of children: Not on file    Years of education: Not on file    Highest education level: Not on file   Occupational History    Not on file   Tobacco Use    Smoking status: Former Smoker    Smokeless tobacco: Never Used    Tobacco comment: quit 30 years ago   Substance and Sexual Activity    Alcohol use: Yes     Comment: social drinker    Drug use: No    Sexual activity: Not on file   Other Topics Concern    Not on file   Social History Narrative    Not on file     Social Determinants of Health     Financial Resource Strain:     Difficulty of Paying Living Expenses: Not on file   Food Insecurity:     Worried About [address] in the Last Year: Not on file    [address] in the Last Year: Not on file   Transportation Needs:     Lack of Transportation (Medical): Not on file    Lack of Transportation (Non-Medical):  Not on file   Physical Activity:     Days of Exercise per Week: Not on file    Minutes of Exercise per Session: Not on file   Stress:     Feeling of Stress : Not on file   Social Connections:     Frequency of Communication with Friends and Family: Not on file    Frequency of Social Gatherings with Friends and Family: Not on file    Attends Orthodox Services: Not on file    Active Member of [address] [place] [address] or Organizations: Not on file    Attends Club or Organization Meetings: Not on file    Marital Status: Not on file   Intimate Partner Violence:     Fear of Current or Ex-Partner: Not on file    Emotionally Abused: Not on file    Physically Abused: Not on file    Sexually Abused: Not on file   Housing Stability:     Unable to Pay for Housing in the Last Year: Not on file    Number of [place] in the Last Year: Not on file    Unstable Housing in the Last Year: Not on file         ALLERGIES: Patient has no known allergies. Review of Systems   Was reviewed are negative unless otherwise documented in HPI    Vitals:    07/10/22 1236 07/10/22 1258 07/10/22 1344   BP: (!) 170/94 (!) 143/71 128/67   Pulse: 72     Resp: 16     Temp: 98.7 °F (37.1 °C)     SpO2: 98% 95%    Weight: 98 kg (216 lb 0.8 oz)     Height: 5' 4\" (1.626 m)              Physical Exam  Constitutional:       Comments: Resting comfortably no acute distress   Eyes:      General: No scleral icterus. Extraocular Movements: Extraocular movements intact. Neck:      Comments: Trachea midline  Cardiovascular:      Comments: Regular rate and rhythm on examination. 2+ DP pulses bilaterally with excellent capillary refill in the distal right lower extremity  Pulmonary:      Effort: Pulmonary effort is normal. No respiratory distress. Musculoskeletal:      Cervical back: Normal range of motion. Comments: Patient has swelling with associated ecchymosis and tenderness over the proximal right tibia distal to the knee. Mild pain with range of motion of the right knee but no swelling of the right knee. No tenderness of the distal right lower extremity. No calf tenderness. No pitting edema in the right lower extremity. Skin:     General: Skin is warm and dry. Neurological:      Comments: Awake and alert. GCS 15          MDM   72-year-old female presents emergency department with the above chief complaint. She has bruising and swelling over the proximal right tibia with associated tenderness. She is neurovascularly intact with good distal pulses and capillary refill. She has some no pain in the distal right lower extremity. No calf tenderness or pitting edema. Her exam is consistent with hematoma. Given the trauma associated with x-rays obtained and were negative for fracture. Patient deemed appropriate for discharge. Her history and exam is not consistent with a DVT.   Patient instructed to continue supportive measures at home and was discharged in stable condition.     Procedures

## 2022-07-10 NOTE — DISCHARGE INSTRUCTIONS
Schedule an appointment with your doctor in 1 week to make sure your symptoms are getting better. Continue to ice and elevate your leg. Return with any new or worsening symptoms.

## 2022-07-10 NOTE — ED NOTES
Patient  discharged with instructions per Dr Chapis Bautista. Instructions reviewed with pt. Alphonso Santillan

## 2022-07-10 NOTE — ED TRIAGE NOTES
Pt presents to ED with c/o falling in the bathtub on a bath mat x 2 on 7/5/2022 and 7/6/2022. There is a large hematoma on the right lower leg. She denies any other injuries.

## 2022-07-14 ENCOUNTER — HOSPITAL ENCOUNTER (EMERGENCY)
Age: 77
Discharge: LEFT BEFORE DISCHARGE | End: 2022-07-14
Payer: MEDICARE

## 2022-07-14 PROCEDURE — 75810000275 HC EMERGENCY DEPT VISIT NO LEVEL OF CARE

## 2022-07-15 ENCOUNTER — HOSPITAL ENCOUNTER (EMERGENCY)
Age: 77
Discharge: HOME | End: 2022-07-15
Attending: STUDENT IN AN ORGANIZED HEALTH CARE EDUCATION/TRAINING PROGRAM
Payer: MEDICARE

## 2022-07-15 VITALS
DIASTOLIC BLOOD PRESSURE: 92 MMHG | OXYGEN SATURATION: 96 % | SYSTOLIC BLOOD PRESSURE: 202 MMHG | WEIGHT: 222.22 LBS | RESPIRATION RATE: 14 BRPM | TEMPERATURE: 98 F | HEIGHT: 64 IN | HEART RATE: 72 BPM | BODY MASS INDEX: 37.94 KG/M2

## 2022-07-15 DIAGNOSIS — T14.8XXA HEMATOMA: Primary | ICD-10-CM

## 2022-07-15 PROCEDURE — 99282 EMERGENCY DEPT VISIT SF MDM: CPT

## 2022-07-15 NOTE — ED TRIAGE NOTES
Pt returns to ED with c/o continued pain in right leg. She states she fell about one week ago in the shower. She was seen here on 7/10/2022 and advised to return if no better. She is ambulatory to the treatment. Pt denies other injuries from the fall.

## 2022-07-15 NOTE — DISCHARGE INSTRUCTIONS
Please call your cardiologist today to determine if you can stop your Xarelto while your hematoma heals. Please return to the emergency room if you have significant worsening of your pain, numbness or weakness of your leg, or fevers.

## 2022-07-15 NOTE — ED NOTES
Patient  discharged with instructions per Dr Jaxson Tuttle. Instructions reviewed with pt. Alex Ibanez

## 2022-07-15 NOTE — ED PROVIDER NOTES
68-year-old female with history of paroxysmal A. fib on Xarelto, HTN, GERD presents to the ED with chief complaint of right lower leg pain and swelling following injury approximately 9 days ago. Patient struck her leg against a piece of furniture. Patient was seen in the ED 5 days ago after having swelling and pain, was diagnosed with hematoma, and discharged with symptomatic treatment. Patient says that bruising has since extended down the back of her right calf and swelling and pain have increased. No numbness or weakness. No fevers, chills, or rash. She has been compliant with her Xarelto. She has been applying ice and elevating her leg without significant improvement. The history is provided by the patient. Leg Pain   Pertinent negatives include no numbness, no back pain and no neck pain.         Past Medical History:   Diagnosis Date    Arrhythmia     Atrial fibrillation ([address]     CAD (coronary artery disease)     atrial fibrillation    Gastrointestinal disorder     GERD    GERD (gastroesophageal reflux disease)     Headache(784.0)     Hypertension     Hypothyroid     Neurological disorder     migraines    Other ill-defined conditions(799.89) migraine    Psychiatric disorder previous hx of depression       Past Surgical History:   Procedure Laterality Date    HX CHOLECYSTECTOMY      HX HYSTERECTOMY      HX ORTHOPAEDIC      Right shoulder surgery    HX PACEMAKER      HX ELIA AND BSO      [place] ANEST[place],SURGERY OF SHOULDER      left         Family History:   Problem Relation Age of Onset    Hypertension Other     Stroke Other     Heart Attack Other     Migraines Mother     Cancer Mother         melanoma    Heart Disease Father     Hypertension Father     Stroke Father     Heart Attack Father        Social History     Socioeconomic History    Marital status:      Spouse name: Not on file    Number of children: Not on file    Years of education: Not on file   Luana Mayen education level: Not on file   Occupational History    Not on file   Tobacco Use    Smoking status: Former Smoker    Smokeless tobacco: Never Used    Tobacco comment: quit 30 years ago   Substance and Sexual Activity    Alcohol use: Yes     Comment: social drinker    Drug use: No    Sexual activity: Not on file   Other Topics Concern    Not on file   Social History Narrative    Not on file     Social Determinants of Health     Financial Resource Strain:     Difficulty of Paying Living Expenses: Not on file   Food Insecurity:     Worried About Running Out of Food in the Last Year: Not on file    David of Food in the Last Year: Not on file   Transportation Needs:     Lack of Transportation (Medical): Not on file    Lack of Transportation (Non-Medical): Not on file   Physical Activity:     Days of Exercise per Week: Not on file    Minutes of Exercise per Session: Not on file   Stress:     Feeling of Stress : Not on file   Social Connections:     Frequency of Communication with Friends and Family: Not on file    Frequency of Social Gatherings with Friends and Family: Not on file    Attends Rastafarian Services: Not on file    Active Member of [address] [place] [address] or Organizations: Not on file    Attends Club or Organization Meetings: Not on file    Marital Status: Not on file   Intimate Partner Violence:     Fear of Current or Ex-Partner: Not on file    Emotionally Abused: Not on file    Physically Abused: Not on file    Sexually Abused: Not on file   Housing Stability:     Unable to Pay for Housing in the Last Year: Not on file    Number of [place] in the Last Year: Not on file    Unstable Housing in the Last Year: Not on file         ALLERGIES: Patient has no known allergies. Review of Systems   Constitutional: Negative for chills and fever. HENT: Negative for congestion and rhinorrhea. Respiratory: Negative for cough and shortness of breath. Cardiovascular: Positive for leg swelling. Negative for chest pain. Gastrointestinal: Negative for abdominal pain, constipation, diarrhea, nausea and vomiting. Genitourinary: Negative for difficulty urinating, dysuria and hematuria. Musculoskeletal: Positive for myalgias. Negative for back pain and neck pain. Skin: Positive for color change. Negative for rash. Neurological: Negative for dizziness, weakness, light-headedness, numbness and headaches. Psychiatric/Behavioral: Negative for agitation and confusion. Vitals:    07/15/22 1131   BP: (!) 202/92   Pulse: 72   Resp: 14   Temp: 98 °F (36.7 °C)   SpO2: 96%   Weight: 100.8 kg (222 lb 3.6 oz)   Height: 5' 4\" (1.626 m)            Physical Exam  Constitutional:       General: She is not in acute distress. Appearance: She is well-developed. HENT:      Head: Normocephalic and atraumatic. Eyes:      General: No scleral icterus. Pupils: Pupils are equal, round, and reactive to light. Neck:      Trachea: No tracheal deviation. Cardiovascular:      Rate and Rhythm: Normal rate and regular rhythm. Heart sounds: No murmur heard. No friction rub. No gallop. Pulmonary:      Effort: Pulmonary effort is normal. No respiratory distress. Breath sounds: Normal breath sounds. No wheezing or rales. Abdominal:      General: Bowel sounds are normal. There is no distension. Palpations: Abdomen is soft. Tenderness: There is no abdominal tenderness. Musculoskeletal:         General: No deformity. Cervical back: Neck supple. Comments: Right medial anterior calf with ecchymosis, tenderness, and swelling consistent with hematoma. No significant warmth, erythema, or drainage. Ecchymosis extends down posterior calf to the foot. Neurovascularly intact distally. Skin:     General: Skin is warm and dry. Neurological:      Mental Status: She is alert and oriented to person, place, and time.    Psychiatric:         Behavior: Behavior normal.          MDM  Number of Diagnoses or Management Options  Hematoma  Diagnosis management comments: 72-year-old female presenting to the ED with right lower extremity hematoma. No evidence of associated infection. No indication for any labs or imaging at this time. Discussed anticipated course, continued symptomatic treatment, follow-up with primary care doctor early next week. Return precautions given for any increase in redness, warmth, drainage, or fevers. Also for numbness or weakness of her right lower extremity. She is going to call her cardiologist today to determine if she can stop her Xarelto for a few days. Procedures    DISCHARGE NOTE:  12:01 PM  The patient has been re-evaluated and feeling much better and are stable for discharge. All available radiology and laboratory results have been reviewed with patient and/or available family. Patient and/or family verbally conveyed their understanding and agreement of the patient's signs, symptoms, diagnosis, treatment and prognosis and additionally agree to follow-up as recommended in the discharge instructions or to return to the Emergency Department should their condition change or worsen prior to their follow-up appointment. All questions have been answered and patient and/or available family express understanding. IMPRESSION:  1.  Hematoma        PLAN:  Follow-up Information     Follow up With Specialties Details Why Contact Info    Clair Bingham MD Family Medicine In 3 days  [id number] [id number]  [phone]      [address] [place] [address] DEPT Emergency Medicine  If symptoms worsen [address]  [phone]        Discharge Medication List as of 7/15/2022 11:46 AM          Signed By: Pricila Sullivan MD     July 15, 2022

## 2022-08-31 ENCOUNTER — TRANSCRIBE ORDER (OUTPATIENT)
Dept: SCHEDULING | Age: 77
End: 2022-08-31

## 2022-08-31 DIAGNOSIS — N39.0 URINARY TRACT INFECTION, SITE NOT SPECIFIED: Primary | ICD-10-CM

## 2022-09-07 ENCOUNTER — HOSPITAL ENCOUNTER (OUTPATIENT)
Dept: ULTRASOUND IMAGING | Age: 77
Discharge: HOME | End: 2022-09-07
Attending: UROLOGY
Payer: MEDICARE

## 2022-09-07 DIAGNOSIS — N39.0 URINARY TRACT INFECTION, SITE NOT SPECIFIED: ICD-10-CM

## 2022-09-07 PROCEDURE — 76770 US EXAM ABDO BACK WALL COMP: CPT

## 2022-11-09 ENCOUNTER — APPOINTMENT (OUTPATIENT)
Dept: GENERAL RADIOLOGY | Age: 77
DRG: 291 | End: 2022-11-09
Attending: EMERGENCY MEDICINE
Payer: MEDICARE

## 2022-11-09 ENCOUNTER — HOSPITAL ENCOUNTER (INPATIENT)
Age: 77
LOS: 3 days | Discharge: HOME | DRG: 291 | End: 2022-11-12
Attending: EMERGENCY MEDICINE | Admitting: HOSPITALIST
Payer: MEDICARE

## 2022-11-09 ENCOUNTER — APPOINTMENT (OUTPATIENT)
Dept: CT IMAGING | Age: 77
DRG: 291 | End: 2022-11-09
Attending: EMERGENCY MEDICINE
Payer: MEDICARE

## 2022-11-09 DIAGNOSIS — I50.9 CONGESTIVE HEART FAILURE, UNSPECIFIED HF CHRONICITY, UNSPECIFIED HEART FAILURE TYPE (HCC): ICD-10-CM

## 2022-11-09 DIAGNOSIS — I50.9 ACUTE CONGESTIVE HEART FAILURE, UNSPECIFIED HEART FAILURE TYPE (HCC): ICD-10-CM

## 2022-11-09 DIAGNOSIS — Z95.0 PACEMAKER: ICD-10-CM

## 2022-11-09 DIAGNOSIS — R06.09 DOE (DYSPNEA ON EXERTION): ICD-10-CM

## 2022-11-09 DIAGNOSIS — I10 HTN (HYPERTENSION), BENIGN: ICD-10-CM

## 2022-11-09 DIAGNOSIS — R06.02 SOB (SHORTNESS OF BREATH): Primary | ICD-10-CM

## 2022-11-09 DIAGNOSIS — Z79.01 ANTICOAGULANT LONG-TERM USE: ICD-10-CM

## 2022-11-09 LAB
ALBUMIN SERPL-MCNC: 3.7 G/DL (ref 3.5–5)
ALBUMIN/GLOB SERPL: 1.1 {RATIO} (ref 1.1–2.2)
ALP SERPL-CCNC: 94 U/L (ref 45–117)
ALT SERPL-CCNC: 93 U/L (ref 12–78)
ANION GAP SERPL CALC-SCNC: 8 MMOL/L (ref 5–15)
AST SERPL-CCNC: 63 U/L (ref 15–37)
BASOPHILS # BLD: 0.1 K/UL (ref 0–0.1)
BASOPHILS NFR BLD: 1 % (ref 0–1)
BILIRUB SERPL-MCNC: 0.4 MG/DL (ref 0.2–1)
BNP SERPL-MCNC: 5247 PG/ML (ref 0–450)
BUN SERPL-MCNC: 24 MG/DL (ref 6–20)
BUN/CREAT SERPL: 19 (ref 12–20)
CALCIUM SERPL-MCNC: 8.2 MG/DL (ref 8.5–10.1)
CHLORIDE SERPL-SCNC: 107 MMOL/L (ref 97–108)
CO2 SERPL-SCNC: 25 MMOL/L (ref 21–32)
COVID-19 RAPID TEST, COVR: NOT DETECTED
CREAT SERPL-MCNC: 1.28 MG/DL (ref 0.55–1.02)
D DIMER PPP FEU-MCNC: 4.2 MG/L FEU (ref 0–0.65)
DIFFERENTIAL METHOD BLD: NORMAL
EOSINOPHIL # BLD: 0.1 K/UL (ref 0–0.4)
EOSINOPHIL NFR BLD: 1 % (ref 0–7)
ERYTHROCYTE [DISTWIDTH] IN BLOOD BY AUTOMATED COUNT: 14.5 % (ref 11.5–14.5)
GLOBULIN SER CALC-MCNC: 3.3 G/DL (ref 2–4)
GLUCOSE SERPL-MCNC: 106 MG/DL (ref 65–100)
HCT VFR BLD AUTO: 39 % (ref 35–47)
HGB BLD-MCNC: 12.5 G/DL (ref 11.5–16)
IMM GRANULOCYTES # BLD AUTO: 0 K/UL (ref 0–0.04)
IMM GRANULOCYTES NFR BLD AUTO: 0 % (ref 0–0.5)
LYMPHOCYTES # BLD: 1.7 K/UL (ref 0.8–3.5)
LYMPHOCYTES NFR BLD: 18 % (ref 12–49)
MCH RBC QN AUTO: 31.4 PG (ref 26–34)
MCHC RBC AUTO-ENTMCNC: 32.1 G/DL (ref 30–36.5)
MCV RBC AUTO: 98 FL (ref 80–99)
MONOCYTES # BLD: 0.9 K/UL (ref 0–1)
MONOCYTES NFR BLD: 9 % (ref 5–13)
NEUTS SEG # BLD: 6.6 K/UL (ref 1.8–8)
NEUTS SEG NFR BLD: 71 % (ref 32–75)
NRBC # BLD: 0 K/UL (ref 0–0.01)
NRBC BLD-RTO: 0 PER 100 WBC
PLATELET # BLD AUTO: 325 K/UL (ref 150–400)
PMV BLD AUTO: 11.4 FL (ref 8.9–12.9)
POTASSIUM SERPL-SCNC: 5 MMOL/L (ref 3.5–5.1)
PROT SERPL-MCNC: 7 G/DL (ref 6.4–8.2)
RBC # BLD AUTO: 3.98 M/UL (ref 3.8–5.2)
SODIUM SERPL-SCNC: 140 MMOL/L (ref 136–145)
SOURCE, COVRS: NORMAL
TROPONIN-HIGH SENSITIVITY: 12 NG/L (ref 0–51)
WBC # BLD AUTO: 9.4 K/UL (ref 3.6–11)

## 2022-11-09 PROCEDURE — 99285 EMERGENCY DEPT VISIT HI MDM: CPT

## 2022-11-09 PROCEDURE — 74011000250 HC RX REV CODE- 250: Performed by: HOSPITALIST

## 2022-11-09 PROCEDURE — 84484 ASSAY OF TROPONIN QUANT: CPT

## 2022-11-09 PROCEDURE — 87635 SARS-COV-2 COVID-19 AMP PRB: CPT

## 2022-11-09 PROCEDURE — 74011000636 HC RX REV CODE- 636: Performed by: EMERGENCY MEDICINE

## 2022-11-09 PROCEDURE — 93005 ELECTROCARDIOGRAM TRACING: CPT

## 2022-11-09 PROCEDURE — 83880 ASSAY OF NATRIURETIC PEPTIDE: CPT

## 2022-11-09 PROCEDURE — 85025 COMPLETE CBC W/AUTO DIFF WBC: CPT

## 2022-11-09 PROCEDURE — 65270000029 HC RM PRIVATE

## 2022-11-09 PROCEDURE — 80053 COMPREHEN METABOLIC PANEL: CPT

## 2022-11-09 PROCEDURE — 94761 N-INVAS EAR/PLS OXIMETRY MLT: CPT

## 2022-11-09 PROCEDURE — 71045 X-RAY EXAM CHEST 1 VIEW: CPT

## 2022-11-09 PROCEDURE — 85379 FIBRIN DEGRADATION QUANT: CPT

## 2022-11-09 PROCEDURE — 36415 COLL VENOUS BLD VENIPUNCTURE: CPT

## 2022-11-09 PROCEDURE — 71275 CT ANGIOGRAPHY CHEST: CPT

## 2022-11-09 PROCEDURE — 74011250637 HC RX REV CODE- 250/637: Performed by: HOSPITALIST

## 2022-11-09 RX ORDER — CETIRIZINE HYDROCHLORIDE 10 MG/1
10 TABLET ORAL DAILY
Status: DISCONTINUED | OUTPATIENT
Start: 2022-11-10 | End: 2022-11-12 | Stop reason: HOSPADM

## 2022-11-09 RX ORDER — BUPROPION HYDROCHLORIDE 150 MG/1
150 TABLET, EXTENDED RELEASE ORAL 2 TIMES DAILY
Status: DISCONTINUED | OUTPATIENT
Start: 2022-11-10 | End: 2022-11-12 | Stop reason: HOSPADM

## 2022-11-09 RX ORDER — TRAMADOL HYDROCHLORIDE 50 MG/1
50 TABLET ORAL
Status: DISCONTINUED | OUTPATIENT
Start: 2022-11-09 | End: 2022-11-12 | Stop reason: HOSPADM

## 2022-11-09 RX ORDER — GABAPENTIN 300 MG/1
600 CAPSULE ORAL 3 TIMES DAILY
Status: DISCONTINUED | OUTPATIENT
Start: 2022-11-09 | End: 2022-11-10

## 2022-11-09 RX ORDER — VALSARTAN 80 MG/1
320 TABLET ORAL
Status: DISCONTINUED | OUTPATIENT
Start: 2022-11-09 | End: 2022-11-12 | Stop reason: HOSPADM

## 2022-11-09 RX ORDER — BUMETANIDE 0.25 MG/ML
1 INJECTION INTRAMUSCULAR; INTRAVENOUS 2 TIMES DAILY
Status: DISCONTINUED | OUTPATIENT
Start: 2022-11-09 | End: 2022-11-10

## 2022-11-09 RX ORDER — METOPROLOL TARTRATE 25 MG/1
25 TABLET, FILM COATED ORAL 2 TIMES DAILY
Status: DISCONTINUED | OUTPATIENT
Start: 2022-11-10 | End: 2022-11-12 | Stop reason: HOSPADM

## 2022-11-09 RX ORDER — LEVOTHYROXINE SODIUM 25 UG/1
25 TABLET ORAL
Status: DISCONTINUED | OUTPATIENT
Start: 2022-11-10 | End: 2022-11-12 | Stop reason: HOSPADM

## 2022-11-09 RX ORDER — AMLODIPINE BESYLATE 2.5 MG/1
2.5 TABLET ORAL DAILY
Status: DISCONTINUED | OUTPATIENT
Start: 2022-11-10 | End: 2022-11-12 | Stop reason: HOSPADM

## 2022-11-09 RX ORDER — AMIODARONE HYDROCHLORIDE 200 MG/1
100 TABLET ORAL EVERY OTHER DAY
Status: DISCONTINUED | OUTPATIENT
Start: 2022-11-11 | End: 2022-11-12 | Stop reason: HOSPADM

## 2022-11-09 RX ORDER — PANTOPRAZOLE SODIUM 40 MG/1
40 TABLET, DELAYED RELEASE ORAL
Status: DISCONTINUED | OUTPATIENT
Start: 2022-11-10 | End: 2022-11-12 | Stop reason: HOSPADM

## 2022-11-09 RX ADMIN — GABAPENTIN 600 MG: 300 CAPSULE ORAL at 22:59

## 2022-11-09 RX ADMIN — BUMETANIDE 1 MG: 0.25 INJECTION, SOLUTION INTRAMUSCULAR; INTRAVENOUS at 20:08

## 2022-11-09 RX ADMIN — IOPAMIDOL 100 ML: 755 INJECTION, SOLUTION INTRAVENOUS at 16:31

## 2022-11-09 RX ADMIN — VALSARTAN 320 MG: 80 TABLET, FILM COATED ORAL at 22:59

## 2022-11-09 NOTE — ED TRIAGE NOTES
Pt reports returning from cruise Sunday, began having shortness of breath yesterday, diarrhea today. Pt's  tested positive for COVID, pt tested negative.

## 2022-11-09 NOTE — ED PROVIDER NOTES
79-year-old female presents from home with complaints of shortness of breath and diarrhea. Patient returned home from a cruise a few days ago.  started feeling sick 2 days ago and tested positive for COVID. She was also started to feel sick at the same time but her home COVID test was negative. She reports worsening shortness of breath with dyspnea on exertion. She denies any fevers at home. No cough, vomiting. She started having diarrhea yesterday. She has had some chest tightness. Past medical history significant for atrial fibrillation with a pacemaker, GERD, hypertension, migraines.        Past Medical History:   Diagnosis Date    Arrhythmia     Atrial fibrillation (HCC)     CAD (coronary artery disease)     atrial fibrillation    Gastrointestinal disorder     GERD    GERD (gastroesophageal reflux disease)     Headache(784.0)     Hypertension     Hypothyroid     Neurological disorder     migraines    Other ill-defined conditions(799.89) migraine    Psychiatric disorder previous hx of depression       Past Surgical History:   Procedure Laterality Date    HX CHOLECYSTECTOMY      HX HYSTERECTOMY      HX ORTHOPAEDIC      Right shoulder surgery    HX PACEMAKER      HX ELIA AND BSO      [place] ANEST[place],SURGERY OF SHOULDER      left         Family History:   Problem Relation Age of Onset    Hypertension Other     Stroke Other     Heart Attack Other     Migraines Mother     Cancer Mother         melanoma    Heart Disease Father     Hypertension Father     Stroke Father     Heart Attack Father        Social History     Socioeconomic History    Marital status:      Spouse name: Not on file    Number of children: Not on file    Years of education: Not on file    Highest education level: Not on file   Occupational History    Not on file   Tobacco Use    Smoking status: Former    Smokeless tobacco: Never    Tobacco comments:     quit 30 years ago   Substance and Sexual Activity    Alcohol use: Yes     Comment: social drinker    Drug use: No    Sexual activity: Not on file   Other Topics Concern    Not on file   Social History Narrative    Not on file     Social Determinants of Health     Financial Resource Strain: Not on file   Food Insecurity: Not on file   Transportation Needs: Not on file   Physical Activity: Not on file   Stress: Not on file   Social Connections: Not on file   Intimate Partner Violence: Not on file   Housing Stability: Not on file         ALLERGIES: Patient has no known allergies. Review of Systems   Constitutional:  Negative for fever. HENT:  Negative for facial swelling. Eyes:  Negative for visual disturbance. Respiratory:  Positive for shortness of breath. Negative for chest tightness. Cardiovascular:  Negative for chest pain. Gastrointestinal:  Negative for abdominal pain. Genitourinary:  Negative for difficulty urinating and dysuria. Musculoskeletal:  Negative for arthralgias. Skin:  Negative for rash. Neurological:  Negative for headaches. Hematological:  Negative for adenopathy. Psychiatric/Behavioral:  Negative for suicidal ideas. Vitals:    11/09/22 1144   BP: (!) 152/94   Pulse: 65   Resp: 16   Temp: 98.1 °F (36.7 °C)   SpO2: 99%   Weight: 99 kg (218 lb 4.1 oz)   Height: 5' 4\" (1.626 m)            Physical Exam  Vitals and nursing note reviewed. Constitutional:       General: She is not in acute distress. Appearance: She is well-developed. HENT:      Head: Normocephalic and atraumatic. Eyes:      General: No scleral icterus. Conjunctiva/sclera: Conjunctivae normal.      Pupils: Pupils are equal, round, and reactive to light. Cardiovascular:      Rate and Rhythm: Normal rate. Heart sounds: No murmur heard. Pulmonary:      Effort: Pulmonary effort is normal. No respiratory distress. Abdominal:      General: There is no distension. Musculoskeletal:         General: Normal range of motion.       Cervical back: Normal range of motion and neck supple. Skin:     General: Skin is warm and dry. Findings: No rash. Neurological:      Mental Status: She is alert and oriented to person, place, and time. MDM     Amount and/or Complexity of Data Reviewed  Clinical lab tests: reviewed  Tests in the radiology section of CPT®: reviewed      ED Course as of 11/09/22 1210   Wed Nov 09, 2022   1210 EKG, 12 LEAD, INITIAL  ED EKG interpretation:  Rhythm: v-paced. Rate (approx.): 65.  Axis: normal.  ST segment:  No concerning ST elevations or depressions. This EKG was interpreted by Dereck Lo MD,ED Provider. [JM]      ED Course User Index  [JM] Indra Huerta MD     Perfect Serve Consult for Admission  3:03 PM    ED Room Number: Chiquita Robles  Patient Name and age:  Noris Leblanc 78 y.o.  female  Working Diagnosis:   1. SOB (shortness of breath)    2. IBARRA (dyspnea on exertion)    3. Congestive heart failure, unspecified HF chronicity, unspecified heart failure type ([address]    4. Anticoagulant long-term use        COVID-19 Suspicion:  no  Sepsis present:  no  Reassessment needed: no  Code Status:  Full Code  Readmission: no  Isolation Requirements:  no  Recommended Level of Care:  telemetry  Department: [facility] ED - [phone]  Admitting Provider:     Other:  SOB and IBARRA over past 2 days. Home COVID test neg. Pt's  was positive. BNP >5000. Trop=12. CXR clear. With with h/o a. Fib, pacmaker, on xarelto. No h/o CAD that I can see. I'm concerned about new onset CHF. No asa given because anticoagulted on xarelto. CTA chest neg. VS stable with no hypoxia. Total critical care time spent exclusive of procedures:  35 minutes.     Procedures

## 2022-11-09 NOTE — ED NOTES
LVM informing pt that I faxed referral to [facility] and was advised that someone will be reaching out to schedule. Also informed pt that we do not have any record on file for shingles and if she wanted to get this done, she will need to go to the pharmacy. Pt should call back if she has any other questions. Patient ambulatory to [facility] restroom

## 2022-11-10 ENCOUNTER — APPOINTMENT (OUTPATIENT)
Dept: NON INVASIVE DIAGNOSTICS | Age: 77
DRG: 291 | End: 2022-11-10
Attending: HOSPITALIST
Payer: MEDICARE

## 2022-11-10 PROBLEM — I10 HTN (HYPERTENSION), BENIGN: Status: ACTIVE | Noted: 2022-11-10

## 2022-11-10 LAB
ATRIAL RATE: 37 BPM
CALCULATED R AXIS, ECG10: -70 DEGREES
CALCULATED T AXIS, ECG11: 77 DEGREES
COMMENT, HOLDF: NORMAL
DIAGNOSIS, 93000: NORMAL
ECHO AO ARCH DIAM: 3 CM
ECHO AO ASC DIAM: 3.4 CM
ECHO AO ASCENDING AORTA INDEX: 1.67 CM/M2
ECHO AR MAX VEL PISA: 2.6 M/S
ECHO AV AREA PEAK VELOCITY: 2.5 CM2
ECHO AV AREA VTI: 2.3 CM2
ECHO AV AREA/BSA PEAK VELOCITY: 1.2 CM2/M2
ECHO AV AREA/BSA VTI: 1.1 CM2/M2
ECHO AV MEAN GRADIENT: 4 MMHG
ECHO AV MEAN VELOCITY: 0.9 M/S
ECHO AV PEAK GRADIENT: 6 MMHG
ECHO AV PEAK VELOCITY: 1.2 M/S
ECHO AV REGURGITANT PHT: 616.2 MILLISECOND
ECHO AV VELOCITY RATIO: 0.83
ECHO AV VTI: 24.7 CM
ECHO LA DIAMETER INDEX: 2.22 CM/M2
ECHO LA DIAMETER: 4.5 CM
ECHO LA VOL 2C: 44 ML (ref 22–52)
ECHO LA VOL 4C: 61 ML (ref 22–52)
ECHO LA VOL BP: 53 ML (ref 22–52)
ECHO LA VOL/BSA BIPLANE: 26 ML/M2 (ref 16–34)
ECHO LA VOLUME AREA LENGTH: 56 ML
ECHO LA VOLUME INDEX A2C: 22 ML/M2 (ref 16–34)
ECHO LA VOLUME INDEX A4C: 30 ML/M2 (ref 16–34)
ECHO LA VOLUME INDEX AREA LENGTH: 28 ML/M2 (ref 16–34)
ECHO LV E' LATERAL VELOCITY: 10 CM/S
ECHO LV E' SEPTAL VELOCITY: 11 CM/S
ECHO LV FRACTIONAL SHORTENING: 32 % (ref 28–44)
ECHO LV INTERNAL DIMENSION DIASTOLE INDEX: 1.87 CM/M2
ECHO LV INTERNAL DIMENSION DIASTOLIC: 3.8 CM (ref 3.9–5.3)
ECHO LV INTERNAL DIMENSION SYSTOLIC INDEX: 1.28 CM/M2
ECHO LV INTERNAL DIMENSION SYSTOLIC: 2.6 CM
ECHO LV IVSD: 0.9 CM (ref 0.6–0.9)
ECHO LV MASS 2D: 101.1 G (ref 67–162)
ECHO LV MASS INDEX 2D: 49.8 G/M2 (ref 43–95)
ECHO LV POSTERIOR WALL DIASTOLIC: 0.9 CM (ref 0.6–0.9)
ECHO LV RELATIVE WALL THICKNESS RATIO: 0.47
ECHO LVOT AREA: 3.1 CM2
ECHO LVOT AV VTI INDEX: 0.72
ECHO LVOT DIAM: 2 CM
ECHO LVOT MEAN GRADIENT: 2 MMHG
ECHO LVOT PEAK GRADIENT: 4 MMHG
ECHO LVOT PEAK VELOCITY: 1 M/S
ECHO LVOT STROKE VOLUME INDEX: 27.4 ML/M2
ECHO LVOT SV: 55.6 ML
ECHO LVOT VTI: 17.7 CM
ECHO MV A VELOCITY: 0.31 M/S
ECHO MV E DECELERATION TIME (DT): 228.8 MS
ECHO MV E VELOCITY: 1.06 M/S
ECHO MV E/A RATIO: 3.42
ECHO MV E/E' LATERAL: 10.6
ECHO MV E/E' RATIO (AVERAGED): 10.12
ECHO MV E/E' SEPTAL: 9.64
ECHO PV MAX VELOCITY: 1 M/S
ECHO PV PEAK GRADIENT: 4 MMHG
ECHO RV FREE WALL PEAK S': 9 CM/S
ECHO RV INTERNAL DIMENSION: 3.9 CM
ECHO RV TAPSE: 2.2 CM (ref 1.7–?)
ECHO RVOT PEAK GRADIENT: 2 MMHG
ECHO RVOT PEAK VELOCITY: 0.7 M/S
ECHO TV REGURGITANT MAX VELOCITY: 2.59 M/S
ECHO TV REGURGITANT PEAK GRADIENT: 27 MMHG
Q-T INTERVAL, ECG07: 468 MS
QRS DURATION, ECG06: 152 MS
QTC CALCULATION (BEZET), ECG08: 486 MS
SAMPLES BEING HELD,HOLD: NORMAL
TSH SERPL DL<=0.05 MIU/L-ACNC: 3.4 UIU/ML (ref 0.36–3.74)
VENTRICULAR RATE, ECG03: 65 BPM

## 2022-11-10 PROCEDURE — 74011000250 HC RX REV CODE- 250: Performed by: INTERNAL MEDICINE

## 2022-11-10 PROCEDURE — 99221 1ST HOSP IP/OBS SF/LOW 40: CPT | Performed by: INTERNAL MEDICINE

## 2022-11-10 PROCEDURE — U0005 INFEC AGEN DETEC AMPLI PROBE: HCPCS

## 2022-11-10 PROCEDURE — 74011250637 HC RX REV CODE- 250/637: Performed by: HOSPITALIST

## 2022-11-10 PROCEDURE — APPSS30 APP SPLIT SHARED TIME 16-30 MINUTES: Performed by: NURSE PRACTITIONER

## 2022-11-10 PROCEDURE — 74011250637 HC RX REV CODE- 250/637: Performed by: INTERNAL MEDICINE

## 2022-11-10 PROCEDURE — 84443 ASSAY THYROID STIM HORMONE: CPT

## 2022-11-10 PROCEDURE — 36415 COLL VENOUS BLD VENIPUNCTURE: CPT

## 2022-11-10 PROCEDURE — 93306 TTE W/DOPPLER COMPLETE: CPT

## 2022-11-10 PROCEDURE — 93306 TTE W/DOPPLER COMPLETE: CPT | Performed by: INTERNAL MEDICINE

## 2022-11-10 PROCEDURE — 65270000046 HC RM TELEMETRY

## 2022-11-10 RX ORDER — ACETAMINOPHEN 325 MG/1
650 TABLET ORAL
Status: DISCONTINUED | OUTPATIENT
Start: 2022-11-10 | End: 2022-11-12 | Stop reason: HOSPADM

## 2022-11-10 RX ORDER — BUMETANIDE 0.25 MG/ML
2 INJECTION INTRAMUSCULAR; INTRAVENOUS 2 TIMES DAILY
Status: DISCONTINUED | OUTPATIENT
Start: 2022-11-10 | End: 2022-11-10

## 2022-11-10 RX ORDER — TROSPIUM CHLORIDE 20 MG/1
20 TABLET, FILM COATED ORAL
COMMUNITY

## 2022-11-10 RX ORDER — GLUCOSAMINE/CHONDR SU A SOD 750-600 MG
2 TABLET ORAL DAILY
COMMUNITY

## 2022-11-10 RX ORDER — BUMETANIDE 0.25 MG/ML
1 INJECTION INTRAMUSCULAR; INTRAVENOUS 2 TIMES DAILY
Status: DISCONTINUED | OUTPATIENT
Start: 2022-11-10 | End: 2022-11-12 | Stop reason: HOSPADM

## 2022-11-10 RX ORDER — CETIRIZINE HYDROCHLORIDE 10 MG/1
10 TABLET ORAL DAILY
COMMUNITY

## 2022-11-10 RX ORDER — AMIODARONE HYDROCHLORIDE 100 MG/1
100 TABLET ORAL EVERY OTHER DAY
COMMUNITY

## 2022-11-10 RX ORDER — ACETAMINOPHEN 325 MG/1
650 TABLET ORAL
COMMUNITY

## 2022-11-10 RX ORDER — POLYETHYLENE GLYCOL 400 AND PROPYLENE GLYCOL 4; 3 MG/ML; MG/ML
1 SOLUTION/ DROPS OPHTHALMIC AS NEEDED
COMMUNITY

## 2022-11-10 RX ORDER — GABAPENTIN 300 MG/1
300 CAPSULE ORAL 3 TIMES DAILY
Status: DISCONTINUED | OUTPATIENT
Start: 2022-11-10 | End: 2022-11-12 | Stop reason: HOSPADM

## 2022-11-10 RX ADMIN — BUMETANIDE 2 MG: 0.25 INJECTION INTRAMUSCULAR; INTRAVENOUS at 02:44

## 2022-11-10 RX ADMIN — NITROGLYCERIN 1 INCH: 20 OINTMENT TOPICAL at 17:51

## 2022-11-10 RX ADMIN — BUMETANIDE 1 MG: 0.25 INJECTION INTRAMUSCULAR; INTRAVENOUS at 17:51

## 2022-11-10 RX ADMIN — GABAPENTIN 600 MG: 300 CAPSULE ORAL at 09:31

## 2022-11-10 RX ADMIN — CETIRIZINE HYDROCHLORIDE 10 MG: 10 TABLET, FILM COATED ORAL at 09:31

## 2022-11-10 RX ADMIN — RIVAROXABAN 20 MG: 20 TABLET, FILM COATED ORAL at 10:27

## 2022-11-10 RX ADMIN — NITROGLYCERIN 1 INCH: 20 OINTMENT TOPICAL at 06:45

## 2022-11-10 RX ADMIN — TRAMADOL HYDROCHLORIDE 50 MG: 50 TABLET ORAL at 14:36

## 2022-11-10 RX ADMIN — NITROGLYCERIN 1 INCH: 20 OINTMENT TOPICAL at 00:25

## 2022-11-10 RX ADMIN — GABAPENTIN 300 MG: 300 CAPSULE ORAL at 21:48

## 2022-11-10 RX ADMIN — AMLODIPINE BESYLATE 2.5 MG: 5 TABLET ORAL at 09:31

## 2022-11-10 RX ADMIN — PANTOPRAZOLE SODIUM 40 MG: 40 TABLET, DELAYED RELEASE ORAL at 06:46

## 2022-11-10 RX ADMIN — BUPROPION HYDROCHLORIDE 150 MG: 150 TABLET, EXTENDED RELEASE ORAL at 17:51

## 2022-11-10 RX ADMIN — LEVOTHYROXINE SODIUM 25 MCG: 0.03 TABLET ORAL at 10:27

## 2022-11-10 RX ADMIN — NITROGLYCERIN 1 INCH: 20 OINTMENT TOPICAL at 14:32

## 2022-11-10 RX ADMIN — GABAPENTIN 300 MG: 300 CAPSULE ORAL at 17:51

## 2022-11-10 RX ADMIN — METOPROLOL TARTRATE 25 MG: 25 TABLET, FILM COATED ORAL at 17:51

## 2022-11-10 RX ADMIN — BUMETANIDE 1 MG: 0.25 INJECTION INTRAMUSCULAR; INTRAVENOUS at 09:36

## 2022-11-10 RX ADMIN — BUPROPION HYDROCHLORIDE 150 MG: 150 TABLET, EXTENDED RELEASE ORAL at 10:26

## 2022-11-10 RX ADMIN — ACETAMINOPHEN 650 MG: 325 TABLET, FILM COATED ORAL at 10:40

## 2022-11-10 RX ADMIN — METOPROLOL TARTRATE 25 MG: 25 TABLET, FILM COATED ORAL at 09:31

## 2022-11-10 NOTE — PROGRESS NOTES
[facility] Dosing Services    Gabapentin was automatically dose-adjusted per [facility] P&T Committee Protocol, with respect to renal function. Assessment/Plan: Estimated CrCl 43 mL/min. Previous dose: Gabapentin 600mg PO TID  Dose adjusted to: Gabapentin 300mg PO TID (maximum recommended dose = 900mg/day for CrCl 30-49ml/min)    [facility] Pharm. D.   [address]

## 2022-11-10 NOTE — NURSE NAVIGATOR
Chart reviewed by Heart Failure Nurse Navigator. Heart Failure database completed. Patient came to ED for shortness of breath. She recently returned from cruise. Her  has tested positive for Covid. Her rapid Covid test is negative and PCR is pending. She has pulmonary edema by chest CT and Cardiology is consulted for presumed CHF. She has PMH of atrial fib, PPM for SSS, HTN, and obesity. EF:  Current Echo is pending    ACEi/ARB/ARNi: Valsartan 320 mg daily at bedtime    BB: Metoprolol tartrate 25 mg BID    Aldosterone Antagonist: **    Obstructive Sleep Apnea Screening:   Referred to Sleep Medicine:     CRT **. NYHA Functional Class **. Heart Failure Teach Back in Patient Education. Heart Failure Avoiding Triggers on Discharge Instructions. Cardiologist: Dr Timothy Scherer is consulted      Post discharge follow up phone call to be made within 48-72 hours of discharge.

## 2022-11-10 NOTE — ED NOTES
TRANSFER - IN REPORT:    Verbal report received from [facility] ED (name) on Ck Patel  being received from [facility] ED(unit) for routine progression of care      Report consisted of patients Situation, Background, Assessment and   Recommendations(SBAR). Information from the following report(s) ED Summary, MAR, and Recent Results was reviewed with the receiving nurse. Opportunity for questions and clarification was provided. Assessment completed upon patients arrival to unit and care assumed.

## 2022-11-10 NOTE — PROGRESS NOTES
Pharmacy Medication Reconciliation     The patient was interviewed regarding current PTA medication list, use and drug allergies. The patient was questioned regarding use of any other inhalers, topical products, over the counter medications, herbal medications, vitamin products or ophthalmic/nasal/otic medication use. During the interview, she presented a laminated list of the medications that she current takes. The medications presented below is current as of October 24, 2022 and reflects what was on the PTA medication list.    Allergy Update: Patient has no known allergies. Recommendations/Findings: The following amendments were made to the patient's active medication list on file at [facility]:    1) Additions:  Cetirizine  Biotin  Trospium  Acetaminophen  Systane eye drops    2) Deletions:  Loratadine    3) Changes:  Decreased Amiodarone dose from 200 mg PO every other day to 100 mg PO every other day  Decreased Vitamin C dose from 1000 mg to 500 mg     Pertinent Findings: The patient is pleasant and was found upright in bed. She is a good historian and presented a list of her PTA medications. PTA medication list was corrected to the following:     Prior to Admission Medications   Prescriptions Last Dose Informant Taking? Biotin 2,500 mcg cap 10/10/2022  Yes   Sig: Take 2 Capsules by mouth daily. Calcium Carbonate-Vit D3-Min 600 mg calcium- 400 unit tab 11/7/2022 at 0900  Yes   Sig: Take 1 Tab by mouth daily. acetaminophen (TYLENOL) 325 mg tablet   Yes   Sig: Take 650 mg by mouth every four (4) hours as needed for Pain (for back pain; takes tylenol before switching to tramadol). amLODIPine (NORVASC) 2.5 mg tablet 11/9/2022 at 0900  Yes   Sig: Take 2.5 mg by mouth daily. amiodarone (PACERONE) 100 mg tablet   Yes   Sig: Take 100 mg by mouth every other day. ascorbic acid, vitamin C, (VITAMIN C) 1,000 mg tablet  Self Yes   Sig: Take 500 mg by mouth nightly.    buPROPion SR (WELLBUTRIN SR) 150 mg SR tablet 11/9/2022 at 0900 Self Yes   Sig: Take 150 mg by mouth two (2) times a day. bumetanide (BUMEX) 2 mg tablet   Yes   Sig: Take 2 mg by mouth daily. cetirizine (ZYRTEC) 10 mg tablet   Yes   Sig: Take 10 mg by mouth daily. gabapentin (NEURONTIN) 300 mg capsule 11/9/2022 at am  Yes   Sig: Take 600 mg by mouth three (3) times daily. levothyroxine (SYNTHROID) 25 mcg tablet 11/9/2022 at 0900 Self Yes   Sig: Take 25 mcg by mouth daily. nebivolol (BYSTOLIC) 20 mg tablet 74/2/4908 at 0900 Self Yes   Sig: Take 20 mg by mouth daily. omeprazole (PRILOSEC) 20 mg capsule 11/9/2022 at 0900 Self Yes   Sig: Take 20 mg by mouth daily. Indications: GASTROESOPHAGEAL REFLUX   peg 400-propylene glycol (Systane, propylene glycoL,) 0.4-0.3 % drop   Yes   Sig: Administer 1 Drop to both eyes as needed. potassium chloride (K-DUR, KLOR-CON) 20 mEq tablet 11/9/2022 at 0900 Self Yes   Sig: Take 1 tablet by mouth two  times daily   rivaroxaban (XARELTO) 20 mg tab tablet 11/9/2022 at 0900  Yes   Sig: Take  by mouth daily. therapeutic multivitamin (THERAGRAN) tablet  Self Yes   Sig: Take 1 Tab by mouth daily. traMADoL (ULTRAM) 50 mg tablet 10/10/2022 at 0900  Yes   Sig: Take 50 mg by mouth every six (6) hours as needed for Pain.   trospium (SANCTURA) 20 mg tablet   Yes   Sig: Take 20 mg by mouth daily as needed. valsartan (DIOVAN) 320 mg tablet 11/9/2022 at 1727  Yes   Sig: Take 320 mg by mouth nightly. vit A/vit C/vit E/zinc/copper (PRESERVISION AREDS PO) 11/9/2022 at 0900  Yes   Sig: Take  by mouth.       Facility-Administered Medications: None        Thank you,  Patti Collier

## 2022-11-10 NOTE — PROGRESS NOTES
Guerrero Verdugo [facility] 79  [address] [facility], [id number] [place] [address]  [phone]      Hospitalist  Progress Note      NAME:         Queenie Bahena   :        1945  MRM:        [id number]    Date of service: 11/10/2022      Chief complaint: Exertional SOB    Interval HPI: Patient remains weak and has exertional SOB. No cough or fever. Generalized weakness. She has no chest pain, nausea or vomiting      Objective:    Vital Signs:    Visit Vitals  BP (!) 144/71   Pulse 65   Temp 98.1 °F (36.7 °C)   Resp 15   Ht 5' 4\" (1.626 m)   Wt 99 kg (218 lb 4.1 oz)   SpO2 95%   BMI 37.46 kg/m²      No intake or output data in the 24 hours ending 11/10/22 0654     Physical Examination:    General:   Weak and ill looking patient in no acute distress  Eyes:   pink conjunctivae, PERRLA with no discharge. ENT:   no ottorrhea or rhinorrhea with dry mucous membranes  Neck: no masses, thyroid non-tender and trachea central.  Pulm:  no accessory muscle use, clear breath sounds without crackles or wheezes  Card:  no JVD or murmurs, has regular and normal S1, S2 without thrills, bruits or peripheral edema  Abd:  Soft, non-tender, non-distended, normoactive bowel sounds   Musc:  No cyanosis, clubbing, atrophy or deformities. Skin:  No rashes, bruising or ulcers. Neuro: Awake and alert.  Generally a non focal exam. Follows commands appropriately  Psych:  Has a good insight and is oriented x 3    Current Facility-Administered Medications   Medication Dose Route Frequency    bumetanide (BUMEX) injection 2 mg  2 mg IntraVENous BID    [START ON 2022] amiodarone (CORDARONE) tablet 100 mg  100 mg Oral EVERY OTHER DAY    amLODIPine (NORVASC) tablet 2.5 mg  2.5 mg Oral DAILY    buPROPion SR (WELLBUTRIN SR) tablet 150 mg  150 mg Oral BID    gabapentin (NEURONTIN) capsule 600 mg  600 mg Oral TID    levothyroxine (SYNTHROID) tablet 25 mcg  25 mcg Oral ACB cetirizine (ZYRTEC) tablet 10 mg  10 mg Oral DAILY    metoprolol tartrate (LOPRESSOR) tablet 25 mg  25 mg Oral BID    pantoprazole (PROTONIX) tablet 40 mg  40 mg Oral ACB    rivaroxaban (XARELTO) tablet 20 mg  20 mg Oral DAILY WITH BREAKFAST    traMADoL (ULTRAM) tablet 50 mg  50 mg Oral Q6H PRN    valsartan (DIOVAN) tablet 320 mg  320 mg Oral QHS    . PHARMACY TO SUBSTITUTE PER PROTOCOL (Reordered from: vit A/vit C/vit E/zinc/copper (PRESERVISION AREDS PO))    Per Protocol    nitroglycerin (NITROBID) 2 % ointment 1 Inch  1 Inch Topical Q6H     Current Outpatient Medications   Medication Sig    valsartan (DIOVAN) 320 mg tablet Take 320 mg by mouth nightly. traMADoL (ULTRAM) 50 mg tablet Take 50 mg by mouth every six (6) hours as needed for Pain. Calcium Carbonate-Vit D3-Min 600 mg calcium- 400 unit tab Take 1 Tab by mouth daily. loratadine 10 mg cap Take  by mouth. amLODIPine (NORVASC) 2.5 mg tablet Take 2.5 mg by mouth daily. vit A/vit C/vit E/zinc/copper (PRESERVISION AREDS PO) Take  by mouth.    rivaroxaban (XARELTO) 20 mg tab tablet Take  by mouth daily. gabapentin (NEURONTIN) 300 mg capsule Take 600 mg by mouth three (3) times daily. buPROPion SR (WELLBUTRIN SR) 150 mg SR tablet Take 150 mg by mouth two (2) times a day. nebivolol (BYSTOLIC) 20 mg tablet Take 20 mg by mouth daily. levothyroxine (SYNTHROID) 25 mcg tablet Take 25 mcg by mouth daily. potassium chloride (K-DUR, KLOR-CON) 20 mEq tablet Take 1 tablet by mouth two  times daily    amiodarone (CORDARONE) 200 mg tablet Take 1 Tab by mouth every other day. (Patient taking differently: Take 100 mg by mouth every other day.)    omeprazole (PRILOSEC) 20 mg capsule Take 20 mg by mouth daily. Indications: GASTROESOPHAGEAL REFLUX    BUMETANIDE PO Take 2 mg by mouth daily. therapeutic multivitamin (THERAGRAN) tablet Take 1 Tab by mouth daily. ascorbic acid, vitamin C, (VITAMIN C) 1,000 mg tablet Take 1,000 mg by mouth nightly. Laboratory data and review:    Recent Labs     11/09/22  1158   WBC 9.4   HGB 12.5   HCT 39.0        Recent Labs     11/09/22  1247      K 5.0      CO2 25   *   BUN 24*   CREA 1.28*   CA 8.2*   ALB 3.7   ALT 93*     No components found for: [place]    Diagnostics: Imaging studies have been reviewed    Telemetry reviewed by me:  paced     Assessment and Plan:    Acute CHF (congestive heart failure) ([facility] (11/9/2022) POA: likely diastolic. CTA chest showed no PE but pulmonary edema. Pro-BNP elevated. Prior Echo in 2016 showed a normal LV function. Repeat Echo pending. Continue IV Bumex. Metoprolol, Diovan. Consult cardiology. Low Na diet with fluid restriction. Given a close family contact with CoV-19 and although the rapid test is neg, will obtain a PCR test    [facility]ox[facility] (11/18/2012) / Sinus node dysfunction ([facility] (11/18/2012) POA: has a pacemaker. Continue Metoprolol, Xarelto. Cardiology to see    Hypothyroidism (2/8/2012) POA: check TSH. Continue Levothyroxine    HTN (hypertension), benign (11/10/2022) / Mixed hyperlipidemia (9/12/2013) POA: BP stable.  Continue Amlodipine, Metoprolol, Valsartan once verified    Gastroesophageal reflux disease without esophagitis (10/14/2016) POA: continue PPi    BMI 37.0-37.9, adult (11/10/2022) POA: needs weight loss                 Care Plan discussed with: Patient, Care Manager, and Nursing Staff    Discussed:  Care Plan    Prophylaxis:   Xarelto    Anticipated Disposition:  Home w/Family           ___________________________________________________    Attending Physician:   Nick Plata MD

## 2022-11-10 NOTE — PROGRESS NOTES
ATTENDING CARDIOLOGIST    The patient was personally examined and chart reviewed. All the elements of history and examination were personally performed and I agree with the plan as listed by advanced practice provider. Treatment plan was addressed with the patient. Subjective:  Recent cruise   COVID  ++ SOB with minimal exertion  Elevated BNP  Followed by Dr. Tom Strickland  EKG V paced    Blood pressure (!) 144/71, pulse 65, temperature 98 °F (36.7 °C), resp. rate 15, height 5' 4\" (1.626 m), weight 99 kg (218 lb 4.1 oz), SpO2 97 %. Heart: Normal rate, regular rhythm, normal S1/S2, no murmur  Lungs: Decreased breath sounds  Abdomen: Soft, nontender, nondistended  Extremities: 1+ edema      A/P:   Volume overload. Unclear if systolic or diastolic heart failure. Records requested. Echo ordered. Continue IV diuresis. Continue BP control. PAF - Rate control and AC Xarelto. HTN - diovan, norvasc, bystolic. SSS sp Medtronic PPM - device to be interrogated. []    High complexity decision making was performed  []    Patient is at high-risk of decompensation with multiple organ involvement      Linden Paz. Orlando Diaz MD, [facility]  [facility] [address]  [address]                                   Office [phone]      Fax [phone]                                                                 4 [facility]                       Cardiology Care Note     [x]Initial Encounter     []Follow-up    Patient Name: Kristen Murillo - PLP:8[birth year] - [facility]:[id number]  Primary Cardiologist: Dr Lucita Guillen Cardiologist: [facility] Physicians: Delonte Chaudhary MD     Reason for encounter: CHF     HPI:     Kristen Murillo is a 78 y.o. female with PMH significant for  paroxysmal atrial fibrillation  on xarelto, pacemaker for sick sinus syndrome, HTN, who was admitted with IBARRA, started the prior day.  Pt jus returned from a cruise, pt tested positive for COVID the next day, per pt her test at [facility] was negative. Denies chest pain. Per pt her weight is up a couple of pounds. She did not take her bumex (2mg) Monday as she was traveling. Denies missing any meds on the cruise. Last stress test was approx 10 years ago with Dr COLIN LONDONO [facility]. Sees Dr Jammie Arrington and Dr Ba Dotson. pBNP 5247. Kayla Lipoma reports dyspnea on exertion. Assessment and Plan     1 hx a fib: paced on tele. Interrogate medtronic device, cont xarelto, bystolic  2 Pulmonary edema on chest CT, no PE: cont IV loops, ECHO, obtain records from Dr Jammie Arrington. 3 Hx HTN on diovan, norvasc, bystolic. 4 obesity: Body mass index is 37.46 kg/m².          ____________________________________________________________    Cardiac testing              Most recent HS troponins:  Recent Labs     11/09/22  1158   TROPHS 12       ECG:   EKG Results       Procedure 720 Value Units Date/Time    EKG, 12 LEAD, INITIAL [[id number]]     Order Status: Sent               Review of Systems:    [x]All other systems reviewed and all negative except as written in HPI    [] Patient unable to provide secondary to condition    Past Medical History:   Diagnosis Date    Arrhythmia     Atrial fibrillation (HCC)     CAD (coronary artery disease)     atrial fibrillation    Gastrointestinal disorder     GERD    GERD (gastroesophageal reflux disease)     Headache(784.0)     Hypertension     Hypothyroid     Neurological disorder     migraines    Other ill-defined conditions(799.89) migraine    Psychiatric disorder previous hx of depression     Past Surgical History:   Procedure Laterality Date    HX CHOLECYSTECTOMY      HX HYSTERECTOMY      HX ORTHOPAEDIC      Right shoulder surgery    HX PACEMAKER      HX ELIA AND BSO      NV ANESTH,SURGERY OF SHOULDER      left     Social Hx:  reports that she has quit smoking. She has never used smokeless tobacco. She reports current alcohol use.  She reports that she does not use drugs. Family Hx: family history includes Cancer in her mother; Heart Attack in her father and another family member; Heart Disease in her father; Hypertension in her father and another family member; Migraines in her mother; Stroke in her father and another family member. No Known Allergies       OBJECTIVE:  Wt Readings from Last 3 Encounters:   11/10/22 99 kg (218 lb 4.1 oz)   07/15/22 100.8 kg (222 lb 3.6 oz)   07/10/22 98 kg (216 lb 0.8 oz)     No intake or output data in the 24 hours ending 11/10/22 0912    Physical Exam:    Vitals:   Vitals:    11/10/22 0200 11/10/22 0539 11/10/22 0600 11/10/22 0813   BP: (!) 148/77 (!) 156/85 (!) 144/71 (!) 144/71   Pulse: 65 65 65 65   Resp: 18 15 15 15   Temp:  98.1 °F (36.7 °C)  98 °F (36.7 °C)   SpO2: 94% 97% 95% 97%   Weight:    99 kg (218 lb 4.1 oz)   Height:    5' 4\" (1.626 m)     Telemetry: paced     Gen: Well-developed, well-nourished, in no acute distress  Neck: Supple, No JVD, No Carotid Bruit  Resp: Rales R base  Card: Regular Rate,Rythm, Normal S1, S2, No murmurs, rubs or gallop. No thrills. Abd:   obese Soft, non-tender, non-distended, BS+   MSK: No cyanosis  Skin: No rashes    Neuro: Moving all four extremities, follows commands appropriately  Psych: Good insight, oriented to person, place, alert, Nml Affect  LE: No edema    Data Review:     Radiology:   XR Results (most recent):  Results from Hospital Encounter encounter on 11/09/22    XR CHEST PORT    Narrative  INDICATION:  sob    Exam: Portable chest 1155. Comparison: 3/23/2022. Findings: Cardiomediastinal silhouette is within normal limits. Pulmonary  vasculature is not engorged. There are no consolidation, effusions, or  pneumothorax. Minimal scarring left mid chest unchanged Dual lead right chest  pacer unchanged    Impression  1.  No acute disease      Recent Labs     11/09/22  1247      K 5.0      CO2 25   BUN 24*   CREA 1.28*   *   CA 8.2*     Recent Labs     11/09/22  1158   WBC 9.4   HGB 12.5   HCT 39.0        Recent Labs     11/09/22  1247   AP 94     No results for input(s): CHOL, LDLC in the last 72 hours. No lab exists for component: TGL, HDLC,  HBA1C      Current meds:    Current Facility-Administered Medications:     bumetanide (BUMEX) injection 1 mg, 1 mg, IntraVENous, BID, Mamie Harada, MD    [START ON 11/11/2022] amiodarone (CORDARONE) tablet 100 mg, 100 mg, Oral, EVERY OTHER DAY, Kenya Hawthorne MD    amLODIPine (NORVASC) tablet 2.5 mg, 2.5 mg, Oral, DAILY, Kenya Hawthorne MD    buPROPion [facility]) tablet 150 mg, 150 mg, Oral, BID, Kenya Hawthorne MD    gabapentin (NEURONTIN) capsule 600 mg, 600 mg, Oral, TID, Kenya Hawthorne MD, 600 mg at 11/09/22 2259    levothyroxine (SYNTHROID) tablet 25 mcg, 25 mcg, Oral, ACB, Kenya Hawthorne MD    cetirizine (ZYRTEC) tablet 10 mg, 10 mg, Oral, DAILY, Kenya Hawthorne MD    metoprolol tartrate (LOPRESSOR) tablet 25 mg, 25 mg, Oral, BID, Kenya Hawthorne MD    pantoprazole (PROTONIX) tablet 40 mg, 40 mg, Oral, ACB, Kenya Hawthorne MD, 40 mg at 11/10/22 1127    rivaroxaban (XARELTO) tablet 20 mg, 20 mg, Oral, DAILY WITH BREAKFAST, Kenya Hawthorne MD    traMADoL [facility]) tablet 50 mg, 50 mg, Oral, Q6H PRN, Kenya Hawthorne MD    valsartan (DIOVAN) tablet 320 mg, 320 mg, Oral, QHS, Kenya Hawthorne MD, 320 mg at 11/09/22 2259    . PHARMACY TO SUBSTITUTE PER PROTOCOL (Reordered from: vit A/vit C/vit E/zinc/copper (PRESERVISION AREDS PO)), , , Per Protocol, Kenya Hawthorne MD    nitroglycerin (NITROBID) 2 % ointment 1 Inch, 1 Inch, Topical, Q6H, Kenya Hawthorne MD, 1 Inch at 11/10/22 5623    Current Outpatient Medications:     valsartan (DIOVAN) 320 mg tablet, Take 320 mg by mouth nightly., Disp: , Rfl:     traMADoL (ULTRAM) 50 mg tablet, Take 50 mg by mouth every six (6) hours as needed for Pain., Disp: , Rfl:     Calcium Carbonate-Vit D3-Min 600 mg calcium- 400 unit tab, Take 1 Tab by mouth daily. , Disp: , Rfl:     loratadine 10 mg cap, Take  by mouth., Disp: , Rfl:     amLODIPine (NORVASC) 2.5 mg tablet, Take 2.5 mg by mouth daily. , Disp: , Rfl:     vit A/vit C/vit E/zinc/copper (PRESERVISION AREDS PO), Take  by mouth., Disp: , Rfl:     rivaroxaban (XARELTO) 20 mg tab tablet, Take  by mouth daily. , Disp: , Rfl:     gabapentin (NEURONTIN) 300 mg capsule, Take 600 mg by mouth three (3) times daily. , Disp: , Rfl:     buPROPion SR (WELLBUTRIN SR) 150 mg SR tablet, Take 150 mg by mouth two (2) times a day., Disp: , Rfl:     nebivolol (BYSTOLIC) 20 mg tablet, Take 20 mg by mouth daily. , Disp: , Rfl:     levothyroxine (SYNTHROID) 25 mcg tablet, Take 25 mcg by mouth daily. , Disp: , Rfl:     potassium chloride (K-DUR, KLOR-CON) 20 mEq tablet, Take 1 tablet by mouth two  times daily, Disp: 60 Tab, Rfl: 0    amiodarone (CORDARONE) 200 mg tablet, Take 1 Tab by mouth every other day. (Patient taking differently: Take 100 mg by mouth every other day.), Disp: 45 Tab, Rfl: 3    omeprazole (PRILOSEC) 20 mg capsule, Take 20 mg by mouth daily. Indications: GASTROESOPHAGEAL REFLUX, Disp: , Rfl:     BUMETANIDE PO, Take 2 mg by mouth daily. , Disp: , Rfl:     therapeutic multivitamin (THERAGRAN) tablet, Take 1 Tab by mouth daily. , Disp: , Rfl:     ascorbic acid, vitamin C, (VITAMIN C) 1,000 mg tablet, Take 1,000 mg by mouth nightly., Disp: , Rfl:     Carlita Hart NP    [facility]  Call center: B) [phone] (F) [phone]      CC: Mana Lockhart MD

## 2022-11-10 NOTE — PROGRESS NOTES
[facility]  IN REPORT:    Verbal report received from [facility] (name) on Denny Memos  being received from ED (unit) for routine progression of care      Report consisted of patients Situation, Background, Assessment and   Recommendations(SBAR). Information from the following report(s) SBAR and Kardex was reviewed with the receiving nurse. Opportunity for questions and clarification was provided. Assessment completed upon patients arrival to unit and care assumed. 1543 Patient arrived to room. 1930 Bedside and Verbal shift change report given to Via Sunil Solorzano (oncoming nurse) by Nancy MUNIZ (offgoing nurse). Report included the following information SBAR and Kardex.

## 2022-11-10 NOTE — ED NOTES
TRANSFER - OUT REPORT:    Verbal report given to [facility] ED(name) on Garcia Pruitt  being transferred to ED(unit) for routine progression of care       Report consisted of patients Situation, Background, Assessment and   Recommendations(SBAR). Information from the following report(s) SBAR, MAR, and Recent Results was reviewed with the receiving nurse. Lines:   Peripheral IV 11/09/22 Left Antecubital (Active)   Site Assessment Clean, dry, & intact 11/09/22 1610   Phlebitis Assessment 0 11/09/22 1610   Infiltration Assessment 0 11/09/22 1610        Opportunity for questions and clarification was provided.       Patient transported with:   Monitor

## 2022-11-10 NOTE — PROGRESS NOTES
Cardiology Update:    PPM is near end of battery life on interrogation, around 3 mos left. Will discuss in the morning if need to perform battery change while here or if pt can follow up with Dr. Cathyrn Perez.

## 2022-11-11 LAB
SARS-COV-2, XPLCVT: NOT DETECTED
SOURCE, COVRS: NORMAL

## 2022-11-11 PROCEDURE — 99233 SBSQ HOSP IP/OBS HIGH 50: CPT | Performed by: INTERNAL MEDICINE

## 2022-11-11 PROCEDURE — 74011000250 HC RX REV CODE- 250: Performed by: INTERNAL MEDICINE

## 2022-11-11 PROCEDURE — APPSS30 APP SPLIT SHARED TIME 16-30 MINUTES: Performed by: NURSE PRACTITIONER

## 2022-11-11 PROCEDURE — 74011250637 HC RX REV CODE- 250/637: Performed by: INTERNAL MEDICINE

## 2022-11-11 PROCEDURE — 74011250637 HC RX REV CODE- 250/637: Performed by: HOSPITALIST

## 2022-11-11 PROCEDURE — 65270000046 HC RM TELEMETRY

## 2022-11-11 RX ADMIN — AMLODIPINE BESYLATE 2.5 MG: 5 TABLET ORAL at 10:02

## 2022-11-11 RX ADMIN — BUMETANIDE 1 MG: 0.25 INJECTION INTRAMUSCULAR; INTRAVENOUS at 17:50

## 2022-11-11 RX ADMIN — NITROGLYCERIN 1 INCH: 20 OINTMENT TOPICAL at 06:59

## 2022-11-11 RX ADMIN — NITROGLYCERIN 1 INCH: 20 OINTMENT TOPICAL at 01:01

## 2022-11-11 RX ADMIN — BUMETANIDE 1 MG: 0.25 INJECTION INTRAMUSCULAR; INTRAVENOUS at 10:02

## 2022-11-11 RX ADMIN — RIVAROXABAN 20 MG: 20 TABLET, FILM COATED ORAL at 10:02

## 2022-11-11 RX ADMIN — VALSARTAN 320 MG: 80 TABLET, FILM COATED ORAL at 21:55

## 2022-11-11 RX ADMIN — ACETAMINOPHEN 650 MG: 325 TABLET, FILM COATED ORAL at 23:10

## 2022-11-11 RX ADMIN — METOPROLOL TARTRATE 25 MG: 25 TABLET, FILM COATED ORAL at 10:02

## 2022-11-11 RX ADMIN — GABAPENTIN 300 MG: 300 CAPSULE ORAL at 21:55

## 2022-11-11 RX ADMIN — AMIODARONE HYDROCHLORIDE 100 MG: 200 TABLET ORAL at 10:02

## 2022-11-11 RX ADMIN — GABAPENTIN 300 MG: 300 CAPSULE ORAL at 15:40

## 2022-11-11 RX ADMIN — CETIRIZINE HYDROCHLORIDE 10 MG: 10 TABLET, FILM COATED ORAL at 10:02

## 2022-11-11 RX ADMIN — LEVOTHYROXINE SODIUM 25 MCG: 0.03 TABLET ORAL at 06:58

## 2022-11-11 RX ADMIN — BUPROPION HYDROCHLORIDE 150 MG: 150 TABLET, EXTENDED RELEASE ORAL at 10:02

## 2022-11-11 RX ADMIN — METOPROLOL TARTRATE 25 MG: 25 TABLET, FILM COATED ORAL at 17:50

## 2022-11-11 RX ADMIN — BUPROPION HYDROCHLORIDE 150 MG: 150 TABLET, EXTENDED RELEASE ORAL at 17:50

## 2022-11-11 RX ADMIN — GABAPENTIN 300 MG: 300 CAPSULE ORAL at 10:02

## 2022-11-11 RX ADMIN — PANTOPRAZOLE SODIUM 40 MG: 40 TABLET, DELAYED RELEASE ORAL at 06:58

## 2022-11-11 RX ADMIN — I-VITE, TAB 1000-60-2MG (60/BT) 1 TABLET: TAB at 10:02

## 2022-11-11 NOTE — PROGRESS NOTES
Guerrero Verdugo [facility]  [address] [facility], [place], [address] [place] [address]  [phone]      Hospitalist  Progress Note      NAME:         Susan Castillo   :        1945  MRM:        [id number]    Date of service: 2022      Chief complaint: Exertional SOB    Interval HPI: Patient remains weak. Her SOB is better but still present. No fever or chills. Domenico cough or fever. She has no chest pain      Objective:    Vital Signs:    Visit Vitals  /72 (BP 1 Location: Right upper arm, BP Patient Position: At rest)   Pulse 65   Temp 98.3 °F (36.8 °C)   Resp 20   Ht 5' 4\" (1.626 m)   Wt 98.4 kg (216 lb 14.4 oz)   SpO2 94%   BMI 37.23 kg/m²        Intake/Output Summary (Last 24 hours) at 2022 0959  Last data filed at 2022 0339  Gross per 24 hour   Intake 1070 ml   Output 100 ml   Net 970 ml          Physical Examination:    General:   Weak and ill looking patient in no acute distress  Eyes:   pink conjunctivae, PERRLA with no discharge. ENT:   no ottorrhea or rhinorrhea with dry mucous membranes  Neck: no masses, thyroid non-tender and trachea central.  Pulm:  clear breath sounds without crackles or wheezes  Card:  no JVD or murmurs, has regular and normal S1, S2 without thrills, bruits or peripheral edema  Abd:  Soft, non-tender, non-distended, normoactive bowel sounds   Musc:  No cyanosis, clubbing, atrophy or deformities. Skin:  No rashes, bruising or ulcers. Neuro: Awake and alert.  Generally a non focal exam.   Psych:  Has a good insight and is oriented x 3    Current Facility-Administered Medications   Medication Dose Route Frequency    bumetanide (BUMEX) injection 1 mg  1 mg IntraVENous BID    acetaminophen (TYLENOL) tablet 650 mg  650 mg Oral Q6H PRN    gabapentin (NEURONTIN) capsule 300 mg  300 mg Oral TID    amiodarone (CORDARONE) tablet 100 mg  100 mg Oral EVERY OTHER DAY    amLODIPine (NORVASC) tablet 2.5 mg 2.5 mg Oral DAILY    buPROPion SR (WELLBUTRIN SR) tablet 150 mg  150 mg Oral BID    levothyroxine (SYNTHROID) tablet 25 mcg  25 mcg Oral ACB    cetirizine (ZYRTEC) tablet 10 mg  10 mg Oral DAILY    metoprolol tartrate (LOPRESSOR) tablet 25 mg  25 mg Oral BID    pantoprazole (PROTONIX) tablet 40 mg  40 mg Oral ACB    rivaroxaban (XARELTO) tablet 20 mg  20 mg Oral DAILY WITH BREAKFAST    traMADoL (ULTRAM) tablet 50 mg  50 mg Oral Q6H PRN    valsartan (DIOVAN) tablet 320 mg  320 mg Oral QHS    vitamin A-O-E-lutein-minerals (OCUVITE) tablet 1 Tablet  1 Tablet Oral DAILY        Laboratory data and review:    Recent Labs     11/09/22  1158   WBC 9.4   HGB 12.5   HCT 39.0          Recent Labs     11/09/22  1247      K 5.0      CO2 25   *   BUN 24*   CREA 1.28*   CA 8.2*   ALB 3.7   ALT 93*       No components found for: [place]    Diagnostics: Imaging studies have been reviewed    Telemetry reviewed by me:  paced     Assessment and Plan:    Acute CHF (congestive heart failure) ([facility] (11/9/2022) POA: likely diastolic. CTA chest showed no PE but pulmonary edema. Pro-BNP elevated. Prior Echo in 2016 showed a normal LV function. Repeat Echo showed a preserved EF. Seen by Cardiology. Continue IV Bumex. Metoprolol, Diovan. Low Na diet with fluid restriction. Given a close family contact with CoV-19 and although the rapid test is neg, PCR test is pending    Paroxysmal atrial fibrillation ([facility] (11/18/2012) / Sinus node dysfunction ([facility] (11/18/2012) POA: has a pacemaker. Continue Amiodarone. Metoprolol, Xarelto. Cardiology following     Hypothyroidism (2/8/2012) POA: TSH is normal. Continue Levothyroxine    HTN (hypertension), benign (11/10/2022) / Mixed hyperlipidemia (9/12/2013) POA: BP stable.  Continue Amlodipine, Metoprolol, Valsartan     Gastroesophageal reflux disease without esophagitis (10/14/2016) POA: continue PPi    BMI 37.0-37.9, adult (11/10/2022) POA: needs weight loss                 Care Plan discussed with: Patient, Care Manager, and Nursing Staff    Discussed:  Care Plan    Prophylaxis:   Xarelto    Anticipated Disposition:  Home w/Family           ___________________________________________________    Attending Physician:   Gifty Jose MD

## 2022-11-11 NOTE — PROGRESS NOTES
ATTENDING CARDIOLOGIST    The patient was personally examined and chart reviewed. All the elements of history and examination were personally performed and I agree with the plan as listed by advanced practice provider. Treatment plan was addressed with the patient. Subjective:  Diuresing  SOB better, not at baseline    Blood pressure 115/74, pulse 65, temperature 99 °F (37.2 °C), resp. rate 18, height 5' 4\" (1.626 m), weight 98.4 kg (216 lb 14.4 oz), SpO2 95 %. Heart: Normal rate, regular rhythm, normal S1/S2, no murmur  Lungs: Clear  Abdomen: Soft, nontender, nondistended  Extremities: trace edema      A/P:   Volume overload. Unclear if systolic or diastolic heart failure. Records requested. Echo EF 55%. Continue IV diuresis. Continue BP control. PAF - Rate control and AC Xarelto. HTN - diovan, norvasc, bystolic. SSS sp Medtronic PPM - device interrogated - need battery change - she is anxious to have this done bc she has another cruise coming up in Jan 2023. []    High complexity decision making was performed  []    Patient is at high-risk of decompensation with multiple organ involvement      Classie [place]. Zeina Elkins MD, [facility]  [facility] [address] Holger [address] , [address]                                   Office [phone]      Fax [phone]        3 [facility]                       Cardiology Care Note     []Initial Encounter     [x]Follow-up    Patient Name: Mya Valenzuela - ZZV:[id number] - EAR:[id number]  Primary Cardiologist: Dr Shantell Aguirre Cardiologist: [facility] Cardiology Physicians: Starr Gale MD     Reason for encounter: CHF     HPI:     Mya Valenzuela is a 78 y.o. female with PMH significant for  paroxysmal atrial fibrillation  on xarelto, pacemaker for sick sinus syndrome, HTN, who was admitted with IBARRA, started the prior day.  Pt jus returned from a cruise, pt's  tested positive for COVID the next day, per pt her test at [facility] was negative. Denies chest pain. Per pt her weight is up a couple of pounds. She did not take her bumex (2mg) Monday as she was traveling. Denies missing any meds on the cruise. Last stress test was approx 10 years ago with Dr Meir Chatman. Sees Dr Valerie Hale and Dr Jhonatan Campbell. pBNP 5247. Neal Miranda reports dyspnea on exertion is unchanged     Assessment and Plan     Hx a fib, SSS s/p pacer: paced on tele. Interrogated medtronic device - has 3 months battery life left, cont xarelto, bystolic  2. Pulmonary edema on chest CT, no PE: cont IV loops for another day, TTE w/ normal LV function, EF 60-65%, still waiting on records from Dr Valerie Hale. 3. Hx HTN on diovan, norvasc, bystolic. 4. Obesity: Body mass index is 37.23 kg/m². 5. COVID exposure: PCR pending          ____________________________________________________________    Cardiac testing    11/09/22    ECHO ADULT COMPLETE 11/10/2022 11/10/2022    Interpretation Summary    Left Ventricle: Normal left ventricular systolic function with a visually estimated EF of 60 - 65%. Left ventricle size is normal. Normal wall thickness. Normal wall motion. Abnormal diastolic function. Right Ventricle: Right ventricle size is normal. Normal wall thickness. Lead present in the right ventricle. Left Atrium: Left atrium is mildly dilated.     Signed by: Edil Cabral MD on 11/10/2022  9:13 PM    Most recent HS troponins:  Recent Labs     11/09/22  1158   TROPHS 12       ECG:   EKG Results       Procedure 720 Value Units Date/Time    EKG, 12 LEAD, INITIAL [[id number]] Collected: 11/09/22 1205    Order Status: Completed Updated: 11/10/22 1127     Ventricular Rate 65 BPM      Atrial Rate 37 BPM      QRS Duration 152 ms      Q-T Interval 468 ms      QTC Calculation (Bezet) 486 ms      Calculated R Axis -70 degrees      Calculated T Axis 77 degrees      Diagnosis --     Ventricular-paced rhythm  Abnormal ECG  When compared with ECG of 17-SEP-2019 21:44,  Electronic ventricular pacemaker has replaced Electronic atrial pacemaker  Confirmed by Dorian Dance ([id number]) on 11/10/2022 11:26:43 AM                Review of Systems:    [x]All other systems reviewed and all negative except as written in HPI    [] Patient unable to provide secondary to condition    Past Medical History:   Diagnosis Date    Arrhythmia     Atrial fibrillation ([address]     CAD (coronary artery disease)     atrial fibrillation    Gastrointestinal disorder     GERD    GERD (gastroesophageal reflux disease)     Headache(784.0)     Hypertension     Hypothyroid     Neurological disorder     migraines    Other ill-defined conditions(799.89) migraine    Psychiatric disorder previous hx of depression     Past Surgical History:   Procedure Laterality Date    HX CHOLECYSTECTOMY      HX HYSTERECTOMY      HX ORTHOPAEDIC      Right shoulder surgery    HX PACEMAKER      HX ELIA AND BSO      HI ANESTH,SURGERY OF SHOULDER      left     Social Hx:  reports that she has quit smoking. She has never used smokeless tobacco. She reports current alcohol use. She reports that she does not use drugs. Family Hx: family history includes Cancer in her mother; Heart Attack in her father and another family member; Heart Disease in her father; Hypertension in her father and another family member; Migraines in her mother; Stroke in her father and another family member.   No Known Allergies       OBJECTIVE:  Wt Readings from Last 3 Encounters:   11/10/22 98.4 kg (216 lb 14.4 oz)   07/15/22 100.8 kg (222 lb 3.6 oz)   07/10/22 98 kg (216 lb 0.8 oz)       Intake/Output Summary (Last 24 hours) at 11/11/2022 0857  Last data filed at 11/11/2022 0339  Gross per 24 hour   Intake 1470 ml   Output 100 ml   Net 1370 ml       Physical Exam:    Vitals:   Vitals:    11/10/22 2356 11/11/22 0055 11/11/22 0339 11/11/22 0812   BP: 137/80 125/79 129/80 106/72   Pulse: 64  65 65   Resp: 20  20 20   Temp: 98.2 °F (36.8 °C) 98 °F (36.7 °C) 98.3 °F (36.8 °C)   SpO2: 93%  94% 94%   Weight:       Height:         Telemetry: paced     Gen: Well-developed, well-nourished, in no acute distress  Neck: Supple, No JVD, No Carotid Bruit  Resp: Rales R base  Card: Regular Rate,Rythm, Normal S1, S2, No murmurs, rubs or gallop. No thrills. Abd:   obese Soft, non-tender, non-distended, BS+   MSK: No cyanosis  Skin: No rashes    Neuro: Moving all four extremities, follows commands appropriately  Psych: Good insight, oriented to person, place, alert, Nml Affect  LE: No edema    Data Review:     Radiology:   XR Results (most recent):  Results from Hospital Encounter encounter on 11/09/22    XR CHEST PORT    Narrative  INDICATION:  sob    Exam: Portable chest 1155. Comparison: 3/23/2022. Findings: Cardiomediastinal silhouette is within normal limits. Pulmonary  vasculature is not engorged. There are no consolidation, effusions, or  pneumothorax. Minimal scarring left mid chest unchanged Dual lead right chest  pacer unchanged    Impression  1. No acute disease      Recent Labs     11/09/22  1247      K 5.0      CO2 25   BUN 24*   CREA 1.28*   *   CA 8.2*     Recent Labs     11/09/22  1158   WBC 9.4   HGB 12.5   HCT 39.0        Recent Labs     11/09/22  1247   AP 94     No results for input(s): CHOL, LDLC in the last 72 hours.     No lab exists for component: TGL, HDLC,  HBA1C      Current meds:    Current Facility-Administered Medications:     bumetanide (BUMEX) injection 1 mg, 1 mg, IntraVENous, BID, Luis Goyal MD, 1 mg at 11/10/22 1751    acetaminophen (TYLENOL) tablet 650 mg, 650 mg, Oral, Q6H PRN, Luis Goyal MD, 650 mg at 11/10/22 1040    gabapentin (NEURONTIN) capsule 300 mg, 300 mg, Oral, TID, Luis Goyal MD, 300 mg at 11/10/22 2148    amiodarone (CORDARONE) tablet 100 mg, 100 mg, Oral, EVERY OTHER DAY, Sandor Faust MD    amLODIPine (NORVASC) tablet 2.5 mg, 2.5 mg, Oral, DAILY, Stalin Elizondo Prudencio Lawton MD, 2.5 mg at 11/10/22 0931    buPROPion SR [facility] SR) tablet 150 mg, 150 mg, Oral, BID, Kamar Cantor MD, 150 mg at 11/10/22 1751    levothyroxine (SYNTHROID) tablet 25 mcg, 25 mcg, Oral, ACB, Kamar Cantor MD, 25 mcg at 11/11/22 0658    cetirizine (ZYRTEC) tablet 10 mg, 10 mg, Oral, DAILY, Kamar Cantor MD, 10 mg at 11/10/22 0931    metoprolol tartrate (LOPRESSOR) tablet 25 mg, 25 mg, Oral, BID, Kamar Cantor MD, 25 mg at 11/10/22 1751    pantoprazole (PROTONIX) tablet 40 mg, 40 mg, Oral, ACB, Kamar Cantor MD, 40 mg at 11/11/22 8808    rivaroxaban (XARELTO) tablet 20 mg, 20 mg, Oral, DAILY WITH BREAKFAST, Kamar Cantor MD, 20 mg at 11/10/22 1027    traMADoL (ULTRAM) tablet 50 mg, 50 mg, Oral, Q6H PRN, Kamar Cantor MD, 50 mg at 11/10/22 1436    valsartan (DIOVAN) tablet 320 mg, 320 mg, Oral, QHS, Kamar Cantor MD, 320 mg at 11/09/22 2259    vitamin X-F-M-lutein-minerals (OCUVITE) tablet 1 Tablet, 1 Tablet, Oral, DAILY, Kamar Cantor MD    nitroglycerin (NITROBID) 2 % ointment 1 Inch, 1 Inch, Topical, Q6H, Kamar Cantor MD, 1 Inch at 11/11/22 [facility], [facility] Cardiology  Call center: (S) [phone]  (N) [phone]      CC: Slime Gomez MD

## 2022-11-11 NOTE — PROGRESS NOTES
Bedside and Verbal shift change report given to [address] (oncoming nurse) by April (offgoing nurse). Report included the following information SBAR, Kardex, Intake/Output, MAR, Med Rec Status, Alarm Parameters , Pre Procedure Checklist, Procedure Verification, and Quality Measures. 2100 Held patient Valsartan per MD due to pt. B/p being low.     Nitropaste intact-b/p is monitored

## 2022-11-11 NOTE — NURSE NAVIGATOR
Met with patient at bedside. Introduced self and role of HF NN. Reviewed diastolic HF disease process, symptoms of HF, HF zones, rationale for daily weight with parameters, and early recognition of symptoms and notification of provider for diuretic adjustment. Reviewed role of medications, importance of not missing diuretic doses, and low sodium 2000 mg or less diet. As we talked, patient noted that she had skipped her diuretic while on the cruise more than she realized including the two days she was traveling home. Patient has Cardiology appointment next week. She was given Living with HF Education book and HF calendar.

## 2022-11-11 NOTE — PROGRESS NOTES
Physician Progress Note      Jocy Clink  CSN #:                  [id number]  :                       1945  ADMIT DATE:       2022 11:37 AM  DISCH DATE:  RESPONDING  PROVIDER #:        Rita Elmore MD          QUERY TEXTLugene Dural Afternoon    This patient admitted for Acute on Chronic CHF . Echo completed on 11/10/2022. If possible, please document in progress notes and discharge summary further specificity regarding the type and acuity of CHF:      The medical record reflects the following:  Risk Factors: SSS s/p pacemaker, CHF, HTN,  Clinical Indicators: Presented with SOB with minimal exertion, CT with + pulmonary edema. SYLVESTER with Normal LV function EF 60-65%. Treatment: IV Bumex, Metoprolol, DIovan, Cards consulted. Thank you  GEORGINA Rueda,Rn, CPHQ, CCDS, SMART  Options provided:  -- Acute on Chronic Systolic CHF/HFrEF  -- Acute on Chronic Diastolic CHF/HFpEF  -- Acute on Chronic Systolic and Diastolic CHF  -- Acute Systolic CHF/HFrEF  -- Acute Diastolic CHF/HFpEF  -- Acute Systolic and Diastolic CHF  -- Other - I will add my own diagnosis  -- Disagree - Not applicable / Not valid  -- Disagree - Clinically unable to determine / Unknown  -- Refer to Clinical Documentation Reviewer    PROVIDER RESPONSE TEXT:    This patient is in acute diastolic CHF/HFpEF.     Query created by: Jnenifer De Los Santos on 2022 9:15 AM      Electronically signed by:  Rita Elmore MD 2022 1:48 PM

## 2022-11-11 NOTE — PROGRESS NOTES
Problem: Risk for Spread of Infection  Goal: Prevent transmission of infectious organism to others  Description: Prevent the transmission of infectious organisms to other patients, staff members, and visitors. Outcome: Progressing Towards Goal     Problem: Patient Education:  Go to Education Activity  Goal: Patient/Family Education  Outcome: Progressing Towards Goal     Problem: Falls - Risk of  Goal: *Absence of Falls  Description: Document [place] Fall Risk and appropriate interventions in the flowsheet.   Outcome: Progressing Towards Goal  Note: Fall Risk Interventions:            Medication Interventions: Teach patient to arise slowly                   Problem: Patient Education: Go to Patient Education Activity  Goal: Patient/Family Education  Outcome: Progressing Towards Goal

## 2022-11-11 NOTE — PROGRESS NOTES
Problem: Risk for Spread of Infection  Goal: Prevent transmission of infectious organism to others  Description: Prevent the transmission of infectious organisms to other patients, staff members, and visitors. Outcome: Progressing Towards Goal     Problem: Patient Education:  Go to Education Activity  Goal: Patient/Family Education  Outcome: Progressing Towards Goal     Problem: Falls - Risk of  Goal: *Absence of Falls  Description: Document [place] Fall Risk and appropriate interventions in the flowsheet.   Outcome: Progressing Towards Goal  Note: Fall Risk Interventions:            Medication Interventions: Bed/chair exit alarm, Patient to call before getting OOB, Teach patient to arise slowly

## 2022-11-11 NOTE — PROGRESS NOTES
Bedside and Verbal shift change report given to FLAVIO Akhtar (oncoming nurse) by Ramandeep Ko (offgoing nurse). Report included the following information SBAR, Kardex, ED Summary, MAR, Recent Results, and Cardiac Rhythm V Paced . This patient was assisted with Intentional Toileting every 2 hours during this shift as appropriate. Documentation of ambulation and output reflected on Flowsheet as appropriate. Purposeful hourly rounding was completed using AIDET and 5Ps. Outcomes of PHR documented as they occurred. Bed alarm in use as appropriate. Dual Suction and ambubag in place. Bedside and Verbal shift change report given to Toshia Jolley (oncoming nurse) by Anderson Monsivais (offgoing nurse). Report included the following information SBAR, Kardex, ED Summary, MAR, Recent Results, and Cardiac Rhythm V Paced .

## 2022-11-11 NOTE — PROGRESS NOTES
Reason for Admission:  CHF  This patient has a recent travel history w/ a cruise to [address] [place] [address] and her  was diagnosed w/ +COVID 19 yesterday (11/9). Past Medical History:   Arrhythmia      Atrial fibrillation (HCC)      CAD (coronary artery disease)       atrial fibrillation    Gastrointestinal disorder       GERD    GERD (gastroesophageal reflux disease)      Headache(784.0)      Hypertension      Hypothyroid      Neurological disorder       migraines    Other ill-defined conditions(799.89) migraine    Psychiatric disorder previous hx of depression                      RUR Score:        11%             Plan for utilizing home health:      none    PCP: First and Last name:  Sita Street MD     Name of Practice:    Are you a current patient: Yes/No: yes   Approximate date of last visit: October 2022   Can you participate in a virtual visit with your PCP:  yes                    Current Advanced Directive/Advance Care Plan: Prior  Verified on file    Healthcare Decision Maker:   Click here to complete [place] including selection of the Healthcare Decision Maker Relationship (ie \"Primary\")                             Transition of Care Plan:     Spoke with pt by phone for d/c planning. She lives with her  in a 2 story house with 2 entry steps and 14 interior stairs. Pt stated she is independent with ADL's, drives and uses no DME with ambulation. She does have cpapr through [employer]. Medications are by mail from [place] and from [address] [place] [address] on [facility]. Pt is admitted for medical management  Pt on covid precautions--results pending on PCR  Cardiology following  CM following  Pt's  to transport her home at d/c    Care Management Interventions  PCP Verified by CM: Yes  Mode of Transport at Discharge: Other (see comment)  Support Systems: Spouse/Significant Other  Discharge Location  Patient Expects to be Discharged to[de-identified] Home with family assistance  DANIEL Lowe

## 2022-11-11 NOTE — PROGRESS NOTES
Physician Progress Note      [facility] #:                  [id number]  :                       1945  ADMIT DATE:       2022 11:37 AM  DISCH DATE:  RESPONDING  PROVIDER #:        Jackie Doherty MD          QUERY TEXT:    Good Morning    This patient admitted for CHF    The patient also has known Atrial Fibrillation and this is maintained on Xarelto    If possible, please document in progress notes and discharge summary if you are evaluating and/or treating any of the following: The medical record reflects the following:  Risk Factors: 78 yr old  female with  Known A-fib on Xarelto  Clinical Indicators: Presented to ER with SOB, Known A-fib, D-Dimer is elevated @ 4.20-  Treatment: Telemetry, Cont. Amiodarone, Xarelto, and also Bystolic, D-Dimer assessed, CT chest    Thank you  Corin Barajas, CPHQ, CCDS, SMART  Options provided:  -- Secondary hypercoagulable state in a patient with atrial fibrillation  -- Other - I will add my own diagnosis  -- Disagree - Not applicable / Not valid  -- Disagree - Clinically unable to determine / Unknown  -- Refer to Clinical Documentation Reviewer    PROVIDER RESPONSE TEXT:    This patient has secondary hypercoagulable state in a patient with atrial fibrillation.     Query created by: Ravi Ahumada on 11/10/2022 7:40 AM      Electronically signed by:  Jackie Doherty MD 11/10/2022 7:03 PM

## 2022-11-12 VITALS
HEIGHT: 64 IN | TEMPERATURE: 97.4 F | DIASTOLIC BLOOD PRESSURE: 82 MMHG | SYSTOLIC BLOOD PRESSURE: 122 MMHG | RESPIRATION RATE: 14 BRPM | HEART RATE: 65 BPM | OXYGEN SATURATION: 94 % | WEIGHT: 215.8 LBS | BODY MASS INDEX: 36.84 KG/M2

## 2022-11-12 PROBLEM — I50.31 ACUTE HEART FAILURE WITH PRESERVED EJECTION FRACTION (HFPEF) (HCC): Status: ACTIVE | Noted: 2022-11-12

## 2022-11-12 LAB
ALBUMIN SERPL-MCNC: 3.2 G/DL (ref 3.5–5)
ANION GAP SERPL CALC-SCNC: 8 MMOL/L (ref 5–15)
BUN SERPL-MCNC: 37 MG/DL (ref 6–20)
BUN/CREAT SERPL: 24 (ref 12–20)
CALCIUM SERPL-MCNC: 8.4 MG/DL (ref 8.5–10.1)
CHLORIDE SERPL-SCNC: 104 MMOL/L (ref 97–108)
CO2 SERPL-SCNC: 26 MMOL/L (ref 21–32)
CREAT SERPL-MCNC: 1.53 MG/DL (ref 0.55–1.02)
GLUCOSE SERPL-MCNC: 105 MG/DL (ref 65–100)
PHOSPHATE SERPL-MCNC: 4.1 MG/DL (ref 2.6–4.7)
POTASSIUM SERPL-SCNC: 3.7 MMOL/L (ref 3.5–5.1)
SODIUM SERPL-SCNC: 138 MMOL/L (ref 136–145)

## 2022-11-12 PROCEDURE — 80069 RENAL FUNCTION PANEL: CPT

## 2022-11-12 PROCEDURE — 74011000250 HC RX REV CODE- 250: Performed by: INTERNAL MEDICINE

## 2022-11-12 PROCEDURE — 74011250637 HC RX REV CODE- 250/637: Performed by: INTERNAL MEDICINE

## 2022-11-12 PROCEDURE — 36415 COLL VENOUS BLD VENIPUNCTURE: CPT

## 2022-11-12 PROCEDURE — 99233 SBSQ HOSP IP/OBS HIGH 50: CPT | Performed by: INTERNAL MEDICINE

## 2022-11-12 PROCEDURE — 74011250637 HC RX REV CODE- 250/637: Performed by: HOSPITALIST

## 2022-11-12 RX ORDER — BUMETANIDE 2 MG/1
2 TABLET ORAL 2 TIMES DAILY
Qty: 60 TABLET | Refills: 0 | Status: SHIPPED | OUTPATIENT
Start: 2022-11-12 | End: 2022-12-12

## 2022-11-12 RX ADMIN — RIVAROXABAN 20 MG: 20 TABLET, FILM COATED ORAL at 08:43

## 2022-11-12 RX ADMIN — I-VITE, TAB 1000-60-2MG (60/BT) 1 TABLET: TAB at 08:43

## 2022-11-12 RX ADMIN — METOPROLOL TARTRATE 25 MG: 25 TABLET, FILM COATED ORAL at 08:43

## 2022-11-12 RX ADMIN — GABAPENTIN 300 MG: 300 CAPSULE ORAL at 08:43

## 2022-11-12 RX ADMIN — LEVOTHYROXINE SODIUM 25 MCG: 0.03 TABLET ORAL at 06:30

## 2022-11-12 RX ADMIN — AMLODIPINE BESYLATE 2.5 MG: 5 TABLET ORAL at 08:43

## 2022-11-12 RX ADMIN — PANTOPRAZOLE SODIUM 40 MG: 40 TABLET, DELAYED RELEASE ORAL at 06:30

## 2022-11-12 RX ADMIN — BUMETANIDE 1 MG: 0.25 INJECTION INTRAMUSCULAR; INTRAVENOUS at 08:43

## 2022-11-12 RX ADMIN — CETIRIZINE HYDROCHLORIDE 10 MG: 10 TABLET, FILM COATED ORAL at 08:43

## 2022-11-12 RX ADMIN — BUPROPION HYDROCHLORIDE 150 MG: 150 TABLET, EXTENDED RELEASE ORAL at 08:43

## 2022-11-12 NOTE — DISCHARGE INSTRUCTIONS
Hospital Medicine DISCHARGE INSTRUCTIONS    NAME: Kayla Polanco   :  1945   MRN:  [id number]     Date:     2022    ADMIT DATE: 2022     DISCHARGE DATE: 2022     PRINCIPAL ADMITTING DIAGNOSIS:  Acute CHF (congestive heart failure) ([facility] [I50.9]    DISCHARGE DIAGNOSES:  Principal Problem:    Acute heart failure with preserved ejection fraction (HFpEF) ([facility] (2022)    Hypothyroidism (2012)    Paroxysmal atrial fibrillation ([facility] (2012)    Sinus node dysfunction ([facility] (2012)    Mixed hyperlipidemia (2013)    Gastroesophageal reflux disease without esophagitis (10/14/2016)    BMI 37.0-37.9, adult (11/10/2022)    HTN (hypertension), benign (11/10/2022)    MEDICATIONS:    It is important that medications are taken exactly as they are prescribed on the discharge medication instructions and keep them your  in the bottles provided by the pharmacist.   Keep a list of the medication names, dosages, and times to be taken at all times. Do not take other medications without consulting your doctor. Recommended diet:  Cardiac Diet    Recommended activity: Activity as tolerated    Post discharge care:    Notify follow up health care provider or return to the emergency department if you cannot get hold of your doctor if you feel worse or experience symptoms similar to those that brought you to hospital    [facility] [address]  [phone]    Follow up on 11/15/2022  Tuesday, Nove 15th at 56 am at [facility] as previously scheduled. David Bingham MD  [facility]  [address] [facility] [address]  [phone]           Information obtained by :  I understand that if any problems occur once I am at home I am to contact my physician and I understand and acknowledge receipt of the instructions indicated above. Physician's or R.N.'s Signature                                                                  Date/Time                                                                                                                                              Patient or Representative Signature                                                          Date/Time

## 2022-11-12 NOTE — PROGRESS NOTES
Bedside and Verbal shift change report given to FLAVIO Akhtar (oncoming nurse) by Theotis Collet (offgoing nurse). Report included the following information SBAR, Kardex, ED Summary, MAR, Recent Results, and Cardiac Rhythm SR . This patient was assisted with Intentional Toileting every 2 hours during this shift as appropriate. Documentation of ambulation and output reflected on Flowsheet as appropriate. Purposeful hourly rounding was completed using AIDET and 5Ps. Outcomes of PHR documented as they occurred. Bed alarm in use as appropriate. Dual Suction and ambubag in place. I have reviewed discharge instructions with the patient. The patient verbalized understanding.

## 2022-11-12 NOTE — PROGRESS NOTES
[facility]                       Cardiology Care Note     []Initial Encounter     [x]Follow-up    Patient Name: Ck Patel - :1945 - JLK:[id number]  Primary Cardiologist: Dr Emperatriz Ramirez Cardiologist: [facility] Cardiology Physicians: Jonnie Wall MD     Reason for encounter: CHF     HPI:     Ck Patel is a 78 y.o. female with PMH significant for  paroxysmal atrial fibrillation  on xarelto, pacemaker for sick sinus syndrome, HTN, who was admitted with IBARRA, started the prior day. Pt jus returned from a cruise, pt's  tested positive for COVID the next day, per pt her test at [facility] was negative. Denies chest pain. Per pt her weight is up a couple of pounds. She did not take her bumex (2mg) Monday as she was traveling. Denies missing any meds on the cruise. Last stress test was approx 10 years ago with Dr Estuardo Brown. Sees Dr Aron Garvin and Dr Abimbola Cunningham. pBNP 5225. Ck Patel reports dyspnea on exertion is improved. Assessment and Plan     Hx a fib, SSS s/p pacer: paced on tele. Interrogated medtronic device - has 3 months battery life left, cont xarelto, bystolic  2. Pulmonary edema on chest CT, no PE: IV ldiuresed, TTE w/ normal LV function, EF 60-65%, still waiting on records from Dr Aron Garvin. 3. Hx HTN on diovan, norvasc, bystolic. 4. Obesity: Body mass index is 37.04 kg/m². 5. COVID exposure: PCR negative. Okay to go home. Would transition diuretics to Bumex 2 mg p.o. twice daily. Follow-up with Dr. Aron Garvin both for post admission, and address her pacemaker and of life battery. ____________________________________________________________    Cardiac testing    22    ECHO ADULT COMPLETE 11/10/2022 11/10/2022    Interpretation Summary    Left Ventricle: Normal left ventricular systolic function with a visually estimated EF of 60 - 65%. Left ventricle size is normal. Normal wall thickness. Normal wall motion.  Abnormal diastolic function. Right Ventricle: Right ventricle size is normal. Normal wall thickness. Lead present in the right ventricle. Left Atrium: Left atrium is mildly dilated. Signed by: Oskar Gay MD on 11/10/2022  9:13 PM    Most recent HS troponins:  Recent Labs     11/09/22  1158   TROPHS 12       ECG:   EKG Results       Procedure 720 Value Units Date/Time    EKG, 12 LEAD, INITIAL [[id number]] Collected: 11/09/22 1205    Order Status: Completed Updated: 11/10/22 1127     Ventricular Rate 65 BPM      Atrial Rate 37 BPM      QRS Duration 152 ms      Q-T Interval 468 ms      QTC Calculation (Bezet) 486 ms      Calculated R Axis -70 degrees      Calculated T Axis 77 degrees      Diagnosis --     Ventricular-paced rhythm  Abnormal ECG  When compared with ECG of 17-SEP-2019 21:44,  Electronic ventricular pacemaker has replaced Electronic atrial pacemaker  Confirmed by Hugo Mata ([id number]) on 11/10/2022 11:26:43 AM                Review of Systems:    [x]All other systems reviewed and all negative except as written in HPI    [] Patient unable to provide secondary to condition    Past Medical History:   Diagnosis Date    Arrhythmia     Atrial fibrillation ([address]     CAD (coronary artery disease)     atrial fibrillation    Gastrointestinal disorder     GERD    GERD (gastroesophageal reflux disease)     Headache(784.0)     Hypertension     Hypothyroid     Neurological disorder     migraines    Other ill-defined conditions(799.89) migraine    Psychiatric disorder previous hx of depression     Past Surgical History:   Procedure Laterality Date    HX CHOLECYSTECTOMY      HX HYSTERECTOMY      HX ORTHOPAEDIC      Right shoulder surgery    HX PACEMAKER      HX ELIA AND BSO      ME ANESTH,SURGERY OF SHOULDER      left     Social Hx:  reports that she has quit smoking. She has never used smokeless tobacco. She reports current alcohol use. She reports that she does not use drugs.   Family Hx: family history includes Cancer in her mother; Heart Attack in her father and another family member; Heart Disease in her father; Hypertension in her father and another family member; Migraines in her mother; Stroke in her father and another family member. No Known Allergies       OBJECTIVE:  Wt Readings from Last 3 Encounters:   11/11/22 97.9 kg (215 lb 12.8 oz)   07/15/22 100.8 kg (222 lb 3.6 oz)   07/10/22 98 kg (216 lb 0.8 oz)       Intake/Output Summary (Last 24 hours) at 11/12/2022 0621  Last data filed at 11/12/2022 0350  Gross per 24 hour   Intake 960 ml   Output 700 ml   Net 260 ml       Physical Exam:    Vitals:   Vitals:    11/11/22 1916 11/11/22 2255 11/11/22 2302 11/12/22 0300   BP: 124/73  107/69 133/81   Pulse: 65 65 65 65   Resp: 16  16 14   Temp: 98.4 °F (36.9 °C)  97.7 °F (36.5 °C) 98 °F (36.7 °C)   SpO2: 94%  94% 94%   Weight:       Height:         Telemetry: paced     Gen: Well-developed, well-nourished, in no acute distress  Neck: Supple, No JVD, No Carotid Bruit  Resp: Rales R base  Card: Regular Rate,Rythm, Normal S1, S2, No murmurs, rubs or gallop. No thrills. Abd:   obese Soft, non-tender, non-distended, BS+   MSK: No cyanosis  Skin: No rashes    Neuro: Moving all four extremities, follows commands appropriately  Psych: Good insight, oriented to person, place, alert, Nml Affect  LE: No edema    Data Review:     Radiology:   XR Results (most recent):  Results from Hospital Encounter encounter on 11/09/22    XR CHEST PORT    Narrative  INDICATION:  sob    Exam: Portable chest 1155. Comparison: 3/23/2022. Findings: Cardiomediastinal silhouette is within normal limits. Pulmonary  vasculature is not engorged. There are no consolidation, effusions, or  pneumothorax. Minimal scarring left mid chest unchanged Dual lead right chest  pacer unchanged    Impression  1.  No acute disease      Recent Labs     11/12/22  0027 11/09/22  1247    140   K 3.7 5.0    107   CO2 26 25   BUN 37* 24*   CREA 1.53* 1.28*   * 106* PHOS 4.1  --    CA 8.4* 8.2*     Recent Labs     11/09/22  1158   WBC 9.4   HGB 12.5   HCT 39.0        Recent Labs     11/09/22  1247   AP 94     No results for input(s): CHOL, LDLC in the last 72 hours.     No lab exists for component: TGL, HDLC,  HBA1C      Current meds:    Current Facility-Administered Medications:     bumetanide (BUMEX) injection 1 mg, 1 mg, IntraVENous, BID, Bakari Ahumada MD, 1 mg at 11/11/22 1750    acetaminophen (TYLENOL) tablet 650 mg, 650 mg, Oral, Q6H PRN, Bakari Ahumada MD, 650 mg at 11/11/22 2310    gabapentin (NEURONTIN) capsule 300 mg, 300 mg, Oral, TID, Bakari Ahumada MD, 300 mg at 11/11/22 2155    amiodarone (CORDARONE) tablet 100 mg, 100 mg, Oral, EVERY OTHER DAY, Dallas Jasso MD, 100 mg at 11/11/22 1002    amLODIPine (NORVASC) tablet 2.5 mg, 2.5 mg, Oral, DAILY, Dallas Jasso MD, 2.5 mg at 11/11/22 1002    buPROPion SR (WELLBUTRIN SR) tablet 150 mg, 150 mg, Oral, BID, Dallas Jasso MD, 150 mg at 11/11/22 1750    levothyroxine (SYNTHROID) tablet 25 mcg, 25 mcg, Oral, ACB, Dallas Jasso MD, 25 mcg at 11/11/22 0658    cetirizine (ZYRTEC) tablet 10 mg, 10 mg, Oral, DAILY, Dallas Jasso MD, 10 mg at 11/11/22 1002    metoprolol tartrate (LOPRESSOR) tablet 25 mg, 25 mg, Oral, BID, Dallas Jasso MD, 25 mg at 11/11/22 1750    pantoprazole (PROTONIX) tablet 40 mg, 40 mg, Oral, ACB, Dallas Jasso MD, 40 mg at 11/11/22 7018    rivaroxaban (XARELTO) tablet 20 mg, 20 mg, Oral, DAILY WITH BREAKFAST, Dallas Jasso MD, 20 mg at 11/11/22 1002    traMADoL (ULTRAM) tablet 50 mg, 50 mg, Oral, Q6H PRN, Dallas Jasso MD, 50 mg at 11/10/22 1436    valsartan (DIOVAN) tablet 320 mg, 320 mg, Oral, QHS, Dallas Jasso MD, 320 mg at 11/11/22 2155    vitamin M-G-T-lutein-minerals (OCUVITE) tablet 1 Tablet, 1 Tablet, Oral, DAILY, Dallas Jasso MD, 1 Tablet at 11/11/22 Shyanne Romero MD    [facility]  Call center: (P) [phone]  (F) [phone]      CC: Kev Gallagher MD

## 2022-11-12 NOTE — PROGRESS NOTES
Discharge order noted. Patient to discharge home today; no CM discharge needs indicated. Patient's  will transport home. Care Management Interventions  PCP Verified by CM: Yes  Mode of Transport at Discharge:  Other (see comment)  Support Systems: Spouse/Significant Other  Discharge Location  Patient Expects to be Discharged to[de-identified] Home with family assistance     Vasile Baker

## 2022-11-12 NOTE — DISCHARGE SUMMARY
[facility] [place] [address]  [address] [place] [address]  Tel: [phone]    Hospital Medicine Discharge Summary    Patient ID:    Garcia Pruitt  Age:              78 y.o.    : 1945  MRN:             [id number]     PCP: Ross Mendoza MD     Date of Admission: 2022    Date of Discharge:  2022    Principal admission Diagnosis:   Acute CHF (congestive heart failure) ([address] [I50.9]    Discharge Diagnoses:  Principal Problem:    Acute heart failure with preserved ejection fraction (HFpEF) ([address] (2022)    Paroxysmal atrial fibrillation ([address] (2012)    Sinus node dysfunction ([address] (2012)    HTN (hypertension), benign (11/10/2022)    Hypothyroidism (2012)    Mixed hyperlipidemia (2013)    Gastroesophageal reflux disease without esophagitis (10/14/2016)    BMI 37.0-37.9, adult (11/10/2022)    Hospital Course:     Ms. Keith Funk is a 78 y.o. admitted to [facility] and treated for the following:    Acute CHF (congestive heart failure) ([address] (2022) POA: likely diastolic. CTA chest showed no PE but pulmonary edema. Pro-BNP elevated. Prior Echo in 2016 showed a normal LV function. Repeat Echo showed a preserved EF. Seen by Cardiology. She has responded well to IV Bumex. Metoprolol, Diovan. Low Na diet with fluid restriction. Continue adjusted Bumex dose at discharge. Follow up,with her primary cardiologist. Rapid and PCR tests are neg. Paroxysmal atrial fibrillation ([address] (2012) / Sinus node dysfunction ([address] (2012) POA: has a pacemaker. Continue Amiodarone. Metoprolol, Xarelto. She will follow up with her primary cardiologist to co-ordinate pacemaker battery change. Hypothyroidism (2012) POA: TSH is normal. Continue Levothyroxine     HTN (hypertension), benign (11/10/2022) / Mixed hyperlipidemia (2013) POA: BP stable.  Continue Amlodipine, Metoprolol, Valsartan Gastroesophageal reflux disease without esophagitis (10/14/2016) POA: continue PPi     BMI 37.0-37.9, adult (11/10/2022) POA: needs weight loss    Discharge Exam:  Visit Vitals  /82 (BP 1 Location: Left upper arm, BP Patient Position: At rest)   Pulse 65   Temp 97.4 °F (36.3 °C)   Resp 14   Ht 5' 4\" (1.626 m)   Wt 97.9 kg (215 lb 12.8 oz)   SpO2 94%   BMI 37.04 kg/m²        Patient has been seen and examined. General: well looking and in no acute distress  Pulm: clear breath sounds without wheezes  Card: no murmurs, normal S1, S2 without thrills, bruits   Abd:    soft, non-tender, normoactive bowel sounds  Skin: no rashes and skin turgor is good  Neuro: awake, alert and has a non focal     Activity: Activity as tolerated    Diet: Cardiac Diet    Current Discharge Medication List        CONTINUE these medications which have CHANGED    Details   bumetanide (BUMEX) 2 mg tablet Take 1 Tablet by mouth two (2) times a day for 30 days. Qty: 60 Tablet, Refills: 0           CONTINUE these medications which have NOT CHANGED    Details   amiodarone (PACERONE) 100 mg tablet Take 100 mg by mouth every other day. cetirizine (ZYRTEC) 10 mg tablet Take 10 mg by mouth daily. Biotin 2,500 mcg cap Take 2 Capsules by mouth daily. trospium (SANCTURA) 20 mg tablet Take 20 mg by mouth daily as needed. acetaminophen (TYLENOL) 325 mg tablet Take 650 mg by mouth every four (4) hours as needed for Pain (for back pain; takes tylenol before switching to tramadol). peg 400-propylene glycol (Systane, propylene glycoL,) 0.4-0.3 % drop Administer 1 Drop to both eyes as needed. valsartan (DIOVAN) 320 mg tablet Take 320 mg by mouth nightly. traMADoL (ULTRAM) 50 mg tablet Take 50 mg by mouth every six (6) hours as needed for Pain. Calcium Carbonate-Vit D3-Min 600 mg calcium- 400 unit tab Take 1 Tab by mouth daily. amLODIPine (NORVASC) 2.5 mg tablet Take 2.5 mg by mouth daily.       vit A/vit C/vit E/zinc/copper (PRESERVISION AREDS PO) Take 1 Tablet by mouth.      rivaroxaban (XARELTO) 20 mg tab tablet Take 20 mg by mouth daily (with breakfast). gabapentin (NEURONTIN) 300 mg capsule Take 600 mg by mouth three (3) times daily. buPROPion SR (WELLBUTRIN SR) 150 mg SR tablet Take 150 mg by mouth two (2) times a day. therapeutic multivitamin (THERAGRAN) tablet Take 1 Tab by mouth daily. nebivolol (BYSTOLIC) 20 mg tablet Take 20 mg by mouth daily. levothyroxine (SYNTHROID) 25 mcg tablet Take 25 mcg by mouth daily. potassium chloride (K-DUR, KLOR-CON) 20 mEq tablet Take 1 tablet by mouth two  times daily  Qty: 60 Tab, Refills: 0      ascorbic acid, vitamin C, (VITAMIN C) 1,000 mg tablet Take 500 mg by mouth nightly. omeprazole (PRILOSEC) 20 mg capsule Take 20 mg by mouth daily. Indications: GASTROESOPHAGEAL REFLUX           STOP taking these medications       loratadine 10 mg cap Comments:   Reason for Stopping:               Yinka Quiros [facility] [address]  [phone]    Follow up on 11/15/2022  Tuesday, Nove 15th at 56 am at [facility] location as previously scheduled. Usha Bingham MD  [facility]  [address] [facility]  [phone]            Follow-up tests or labs: None    Discharge Condition: Stable    Disposition: home    Time taken to co-ordinate and arrange discharge:  35 minutes.     Signed:  Kimberlee Lynch MD       11/12/2022   9:35 AM

## 2022-11-14 ENCOUNTER — PATIENT OUTREACH (OUTPATIENT)
Dept: CASE MANAGEMENT | Age: 77
End: 2022-11-14

## 2022-11-14 NOTE — PROGRESS NOTES
Care Transitions Initial Call    Call within 2 business days of discharge: Yes     Patient: Travis Abrams Patient : 1945 MRN: [id number]    Last Discharge [address]       Date Complaint Diagnosis Description Type Department Provider    22 Shortness of Breath; Diarrhea SOB (shortness of breath) . .. ED to Hosp-Admission (Discharged) (ADMIT) Derek Clemons MD; Lico Shankar. .. Was this an external facility discharge? No     Challenges to be reviewed by the provider   Additional needs identified to be addressed with provider: no       Method of communication with provider : none    Discussed COVID-19 related testing which was available at this time. Test results were negative. Patient informed of results, if available? no     Advance Care Planning:   Does patient have an Advance Directive: not on file. Education deferred    Inpatient Readmission Risk score: Unplanned Readmit Risk Score: 10.7    Was this a readmission? no   Patient stated reason for the admission: SOB    Patients top risk factors for readmission: medical condition-CHF    Interventions to address risk factors: Scheduled appointment with PCP-11/15/22, Obtained and reviewed discharge summary and/or continuity of care documents, Education of patient/family/caregiver/guardian to support self-management-CHF, and Assessment and support for treatment adherence and medication management-Bumex    Care Transition Nurse (CTN) contacted the patient by telephone to perform post hospital discharge assessment. Verified name and  with patient as identifiers. Provided introduction to self, and explanation of the CTN role. CTN reviewed discharge instructions, medical action plan and red flags with patient who verbalized understanding. Were discharge instructions available to patient? yes.  Reviewed appropriate site of care based on symptoms and resources available to patient including: PCP, Specialist, Urgent Care Clinics, and Condition related references. Patient given an opportunity to ask questions and does not have any further questions or concerns at this time. The patient agrees to contact the PCP office for questions related to their healthcare. Medication reconciliation was performed with patient, who verbalizes understanding of administration of home medications. Advised obtaining a 90-day supply of all daily and as-needed medications. Referral to Pharm D needed: no     Home Health/Outpatient orders at discharge: none    Durable Medical Equipment ordered at discharge: None      Was patient discharged with a pulse oximeter? no    Discussed follow-up appointments. If no appointment was previously scheduled, appointment scheduling offered:  na . Is follow up appointment scheduled within 7 days of discharge? yes. [facility] follow up appointment(s): No future appointments. Non-[facility] follow up appointment(s): PCP 11/15/22    Plan for follow-up call in 5-7 days based on severity of symptoms and risk factors. Plan for next call: self management-weights and follow up appointment-pcp,  CTN provided contact information for future needs. Goals Addressed                   This Visit's Progress     Prepare patients and caregivers for end of life decisions (ie. need for hospice, pain management, symptom relief, advance directives etc.)        11/14/22 Per review of chart, ACP not on file. education deferred to future outreach. WILDER       Reduce risk of CHF exacerbations and complications. 11/14/22  Daily weights- does not currently weigh daily. Has agreed to begin 11/15/22 morning  Low sodium diet/fluid restrictions  Monitor BP/pulse- orthostatic   Medication compliance- confirmed Bumex BID  Attend SHELLY appointments-11/15/22  Patient will monitor  and report following (red flags):  - Weight gain of 2-3 lbs. in one day or 5 lbs.  in one week  -increased SOB, feeling more tired or no energy, dizziness  -increase swelling to feet, ankles, legs or stomach  -dry hacky cough, harder to breathe when lying down.  WILDER

## 2022-11-22 ENCOUNTER — PATIENT OUTREACH (OUTPATIENT)
Dept: CASE MANAGEMENT | Age: 77
End: 2022-11-22

## 2022-11-22 NOTE — PROGRESS NOTES
Care Transitions Follow Up Call    Care Transition Nurse (CTN) contacted the patient by telephone to follow up after admission on 22. Verified name and  with patient as identifiers. Addressed changes since last contact: Follow up appointment completed? yes. Was follow up appointment scheduled within 7 days of discharge? yes. [facility] follow up appointment(s):   Future Appointments   Date Time Provider Castro Holcomb   2022 10:40 AM Ruben Antony MD [facility] BS [facility]     Non-BS follow up appointment(s): none    CTN provided contact information for future needs. Plan for follow-up call in 7-10 days based on severity of symptoms and risk factors. Plan for next call: symptom management-edema, SOB and self management-weights       Goals Addressed                   This Visit's Progress     Reduce risk of CHF exacerbations and complications. 22  Daily weights- does not currently weigh daily. Has agreed to begin 11/15/22 morning  Low sodium diet/fluid restrictions  Monitor BP/pulse- orthostatic   Medication compliance- confirmed Bumex BID  Attend SHELLY appointments-11/15/22  Patient will monitor  and report following (red flags):  - Weight gain of 2-3 lbs. in one day or 5 lbs. in one week  -increased SOB, feeling more tired or no energy, dizziness  -increase swelling to feet, ankles, legs or stomach  -dry hacky cough, harder to breathe when lying down. WILDER  22 reports attendance of appt, now taking Bumex once per day, following low Na diet. Will continue to monitor CHF red flags and weight daily to report at next week outreach.  [facility]

## 2022-11-30 ENCOUNTER — PATIENT OUTREACH (OUTPATIENT)
Dept: CASE MANAGEMENT | Age: 77
End: 2022-11-30

## 2022-11-30 NOTE — PROGRESS NOTES
Goals Addressed                   This Visit's Progress     Reduce risk of CHF exacerbations and complications. 11/14/22  Daily weights- does not currently weigh daily. Has agreed to begin 11/15/22 morning  Low sodium diet/fluid restrictions  Monitor BP/pulse- orthostatic   Medication compliance- confirmed Bumex BID  Attend SHELLY appointments-11/15/22  Patient will monitor  and report following (red flags):  - Weight gain of 2-3 lbs. in one day or 5 lbs. in one week  -increased SOB, feeling more tired or no energy, dizziness  -increase swelling to feet, ankles, legs or stomach  -dry hacky cough, harder to breathe when lying down. [facility]  11/22/22 reports attendance of appt, now taking Bumex once per day, following low Na diet. Will continue to monitor CHF red flags and weight daily to report at next week outreach. [facility]  11/30/22 Patient called on home number on file. Message left on voicemail with contact information to return call to this writer. WILDER

## 2022-12-05 ENCOUNTER — HOSPITAL ENCOUNTER (OUTPATIENT)
Age: 77
Setting detail: OBSERVATION
Discharge: HOME | End: 2022-12-06
Attending: EMERGENCY MEDICINE | Admitting: INTERNAL MEDICINE
Payer: MEDICARE

## 2022-12-05 ENCOUNTER — APPOINTMENT (OUTPATIENT)
Dept: GENERAL RADIOLOGY | Age: 77
End: 2022-12-05
Attending: EMERGENCY MEDICINE
Payer: MEDICARE

## 2022-12-05 DIAGNOSIS — R06.09 DOE (DYSPNEA ON EXERTION): ICD-10-CM

## 2022-12-05 DIAGNOSIS — I50.9 ACUTE ON CHRONIC CONGESTIVE HEART FAILURE, UNSPECIFIED HEART FAILURE TYPE (HCC): ICD-10-CM

## 2022-12-05 DIAGNOSIS — N18.9 ACUTE ON CHRONIC RENAL INSUFFICIENCY: Primary | ICD-10-CM

## 2022-12-05 DIAGNOSIS — N28.9 ACUTE ON CHRONIC RENAL INSUFFICIENCY: Primary | ICD-10-CM

## 2022-12-05 PROBLEM — I50.31 ACUTE HEART FAILURE WITH PRESERVED EJECTION FRACTION (HFPEF) (HCC): Status: RESOLVED | Noted: 2022-11-12 | Resolved: 2022-12-05

## 2022-12-05 PROBLEM — N17.9 AKI (ACUTE KIDNEY INJURY) (HCC): Status: ACTIVE | Noted: 2022-12-05

## 2022-12-05 PROBLEM — E66.01 SEVERE OBESITY (BMI 35.0-39.9): Status: RESOLVED | Noted: 2018-06-26 | Resolved: 2022-12-05

## 2022-12-05 PROBLEM — E86.0 DEHYDRATION: Status: ACTIVE | Noted: 2022-12-05

## 2022-12-05 PROBLEM — I10 HYPERTENSION: Status: ACTIVE | Noted: 2022-11-10

## 2022-12-05 PROBLEM — E66.9 OBESE: Status: ACTIVE | Noted: 2018-06-26

## 2022-12-05 PROBLEM — Z79.01 CHRONIC ANTICOAGULATION: Status: ACTIVE | Noted: 2022-12-05

## 2022-12-05 PROBLEM — R06.00 DYSPNEA: Status: ACTIVE | Noted: 2022-12-05

## 2022-12-05 LAB
ALBUMIN SERPL-MCNC: 3.8 G/DL (ref 3.5–5)
ALBUMIN/GLOB SERPL: 1.2 {RATIO} (ref 1.1–2.2)
ALP SERPL-CCNC: 95 U/L (ref 45–117)
ALT SERPL-CCNC: 88 U/L (ref 12–78)
ANION GAP SERPL CALC-SCNC: 5 MMOL/L (ref 5–15)
APPEARANCE UR: ABNORMAL
AST SERPL-CCNC: 34 U/L (ref 15–37)
BACTERIA URNS QL MICRO: ABNORMAL /HPF
BASOPHILS # BLD: 0.1 K/UL (ref 0–0.1)
BASOPHILS NFR BLD: 1 % (ref 0–1)
BILIRUB SERPL-MCNC: 0.7 MG/DL (ref 0.2–1)
BILIRUB UR QL: NEGATIVE
BNP SERPL-MCNC: 8431 PG/ML
BUN SERPL-MCNC: 50 MG/DL (ref 6–20)
BUN/CREAT SERPL: 28 (ref 12–20)
CALCIUM SERPL-MCNC: 8.8 MG/DL (ref 8.5–10.1)
CHLORIDE SERPL-SCNC: 103 MMOL/L (ref 97–108)
CO2 SERPL-SCNC: 30 MMOL/L (ref 21–32)
COLOR UR: ABNORMAL
COVID-19 RAPID TEST, COVR: NOT DETECTED
CREAT SERPL-MCNC: 1.81 MG/DL (ref 0.55–1.02)
DIFFERENTIAL METHOD BLD: ABNORMAL
EOSINOPHIL # BLD: 0.1 K/UL (ref 0–0.4)
EOSINOPHIL NFR BLD: 1 % (ref 0–7)
EPITH CASTS URNS QL MICRO: ABNORMAL /LPF
ERYTHROCYTE [DISTWIDTH] IN BLOOD BY AUTOMATED COUNT: 14.6 % (ref 11.5–14.5)
FLUAV AG NPH QL IA: NEGATIVE
FLUBV AG NOSE QL IA: NEGATIVE
GLOBULIN SER CALC-MCNC: 3.1 G/DL (ref 2–4)
GLUCOSE SERPL-MCNC: 115 MG/DL (ref 65–100)
GLUCOSE UR STRIP.AUTO-MCNC: NEGATIVE MG/DL
HCT VFR BLD AUTO: 41.7 % (ref 35–47)
HGB BLD-MCNC: 13.2 G/DL (ref 11.5–16)
HGB UR QL STRIP: NEGATIVE
IMM GRANULOCYTES # BLD AUTO: 0 K/UL (ref 0–0.04)
IMM GRANULOCYTES NFR BLD AUTO: 0 % (ref 0–0.5)
KETONES UR QL STRIP.AUTO: NEGATIVE MG/DL
LEUKOCYTE ESTERASE UR QL STRIP.AUTO: ABNORMAL
LYMPHOCYTES # BLD: 1.5 K/UL (ref 0.8–3.5)
LYMPHOCYTES NFR BLD: 15 % (ref 12–49)
MCH RBC QN AUTO: 31.1 PG (ref 26–34)
MCHC RBC AUTO-ENTMCNC: 31.7 G/DL (ref 30–36.5)
MCV RBC AUTO: 98.3 FL (ref 80–99)
MONOCYTES # BLD: 0.9 K/UL (ref 0–1)
MONOCYTES NFR BLD: 9 % (ref 5–13)
NEUTS SEG # BLD: 7.3 K/UL (ref 1.8–8)
NEUTS SEG NFR BLD: 74 % (ref 32–75)
NITRITE UR QL STRIP.AUTO: NEGATIVE
NRBC # BLD: 0 K/UL (ref 0–0.01)
NRBC BLD-RTO: 0 PER 100 WBC
PH UR STRIP: 6 [PH] (ref 5–8)
PLATELET # BLD AUTO: 270 K/UL (ref 150–400)
PMV BLD AUTO: 11.2 FL (ref 8.9–12.9)
POTASSIUM SERPL-SCNC: 4.3 MMOL/L (ref 3.5–5.1)
PROCALCITONIN SERPL-MCNC: 0.1 NG/ML
PROT SERPL-MCNC: 6.9 G/DL (ref 6.4–8.2)
PROT UR STRIP-MCNC: 30 MG/DL
RBC # BLD AUTO: 4.24 M/UL (ref 3.8–5.2)
RBC #/AREA URNS HPF: ABNORMAL /HPF (ref 0–5)
SODIUM SERPL-SCNC: 138 MMOL/L (ref 136–145)
SOURCE, COVRS: NORMAL
SP GR UR REFRACTOMETRY: 1.02 (ref 1–1.03)
TROPONIN-HIGH SENSITIVITY: 18 NG/L (ref 0–51)
TSH SERPL DL<=0.05 MIU/L-ACNC: 2.88 UIU/ML (ref 0.36–3.74)
UROBILINOGEN UR QL STRIP.AUTO: 0.2 EU/DL (ref 0.2–1)
WBC # BLD AUTO: 9.8 K/UL (ref 3.6–11)
WBC URNS QL MICRO: ABNORMAL /HPF (ref 0–4)

## 2022-12-05 PROCEDURE — 83880 ASSAY OF NATRIURETIC PEPTIDE: CPT

## 2022-12-05 PROCEDURE — 99285 EMERGENCY DEPT VISIT HI MDM: CPT

## 2022-12-05 PROCEDURE — 74011250637 HC RX REV CODE- 250/637: Performed by: INTERNAL MEDICINE

## 2022-12-05 PROCEDURE — G0378 HOSPITAL OBSERVATION PER HR: HCPCS

## 2022-12-05 PROCEDURE — 87804 INFLUENZA ASSAY W/OPTIC: CPT

## 2022-12-05 PROCEDURE — 74011000250 HC RX REV CODE- 250: Performed by: INTERNAL MEDICINE

## 2022-12-05 PROCEDURE — 87635 SARS-COV-2 COVID-19 AMP PRB: CPT

## 2022-12-05 PROCEDURE — APPSS30 APP SPLIT SHARED TIME 16-30 MINUTES: Performed by: NURSE PRACTITIONER

## 2022-12-05 PROCEDURE — 74011250637 HC RX REV CODE- 250/637: Performed by: EMERGENCY MEDICINE

## 2022-12-05 PROCEDURE — 71046 X-RAY EXAM CHEST 2 VIEWS: CPT

## 2022-12-05 PROCEDURE — 87186 SC STD MICRODIL/AGAR DIL: CPT

## 2022-12-05 PROCEDURE — 80053 COMPREHEN METABOLIC PANEL: CPT

## 2022-12-05 PROCEDURE — 65270000029 HC RM PRIVATE

## 2022-12-05 PROCEDURE — 84484 ASSAY OF TROPONIN QUANT: CPT

## 2022-12-05 PROCEDURE — 99215 OFFICE O/P EST HI 40 MIN: CPT | Performed by: INTERNAL MEDICINE

## 2022-12-05 PROCEDURE — 81001 URINALYSIS AUTO W/SCOPE: CPT

## 2022-12-05 PROCEDURE — 36415 COLL VENOUS BLD VENIPUNCTURE: CPT

## 2022-12-05 PROCEDURE — 87077 CULTURE AEROBIC IDENTIFY: CPT

## 2022-12-05 PROCEDURE — 93005 ELECTROCARDIOGRAM TRACING: CPT

## 2022-12-05 PROCEDURE — 74011250636 HC RX REV CODE- 250/636: Performed by: INTERNAL MEDICINE

## 2022-12-05 PROCEDURE — 84145 PROCALCITONIN (PCT): CPT

## 2022-12-05 PROCEDURE — 74011250636 HC RX REV CODE- 250/636: Performed by: EMERGENCY MEDICINE

## 2022-12-05 PROCEDURE — 87086 URINE CULTURE/COLONY COUNT: CPT

## 2022-12-05 PROCEDURE — 84443 ASSAY THYROID STIM HORMONE: CPT

## 2022-12-05 PROCEDURE — 96374 THER/PROPH/DIAG INJ IV PUSH: CPT

## 2022-12-05 PROCEDURE — 85025 COMPLETE CBC W/AUTO DIFF WBC: CPT

## 2022-12-05 RX ORDER — ACETAMINOPHEN 500 MG
1000 TABLET ORAL ONCE
Status: COMPLETED | OUTPATIENT
Start: 2022-12-05 | End: 2022-12-05

## 2022-12-05 RX ORDER — ACETAMINOPHEN 325 MG/1
650 TABLET ORAL
Status: DISCONTINUED | OUTPATIENT
Start: 2022-12-05 | End: 2022-12-06 | Stop reason: HOSPADM

## 2022-12-05 RX ORDER — ONDANSETRON 4 MG/1
4 TABLET, ORALLY DISINTEGRATING ORAL
Status: DISCONTINUED | OUTPATIENT
Start: 2022-12-05 | End: 2022-12-06

## 2022-12-05 RX ORDER — POLYETHYLENE GLYCOL 3350 17 G/17G
17 POWDER, FOR SOLUTION ORAL DAILY PRN
Status: DISCONTINUED | OUTPATIENT
Start: 2022-12-05 | End: 2022-12-06 | Stop reason: HOSPADM

## 2022-12-05 RX ORDER — SODIUM CHLORIDE 0.9 % (FLUSH) 0.9 %
5-40 SYRINGE (ML) INJECTION AS NEEDED
Status: DISCONTINUED | OUTPATIENT
Start: 2022-12-05 | End: 2022-12-06 | Stop reason: HOSPADM

## 2022-12-05 RX ORDER — AMIODARONE HYDROCHLORIDE 200 MG/1
100 TABLET ORAL EVERY OTHER DAY
Status: DISCONTINUED | OUTPATIENT
Start: 2022-12-06 | End: 2022-12-06 | Stop reason: HOSPADM

## 2022-12-05 RX ORDER — ONDANSETRON 2 MG/ML
4 INJECTION INTRAMUSCULAR; INTRAVENOUS ONCE
Status: COMPLETED | OUTPATIENT
Start: 2022-12-05 | End: 2022-12-05

## 2022-12-05 RX ORDER — SODIUM CHLORIDE 0.9 % (FLUSH) 0.9 %
5-40 SYRINGE (ML) INJECTION EVERY 8 HOURS
Status: DISCONTINUED | OUTPATIENT
Start: 2022-12-05 | End: 2022-12-06 | Stop reason: HOSPADM

## 2022-12-05 RX ORDER — GABAPENTIN 300 MG/1
300 CAPSULE ORAL 3 TIMES DAILY
Status: DISCONTINUED | OUTPATIENT
Start: 2022-12-05 | End: 2022-12-06 | Stop reason: HOSPADM

## 2022-12-05 RX ORDER — ACETAMINOPHEN 650 MG/1
650 SUPPOSITORY RECTAL
Status: DISCONTINUED | OUTPATIENT
Start: 2022-12-05 | End: 2022-12-06

## 2022-12-05 RX ORDER — BUPROPION HYDROCHLORIDE 150 MG/1
150 TABLET, EXTENDED RELEASE ORAL 2 TIMES DAILY
Status: DISCONTINUED | OUTPATIENT
Start: 2022-12-05 | End: 2022-12-06 | Stop reason: HOSPADM

## 2022-12-05 RX ORDER — METOPROLOL SUCCINATE 25 MG/1
100 TABLET, EXTENDED RELEASE ORAL DAILY
Status: DISCONTINUED | OUTPATIENT
Start: 2022-12-06 | End: 2022-12-06 | Stop reason: HOSPADM

## 2022-12-05 RX ORDER — ONDANSETRON 2 MG/ML
4 INJECTION INTRAMUSCULAR; INTRAVENOUS
Status: DISCONTINUED | OUTPATIENT
Start: 2022-12-05 | End: 2022-12-06 | Stop reason: HOSPADM

## 2022-12-05 RX ORDER — PANTOPRAZOLE SODIUM 40 MG/1
40 TABLET, DELAYED RELEASE ORAL
Status: DISCONTINUED | OUTPATIENT
Start: 2022-12-06 | End: 2022-12-06 | Stop reason: HOSPADM

## 2022-12-05 RX ADMIN — GABAPENTIN 300 MG: 300 CAPSULE ORAL at 21:12

## 2022-12-05 RX ADMIN — ACETAMINOPHEN 1000 MG: 500 TABLET ORAL at 13:31

## 2022-12-05 RX ADMIN — ONDANSETRON 4 MG: 2 INJECTION INTRAMUSCULAR; INTRAVENOUS at 13:31

## 2022-12-05 RX ADMIN — SODIUM CHLORIDE, PRESERVATIVE FREE 10 ML: 5 INJECTION INTRAVENOUS at 21:12

## 2022-12-05 RX ADMIN — GABAPENTIN 300 MG: 300 CAPSULE ORAL at 17:27

## 2022-12-05 RX ADMIN — SODIUM CHLORIDE 1000 ML: 9 INJECTION, SOLUTION INTRAVENOUS at 16:22

## 2022-12-05 NOTE — ED NOTES
Spoke with patient and family regarding her increased BNP level.  requesting for cardiology to see while here as patient's pacemaker battery needs replacement. If plans are to admit patient, they would like to see if her battery be replaced during this admission.

## 2022-12-05 NOTE — PROGRESS NOTES
[facility]                    Cardiology Care Note     [x]Initial Encounter     []Follow-up    Patient Name: Karen Chaidez - :1945 - CXQ:[id number]  Primary Cardiologist: Dr. Elis Clements Cardiologist: [facility] Physicians: Becki Arreola MD     Reason for encounter: Elevated pBNP, CHF    HPI:       Karen Chaidez is a 78 y.o. female with PMH significant for PAF on Xarelto, SSS s/p pacer, HFpEF, HTN, hypothyroidism and obesity. Pt presented to ED with complaints of progressive SOB, now occurring at rest, increased weakness and feeling lightheaded, especially when standing. Continues with lower ext edema. She was admitted last month for CHF exacerbation and discharged on increased diuretics, which she has been taking but doesn't feel she is making any increased amount of urine. Of note, she was admitted to [address] [place] [address] between discharge prev to now for dizziness and told she'd had a CVA, she is unsure if this was acute or more chronic. She was advised to follow up w/ neurology but has not been able to see them yet. She has not followed up w/ Dr. Rupal Pacheco yet, wants to know if pacer battery can be changed this admission. She denies any CP, palpitations. Subjective:      Karen Chaidez reports dyspnea. Assessment and Plan     Acute on chronic HFpEF: last EF 60-65% in Nov, no need to repeat echo as of now. Hold diuresis today d/t elevated Cr but will not be able to remain off diuresis long term. pBNP up to 1.81. Level of dyspnea seems disproportionate to findings, agree w/ ruling out flu. Chest CTA neg last month for PE and on Xarelto. Has been several years since last stress by her report - will plan for stress nuc tomorrow       2. Dizziness, hx CVA: dizziness could be d/t orthostasis - check orthostatics. On xarelto    3.  PAF, hx SSS s/p pacer: pacer w/ 3 mos battery left last admission, pt has not followed up w/ cardiology since then. Will discuss during this admission. Cont PO amio, xarelto     4. CRISTIANA on CKD: Cr up to 1.81, hold diuresis for today - trend     5. HTN: resume meds as needed        ____________________________________________________________    Cardiac testing  11/09/22    ECHO ADULT COMPLETE 11/10/2022 11/10/2022    Interpretation Summary    Left Ventricle: Normal left ventricular systolic function with a visually estimated EF of 60 - 65%. Left ventricle size is normal. Normal wall thickness. Normal wall motion. Abnormal diastolic function. Right Ventricle: Right ventricle size is normal. Normal wall thickness. Lead present in the right ventricle. Left Atrium: Left atrium is mildly dilated. Signed by: Brian Guidry MD on 11/10/2022  9:13 PM      Most recent HS troponins:  Recent Labs     12/05/22  1205   TROPHS 18       ECG: ventricular paced rhythm     Review of Systems:    [x]All other systems reviewed and all negative except as written in HPI    [] Patient unable to provide secondary to condition    Past Medical History:   Diagnosis Date    Anxiety and depression     Atrial fibrillation (HCC)     Chronic anticoagulation     GERD (gastroesophageal reflux disease)     Hypertension     Hypothyroid     Migraines      Past Surgical History:   Procedure Laterality Date    HX CHOLECYSTECTOMY      HX HYSTERECTOMY      HX ORTHOPAEDIC      Right shoulder surgery    HX PACEMAKER      HX ELIA AND BSO      ND ANESTH,SURGERY OF SHOULDER      left     Social Hx:  reports that she has quit smoking. She has never used smokeless tobacco. She reports current alcohol use. She reports that she does not use drugs. Family Hx: family history includes Cancer in her mother; Heart Attack in her father and another family member; Heart Disease in her father; Hypertension in her father and another family member; Migraines in her mother; Stroke in her father and another family member.   No Known Allergies OBJECTIVE:  Wt Readings from Last 3 Encounters:   12/05/22 90.7 kg (200 lb)   11/11/22 97.9 kg (215 lb 12.8 oz)   07/15/22 100.8 kg (222 lb 3.6 oz)     No intake or output data in the 24 hours ending 12/05/22 1505    Physical Exam:    Vitals:   Vitals:    12/05/22 1155 12/05/22 1331   BP: (!) 148/91 118/89   Pulse: 65 65   Resp: 16 18   Temp: 97.2 °F (36.2 °C)    SpO2: 100% 100%   Weight: 90.7 kg (200 lb)    Height: 5' 4\" (1.626 m)      Telemetry:  paced    Gen: Well-developed, well-nourished, in no acute distress  Neck: Supple, No JVD, No Carotid Bruit  Resp: No accessory muscle use, Clear breath sounds, No rales or rhonchi  Card: Regular Rate,Rythm, Normal S1, S2, No murmurs, rubs or gallop. No thrills. Abd:   Soft, non-tender, non-distended, BS+   MSK: No cyanosis  Skin: No rashes    Neuro: Moving all four extremities, follows commands appropriately  Psych: Good insight, oriented to person, place, alert, Nml Affect  LE: +1-2 edema bilat    Data Review:     Radiology:   XR Results (most recent):  Results from Hospital Encounter encounter on 12/05/22    XR CHEST PA LAT    Narrative  Clinical history: SOB lightheaded  INDICATION:   SOB lightheaded  COMPARISON: 11/9/2022    FINDINGS:  PA and lateral views of the chest are obtained. The cardiopericardial silhouette is enlarged. Cardiac pacer. . There is no  pleural effusion, pneumothorax or focal consolidation present. Impression  Cardiomegaly. No significant interval change. No acute intrathoracic disease. Recent Labs     12/05/22  1205      K 4.3      CO2 30   BUN 50*   CREA 1.81*   *   CA 8.8     Recent Labs     12/05/22  1205   WBC 9.8   HGB 13.2   HCT 41.7        Recent Labs     12/05/22  1205   AP 95     No results for input(s): CHOL, LDLC in the last 72 hours.     No lab exists for component: TGL, HDLC,  HBA1C      Current meds:    Current Facility-Administered Medications:     sodium chloride 0.9 % bolus infusion 1,000 mL, 1,000 mL, IntraVENous, ONCE, Shahab Ware MD    [START ON 12/6/2022] amiodarone (CORDARONE) tablet 100 mg, 100 mg, Oral, EVERY OTHER DAY, Shahab Ware MD    buPROPion SR [facility] SR) tablet 150 mg, 150 mg, Oral, BID, Shahab Ware MD    gabapentin (NEURONTIN) capsule 300 mg, 300 mg, Oral, TID, Shahab Ware MD    .PHARMACY TO SUBSTITUTE PER PROTOCOL (Reordered from: nebivolol (BYSTOLIC) 20 mg tablet), , , Per Protocol, Shahab Ware MD    [START ON 12/6/2022] pantoprazole (PROTONIX) tablet 40 mg, 40 mg, Oral, ACB, Shahab Ware MD    [START ON 12/6/2022] rivaroxaban (XARELTO) tablet 20 mg, 20 mg, Oral, DAILY, Shahab Ware MD    Current Outpatient Medications:     bumetanide (BUMEX) 2 mg tablet, Take 1 Tablet by mouth two (2) times a day for 30 days. , Disp: 60 Tablet, Rfl: 0    amiodarone (PACERONE) 100 mg tablet, Take 100 mg by mouth every other day., Disp: , Rfl:     cetirizine (ZYRTEC) 10 mg tablet, Take 10 mg by mouth daily. , Disp: , Rfl:     Biotin 2,500 mcg cap, Take 2 Capsules by mouth daily. , Disp: , Rfl:     trospium (SANCTURA) 20 mg tablet, Take 20 mg by mouth daily as needed. , Disp: , Rfl:     acetaminophen (TYLENOL) 325 mg tablet, Take 650 mg by mouth every four (4) hours as needed for Pain (for back pain; takes tylenol before switching to tramadol). , Disp: , Rfl:     peg 400-propylene glycol (Systane, propylene glycoL,) 0.4-0.3 % drop, Administer 1 Drop to both eyes as needed. , Disp: , Rfl:     valsartan (DIOVAN) 320 mg tablet, Take 320 mg by mouth nightly., Disp: , Rfl:     traMADoL (ULTRAM) 50 mg tablet, Take 50 mg by mouth every six (6) hours as needed for Pain., Disp: , Rfl:     Calcium Carbonate-Vit D3-Min 600 mg calcium- 400 unit tab, Take 1 Tab by mouth daily. , Disp: , Rfl:     amLODIPine (NORVASC) 2.5 mg tablet, Take 2.5 mg by mouth daily. , Disp: , Rfl:     vit A/vit C/vit E/zinc/copper (PRESERVISION AREDS PO), Take 1 Tablet by mouth., Disp: , Rfl: rivaroxaban (XARELTO) 20 mg tab tablet, Take 20 mg by mouth daily (with breakfast). , Disp: , Rfl:     gabapentin (NEURONTIN) 300 mg capsule, Take 600 mg by mouth three (3) times daily. , Disp: , Rfl:     buPROPion SR (WELLBUTRIN SR) 150 mg SR tablet, Take 150 mg by mouth two (2) times a day., Disp: , Rfl:     therapeutic multivitamin (THERAGRAN) tablet, Take 1 Tab by mouth daily. , Disp: , Rfl:     nebivolol (BYSTOLIC) 20 mg tablet, Take 20 mg by mouth daily. , Disp: , Rfl:     levothyroxine (SYNTHROID) 25 mcg tablet, Take 25 mcg by mouth daily. , Disp: , Rfl:     potassium chloride (K-DUR, KLOR-CON) 20 mEq tablet, Take 1 tablet by mouth two  times daily, Disp: 60 Tab, Rfl: 0    ascorbic acid, vitamin C, (VITAMIN C) 1,000 mg tablet, Take 500 mg by mouth nightly., Disp: , Rfl:     omeprazole (PRILOSEC) 20 mg capsule, Take 20 mg by mouth daily. Indications: GASTROESOPHAGEAL REFLUX, Disp: , Rfl:     Mark Tapia NP    [facility]  Call center: (j) [phone] (o) [phone]      CC: Gina Mercado MD

## 2022-12-05 NOTE — PROGRESS NOTES
CARE MANAGEMENT NOTE      MD coordinated with  indicating need for [facility]. MD indicates PT eval pending and cardio consult then possible discharge. CM reviewed chart. Cardio note indicates plan for nuclear stress test tomorrow. CM will arrange [facility] services after stress test tomorrow.    _____________________________________  Rodriguez [facility] Management   Available via [employer]  12/5/2022   4:20 PM

## 2022-12-05 NOTE — PROGRESS NOTES
Pharmacist renally adjusted rivaroxaban to 15 mg daily for atrial fibrillation based on P and T approved protocol.     Thank you,      Steve Ying, MateuszD, BCPS

## 2022-12-05 NOTE — ED TRIAGE NOTES
Pt arrives to ED for SOB at rest starting today. States yesterday she felt faint when going from sitting to standing. States her pacemaker battery is dying and was supposed to have changed last week but cardiologist had to reschedule. Hx CHF. Pt was admitted to [facility] 2 weeks for same.

## 2022-12-05 NOTE — ED PROVIDER NOTES
Date of Service:  12/5/2022    Patient:  Dewitt Cowden    Chief Complaint:  Shortness of Breath       HPI:  Dewitt Cowden is a 78 y.o.  female who presents for evaluation of shortness of breath. Patient has a known family pacemaker battery. She states that she has been feeling progressively more weak and more tired. She has shortness of breath while sitting and exerting herself. Yesterday she tried to stand up and felt very lightheaded like she might pass out. She did not syncopized. She is feeling a little bit better from that standpoint but still states that she generally just does not feel well. She is supposed to have her pacemaker battery change last week but had to reschedule due to schedule conflicts. Patient's pacemaker was originally placed at this facility.   No other complaints       Past Medical History:   Diagnosis Date    Arrhythmia     Atrial fibrillation (HCC)     CAD (coronary artery disease)     atrial fibrillation    Gastrointestinal disorder     GERD    GERD (gastroesophageal reflux disease)     Headache(784.0)     Hypertension     Hypothyroid     Neurological disorder     migraines    Other ill-defined conditions(799.89) migraine    Psychiatric disorder previous hx of depression       Past Surgical History:   Procedure Laterality Date    HX CHOLECYSTECTOMY      HX HYSTERECTOMY      HX ORTHOPAEDIC      Right shoulder surgery    HX PACEMAKER      HX ELIA AND BSO      [place] ANESTH,SURGERY OF SHOULDER      left         Family History:   Problem Relation Age of Onset    Hypertension Other     Stroke Other     Heart Attack Other     Migraines Mother     Cancer Mother         melanoma    Heart Disease Father     Hypertension Father     Stroke Father     Heart Attack Father        Social History     Socioeconomic History    Marital status:      Spouse name: Not on file    Number of children: Not on file    Years of education: Not on file    Highest education level: Not on file Occupational History    Not on file   Tobacco Use    Smoking status: Former    Smokeless tobacco: Never    Tobacco comments:     quit 30 years ago   Substance and Sexual Activity    Alcohol use: Yes     Comment: social drinker    Drug use: No    Sexual activity: Not on file   Other Topics Concern    Not on file   Social History Narrative    Not on file     Social Determinants of Health     Financial Resource Strain: Not on file   Food Insecurity: Not on file   Transportation Needs: Not on file   Physical Activity: Not on file   Stress: Not on file   Social Connections: Not on file   Intimate Partner Violence: Not on file   Housing Stability: Not on file         ALLERGIES: Patient has no known allergies. Review of Systems   All other systems reviewed and are negative. Vitals:    12/05/22 1155   BP: (!) 148/91   Pulse: 65   Resp: 16   Temp: 97.2 °F (36.2 °C)   SpO2: 100%   Weight: 90.7 kg (200 lb)   Height: 5' 4\" (1.626 m)            Physical Exam  Vitals and nursing note reviewed. Constitutional:       Appearance: She is well-developed. HENT:      Head: Normocephalic and atraumatic. Nose: Nose normal.      Mouth/Throat:      Mouth: Mucous membranes are moist.   Cardiovascular:      Rate and Rhythm: Normal rate and regular rhythm. Pulmonary:      Effort: Pulmonary effort is normal.      Breath sounds: Normal breath sounds. Chest:      Chest wall: No mass or tenderness. Abdominal:      Palpations: Abdomen is soft. Musculoskeletal:         General: No deformity. Skin:     General: Skin is warm. Neurological:      Mental Status: She is alert and oriented to person, place, and time.    Psychiatric:         Mood and Affect: Mood normal.        MDM     Amount and/or Complexity of Data Reviewed  Decide to obtain previous medical records or to obtain history from someone other than the patient: yes      ED Course as of 12/05/22 1209   Mon Dec 05, 2022   1159 EKG 1153  Ventricular paced rhythm at 65 bpm.  No obvious acute ST elevation. Appropriate axis and intervals for paced rhythm [GG]      ED Course User Index  [GG] Terry Farrar      VITAL SIGNS:  Patient Vitals for the past 4 hrs:   Temp Pulse Resp BP SpO2   12/05/22 1331 -- 65 18 118/89 100 %   12/05/22 1155 97.2 °F (36.2 °C) 65 16 (!) 148/91 100 %         LABS:  Recent Results (from the past 6 hour(s))   METABOLIC PANEL, COMPREHENSIVE    Collection Time: 12/05/22 12:05 PM   Result Value Ref Range    Sodium 138 136 - 145 mmol/L    Potassium 4.3 3.5 - 5.1 mmol/L    Chloride 103 97 - 108 mmol/L    CO2 30 21 - 32 mmol/L    Anion gap 5 5 - 15 mmol/L    Glucose 115 (H) 65 - 100 mg/dL    BUN 50 (H) 6 - 20 MG/DL    Creatinine 1.81 (H) 0.55 - 1.02 MG/DL    BUN/Creatinine ratio 28 (H) 12 - 20      eGFR 29 (L) >60 ml/min/1.73m2    Calcium 8.8 8.5 - 10.1 MG/DL    Bilirubin, total 0.7 0.2 - 1.0 MG/DL    ALT (SGPT) 88 (H) 12 - 78 U/L    AST (SGOT) 34 15 - 37 U/L    Alk. phosphatase 95 45 - 117 U/L    Protein, total 6.9 6.4 - 8.2 g/dL    Albumin 3.8 3.5 - 5.0 g/dL    Globulin 3.1 2.0 - 4.0 g/dL    A-G Ratio 1.2 1.1 - 2.2     CBC WITH AUTOMATED DIFF    Collection Time: 12/05/22 12:05 PM   Result Value Ref Range    WBC 9.8 3.6 - 11.0 K/uL    RBC 4.24 3.80 - 5.20 M/uL    HGB 13.2 11.5 - 16.0 g/dL    HCT 41.7 35.0 - 47.0 %    MCV 98.3 80.0 - 99.0 FL    MCH 31.1 26.0 - 34.0 PG    MCHC 31.7 30.0 - 36.5 g/dL    RDW 14.6 (H) 11.5 - 14.5 %    PLATELET 938 403 - 427 K/uL    MPV 11.2 8.9 - 12.9 FL    NRBC 0.0 0  WBC    ABSOLUTE NRBC 0.00 0.00 - 0.01 K/uL    NEUTROPHILS 74 32 - 75 %    LYMPHOCYTES 15 12 - 49 %    MONOCYTES 9 5 - 13 %    EOSINOPHILS 1 0 - 7 %    BASOPHILS 1 0 - 1 %    IMMATURE GRANULOCYTES 0 0.0 - 0.5 %    ABS. NEUTROPHILS 7.3 1.8 - 8.0 K/UL    ABS. LYMPHOCYTES 1.5 0.8 - 3.5 K/UL    ABS. MONOCYTES 0.9 0.0 - 1.0 K/UL    ABS. EOSINOPHILS 0.1 0.0 - 0.4 K/UL    ABS. BASOPHILS 0.1 0.0 - 0.1 K/UL    ABS. IMM.  GRANS. 0.0 0.00 - 0.04 K/UL    DF AUTOMATED TROPONIN-HIGH SENSITIVITY    Collection Time: 12/05/22 12:05 PM   Result Value Ref Range    Troponin-High Sensitivity 18 0 - 51 ng/L   NT-PRO BNP    Collection Time: 12/05/22 12:05 PM   Result Value Ref Range    NT pro-BNP 8,431 (H) <450 PG/ML        IMAGING:  XR CHEST PA LAT   Final Result      Cardiomegaly. No significant interval change. No acute intrathoracic disease. Medications During Visit:  Medications   ondansetron (ZOFRAN) injection 4 mg (4 mg IntraVENous Given 12/5/22 1331)   acetaminophen (TYLENOL) tablet 1,000 mg (1,000 mg Oral Given 12/5/22 1331)         DECISION MAKING:  Ck Patel is a 78 y.o. female who comes in as above. Patient starting to feel better at rest.  She states that she cannot ambulate more than a few feet without becoming extremely dyspneic. Over the last several weeks patient's kidney function is steadily declining. She has elevated BNP with dyspnea on exertion. She is also supposed to have apparent pacemaker battery and is supposed to get the battery exchange at some point but this keeps getting put off. Plan to admit for the renal insufficiency and heart failure, hopefully have electrophysiology see patient here for pacemaker management. Originally placed at this facility      IMPRESSION:  1. Acute on chronic renal insufficiency    2. Acute on chronic congestive heart failure, unspecified heart failure type ([address]    3. IBARRA (dyspnea on exertion)        DISPOSITION:  Admitted          Procedures    Perfect Serve Consult for Admission  1:58 PM    ED Room Number: ERA/A  Patient Name and age:  Ck Patel 78 y.o.  female  Working Diagnosis:   1. Acute on chronic renal insufficiency    2. Acute on chronic congestive heart failure, unspecified heart failure type ([address]    3.  IBARRA (dyspnea on exertion)        COVID-19 Suspicion:  no  Sepsis present:  no  Reassessment needed: no  Code Status:  Full Code  Readmission: no  Isolation Requirements: no  Recommended Level of Care:  telemetry  Department: [facility] - [phone]  Other:  Patient starting to feel better at rest.  She states that she cannot ambulate more than a few feet without becoming extremely dyspneic. Over the last several weeks patient's kidney function is steadily declining. She has elevated BNP with dyspnea on exertion. She is also supposed to have apparent pacemaker battery and is supposed to get the battery exchange at some point but this keeps getting put off. Plan to admit for the renal insufficiency and heart failure, hopefully have electrophysiology see patient here for pacemaker management.   Originally placed at this facility

## 2022-12-05 NOTE — ED NOTES
Ambulated patient to restroom for urine specimen. Patient requiring handheld assistance due to dizziness.

## 2022-12-05 NOTE — CONSULTS
[facility]  [address], [place] [address]  [phone]    Hospitalist Consult Note      NAME:  Nirav Ziegler   :   1945   MRN:  [id number]     Requesting Physician Meron Bojorquez DO   Reason for Consult:  dyspnea     PCP:  Shelly Yusuf MD     Date/Time of service  2022 2:35 PM       Assessment and Plan:      CRISTIANA (acute kidney injury) / Dehydration - POA, likely from excess bumex use, started 4 weeks ago. Hydrate gently. Make Bumex prn at discharge    Dyspnea - Subjective symptom. Not hypoxic. Lungs clear. Likely deconditioning, hypoventilation and anxiety. Check PT eval and see if hypoxia. Check UA, procalcitonin, Flu, COVID, TSH, cortisol. Atrial fibrillation / Chronic anticoagulation / Sinus node dysfunction - POA, stable. Continue amiodarone and BB. Patient requests a cardiology consult now to change her pacer battery while she is here. Continue xarelto    Hypertension - Continue valsartan and BB, hold micro dose Norvasc    Hypothyroid - Stop homeopathic dose of synthroid    Anxiety and depression / Hx Migraines - Continue gabapentin at a lower dose. Continue buproprion    GERD (gastroesophageal reflux disease) - PPI    Obese - Advise weight loss. Subjective:     CHIEF COMPLAINT: dyspnea     HISTORY OF PRESENT ILLNESS:     Ms. Van Figueroa is a 78 y.o.  female who is admitted to the ER Service with dyspnea. We are asked to evaluate for admission. Admitted last month. ER workup is normal.  There is no objective evidence to admit the patient at this point. She requests a cardology consult. If cleared by them she can DC home. .      Past Medical History:   Diagnosis Date    Anxiety and depression     Atrial fibrillation (HCC)     Chronic anticoagulation     GERD (gastroesophageal reflux disease)     Hypertension     Hypothyroid     Migraines         Past Surgical History:   Procedure Laterality Date    HX CHOLECYSTECTOMY      HX HYSTERECTOMY      HX ORTHOPAEDIC      Right shoulder surgery    HX PACEMAKER      HX ELIA AND BSO      CA ANESTH,SURGERY OF SHOULDER      left       Social History     Tobacco Use    Smoking status: Former    Smokeless tobacco: Never    Tobacco comments:     quit 30 years ago   Substance Use Topics    Alcohol use: Yes     Comment: social drinker        Family History   Problem Relation Age of Onset    Hypertension Other     Stroke Other     Heart Attack Other     Migraines Mother     Cancer Mother         melanoma    Heart Disease Father     Hypertension Father     Stroke Father     Heart Attack Father         No Known Allergies     Prior to Admission medications    Medication Sig Start Date End Date Taking? Authorizing Provider   bumetanide (BUMEX) 2 mg tablet Take 1 Tablet by mouth two (2) times a day for 30 days. 11/12/22 12/12/22  Nasim Doshi MD   amiodarone (PACERONE) 100 mg tablet Take 100 mg by mouth every other day. Provider, Historical   cetirizine (ZYRTEC) 10 mg tablet Take 10 mg by mouth daily. Provider, Historical   Biotin 2,500 mcg cap Take 2 Capsules by mouth daily. Provider, Historical   trospium (SANCTURA) 20 mg tablet Take 20 mg by mouth daily as needed. Provider, Historical   acetaminophen (TYLENOL) 325 mg tablet Take 650 mg by mouth every four (4) hours as needed for Pain (for back pain; takes tylenol before switching to tramadol). Provider, Historical   peg 400-propylene glycol (Systane, propylene glycoL,) 0.4-0.3 % drop Administer 1 Drop to both eyes as needed. Provider, Historical   valsartan (DIOVAN) 320 mg tablet Take 320 mg by mouth nightly. OtherKeith MD   traMADoL (ULTRAM) 50 mg tablet Take 50 mg by mouth every six (6) hours as needed for Pain. Keith Iverson MD   Calcium Carbonate-Vit D3-Min 600 mg calcium- 400 unit tab Take 1 Tab by mouth daily. Keith Iverson MD   amLODIPine (NORVASC) 2.5 mg tablet Take 2.5 mg by mouth daily.  9/3/20 Provider, Historical   vit A/vit C/vit E/zinc/copper (PRESERVISION AREDS PO) Take 1 Tablet by mouth. Provider, Historical   rivaroxaban (XARELTO) 20 mg tab tablet Take 20 mg by mouth daily (with breakfast). Keith Iverson MD   gabapentin (NEURONTIN) 300 mg capsule Take 600 mg by mouth three (3) times daily. Keith Iverson MD   buPROPion SR (WELLBUTRIN SR) 150 mg SR tablet Take 150 mg by mouth two (2) times a day. Provider, Historical   therapeutic multivitamin (THERAGRAN) tablet Take 1 Tab by mouth daily. Provider, Historical   nebivolol (BYSTOLIC) 20 mg tablet Take 20 mg by mouth daily. Provider, Historical   levothyroxine (SYNTHROID) 25 mcg tablet Take 25 mcg by mouth daily. Provider, Historical   potassium chloride (K-DUR, KLOR-CON) 20 mEq tablet Take 1 tablet by mouth two  times daily 3/24/16   Son Najera MD   ascorbic acid, vitamin C, (VITAMIN C) 1,000 mg tablet Take 500 mg by mouth nightly. Provider, Historical   omeprazole (PRILOSEC) 20 mg capsule Take 20 mg by mouth daily.  Indications: GASTROESOPHAGEAL REFLUX    Keith Iverson MD         Current Facility-Administered Medications:     sodium chloride 0.9 % bolus infusion 1,000 mL, 1,000 mL, IntraVENous, ONCE, Blanco Woods MD    amiodarone (CORDARONE) tablet 100 mg, 100 mg, Oral, EVERY OTHER DAY, Blanco Woods MD    buPROPion SR [facility] SR) tablet 150 mg, 150 mg, Oral, BID, Blanco Woods MD    gabapentin (NEURONTIN) capsule 300 mg, 300 mg, Oral, TID, Blanco Woods MD    .PHARMACY TO SUBSTITUTE PER PROTOCOL (Reordered from: nebivolol (BYSTOLIC) 20 mg tablet), , , Per Protocol, Blanco Woods MD    [START ON 12/6/2022] pantoprazole (PROTONIX) tablet 40 mg, 40 mg, Oral, ACB, Blanco Woods MD    [START ON 12/6/2022] rivaroxaban (XARELTO) tablet 20 mg, 20 mg, Oral, DAILY, Blanco Woods MD    Current Outpatient Medications:     bumetanide (BUMEX) 2 mg tablet, Take 1 Tablet by mouth two (2) times a day for 30 days. , Disp: 60 Tablet, Rfl: 0    amiodarone (PACERONE) 100 mg tablet, Take 100 mg by mouth every other day., Disp: , Rfl:     cetirizine (ZYRTEC) 10 mg tablet, Take 10 mg by mouth daily. , Disp: , Rfl:     Biotin 2,500 mcg cap, Take 2 Capsules by mouth daily. , Disp: , Rfl:     trospium (SANCTURA) 20 mg tablet, Take 20 mg by mouth daily as needed. , Disp: , Rfl:     acetaminophen (TYLENOL) 325 mg tablet, Take 650 mg by mouth every four (4) hours as needed for Pain (for back pain; takes tylenol before switching to tramadol). , Disp: , Rfl:     peg 400-propylene glycol (Systane, propylene glycoL,) 0.4-0.3 % drop, Administer 1 Drop to both eyes as needed. , Disp: , Rfl:     valsartan (DIOVAN) 320 mg tablet, Take 320 mg by mouth nightly., Disp: , Rfl:     traMADoL (ULTRAM) 50 mg tablet, Take 50 mg by mouth every six (6) hours as needed for Pain., Disp: , Rfl:     Calcium Carbonate-Vit D3-Min 600 mg calcium- 400 unit tab, Take 1 Tab by mouth daily. , Disp: , Rfl:     amLODIPine (NORVASC) 2.5 mg tablet, Take 2.5 mg by mouth daily. , Disp: , Rfl:     vit A/vit C/vit E/zinc/copper (PRESERVISION AREDS PO), Take 1 Tablet by mouth., Disp: , Rfl:     rivaroxaban (XARELTO) 20 mg tab tablet, Take 20 mg by mouth daily (with breakfast). , Disp: , Rfl:     gabapentin (NEURONTIN) 300 mg capsule, Take 600 mg by mouth three (3) times daily. , Disp: , Rfl:     buPROPion SR (WELLBUTRIN SR) 150 mg SR tablet, Take 150 mg by mouth two (2) times a day., Disp: , Rfl:     therapeutic multivitamin (THERAGRAN) tablet, Take 1 Tab by mouth daily. , Disp: , Rfl:     nebivolol (BYSTOLIC) 20 mg tablet, Take 20 mg by mouth daily. , Disp: , Rfl:     levothyroxine (SYNTHROID) 25 mcg tablet, Take 25 mcg by mouth daily. , Disp: , Rfl:     potassium chloride (K-DUR, KLOR-CON) 20 mEq tablet, Take 1 tablet by mouth two  times daily, Disp: 60 Tab, Rfl: 0    ascorbic acid, vitamin C, (VITAMIN C) 1,000 mg tablet, Take 500 mg by mouth nightly., Disp: , Rfl: omeprazole (PRILOSEC) 20 mg capsule, Take 20 mg by mouth daily.  Indications: GASTROESOPHAGEAL REFLUX, Disp: , Rfl:     Review of Systems:  (bold if positive, if negative)    Gen:  Eyes:  ENT:  CVS:  Pulm:  dyspneaGI:  :  MS:  Skin:  Psych:  Endo:  Hem:  Renal:  Neuro:            Objective:      VITALS:    Vital signs reviewed; most recent are:    Visit Vitals  /89   Pulse 65   Temp 97.2 °F (36.2 °C)   Resp 18   Ht 5' 4\" (1.626 m)   Wt 90.7 kg (200 lb)   SpO2 100%   BMI 34.33 kg/m²     SpO2 Readings from Last 6 Encounters:   12/05/22 100%   11/12/22 94%   07/15/22 96%   07/10/22 95%   05/07/21 95%   12/20/20 98%        No intake or output data in the 24 hours ending 12/05/22 1435   All Micro Results       Procedure Component Value Units Date/Time    CULTURE, URINE [[id number]]     Order Status: Sent Specimen: Cath Urine     COVID-19 RAPID TEST [[id number]]     Order Status: Sent               Exam:     Physical Exam:    Gen:  Obese, in no acute distress  HEENT:  Pink conjunctivae, PERRL, hearing intact to voice, moist mucous membranes  Neck:  Supple, without masses, thyroid non-tender  Resp:  No accessory muscle use, clear breath sounds without wheezes rales or rhonchi  Card:  No murmurs, normal S1, S2 without thrills, bruits or peripheral edema  Abd:  Soft, non-tender, non-distended, normoactive bowel sounds are present, no mass  Lymph:  No cervical or inguinal adenopathy  Musc:  No cyanosis or clubbing  Skin:  No rashes or ulcers, skin turgor is good  Neuro:  Cranial nerves are grossly intact, mild motor weakness, follows commands appropriately  Psych:  Good insight, oriented to person, place and time, alert       Labs:    Recent Labs     12/05/22  1205   WBC 9.8   HGB 13.2   HCT 41.7        Recent Labs     12/05/22  1205      K 4.3      CO2 30   *   BUN 50*   CREA 1.81*   CA 8.8   ALB 3.8   TBILI 0.7   ALT 88*     Lab Results   Component Value Date/Time    Glucose (POC) 127 (H) 11/17/2012 12:54 AM    Glucose (POC) 85 02/10/2012 03:20 PM    Glucose (POC) 130 (H) 03/21/2011 09:45 PM    Glucose (POC) 121 (H) 07/09/2009 06:07 PM     No results for input(s): PH, PCO2, PO2, HCO3, FIO2 in the last 72 hours. No results for input(s): INR, INREXT in the last 72 hours. I have reviewed previous records,    Telemetry reviewed:   normal sinus rhythm    Risk of deterioration: high      Total time spent with patient: 45  mintues I personally reviewed chart, notes, data and current medications in the medical record. I have personally examined and treated the patient at bedside during this period. To assist coordination of care and communication with nursing and staff, this note may be preliminary early in the day, but finalized by end of the day.                  Care Plan discussed with: Patient, Nursing Staff, and >50% of time spent in counseling and coordination of care    Discussed:  Care Plan and D/C Planning       ___________________________________________________    Attending Physician: Gabino Betts MD

## 2022-12-06 ENCOUNTER — APPOINTMENT (OUTPATIENT)
Dept: NUCLEAR MEDICINE | Age: 77
End: 2022-12-06
Attending: NURSE PRACTITIONER
Payer: MEDICARE

## 2022-12-06 ENCOUNTER — APPOINTMENT (OUTPATIENT)
Dept: NON INVASIVE DIAGNOSTICS | Age: 77
End: 2022-12-06
Attending: NURSE PRACTITIONER
Payer: MEDICARE

## 2022-12-06 VITALS
OXYGEN SATURATION: 97 % | RESPIRATION RATE: 18 BRPM | WEIGHT: 200 LBS | SYSTOLIC BLOOD PRESSURE: 152 MMHG | DIASTOLIC BLOOD PRESSURE: 91 MMHG | BODY MASS INDEX: 34.15 KG/M2 | TEMPERATURE: 97.6 F | HEART RATE: 65 BPM | HEIGHT: 64 IN

## 2022-12-06 LAB
ALBUMIN SERPL-MCNC: 3.2 G/DL (ref 3.5–5)
ALBUMIN/GLOB SERPL: 1.2 {RATIO} (ref 1.1–2.2)
ALP SERPL-CCNC: 79 U/L (ref 45–117)
ALT SERPL-CCNC: 66 U/L (ref 12–78)
ANION GAP SERPL CALC-SCNC: 4 MMOL/L (ref 5–15)
AST SERPL-CCNC: 25 U/L (ref 15–37)
BILIRUB SERPL-MCNC: 0.4 MG/DL (ref 0.2–1)
BUN SERPL-MCNC: 48 MG/DL (ref 6–20)
BUN/CREAT SERPL: 28 (ref 12–20)
CALCIUM SERPL-MCNC: 8.5 MG/DL (ref 8.5–10.1)
CHLORIDE SERPL-SCNC: 107 MMOL/L (ref 97–108)
CO2 SERPL-SCNC: 30 MMOL/L (ref 21–32)
CREAT SERPL-MCNC: 1.69 MG/DL (ref 0.55–1.02)
ERYTHROCYTE [DISTWIDTH] IN BLOOD BY AUTOMATED COUNT: 14.7 % (ref 11.5–14.5)
GLOBULIN SER CALC-MCNC: 2.6 G/DL (ref 2–4)
GLUCOSE SERPL-MCNC: 103 MG/DL (ref 65–100)
HCT VFR BLD AUTO: 38.2 % (ref 35–47)
HGB BLD-MCNC: 11.9 G/DL (ref 11.5–16)
MAGNESIUM SERPL-MCNC: 2.3 MG/DL (ref 1.6–2.4)
MCH RBC QN AUTO: 31.1 PG (ref 26–34)
MCHC RBC AUTO-ENTMCNC: 31.2 G/DL (ref 30–36.5)
MCV RBC AUTO: 99.7 FL (ref 80–99)
NRBC # BLD: 0 K/UL (ref 0–0.01)
NRBC BLD-RTO: 0 PER 100 WBC
NUC STRESS EJECTION FRACTION: 70 %
PLATELET # BLD AUTO: 222 K/UL (ref 150–400)
PMV BLD AUTO: 11.1 FL (ref 8.9–12.9)
POTASSIUM SERPL-SCNC: 4.2 MMOL/L (ref 3.5–5.1)
PROT SERPL-MCNC: 5.8 G/DL (ref 6.4–8.2)
RBC # BLD AUTO: 3.83 M/UL (ref 3.8–5.2)
SODIUM SERPL-SCNC: 141 MMOL/L (ref 136–145)
STRESS BASELINE DIAS BP: 91 MMHG
STRESS BASELINE HR: 65 BPM
STRESS BASELINE SYS BP: 152 MMHG
STRESS ESTIMATED WORKLOAD: 1 METS
STRESS EXERCISE DUR MIN: 3 MIN
STRESS EXERCISE DUR SEC: 0 SEC
STRESS PEAK DIAS BP: 86 MMHG
STRESS PEAK SYS BP: 161 MMHG
STRESS PERCENT HR ACHIEVED: 48 %
STRESS POST PEAK HR: 69 BPM
STRESS RATE PRESSURE PRODUCT: NORMAL BPM*MMHG
STRESS ST DEPRESSION: 0 MM
STRESS TARGET HR: 145 BPM
WBC # BLD AUTO: 9.6 K/UL (ref 3.6–11)

## 2022-12-06 PROCEDURE — 80053 COMPREHEN METABOLIC PANEL: CPT

## 2022-12-06 PROCEDURE — 97535 SELF CARE MNGMENT TRAINING: CPT

## 2022-12-06 PROCEDURE — 97116 GAIT TRAINING THERAPY: CPT

## 2022-12-06 PROCEDURE — 78452 HT MUSCLE IMAGE SPECT MULT: CPT | Performed by: INTERNAL MEDICINE

## 2022-12-06 PROCEDURE — 96375 TX/PRO/DX INJ NEW DRUG ADDON: CPT

## 2022-12-06 PROCEDURE — 93017 CV STRESS TEST TRACING ONLY: CPT

## 2022-12-06 PROCEDURE — 36415 COLL VENOUS BLD VENIPUNCTURE: CPT

## 2022-12-06 PROCEDURE — 97161 PT EVAL LOW COMPLEX 20 MIN: CPT

## 2022-12-06 PROCEDURE — 83735 ASSAY OF MAGNESIUM: CPT

## 2022-12-06 PROCEDURE — 97165 OT EVAL LOW COMPLEX 30 MIN: CPT

## 2022-12-06 PROCEDURE — A9500 TC99M SESTAMIBI: HCPCS

## 2022-12-06 PROCEDURE — APPSS30 APP SPLIT SHARED TIME 16-30 MINUTES: Performed by: NURSE PRACTITIONER

## 2022-12-06 PROCEDURE — 85027 COMPLETE CBC AUTOMATED: CPT

## 2022-12-06 PROCEDURE — G0378 HOSPITAL OBSERVATION PER HR: HCPCS

## 2022-12-06 PROCEDURE — 74011250636 HC RX REV CODE- 250/636: Performed by: INTERNAL MEDICINE

## 2022-12-06 PROCEDURE — 74011250636 HC RX REV CODE- 250/636: Performed by: NURSE PRACTITIONER

## 2022-12-06 PROCEDURE — 99214 OFFICE O/P EST MOD 30 MIN: CPT | Performed by: INTERNAL MEDICINE

## 2022-12-06 PROCEDURE — 74011250637 HC RX REV CODE- 250/637: Performed by: INTERNAL MEDICINE

## 2022-12-06 RX ORDER — TETRAKIS(2-METHOXYISOBUTYLISOCYANIDE)COPPER(I) TETRAFLUOROBORATE 1 MG/ML
11 INJECTION, POWDER, LYOPHILIZED, FOR SOLUTION INTRAVENOUS
Status: COMPLETED | OUTPATIENT
Start: 2022-12-06 | End: 2022-12-06

## 2022-12-06 RX ORDER — FUROSEMIDE 40 MG/1
40 TABLET ORAL DAILY
Status: DISCONTINUED | OUTPATIENT
Start: 2022-12-07 | End: 2022-12-06

## 2022-12-06 RX ORDER — BUMETANIDE 2 MG/1
2 TABLET ORAL DAILY
Qty: 30 TABLET | Refills: 0 | Status: SHIPPED | OUTPATIENT
Start: 2022-12-06 | End: 2023-01-05

## 2022-12-06 RX ORDER — TETRAKIS(2-METHOXYISOBUTYLISOCYANIDE)COPPER(I) TETRAFLUOROBORATE 1 MG/ML
33 INJECTION, POWDER, LYOPHILIZED, FOR SOLUTION INTRAVENOUS
Status: COMPLETED | OUTPATIENT
Start: 2022-12-06 | End: 2022-12-06

## 2022-12-06 RX ORDER — FUROSEMIDE 10 MG/ML
40 INJECTION INTRAMUSCULAR; INTRAVENOUS ONCE
Status: COMPLETED | OUTPATIENT
Start: 2022-12-06 | End: 2022-12-06

## 2022-12-06 RX ADMIN — AMIODARONE HYDROCHLORIDE 100 MG: 200 TABLET ORAL at 12:32

## 2022-12-06 RX ADMIN — GABAPENTIN 300 MG: 300 CAPSULE ORAL at 12:32

## 2022-12-06 RX ADMIN — FUROSEMIDE 40 MG: 10 INJECTION, SOLUTION INTRAMUSCULAR; INTRAVENOUS at 12:31

## 2022-12-06 RX ADMIN — METOPROLOL SUCCINATE 100 MG: 25 TABLET, EXTENDED RELEASE ORAL at 12:32

## 2022-12-06 RX ADMIN — ACETAMINOPHEN 650 MG: 325 TABLET ORAL at 12:31

## 2022-12-06 RX ADMIN — TETRAKIS(2-METHOXYISOBUTYLISOCYANIDE)COPPER(I) TETRAFLUOROBORATE 33 MILLICURIE: 1 INJECTION, POWDER, LYOPHILIZED, FOR SOLUTION INTRAVENOUS at 09:50

## 2022-12-06 RX ADMIN — REGADENOSON 0.4 MG: 0.08 INJECTION, SOLUTION INTRAVENOUS at 09:33

## 2022-12-06 RX ADMIN — BUPROPION HYDROCHLORIDE 150 MG: 150 TABLET, EXTENDED RELEASE ORAL at 12:31

## 2022-12-06 RX ADMIN — TETRAKIS(2-METHOXYISOBUTYLISOCYANIDE)COPPER(I) TETRAFLUOROBORATE 11 MILLICURIE: 1 INJECTION, POWDER, LYOPHILIZED, FOR SOLUTION INTRAVENOUS at 08:15

## 2022-12-06 NOTE — PROGRESS NOTES
Problem: Mobility Impaired (Adult and Pediatric)  Goal: *Acute Goals and Plan of Care (Insert Text)  Description: FUNCTIONAL STATUS PRIOR TO ADMISSION: Patient was independent  without use of DME. She reports increased difficulty with stair navigation and endurance. HOME SUPPORT PRIOR TO ADMISSION: The patient lived with her spouse but did not require assist.    Physical Therapy Goals  Initiated 12/6/2022  1. Patient will move from supine to sit and sit to supine , scoot up and down, and roll side to side in bed with independence within 7 day(s). 2.  Patient will transfer from bed to chair and chair to bed with modified independence using the least restrictive device within 7 day(s). 3.  Patient will perform sit to stand with modified independence within 7 day(s). 4.  Patient will ambulate with modified independence for 300 feet with the least restrictive device within 7 day(s). 5.  Patient will ascend/descend 10 stairs with 1-2 handrail(s) with minimal assistance/contact guard assist within 7 day(s). Outcome: Not Met   PHYSICAL THERAPY EVALUATION  Patient: Susan Estrella (48 y.o. female)  Date: 12/6/2022  Primary Diagnosis: Dyspnea [R06.00]       Precautions:   Fall      ASSESSMENT  Based on the objective data described below, the patient presents with impaired balance without support, decreased endurance, no hypoxia with activity and overall decreased functional mobility from her independent baseline in the setting of hospital admission for dyspnea. Patient reports increased difficulty lately with stair navigation and requires rest break FDC through ascent. Educated on attempting lateral ascent with spouse positioned 1-2 steps below her to address. Patient tolerates 200ft ambulation around ED with CGA and HHA. Without HHA, patient with 2 losses of balance which she is able to correct independently without falling. Recommend SPC use in the home at this time and HHPT follow up.  No supplemental oxygen needed. Pulse oximetry assessment   98% at rest on room air (if 88% or less, skip next steps)  95% while ambulating on room air  96% at rest on room air at conclusion       Current Level of Function Impacting Discharge (mobility/balance): CGA with HHA    Functional Outcome Measure: The patient scored Total Score: 12/28 on the Tinetti outcome measure which is indicative of high fall risk. Other factors to consider for discharge: none additional     Patient will benefit from skilled therapy intervention to address the above noted impairments. PLAN :  Recommendations and Planned Interventions: bed mobility training, transfer training, gait training, therapeutic exercises, patient and family training/education, and therapeutic activities      Frequency/Duration: Patient will be followed by physical therapy:  5 times a week to address goals. Recommendation for discharge: (in order for the patient to meet his/her long term goals)  Physical therapy at least 2 days/week in the home AND ensure assist and/or supervision for safety with [facility] use    This discharge recommendation:  A follow-up discussion with the attending provider and/or case management is planned    IF patient discharges home will need the following DME: patient owns DME required for discharge         SUBJECTIVE:   Patient stated I have to stop about longterm up.     OBJECTIVE DATA SUMMARY:   Patient received supine in bed and was agreeable to participate in PT session. Patient was cleared by nursing to participate in PT session.        HISTORY:    Past Medical History:   Diagnosis Date    Anxiety and depression     Atrial fibrillation (HCC)     Chronic anticoagulation     GERD (gastroesophageal reflux disease)     Hypertension     Hypothyroid     Migraines      Past Surgical History:   Procedure Laterality Date    HX CHOLECYSTECTOMY      HX HYSTERECTOMY      HX ORTHOPAEDIC      Right shoulder surgery    HX PACEMAKER      HX ELIA AND BSO      [place] ANESTH,SURGERY OF SHOULDER      left       Personal factors and/or comorbidities impacting plan of care: none additional    Home Situation  Home Environment: Private residence  # Steps to Enter: 5  Rails to Enter: Yes  One/Two Story Residence: Two story  Living Alone: No  Support Systems: Spouse/Significant Other  Patient Expects to be Discharged to[de-identified] Home  Current DME Used/Available at Home: Grab bars, Cane, straight  Tub or Shower Type: Tub/Shower combination    EXAMINATION/PRESENTATION/DECISION MAKING:   Critical Behavior:  Neurologic State: Alert  Orientation Level: Oriented X4  Cognition: Appropriate decision making, Follows commands  Safety/Judgement: Awareness of environment  Hearing:     Skin:  all observed intact  Edema: none apparent   Range Of Motion:  AROM: Within functional limits                       Strength:    Strength: Within functional limits                    Tone & Sensation:   Tone: Normal              Sensation: Impaired (reports reduced sensation to marcos LE)               Coordination:  Coordination: Generally decreased, functional  Vision:   Corrective Lenses: Glasses  Functional Mobility:  Bed Mobility:  Rolling: Stand-by assistance  Supine to Sit: Stand-by assistance  Sit to Supine: Stand-by assistance  Scooting: Contact guard assistance  Transfers:  Sit to Stand: Contact guard assistance  Stand to Sit: Contact guard assistance                       Balance:   Sitting: Impaired; Without support  Sitting - Static: Good (unsupported)  Sitting - Dynamic: Fair (occasional)  Standing: Impaired; With support  Standing - Static: Good  Standing - Dynamic : Fair;Constant support  Ambulation/Gait Training:  Distance (ft): 200 Feet (ft)  Assistive Device: Gait belt;Cane, straight  Ambulation - Level of Assistance: Contact guard assistance        Gait Abnormalities: Scissoring;Trunk sway increased; Antalgic        Base of Support: Narrowed     Speed/Argelia: Shuffled  Step Length: Left shortened;Right shortened                     Stairs: Therapeutic Exercises:       Functional Measure:  Tinetti test:    Sitting Balance: 1  Arises: 1  Attempts to Rise: 2  Immediate Standing Balance: 0  Standing Balance: 1  Nudged: 0  Eyes Closed: 0  Turn 360 Degrees - Continuous/Discontinuous: 0  Turn 360 Degrees - Steady/Unsteady: 0  Sitting Down: 1  Balance Score: 6 Balance total score  Indication of Gait: 1  R Step Length/Height: 1  L Step Length/Height: 1  R Foot Clearance: 1  L Foot Clearance: 1  Step Symmetry: 0  Step Continuity: 0  Path: 0  Trunk: 0  Walking Time: 1  Gait Score: 6 Gait total score  Total Score: 12/28 Overall total score         Tinetti Tool Score Risk of Falls  <19 = High Fall Risk  19-24 = Moderate Fall Risk  25-28 = Low Fall Risk  Tinetti ME. Performance-Oriented Assessment of Mobility Problems in Elderly Patients. [facility] 66; [id number].  (Scoring Description: PT Bulletin Feb. 10, 1993)    Older adults: Stefano Vogel et al, 2009; n = 1000 [facility] elderly evaluated with ABC, JESS, ADL, and IADL)  · Mean JESS score for males aged 69-68 years = 26.21(3.40)  · Mean JESS score for females age 69-68 years = 25.16(4.30)  · Mean JESS score for males over 80 years = 23.29(6.02)  · Mean JESS score for females over 80 years = 17.20(8.32)        Physical Therapy Evaluation Charge Determination   History Examination Presentation Decision-Making   MEDIUM  Complexity : 1-2 comorbidities / personal factors will impact the outcome/ POC  MEDIUM Complexity : 3 Standardized tests and measures addressing body structure, function, activity limitation and / or participation in recreation  MEDIUM Complexity : Evolving with changing characteristics  MEDIUM Complexity : FOTO score of 26-74      Based on the above components, the patient evaluation is determined to be of the following complexity level: MEDIUM    Pain Rating:  Patient without reports of pain during therapy      Activity Tolerance:   Good, tolerates ADLs without rest breaks, and SpO2 stable on RA      After treatment patient left in no apparent distress:   Supine in bed, Call bell within reach, and Side rails x 3    COMMUNICATION/EDUCATION:   The patients plan of care was discussed with: Occupational therapist, Registered nurse, Physician, and Case management. Fall prevention education was provided and the patient/caregiver indicated understanding. and Patient/family have participated as able in goal setting and plan of care.     Thank you for this referral.  Irwin Virgen PT, DPT   Time Calculation: 17 mins

## 2022-12-06 NOTE — PROGRESS NOTES
[address]  [phone]    Hospitalist Progress Note      NAME: Kane Sharif   :  1945  MRM:  [id number]    Date/Time of service 2022  8:12 AM          Assessment and Plan:     CRISTIANA (acute kidney injury) / Dehydration - POA, likely from excess bumex use, started 4 weeks ago. Hydrated gently and improved. Make Bumex prn at discharge     Dyspnea - Subjective symptom. Not hypoxic. Lungs clear. Likely deconditioning, hypoventilation and anxiety. Check PT eval and see if hypoxia. Contaminated UA, unremarkable procalcitonin, Flu, COVID, TSH, cortisol. Cardiology ordered a stress test.     Atrial fibrillation / Chronic anticoagulation / Sinus node dysfunction - POA, stable. Continue amiodarone and BB. Patient requests a cardiology consult now to change her pacer battery while she is here. Continue xarelto     Hypertension - Continue valsartan and BB, stop micro dose Norvasc     Hypothyroid - Stop homeopathic dose of synthroid     Anxiety and depression / Hx Migraines - Continue gabapentin at a lower dose. Continue buproprion     GERD (gastroesophageal reflux disease) - PPI     Obese - Advise weight loss. Subjective:     Chief Complaint:  awaiting stress test    ROS:  (bold if positive, if negative)    Tolerating PT   NPO       Objective:     Last 24hrs VS reviewed since prior progress note.  Most recent are:    Visit Vitals  BP (!) 146/83   Pulse 65   Temp 97.6 °F (36.4 °C)   Resp 18   Ht 5' 4\" (1.626 m)   Wt 90.7 kg (200 lb)   SpO2 97%   BMI 34.33 kg/m²     SpO2 Readings from Last 6 Encounters:   22 97%   22 94%   07/15/22 96%   07/10/22 95%   21 95%   20 98%        No intake or output data in the 24 hours ending 22 7081     Physical Exam:    Gen:  Obese, in no acute distress  HEENT:  Pink conjunctivae, PERRL, hearing intact to voice, moist mucous membranes  Neck:  Supple, without masses, thyroid non-tender  Resp:  No accessory muscle use, clear breath sounds without wheezes rales or rhonchi  Card:  No murmurs, normal S1, S2 without thrills, bruits or peripheral edema  Abd:  Soft, non-tender, non-distended, normoactive bowel sounds are present, no mass  Lymph:  No cervical or inguinal adenopathy  Musc:  No cyanosis or clubbing  Skin:  No rashes or ulcers, skin turgor is good  Neuro:  Cranial nerves are grossly intact, no focal motor weakness, follows commands   Psych:  Poor insight, oriented to person, place, anxiious    Telemetry reviewed:   normal sinus rhythm  __________________________________________________________________  Medications Reviewed: (see below)  Medications:     Current Facility-Administered Medications   Medication Dose Route Frequency    amiodarone (CORDARONE) tablet 100 mg  100 mg Oral EVERY OTHER DAY    buPROPion SR (WELLBUTRIN SR) tablet 150 mg  150 mg Oral BID    gabapentin (NEURONTIN) capsule 300 mg  300 mg Oral TID    metoprolol succinate (TOPROL-XL) XL tablet 100 mg  100 mg Oral DAILY    pantoprazole (PROTONIX) tablet 40 mg  40 mg Oral ACB    rivaroxaban (XARELTO) tablet 15 mg  15 mg Oral DAILY WITH BREAKFAST    sodium chloride (NS) flush 5-40 mL  5-40 mL IntraVENous Q8H    sodium chloride (NS) flush 5-40 mL  5-40 mL IntraVENous PRN    acetaminophen (TYLENOL) tablet 650 mg  650 mg Oral Q6H PRN    Or    acetaminophen (TYLENOL) suppository 650 mg  650 mg Rectal Q6H PRN    polyethylene glycol (MIRALAX) packet 17 g  17 g Oral DAILY PRN    ondansetron (ZOFRAN ODT) tablet 4 mg  4 mg Oral Q8H PRN    Or    ondansetron (ZOFRAN) injection 4 mg  4 mg IntraVENous Q6H PRN     Current Outpatient Medications   Medication Sig    bumetanide (BUMEX) 2 mg tablet Take 1 Tablet by mouth two (2) times a day for 30 days. amiodarone (PACERONE) 100 mg tablet Take 100 mg by mouth every other day. cetirizine (ZYRTEC) 10 mg tablet Take 10 mg by mouth daily.     Biotin 2,500 mcg cap Take 2 Capsules by mouth daily. trospium (SANCTURA) 20 mg tablet Take 20 mg by mouth daily as needed. acetaminophen (TYLENOL) 325 mg tablet Take 650 mg by mouth every four (4) hours as needed for Pain (for back pain; takes tylenol before switching to tramadol). peg 400-propylene glycol (Systane, propylene glycoL,) 0.4-0.3 % drop Administer 1 Drop to both eyes as needed. valsartan (DIOVAN) 320 mg tablet Take 320 mg by mouth nightly. traMADoL (ULTRAM) 50 mg tablet Take 50 mg by mouth every six (6) hours as needed for Pain. Calcium Carbonate-Vit D3-Min 600 mg calcium- 400 unit tab Take 1 Tab by mouth daily. amLODIPine (NORVASC) 2.5 mg tablet Take 2.5 mg by mouth daily. vit A/vit C/vit E/zinc/copper (PRESERVISION AREDS PO) Take 1 Tablet by mouth.    rivaroxaban (XARELTO) 20 mg tab tablet Take 20 mg by mouth daily (with breakfast). gabapentin (NEURONTIN) 300 mg capsule Take 600 mg by mouth three (3) times daily. buPROPion SR (WELLBUTRIN SR) 150 mg SR tablet Take 150 mg by mouth two (2) times a day. therapeutic multivitamin (THERAGRAN) tablet Take 1 Tab by mouth daily. nebivolol (BYSTOLIC) 20 mg tablet Take 20 mg by mouth daily. levothyroxine (SYNTHROID) 25 mcg tablet Take 25 mcg by mouth daily. potassium chloride (K-DUR, KLOR-CON) 20 mEq tablet Take 1 tablet by mouth two  times daily    ascorbic acid, vitamin C, (VITAMIN C) 1,000 mg tablet Take 500 mg by mouth nightly. omeprazole (PRILOSEC) 20 mg capsule Take 20 mg by mouth daily.  Indications: GASTROESOPHAGEAL REFLUX        Lab Data Reviewed: (see below)  Lab Review:     Recent Labs     12/06/22  0355 12/05/22  1205   WBC 9.6 9.8   HGB 11.9 13.2   HCT 38.2 41.7    270     Recent Labs     12/06/22  0355 12/05/22  1205    138   K 4.2 4.3    103   CO2 30 30   * 115*   BUN 48* 50*   CREA 1.69* 1.81*   CA 8.5 8.8   MG 2.3  --    ALB 3.2* 3.8   TBILI 0.4 0.7   ALT 66 88*     Lab Results   Component Value Date/Time Glucose (POC) 127 (H) 11/17/2012 12:54 AM    Glucose (POC) 85 02/10/2012 03:20 PM    Glucose (POC) 130 (H) 03/21/2011 09:45 PM    Glucose (POC) 104 02/23/2011 05:48 PM    Glucose (POC) 116 (H) 01/14/2011 12:19 PM    Glucose (POC) 112 (H) 10/04/2010 10:57 PM    Glucose (POC) 121 (H) 07/09/2009 06:07 PM     No results for input(s): PH, PCO2, PO2, HCO3, FIO2 in the last 72 hours. No results for input(s): INR, INREXT in the last 72 hours. All Micro Results       Procedure Component Value Units Date/Time    CULTURE, URINE [[id number]] Collected: 12/05/22 1609    Order Status: Sent Specimen: Cath Urine Updated: 12/05/22 2154    COVID-19 RAPID TEST [[id number]] Collected: 12/05/22 1447    Order Status: Completed Specimen: Nasopharyngeal Updated: 12/05/22 1510     Specimen source Nasopharyngeal        COVID-19 rapid test Not detected        Comment: Rapid Abbott ID Now       Rapid NAAT:  The specimen is NEGATIVE for SARS-CoV-2, the novel coronavirus associated with COVID-19. Negative results should be treated as presumptive and, if inconsistent with clinical signs and symptoms or necessary for patient management, should be tested with an alternative molecular assay. Negative results do not preclude SARS-CoV-2 infection and should not be used as the sole basis for patient management decisions. This test has been authorized by the FDA under an Emergency Use Authorization (EUA) for use by authorized laboratories. Fact sheet for Healthcare Providers:  http://www.lisa-rina.biz/  Fact sheet for Patients: http://www.gonzalez-flynn.biz/       Methodology: Isothermal Nucleic Acid Amplification                 Other pertinent lab: none    Total time spent with patient: 30 Minutes I personally reviewed chart, notes, data and current medications in the medical record. I have personally examined and treated the patient at bedside during this period.  To assist coordination of care and communication with nursing and staff, this note may be preliminary early in the day, but finalized by end of the day.                  Care Plan discussed with: Patient, Care Manager, Nursing Staff, Consultant/Specialist, and >50% of time spent in counseling and coordination of care    Discussed:  Care Plan and D/C Planning    Prophylaxis:  H2B/PPI and xarelto    Disposition:  Home w/Family           ___________________________________________________    Attending Physician: Caren Mullins MD

## 2022-12-06 NOTE — PROGRESS NOTES
CARE MANAGEMENT NOTE      CM received orders to arrange [facility] PT/OT. CM met with Pt and spouse at bedside. Demographics confirmed. CM provided education on [facility] services. Pt denies having preference for [facility] agency. Pt agreeable to using [facility]. Pt signed patient choice letter. Referral sent to [facility] via [employer].  CM spoke with Milady Stephen at [facility] re: referral.     Information added to AVS.   _____________________________________  Carlos Prater [address] [facility] Management   Available via [facility]  12/6/2022   2:15 PM

## 2022-12-06 NOTE — PROGRESS NOTES
Problem: Self Care Deficits Care Plan (Adult)  Goal: *Acute Goals and Plan of Care (Insert Text)  Description: FUNCTIONAL STATUS PRIOR TO ADMISSION: Patient was modified independent for functional mobility, requiring increased time to complete tasks secondary to decreased endurance. HOME SUPPORT: The patient lived with spouse who assisted patient as needed with household chores. Occupational Therapy Goals  Initiated 12/6/2022  1. Patient will perform lower body dressing with supervision/set-up within 7 day(s). 2.  Patient will perform grooming tasks, standing at sink, with supervision/set-up within 7 day(s). 3.  Patient will perform toilet transfers with supervision/set-up within 7 day(s). 4.  Patient will perform all aspects of toileting with supervision/set-up within 7 day(s). 5.  Patient will participate in upper extremity therapeutic exercise/activities with supervision/set-up for 10 minutes within 7 day(s). 6.  Patient will utilize energy conservation techniques during functional activities with verbal cues within 7 day(s). Outcome: Progressing Towards Goal   OCCUPATIONAL THERAPY EVALUATION  Patient: Balaji Magallon (61 y.o. female)  Date: 12/6/2022  Primary Diagnosis: Dyspnea [R06.00]       Precautions:   Fall       ASSESSMENT  Based on the objective data described below, the patient presents with hospital admission secondary to dyspnea. Patient reports decreased endurance at home, prompting assist with some tasks from spouse. Patient today with near baseline for ADL tasks and transfers, and report of some SOB but no supplemental O2 requirement. Patient performed tasks at [facility] with [facility] support. Patient to use SPC at home, per PT, with assist of spouse as needed. Current Level of Function Impacting Discharge (ADLs/self-care): CGA    Functional Outcome Measure: The patient scored 85/100 on the Barthel Index  outcome measure.       Other factors to consider for discharge:      Patient will benefit from skilled therapy intervention to address the above noted impairments. PLAN :  Recommendations and Planned Interventions: self care training, functional mobility training, therapeutic exercise, balance training, therapeutic activities, endurance activities, patient education, home safety training, and family training/education    Frequency/Duration: Patient will be followed by occupational therapy 3 times a week to address goals. Recommendation for discharge: (in order for the patient to meet his/her long term goals)  Occupational therapy at least 2 days/week in the home     This discharge recommendation:  Has been made in collaboration with the attending provider and/or case management    IF patient discharges home will need the following DME: none       SUBJECTIVE:   Patient stated I have needed him to help with house stuff.     OBJECTIVE DATA SUMMARY:   HISTORY:   Past Medical History:   Diagnosis Date    Anxiety and depression     Atrial fibrillation (HCC)     Chronic anticoagulation     GERD (gastroesophageal reflux disease)     Hypertension     Hypothyroid     Migraines      Past Surgical History:   Procedure Laterality Date    HX CHOLECYSTECTOMY      HX HYSTERECTOMY      HX ORTHOPAEDIC      Right shoulder surgery    HX PACEMAKER      HX ELIA AND BSO      OK ANESTH,SURGERY OF SHOULDER      left       Expanded or extensive additional review of patient history:     Home Situation  Home Environment: Private residence  # Steps to Enter: 5  Rails to Enter: Yes  One/Two Story Residence: Two story  Living Alone: No  Support Systems: Spouse/Significant Other  Patient Expects to be Discharged to[de-identified] Home  Current DME Used/Available at Home: Grab bars, Cane, straight  Tub or Shower Type: Tub/Shower combination    Hand dominance: Right    EXAMINATION OF PERFORMANCE DEFICITS:  Cognitive/Behavioral Status:  Neurologic State: Alert  Orientation Level: Oriented X4  Cognition: Appropriate decision making; Follows commands  Perception: Appears intact  Perseveration: No perseveration noted  Safety/Judgement: Awareness of environment    Skin: intact as seen    Edema: none noted     Hearing:       Vision/Perceptual:                                Corrective Lenses: Glasses    Range of Motion:  AROM: Within functional limits                         Strength:  Strength: Within functional limits                Coordination:  Coordination: Generally decreased, functional  Fine Motor Skills-Upper: Left Intact; Right Intact    Gross Motor Skills-Upper: Left Intact; Right Intact    Tone & Sensation:    Tone: Normal  Sensation: Impaired (reports reduced sensation to marcos LE)                      Balance:  Sitting: Impaired; Without support  Sitting - Static: Good (unsupported)  Sitting - Dynamic: Fair (occasional)  Standing: Impaired; With support  Standing - Static: Good  Standing - Dynamic : Fair;Constant support    Functional Mobility and Transfers for ADLs:  Bed Mobility:  Rolling: Stand-by assistance  Supine to Sit: Stand-by assistance  Sit to Supine: Stand-by assistance  Scooting: Contact guard assistance    Transfers:  Sit to Stand: Contact guard assistance  Stand to Sit: Contact guard assistance  Bathroom Mobility: Contact guard assistance  Toilet Transfer : Contact guard assistance    ADL Assessment:  Feeding: Supervision    Oral Facial Hygiene/Grooming: Stand-by assistance    Bathing: Contact guard assistance         Upper Body Dressing: Supervision    Lower Body Dressing: Contact guard assistance    Toileting: Stand by assistance                  ADL Intervention and task modifications:                                          Cognitive Retraining  Safety/Judgement: Awareness of environment      Therapeutic Exercise:     Functional Measure:  .   Barthel Index:  Bathin  Bladder: 10  Bowels: 10  Groomin  Dressing: 10  Feeding: 10  Mobility: 10  Stairs: 5  Toilet Use: 10  Transfer (Bed to Chair and Back): 10  Total: 85/100      The Barthel ADL Index: Guidelines  1. The index should be used as a record of what a patient does, not as a record of what a patient could do. 2. The main aim is to establish degree of independence from any help, physical or verbal, however minor and for whatever reason. 3. The need for supervision renders the patient not independent. 4. A patient's performance should be established using the best available evidence. Asking the patient, friends/relatives and nurses are the usual sources, but direct observation and common sense are also important. However direct testing is not needed. 5. Usually the patient's performance over the preceding 24-48 hours is important, but occasionally longer periods will be relevant. 6. Middle categories imply that the patient supplies over 50 per cent of the effort. 7. Use of aids to be independent is allowed. Score Interpretation (from [address] [facility] 83)    Independent   60-79 Minimally independent   40-59 Partially dependent   20-39 Very dependent   <20 Totally dependent     -Jackie Garcia., Barthel, D.W. (1965). Functional evaluation: the Barthel Index. [address] [facility] ([address] (1997). The Barthel activities of daily living index: self-reporting versus actual performance in the old (> or = 75 years). Journal [address] 45(7), [address] [facility], J.J.M.F, Tonja Rosales., Romana Just. (1999). Measuring the change in disability after inpatient rehabilitation; comparison of the responsiveness of the Barthel Index and Functional [place] Measure. Journal of Neurology, Neurosurgery, and Psychiatry, 66(4), 770-283. OTILIA Silva.BECKY, KESHIA Mckeon, & Karina Choudhury MMckennaA. (2004) Assessment of post-stroke quality of life in cost-effectiveness studies: The usefulness of the Barthel Index and the EuroQoL-5D.  Quality of Life Research, 13, 647-98         Occupational Therapy Evaluation Charge Determination   History Examination Decision-Making   LOW Complexity : Brief history review  LOW Complexity : 1-3 performance deficits relating to physical, cognitive , or psychosocial skils that result in activity limitations and / or participation restrictions  LOW Complexity : No comorbidities that affect functional and no verbal or physical assistance needed to complete eval tasks       Based on the above components, the patient evaluation is determined to be of the following complexity level: LOW   Pain Rating:  None     Activity Tolerance:   Good and requires rest breaks    After treatment patient left in no apparent distress:    Supine in bed, Call bell within reach, Bed / chair alarm activated, and Caregiver / family present    COMMUNICATION/EDUCATION:   The patients plan of care was discussed with: Physical therapist and Registered nurse. Home safety education was provided and the patient/caregiver indicated understanding., Patient/family have participated as able in goal setting and plan of care. , and Patient/family agree to work toward stated goals and plan of care. This patients plan of care is appropriate for delegation to [facility].     Thank you for this referral.  Darinel Jim, OTR/L  Time Calculation: 16 mins

## 2022-12-06 NOTE — DISCHARGE INSTRUCTIONS
Patient Discharge Instructions    Nirav Ziegler / [id number] : 1945    Admitted 2022 Discharged: 2022     Primary Diagnoses  Problem List as of 2022 Date Reviewed: 2022               RESOLVED: TIA (transient ischemic attack)        Dyspnea        Dehydration        [facility]) [facility]        Chronic anticoagulation        Hypertension        Obese        GERD (gastroesophageal reflux disease)        Anxiety and depression        Atrial fibrillation (HCC)        Sinus node dysfunction ([facility]        Hypothyroid        Migraines       Take Home Medications     It is important that you take the medication exactly as they are prescribed. Keep your medication in the bottles provided by the pharmacist and keep a list of the medication names, dosages, and times to be taken in your wallet. Do not take other medications without consulting your doctor. What to do at Home    Recommended diet: Cardiac Diet    Recommended activity: Activity as tolerated and PT/OT per Home Health    If you experience worse symptoms, please follow up with your PCP. Follow-up with your PCP in a few weeks    Follow-up Information       Follow up With Specialties Details Why Contact Info    Liliana Bingham MD Family Medicine Schedule an appointment as soon as possible for a visit in 1 week(s)  Tino Ramirez [facility]   [phone]               Information obtained by :  I understand that if any problems occur once I am at home I am to contact my physician. I understand and acknowledge receipt of the instructions indicated above.                                                                                                                                            Physician's or R.N.'s Signature                                                                  Date/Time Patient or Representative Signature                                                          Date/Time

## 2022-12-06 NOTE — PROGRESS NOTES
Pt ordered for Home O2 evaluation. Pt scheduled for Physical Therapy evaluation. Spoke with Brisa Memory, PT to assess.

## 2022-12-06 NOTE — ED NOTES
Bedside and Verbal shift change report given to Nina Lindquist RN (oncoming nurse) by Preethi You RN (offgoing nurse). Report included the following information SBAR, ED Summary, and MAR.

## 2022-12-06 NOTE — DISCHARGE SUMMARY
Physician Discharge Summary     Patient ID:  Anette Haile  [id number]  78 y.o.  1945    Admit date: 12/5/2022    Discharge date of service and time: 12/6/2022  Greater than 30 minutes were spent providing discharge related services for this patient    Admission Diagnoses: Dyspnea [R06.00]    Discharge Diagnoses:    Principal Diagnosis   <principal problem not specified>                                             Hospital Course and other diagnoses  CRISTIANA (acute kidney injury) / Dehydration - POA, likely from excess bumex use, started 4 weeks ago. Hydrated gently and improved. I had planned to make Bumex prn at discharge, cardiology increased the daily dose     Dyspnea - Subjective symptom. Not hypoxic even during 6 minute walk. Lungs clear. Likely deconditioning, hypoventilation and anxiety. Contaminated UA, unremarkable procalcitonin, Flu, COVID, TSH, cortisol. Cardiology ordered a stress test, which was normal     Atrial fibrillation / Chronic anticoagulation / Sinus node dysfunction - POA, stable. Continue amiodarone and BB. Patient requests a cardiology consult now to change her pacer battery while she is here. They did not. Continue xarelto     Hypertension - Continue BB, stop micro dose Norvasc and valsartan     Hypothyroid - Stop homeopathic dose of synthroid     Anxiety and depression / Hx Migraines - Continued gabapentin at a lower dose. PCP may want to permanmently reduce the dose. Continue buproprion     GERD (gastroesophageal reflux disease) - PPI     Obese - Advise weight loss. PCP: Hebert Moreira MD    Consults: Cardiology    Significant Diagnostic Studies: See Hospital Course    Discharged home in improved condition.     Discharge Exam:  BP (!) 146/83   Pulse 65   Temp 97.6 °F (36.4 °C)   Resp 18   Ht 5' 4\" (1.626 m)   Wt 90.7 kg (200 lb)   SpO2 97%   BMI 34.33 kg/m²      Gen:  Obese, in no acute distress  HEENT:  Pink conjunctivae, PERRL, hearing intact to voice, moist mucous membranes  Neck:  Supple, without masses, thyroid non-tender  Resp:  No accessory muscle use, clear breath sounds without wheezes rales or rhonchi  Card:  No murmurs, normal S1, S2 without thrills, bruits or peripheral edema  Abd:  Soft, non-tender, non-distended, normoactive bowel sounds are present, no mass  Lymph:  No cervical or inguinal adenopathy  Musc:  No cyanosis or clubbing  Skin:  No rashes or ulcers, skin turgor is good  Neuro:  Cranial nerves are grossly intact, no focal motor weakness, follows commands   Psych:  Poor insight, oriented to person, place, anxiious    Patient Instructions:   Current Discharge Medication List        CONTINUE these medications which have CHANGED    Details   bumetanide (BUMEX) 2 mg tablet Take 1 Tablet by mouth daily for 30 days. Qty: 30 Tablet, Refills: 0           CONTINUE these medications which have NOT CHANGED    Details   amiodarone (PACERONE) 100 mg tablet Take 100 mg by mouth every other day. cetirizine (ZYRTEC) 10 mg tablet Take 10 mg by mouth daily. Biotin 2,500 mcg cap Take 2 Capsules by mouth daily. trospium (SANCTURA) 20 mg tablet Take 20 mg by mouth daily as needed. acetaminophen (TYLENOL) 325 mg tablet Take 650 mg by mouth every four (4) hours as needed for Pain (for back pain; takes tylenol before switching to tramadol). peg 400-propylene glycol (Systane, propylene glycoL,) 0.4-0.3 % drop Administer 1 Drop to both eyes as needed. traMADoL (ULTRAM) 50 mg tablet Take 50 mg by mouth every six (6) hours as needed for Pain. Calcium Carbonate-Vit D3-Min 600 mg calcium- 400 unit tab Take 1 Tab by mouth daily. vit A/vit C/vit E/zinc/copper (PRESERVISION AREDS PO) Take 1 Tablet by mouth.      rivaroxaban (XARELTO) 20 mg tab tablet Take 20 mg by mouth daily (with breakfast). gabapentin (NEURONTIN) 300 mg capsule Take 600 mg by mouth three (3) times daily.       buPROPion SR (WELLBUTRIN SR) 150 mg SR tablet Take 150 mg by mouth two (2) times a day. therapeutic multivitamin (THERAGRAN) tablet Take 1 Tab by mouth daily. nebivolol (BYSTOLIC) 20 mg tablet Take 20 mg by mouth daily. potassium chloride (K-DUR, KLOR-CON) 20 mEq tablet Take 1 tablet by mouth two  times daily  Qty: 60 Tab, Refills: 0      ascorbic acid, vitamin C, (VITAMIN C) 1,000 mg tablet Take 500 mg by mouth nightly. omeprazole (PRILOSEC) 20 mg capsule Take 20 mg by mouth daily. Indications: GASTROESOPHAGEAL REFLUX           STOP taking these medications       valsartan (DIOVAN) 320 mg tablet Comments:   Reason for Stopping:         amLODIPine (NORVASC) 2.5 mg tablet Comments:   Reason for Stopping:         levothyroxine (SYNTHROID) 25 mcg tablet Comments:   Reason for Stopping:             Activity: Activity as tolerated and PT/OT per Home Health  Diet: Cardiac Diet  Wound Care: None needed    Follow-up with your PCP in a few weeks.   Follow-up tests/labs - none    Signed:  Dima Vargas MD  12/6/2022  1:11 PM

## 2022-12-06 NOTE — PROGRESS NOTES
AVS and medication prescription reviewed with pt. Pt expressed no additional questions. IV removed without complaint. Telemetry discontinued. Pt dressed independently. Belongings bag sent home with pt. Pt discharge home with family.

## 2022-12-06 NOTE — PROGRESS NOTES
[facility]                    Cardiology Care Note     []Initial Encounter     [x]Follow-up    Patient Name: Anette Haile - :1945 - NLK:[id number]  Primary Cardiologist: Dr. Sailnas McHenry Cardiologist: [facility] Cardiology Physicians: Anjelica Villegas MD     Reason for encounter: Elevated pBNP, CHF    HPI:       Anette Haile is a 78 y.o. female with PMH significant for PAF on Xarelto, SSS s/p pacer, HFpEF, HTN, hypothyroidism and obesity. Pt presented to ED with complaints of progressive SOB, now occurring at rest, increased weakness and feeling lightheaded, especially when standing. Continues with lower ext edema. She was admitted last month for CHF exacerbation and discharged on increased diuretics, which she has been taking but doesn't feel she is making any increased amount of urine. Of note, she was admitted to [address] [place] [address] between discharge prev to now for dizziness and told she'd had a CVA, she is unsure if this was acute or more chronic. She was advised to follow up w/ neurology but has not been able to see them yet. She has not followed up w/ Dr. Rashida Khan yet, wants to know if pacer battery can be changed this admission. She denies any CP, palpitations. Subjective:      Anette Haile reports dyspnea. Assessment and Plan     Acute on chronic HFpEF: last EF 60-65% in Nov, no need to repeat echo as of now. Diuresis held but will not be able to remain off diuresis long term. pBNP up to 8431. Chest CTA neg last month for PE and on Xarelto. Has been several years since last stress by her report - for nuc stress today      2. Dizziness, hx CVA: dizziness could be d/t orthostasis - check orthostatics. On xarelto    3. PAF, hx SSS s/p pacer: pacer w/ 3 mos battery left last admission, pt has not followed up w/ cardiology since then. Will discuss during this admission. Cont PO amio, xarelto     4.  CRISTIANA on CKD: Cr improved to 1.69 off diuretics, will need to resume diuresis at some point - TBD pending stress results     5. HTN: resume meds as needed     Cardiology Attending:    Patient personally seen and examined. All the elements of history and examination were personally performed. Assessment and plan was discussed and agree as written above. Briefly    Travis Abrams is a 78 y.o. female with PMH of Afib admitted with SOB. AAOx3  Chest - clear to auscultation, no wheezes, rales or rhonchi  Heart - normal rate, regular rhythm, normal S1, S2, no murmurs, rubs, clicks or gallops  Abdomen - soft, nontender, nondistended, no masses or organomegaly  Extremities - peripheral pulses normal, no pedal edema      Assessment:    - Acute on chronic HFpEF  - Dizziness, hx CVA      Plan:     - Stress Nuc is negative for ischemia.   - Resume diuretics. Give one dose IV and then PO.   - Has PPM replacement setup 2 days from now with Dr. Tabatha Johnson and thereafter will like to transition care to our office as she lives right next door.   - ok to DC home.              ___________________________________________________    Mohan Edwards. Nicky Ramírez MD, [facility]             ____________________________________________________________    Cardiac testing  11/09/22    ECHO ADULT COMPLETE 11/10/2022 11/10/2022    Interpretation Summary    Left Ventricle: Normal left ventricular systolic function with a visually estimated EF of 60 - 65%. Left ventricle size is normal. Normal wall thickness. Normal wall motion. Abnormal diastolic function. Right Ventricle: Right ventricle size is normal. Normal wall thickness. Lead present in the right ventricle. Left Atrium: Left atrium is mildly dilated.     Signed by: Byron Wilson MD on 11/10/2022  9:13 PM      Most recent HS troponins:  Recent Labs     12/05/22  1205   TROPHS 18       ECG: ventricular paced rhythm     Review of Systems:    [x]All other systems reviewed and all negative except as written in HPI    [] Patient unable to provide secondary to condition    Past Medical History:   Diagnosis Date    Anxiety and depression     Atrial fibrillation (HCC)     Chronic anticoagulation     GERD (gastroesophageal reflux disease)     Hypertension     Hypothyroid     Migraines      Past Surgical History:   Procedure Laterality Date    HX CHOLECYSTECTOMY      HX HYSTERECTOMY      HX ORTHOPAEDIC      Right shoulder surgery    HX PACEMAKER      HX ELIA AND BSO      VT ANESTH,SURGERY OF SHOULDER      left     Social Hx:  reports that she has quit smoking. She has never used smokeless tobacco. She reports current alcohol use. She reports that she does not use drugs. Family Hx: family history includes Cancer in her mother; Heart Attack in her father and another family member; Heart Disease in her father; Hypertension in her father and another family member; Migraines in her mother; Stroke in her father and another family member. No Known Allergies       OBJECTIVE:  Wt Readings from Last 3 Encounters:   12/05/22 90.7 kg (200 lb)   11/11/22 97.9 kg (215 lb 12.8 oz)   07/15/22 100.8 kg (222 lb 3.6 oz)     No intake or output data in the 24 hours ending 12/06/22 0828    Physical Exam:    Vitals:   Vitals:    12/06/22 0501 12/06/22 0601 12/06/22 0700 12/06/22 0728   BP: (!) 123/58 138/78  (!) 146/83   Pulse: 65 65 65 65   Resp: 15 16  18   Temp:    97.6 °F (36.4 °C)   SpO2: 95% 97%  97%   Weight:       Height:         Telemetry:  paced    Gen: Well-developed, well-nourished, in no acute distress  Neck: Supple, No JVD, No Carotid Bruit  Resp: No accessory muscle use, Clear breath sounds, No rales or rhonchi  Card: Regular Rate,Rythm, Normal S1, S2, No murmurs, rubs or gallop. No thrills.    Abd:   Soft, non-tender, non-distended, BS+   MSK: No cyanosis  Skin: No rashes    Neuro: Moving all four extremities, follows commands appropriately  Psych: Good insight, oriented to person, place, alert, Nml Affect  LE: +1-2 edema bilat    Data Review:     Radiology:   XR Results (most recent):  Results from Hospital Encounter encounter on 12/05/22    XR CHEST PA LAT    Narrative  Clinical history: SOB lightheaded  INDICATION:   SOB lightheaded  COMPARISON: 11/9/2022    FINDINGS:  PA and lateral views of the chest are obtained. The cardiopericardial silhouette is enlarged. Cardiac pacer. . There is no  pleural effusion, pneumothorax or focal consolidation present. Impression  Cardiomegaly. No significant interval change. No acute intrathoracic disease. Recent Labs     12/06/22  0355 12/05/22  1205    138   K 4.2 4.3    103   CO2 30 30   BUN 48* 50*   CREA 1.69* 1.81*   * 115*   CA 8.5 8.8     Recent Labs     12/06/22  0355 12/05/22  1205   WBC 9.6 9.8   HGB 11.9 13.2   HCT 38.2 41.7    270     Recent Labs     12/06/22  0355 12/05/22  1205   AP 79 95     No results for input(s): CHOL, LDLC in the last 72 hours.     No lab exists for component: TGL, HDLC,  HBA1C      Current meds:    Current Facility-Administered Medications:     amiodarone (CORDARONE) tablet 100 mg, 100 mg, Oral, EVERY OTHER DAY, Blanco Woods MD    buPROPion [facility] SR) tablet 150 mg, 150 mg, Oral, BID, Blanco Woods MD    gabapentin (NEURONTIN) capsule 300 mg, 300 mg, Oral, TID, Blanco Woods MD, 300 mg at 12/05/22 2112    metoprolol succinate (TOPROL-XL) XL tablet 100 mg, 100 mg, Oral, DAILY, Blanco Woods MD    pantoprazole (PROTONIX) tablet 40 mg, 40 mg, Oral, ACB, Blanco Woods MD    rivaroxaban Sana Olives) tablet 15 mg, 15 mg, Oral, DAILY WITH BREAKFAST, Blanco Woods MD    sodium chloride (NS) flush 5-40 mL, 5-40 mL, IntraVENous, Q8H, Blanco Woods MD, 10 mL at 12/05/22 2112    sodium chloride (NS) flush 5-40 mL, 5-40 mL, IntraVENous, PRN, Blanco Woods MD    acetaminophen (TYLENOL) tablet 650 mg, 650 mg, Oral, Q6H PRN **OR** [DISCONTINUED] acetaminophen (TYLENOL) suppository 650 mg, 650 mg, Rectal, Q6H PRN, Yris Messina MD    polyethylene glycol (MIRALAX) packet 17 g, 17 g, Oral, DAILY PRN, Yris Messina MD    [DISCONTINUED] ondansetron (ZOFRAN ODT) tablet 4 mg, 4 mg, Oral, Q8H PRN **OR** ondansetron (ZOFRAN) injection 4 mg, 4 mg, IntraVENous, Q6H PRN, Yris Messina MD    Current Outpatient Medications:     bumetanide (BUMEX) 2 mg tablet, Take 1 Tablet by mouth two (2) times a day for 30 days. , Disp: 60 Tablet, Rfl: 0    amiodarone (PACERONE) 100 mg tablet, Take 100 mg by mouth every other day., Disp: , Rfl:     cetirizine (ZYRTEC) 10 mg tablet, Take 10 mg by mouth daily. , Disp: , Rfl:     Biotin 2,500 mcg cap, Take 2 Capsules by mouth daily. , Disp: , Rfl:     trospium (SANCTURA) 20 mg tablet, Take 20 mg by mouth daily as needed. , Disp: , Rfl:     acetaminophen (TYLENOL) 325 mg tablet, Take 650 mg by mouth every four (4) hours as needed for Pain (for back pain; takes tylenol before switching to tramadol). , Disp: , Rfl:     peg 400-propylene glycol (Systane, propylene glycoL,) 0.4-0.3 % drop, Administer 1 Drop to both eyes as needed. , Disp: , Rfl:     valsartan (DIOVAN) 320 mg tablet, Take 320 mg by mouth nightly., Disp: , Rfl:     traMADoL (ULTRAM) 50 mg tablet, Take 50 mg by mouth every six (6) hours as needed for Pain., Disp: , Rfl:     Calcium Carbonate-Vit D3-Min 600 mg calcium- 400 unit tab, Take 1 Tab by mouth daily. , Disp: , Rfl:     amLODIPine (NORVASC) 2.5 mg tablet, Take 2.5 mg by mouth daily. , Disp: , Rfl:     vit A/vit C/vit E/zinc/copper (PRESERVISION AREDS PO), Take 1 Tablet by mouth., Disp: , Rfl:     rivaroxaban (XARELTO) 20 mg tab tablet, Take 20 mg by mouth daily (with breakfast). , Disp: , Rfl:     gabapentin (NEURONTIN) 300 mg capsule, Take 600 mg by mouth three (3) times daily. , Disp: , Rfl:     buPROPion SR (WELLBUTRIN SR) 150 mg SR tablet, Take 150 mg by mouth two (2) times a day., Disp: , Rfl:     therapeutic multivitamin (THERAGRAN) tablet, Take 1 Tab by mouth daily. , Disp: , Rfl:     nebivolol (BYSTOLIC) 20 mg tablet, Take 20 mg by mouth daily. , Disp: , Rfl:     levothyroxine (SYNTHROID) 25 mcg tablet, Take 25 mcg by mouth daily. , Disp: , Rfl:     potassium chloride (K-DUR, KLOR-CON) 20 mEq tablet, Take 1 tablet by mouth two  times daily, Disp: 60 Tab, Rfl: 0    ascorbic acid, vitamin C, (VITAMIN C) 1,000 mg tablet, Take 500 mg by mouth nightly., Disp: , Rfl:     omeprazole (PRILOSEC) 20 mg capsule, Take 20 mg by mouth daily. Indications: GASTROESOPHAGEAL REFLUX, Disp: , Rfl:     Yahir Lindo NP    [facility]  Call center: (m) [phone] (j) [phone]      CC: Collin Cali MD

## 2022-12-07 ENCOUNTER — PATIENT OUTREACH (OUTPATIENT)
Dept: CASE MANAGEMENT | Age: 77
End: 2022-12-07

## 2022-12-07 ENCOUNTER — OFFICE VISIT (OUTPATIENT)
Dept: SLEEP MEDICINE | Age: 77
End: 2022-12-07
Payer: MEDICARE

## 2022-12-07 VITALS
DIASTOLIC BLOOD PRESSURE: 76 MMHG | HEIGHT: 64 IN | OXYGEN SATURATION: 100 % | WEIGHT: 200 LBS | BODY MASS INDEX: 34.15 KG/M2 | HEART RATE: 65 BPM | SYSTOLIC BLOOD PRESSURE: 130 MMHG

## 2022-12-07 DIAGNOSIS — G47.33 OSA (OBSTRUCTIVE SLEEP APNEA): Primary | ICD-10-CM

## 2022-12-07 LAB
ATRIAL RATE: 119 BPM
CALCULATED R AXIS, ECG10: -72 DEGREES
CALCULATED T AXIS, ECG11: 95 DEGREES
DIAGNOSIS, 93000: NORMAL
Q-T INTERVAL, ECG07: 476 MS
QRS DURATION, ECG06: 160 MS
QTC CALCULATION (BEZET), ECG08: 495 MS
VENTRICULAR RATE, ECG03: 65 BPM

## 2022-12-07 PROCEDURE — 1123F ACP DISCUSS/DSCN MKR DOCD: CPT | Performed by: SPECIALIST

## 2022-12-07 PROCEDURE — 3017F COLORECTAL CA SCREEN DOC REV: CPT | Performed by: SPECIALIST

## 2022-12-07 PROCEDURE — G8427 DOCREV CUR MEDS BY ELIG CLIN: HCPCS | Performed by: SPECIALIST

## 2022-12-07 PROCEDURE — 1090F PRES/ABSN URINE INCON ASSESS: CPT | Performed by: SPECIALIST

## 2022-12-07 PROCEDURE — G9717 DOC PT DX DEP/BP F/U NT REQ: HCPCS | Performed by: SPECIALIST

## 2022-12-07 PROCEDURE — 1111F DSCHRG MED/CURRENT MED MERGE: CPT | Performed by: SPECIALIST

## 2022-12-07 PROCEDURE — G8400 PT W/DXA NO RESULTS DOC: HCPCS | Performed by: SPECIALIST

## 2022-12-07 PROCEDURE — 99213 OFFICE O/P EST LOW 20 MIN: CPT | Performed by: SPECIALIST

## 2022-12-07 PROCEDURE — G8536 NO DOC ELDER MAL SCRN: HCPCS | Performed by: SPECIALIST

## 2022-12-07 PROCEDURE — 1101F PT FALLS ASSESS-DOCD LE1/YR: CPT | Performed by: SPECIALIST

## 2022-12-07 PROCEDURE — 3074F SYST BP LT 130 MM HG: CPT | Performed by: SPECIALIST

## 2022-12-07 PROCEDURE — 3078F DIAST BP <80 MM HG: CPT | Performed by: SPECIALIST

## 2022-12-07 PROCEDURE — G8419 CALC BMI OUT NRM PARAM NOF/U: HCPCS | Performed by: SPECIALIST

## 2022-12-07 NOTE — PROGRESS NOTES
[facility]. [place], [address]  Tel.  [phone]  Fax. [facility] [address]  [place], [address] [facility]  Tel.  [phone]  Fax. [phone] [address] Susy Lam   Tel.  [phone]  Fax. [phone]         Chief Complaint       Chief Complaint   Patient presents with    Sleep Problem     Yearly follow up/ pt has pacemaker and recalled mask         HPI        Anand Aguilar is a 78 y.o. female seen for follow-up. She was evaluated with a sleep study which demonstrated 72 respiratory events occurred of which 52 hypopnea and 20 obstructive apnea. The overall AHI was 16/h. Events more prominent in REM sleep with the REM-related AHI of  60 per hour. Minimal SaO2 79%. Mild-moderate snoring noted. Recommendation: APAP 5-15 cm. Currently 7-15 cm. Compliance data downloaded and reviewed in detail with the patient today. During the past 90 days, APAP used during 57 days with the average daily use of 4.75 hours. CMS compliance criteria 38%. AHI 1.7  per hour. Variable mask leak. Patient has a recalled Respironics CPAP mask; has a pacemaker. No Known Allergies    Current Outpatient Medications   Medication Sig Dispense Refill    bumetanide (BUMEX) 2 mg tablet Take 1 Tablet by mouth daily for 30 days. 30 Tablet 0    amiodarone (PACERONE) 100 mg tablet Take 100 mg by mouth every other day. cetirizine (ZYRTEC) 10 mg tablet Take 10 mg by mouth daily. Biotin 2,500 mcg cap Take 2 Capsules by mouth daily. trospium (SANCTURA) 20 mg tablet Take 20 mg by mouth daily as needed. acetaminophen (TYLENOL) 325 mg tablet Take 650 mg by mouth every four (4) hours as needed for Pain (for back pain; takes tylenol before switching to tramadol). peg 400-propylene glycol (Systane, propylene glycoL,) 0.4-0.3 % drop Administer 1 Drop to both eyes as needed.       Calcium Carbonate-Vit D3-Min 600 mg calcium- 400 unit tab Take 1 Tab by mouth daily. vit A/vit C/vit E/zinc/copper (PRESERVISION AREDS PO) Take 1 Tablet by mouth.      rivaroxaban (XARELTO) 20 mg tab tablet Take 20 mg by mouth daily (with breakfast). gabapentin (NEURONTIN) 300 mg capsule Take 600 mg by mouth three (3) times daily. buPROPion SR (WELLBUTRIN SR) 150 mg SR tablet Take 150 mg by mouth two (2) times a day. therapeutic multivitamin (THERAGRAN) tablet Take 1 Tab by mouth daily. nebivolol (BYSTOLIC) 20 mg tablet Take 20 mg by mouth daily. potassium chloride (K-DUR, KLOR-CON) 20 mEq tablet Take 1 tablet by mouth two  times daily 60 Tab 0    ascorbic acid, vitamin C, (VITAMIN C) 1,000 mg tablet Take 500 mg by mouth nightly. omeprazole (PRILOSEC) 20 mg capsule Take 20 mg by mouth daily. Indications: GASTROESOPHAGEAL REFLUX      traMADoL (ULTRAM) 50 mg tablet Take 50 mg by mouth every six (6) hours as needed for Pain. She  has a past medical history of Anxiety and depression, Atrial fibrillation ([address] Chronic anticoagulation, GERD (gastroesophageal reflux disease), Hypertension, Hypothyroid, and Migraines. She  has a past surgical history that includes hx cholecystectomy; hx amira and bso; pr anesth,surgery of shoulder; hx pacemaker; hx orthopaedic; and hx hysterectomy. She family history includes Cancer in her mother; Heart Attack in her father and another family member; Heart Disease in her father; Hypertension in her father and another family member; Migraines in her mother; Stroke in her father and another family member. She  reports that she has quit smoking. She has never used smokeless tobacco. She reports current alcohol use. She reports that she does not use drugs.      Review of Systems:  Unchanged per patient      Objective:   Visit Vitals  /76 (BP 1 Location: Left upper arm, BP Patient Position: Sitting)   Pulse 65   Ht 5' 4\" (1.626 m)   Wt 200 lb (90.7 kg)   SpO2 100%   BMI 34.33 kg/m²     Body mass index is 34.33 kg/m². General:   Conversant, cooperative       Oropharynx:   Mallampati score IV,                         Neuro:  Speech fluent, face symmetrical             Assessment:       ICD-10-CM ICD-9-CM    1. COLE (obstructive sleep apnea)  G47.33 327.23         CPAP clinic. PAP continues to benefit patient and remains necessary for control of her sleep apnea. Plan:   No orders of the defined types were placed in this encounter. *A copy of compliance data was provided to the patient and reviewed in detail. *CPAP will be  continued at the above pressure settings. The patient is to contact the office if there are problems with either mask or pressure settings. Follow-up will be scheduled at which time compliance data will be reviewed. * Patient has a history and examination consistent with the diagnosis of sleep apnea. * She was provided information on sleep apnea including corresponding risk factors and the importance of proper treatment. * Treatment options if indicated were reviewed today. Potential benefit of weight reduction was discussed. Weight reduction techniques reviewed. Whitley Puckett MD, FAASM  Electronically signed 12/07/22        This note was created using voice recognition software. Despite editing, there may be syntax errors. This note will not be viewable in [address]

## 2022-12-07 NOTE — PROGRESS NOTES
Care Transitions Initial Call    Call within 2 business days of discharge: Yes     Patient: Beverly Santacruz Patient : 1945 MRN: [id number]    Last Discharge [address]       Date Complaint Diagnosis Description Type Department Provider    22 Shortness of Breath Acute on chronic renal insufficiency . .. ED (ADMIT) FERNANDO Talamantes MD; Lily Cowart... Was this an external facility discharge? No     Challenges to be reviewed by the provider   Additional needs identified to be addressed with provider: no       Method of communication with provider : none    Discussed COVID-19 related testing which was available at this time. Test results were negative. Patient informed of results, if available? no     Advance Care Planning:   Does patient have an Advance Directive: not on file. Education deferred    Inpatient Readmission Risk score: Unplanned Readmit Risk Score: 15.3    Was this a readmission? yes   Patient stated reason for the admission: SOB    Patients top risk factors for readmission: medical condition-CHF,     Interventions to address risk factors: Obtained and reviewed discharge summary and/or continuity of care documents and Education of patient/family/caregiver/guardian to support self-management-CHF    Care Transition Nurse (CTN) contacted the patient by telephone to perform post hospital discharge assessment. Verified name and  with patient as identifiers. Provided introduction to self, and explanation of the CTN role. CTN reviewed discharge instructions, medical action plan and red flags with patient who verbalized understanding. Were discharge instructions available to patient? yes. Reviewed appropriate site of care based on symptoms and resources available to patient including: PCP, Specialist, and Condition related references. Patient given an opportunity to ask questions and does not have any further questions or concerns at this time.  The patient agrees to contact the PCP office for questions related to their healthcare. Medication reconciliation was performed with patient, who verbalizes understanding of administration of home medications. Advised obtaining a 90-day supply of all daily and as-needed medications. Referral to Pharm D needed: no     Home Health/Outpatient orders at discharge: home health care  [address]: [address]  Date of initial visit: TBD    Durable Medical Equipment ordered at discharge: None    Was patient discharged with a pulse oximeter? no    Discussed follow-up appointments. If no appointment was previously scheduled, appointment scheduling offered:  na . Is follow up appointment scheduled within 7 days of discharge? yes. [facility] follow up appointment(s):   Future Appointments   Date Time Provider Castro Holcomb   12/7/2022 10:40 AM MD Manny Ralph 41 BS [facility]   1/13/2023  2:30 PM Hermelinda Martinez MD [facility] BS [facility]   2/16/2023  1:00 PM Lucy Jacobs  [facility]     [facility] follow up appointment(s): [facility] pacemaker battery change 12/8/22    Plan for follow-up call in 5-7 days based on severity of symptoms and risk factors. Plan for next call: symptom management-edema, fever, SOB  CTN provided contact information for future needs. Goals Addressed                   This Visit's Progress     Reduce risk of CHF exacerbations and complications. 11/14/22  Daily weights- does not currently weigh daily. Has agreed to begin 11/15/22 morning  Low sodium diet/fluid restrictions  Monitor BP/pulse- orthostatic   Medication compliance- confirmed Bumex BID  Attend SHELLY appointments-11/15/22  Patient will monitor  and report following (red flags):  - Weight gain of 2-3 lbs. in one day or 5 lbs. in one week  -increased SOB, feeling more tired or no energy, dizziness  -increase swelling to feet, ankles, legs or stomach  -dry hacky cough, harder to breathe when lying down.  WILDER  11/22/22 reports attendance of appt, now taking Bumex once per day, following low Na diet. Will continue to monitor CHF red flags and weight daily to report at next week outreach. [facility]  11/30/22 Patient called on home number on file. Message left on voicemail with contact information to return call to this writer. WILDER  12/7/22 reports appt regarding cpap today and surgery scheduled for pacemaker battery change 12/8/22 at [facility]. Did not weight today, taking all medications. Will monitor CHF red flags. CTN will follow up with patient next week.  WILDER

## 2022-12-08 LAB
BACTERIA SPEC CULT: ABNORMAL
CC UR VC: ABNORMAL
SERVICE CMNT-IMP: ABNORMAL

## 2022-12-14 ENCOUNTER — PATIENT OUTREACH (OUTPATIENT)
Dept: CASE MANAGEMENT | Age: 77
End: 2022-12-14

## 2022-12-14 NOTE — PROGRESS NOTES
Goals Addressed                   This Visit's Progress     Reduce risk of CHF exacerbations and complications. 11/14/22  Daily weights- does not currently weigh daily. Has agreed to begin 11/15/22 morning  Low sodium diet/fluid restrictions  Monitor BP/pulse- orthostatic   Medication compliance- confirmed Bumex BID  Attend SHELLY appointments-11/15/22  Patient will monitor  and report following (red flags):  - Weight gain of 2-3 lbs. in one day or 5 lbs. in one week  -increased SOB, feeling more tired or no energy, dizziness  -increase swelling to feet, ankles, legs or stomach  -dry hacky cough, harder to breathe when lying down. [facility]  11/22/22 reports attendance of appt, now taking Bumex once per day, following low Na diet. Will continue to monitor CHF red flags and weight daily to report at next week outreach. [facility]  11/30/22 Patient called on home number on file. Message left on voicemail with contact information to return call to this writer. [facility]  12/7/22 reports appt regarding cpap today and surgery scheduled for pacemaker battery change 12/8/22 at [facility]. Did not weight today, taking all medications. Will monitor CHF red flags. CTN will follow up with patient next week. [facility]  12/14/22 Patient called on mobile number on file. Message left on voicemail with contact information to return call to this writer.  [facility]

## 2022-12-23 ENCOUNTER — PATIENT OUTREACH (OUTPATIENT)
Dept: CASE MANAGEMENT | Age: 77
End: 2022-12-23

## 2022-12-23 NOTE — PROGRESS NOTES
Goals Addressed                   This Visit's Progress     Reduce risk of CHF exacerbations and complications. 11/14/22  Daily weights- does not currently weigh daily. Has agreed to begin 11/15/22 morning  Low sodium diet/fluid restrictions  Monitor BP/pulse- orthostatic   Medication compliance- confirmed Bumex BID  Attend SHELLY appointments-11/15/22  Patient will monitor  and report following (red flags):  - Weight gain of 2-3 lbs. in one day or 5 lbs. in one week  -increased SOB, feeling more tired or no energy, dizziness  -increase swelling to feet, ankles, legs or stomach  -dry hacky cough, harder to breathe when lying down. [facility]  11/22/22 reports attendance of appt, now taking Bumex once per day, following low Na diet. Will continue to monitor CHF red flags and weight daily to report at next week outreach. [facility]  11/30/22 Patient called on home number on file. Message left on voicemail with contact information to return call to this writer. [facility]  12/7/22 reports appt regarding cpap today and surgery scheduled for pacemaker battery change 12/8/22 at [facility]. Did not weight today, taking all medications. Will monitor CHF red flags. CTN will follow up with patient next week. [facility]  12/14/22 Patient called on mobile number on file. Message left on voicemail with contact information to return call to this writer. [facility]  12/23/22 reports she feeling well, weight is currently 205 , denies edema, SOB, and infection. Will continue to monitor CHF S&S and report at next week outreach.  [facility]

## 2022-12-30 ENCOUNTER — PATIENT OUTREACH (OUTPATIENT)
Dept: CASE MANAGEMENT | Age: 77
End: 2022-12-30

## 2022-12-30 NOTE — PROGRESS NOTES
Goals Addressed                   This Visit's Progress     Reduce risk of CHF exacerbations and complications. 11/14/22  Daily weights- does not currently weigh daily. Has agreed to begin 11/15/22 morning  Low sodium diet/fluid restrictions  Monitor BP/pulse- orthostatic   Medication compliance- confirmed Bumex BID  Attend SHELLY appointments-11/15/22  Patient will monitor  and report following (red flags):  - Weight gain of 2-3 lbs. in one day or 5 lbs. in one week  -increased SOB, feeling more tired or no energy, dizziness  -increase swelling to feet, ankles, legs or stomach  -dry hacky cough, harder to breathe when lying down. [facility]  11/22/22 reports attendance of appt, now taking Bumex once per day, following low Na diet. Will continue to monitor CHF red flags and weight daily to report at next week outreach. [facility]  11/30/22 Patient called on home number on file. Message left on voicemail with contact information to return call to this writer. [facility]  12/7/22 reports appt regarding cpap today and surgery scheduled for pacemaker battery change 12/8/22 at [facility]. Did not weight today, taking all medications. Will monitor CHF red flags. CTN will follow up with patient next week. [facility]  12/14/22 Patient called on mobile number on file. Message left on voicemail with contact information to return call to this writer. [facility]  12/23/22 reports she feeling well, weight is currently 205 , denies edema, SOB, and infection. Will continue to monitor CHF S&S and report at next week outreach. [facility]  12/30/22 Patient called on mobile number on file. Message left on voicemail with contact information to return call to this writer.  [facility]

## 2023-01-04 PROBLEM — E86.0 DEHYDRATION: Status: RESOLVED | Noted: 2022-12-05 | Resolved: 2023-01-04

## 2023-01-06 ENCOUNTER — PATIENT OUTREACH (OUTPATIENT)
Dept: CASE MANAGEMENT | Age: 78
End: 2023-01-06

## 2023-01-06 NOTE — PROGRESS NOTES
Patient has graduated from the Transitions of Care Coordination  program on 1/6/23. Patient/family has the ability to self-manage at this time Care management goals have been completed. Patient was not referred to the [facility] team for further management. Goals Addressed                   This Visit's Progress     COMPLETED: Reduce risk of CHF exacerbations and complications. 11/14/22  Daily weights- does not currently weigh daily. Has agreed to begin 11/15/22 morning  Low sodium diet/fluid restrictions  Monitor BP/pulse- orthostatic   Medication compliance- confirmed Bumex BID  Attend SHELLY appointments-11/15/22  Patient will monitor  and report following (red flags):  - Weight gain of 2-3 lbs. in one day or 5 lbs. in one week  -increased SOB, feeling more tired or no energy, dizziness  -increase swelling to feet, ankles, legs or stomach  -dry hacky cough, harder to breathe when lying down. [facility]  11/22/22 reports attendance of appt, now taking Bumex once per day, following low Na diet. Will continue to monitor CHF red flags and weight daily to report at next week outreach. [facility]  11/30/22 Patient called on home number on file. Message left on voicemail with contact information to return call to this writer. [facility]  12/7/22 reports appt regarding cpap today and surgery scheduled for pacemaker battery change 12/8/22 at [facility]. Did not weight today, taking all medications. Will monitor CHF red flags. CTN will follow up with patient next week. [facility]  12/14/22 Patient called on mobile number on file. Message left on voicemail with contact information to return call to this writer. [facility]  12/23/22 reports she feeling well, weight is currently 205 , denies edema, SOB, and infection. Will continue to monitor CHF S&S and report at next week outreach. [facility]  12/30/22 Patient called on mobile number on file. Message left on voicemail with contact information to return call to this writer.  [facility]  1/6/23 Per review of chart patient stephen has had no ED or hospital admissions 30 days post discharge. Goal complete. WILDER                 Patient has Care Transition Nurse's contact information for any further questions, concerns, or needs.   Patients upcoming visits:    Future Appointments   Date Time Provider Castro Aicha   1/18/2023  9:40 AM Jose Tovar MD [facility] BS AMB   2/8/2023  1:20 PM Hermelinda Martinez MD [facility] BS AMB   2/16/2023  1:00 PM Sherrie Jacobs MD [facility] BS AMB

## 2023-01-13 ENCOUNTER — TELEPHONE (OUTPATIENT)
Dept: CARDIOLOGY CLINIC | Age: 78
End: 2023-01-13

## 2023-01-13 NOTE — TELEPHONE ENCOUNTER
Called patient, no answer. LM to have call returned to [phone]. Need to find out if patient has had her pacemaker generator change with Dr. Penney Sandhoff @ [facility].

## 2023-01-16 NOTE — TELEPHONE ENCOUNTER
Received records from [facility]. Patient had her ppm generator changed on 12/8/22 with Dr. Lavinia Bosworth. Need to confirm with patient who she is going to follow up with for her care. Called patient, ID verified using two patient identifiers. Confirmed with patient that she is going to keep her appointment with Dr. Matheus Tuttle on 2/8/23.        Future Appointments   Date Time Provider Castro Monacoi   1/18/2023  9:40 AM Trinidad Hooper MD [facility] BS AMB   2/8/2023  1:20 PM Hermelinda Martinez MD [facility] BS AMB   2/8/2023  1:20 PM PACEMAKER, STFRANCES [facility] BS AMB   2/16/2023  1:00 PM Lucy Jacobs MD [facility] BS AMB

## 2023-01-17 ENCOUNTER — TELEPHONE (OUTPATIENT)
Dept: SLEEP MEDICINE | Age: 78
End: 2023-01-17

## 2023-02-08 ENCOUNTER — OFFICE VISIT (OUTPATIENT)
Dept: CARDIOLOGY CLINIC | Age: 78
End: 2023-02-08
Payer: MEDICARE

## 2023-02-08 VITALS
RESPIRATION RATE: 16 BRPM | BODY MASS INDEX: 36.6 KG/M2 | OXYGEN SATURATION: 97 % | WEIGHT: 214.4 LBS | DIASTOLIC BLOOD PRESSURE: 84 MMHG | HEART RATE: 68 BPM | SYSTOLIC BLOOD PRESSURE: 152 MMHG | HEIGHT: 64 IN

## 2023-02-08 DIAGNOSIS — E66.09 CLASS 2 OBESITY DUE TO EXCESS CALORIES WITHOUT SERIOUS COMORBIDITY WITH BODY MASS INDEX (BMI) OF 36.0 TO 36.9 IN ADULT: ICD-10-CM

## 2023-02-08 DIAGNOSIS — I49.5 SINUS NODE DYSFUNCTION (HCC): ICD-10-CM

## 2023-02-08 DIAGNOSIS — I50.9 HEART FAILURE, UNSPECIFIED HF CHRONICITY, UNSPECIFIED HEART FAILURE TYPE (HCC): ICD-10-CM

## 2023-02-08 DIAGNOSIS — Z79.899 HIGH RISK MEDICATION USE: ICD-10-CM

## 2023-02-08 DIAGNOSIS — Z95.0 PACEMAKER: ICD-10-CM

## 2023-02-08 DIAGNOSIS — I48.91 ATRIAL FIBRILLATION, UNSPECIFIED TYPE (HCC): Primary | ICD-10-CM

## 2023-02-08 DIAGNOSIS — E66.01 SEVERE OBESITY (BMI 35.0-39.9) WITH COMORBIDITY (HCC): ICD-10-CM

## 2023-02-08 DIAGNOSIS — I10 PRIMARY HYPERTENSION: ICD-10-CM

## 2023-02-08 DIAGNOSIS — Z79.01 CHRONIC ANTICOAGULATION: ICD-10-CM

## 2023-02-08 DIAGNOSIS — Z95.0 CARDIAC PACEMAKER IN SITU: Primary | ICD-10-CM

## 2023-02-08 PROCEDURE — 1101F PT FALLS ASSESS-DOCD LE1/YR: CPT | Performed by: INTERNAL MEDICINE

## 2023-02-08 PROCEDURE — G8536 NO DOC ELDER MAL SCRN: HCPCS | Performed by: INTERNAL MEDICINE

## 2023-02-08 PROCEDURE — 1090F PRES/ABSN URINE INCON ASSESS: CPT | Performed by: INTERNAL MEDICINE

## 2023-02-08 PROCEDURE — 3079F DIAST BP 80-89 MM HG: CPT | Performed by: INTERNAL MEDICINE

## 2023-02-08 PROCEDURE — G0463 HOSPITAL OUTPT CLINIC VISIT: HCPCS | Performed by: INTERNAL MEDICINE

## 2023-02-08 PROCEDURE — 3077F SYST BP >= 140 MM HG: CPT | Performed by: INTERNAL MEDICINE

## 2023-02-08 PROCEDURE — G8427 DOCREV CUR MEDS BY ELIG CLIN: HCPCS | Performed by: INTERNAL MEDICINE

## 2023-02-08 PROCEDURE — G9717 DOC PT DX DEP/BP F/U NT REQ: HCPCS | Performed by: INTERNAL MEDICINE

## 2023-02-08 PROCEDURE — G8400 PT W/DXA NO RESULTS DOC: HCPCS | Performed by: INTERNAL MEDICINE

## 2023-02-08 PROCEDURE — 3017F COLORECTAL CA SCREEN DOC REV: CPT | Performed by: INTERNAL MEDICINE

## 2023-02-08 PROCEDURE — 99205 OFFICE O/P NEW HI 60 MIN: CPT | Performed by: INTERNAL MEDICINE

## 2023-02-08 PROCEDURE — 1123F ACP DISCUSS/DSCN MKR DOCD: CPT | Performed by: INTERNAL MEDICINE

## 2023-02-08 PROCEDURE — G8417 CALC BMI ABV UP PARAM F/U: HCPCS | Performed by: INTERNAL MEDICINE

## 2023-02-08 RX ORDER — AMLODIPINE BESYLATE 2.5 MG/1
2.5 TABLET ORAL DAILY
COMMUNITY

## 2023-02-08 NOTE — PATIENT INSTRUCTIONS
Establish care with general cardiology at [facility] or Isma Lim) in 6-9 months  Set up for remote transmission every 3 months  Obtain liver function test (LFTs) and thyroid test (TSH) in 6 months  FU with NP in 6 months  FU with Dr. Carlito Germain in 1 year

## 2023-02-08 NOTE — PROGRESS NOTES
Cardiac Electrophysiology OFFICE Consultation Note       Assessment/Plan:   1. Atrial fibrillation, unspecified type ([facility]  2. Heart failure, unspecified HF chronicity, unspecified heart failure type ([facility]  3. Severe obesity (BMI 35.0-39. 9) with comorbidity ([facility]  4. Sinus node dysfunction (HCC)  5. Pacemaker  6. Chronic anticoagulation  7. Primary hypertension  8. Class 2 obesity due to excess calories without serious comorbidity with body mass index (BMI) of 36.0 to 36.9 in adult  9. High risk medication use     Paroxysmal atrial fibrillation  History of very symptomatic atrial fibrillation. Device interrogation demonstrated that since December at the time of implant, she has not had any atrial fibrillation.  - Currently rhythm controlled on amiodarone  - Obtain LFTs and TSH in 6 months  - Continue Bystolic 20 mg daily for rate control  - Continue Xarelto 20 mg    Sick sinus syndrome  Status post dual-chamber pacemaker. Atrially paced 80% of the time, ventricular paced 1.9%  - See device interrogation as noted below    Pacemaker  Date of implant 12/8/2022, Medtronic dual chamber pacemaker with normal function. Battery life 12.8 years. No new or significant lead issues requiring intervention. No significant arrhythmias noted requiring intervention. Iterative programing was performed to assess lead parameters without any permanent changes. Atrial pacing 80.4%, ventricular pacing 1.9%. Known chronically elevated RV lead threshold at 3 V at 0.4 ms. Impedance of 1634, sensing of 5.1 mV. She seldomly paces from the RV and therefore does not affect longevity. High risk med management  - We discussed in great detail regarding high risk medication management and the associated risks of antiarrhythmic therapy.   Patient reports full understanding of recommendations.  -Risks associated with Amiodarone including but not limited to risks of thyroid toxicity, liver toxicity, and lung injury was explained to the patient including the need for long term monitoring with labs and imaging.  - EKG demonstrated QTc of 465 after corrected for ventricular pacing  - obtain LFTs and TSH every 6 months    Anticoagulation  Denies of any bleeding issues. Know to contact clinic with any bleeding side effects (BRBPR, melena, hemotysis, hematuria). - Continue Xarelto for thromboembolic prophylaxis    Hypertension  Will establish with general cardiology within the [facility] system  - Continue amlodipine 2.5 mg, Bystolic 20 mg daily    Subjective:       Tamika Corbett is a 78 year old woman who has been seen by Dr. Trang Richmond now representing to establish care for management of PPM.     The patient had paroxysmal atrial fibrillation, pacemaker for sick sinus syndrome and predominantly more than 95%, she paces in the atrium. The atrial threshold is normal but the ventricular pacing threshold is elevated. However, she is rarely paced in the ventricle. The patient is on amiodarone and has maintained sinus rhythm. The recent pacemaker check today does not show the atrial fibrillation recurrence. However, she is content with taking Xarelto because she has a history of migraine headache. The patient has high blood pressure and also problems with orthostatic hypotension and recently with the adjustment of Bystolic, taken in the evening and also with exercise, she has had better blood pressure control at this time. She has no noted any bleeding problems. Patient Active Problem List   Diagnosis Code    Hypothyroid E03.9    Migraines G43.909    Atrial fibrillation (HCC) I48.91    Sinus node dysfunction (HCC) I49.5    GERD (gastroesophageal reflux disease) K21.9    Anxiety and depression F41.9, F32. A    Obese E66.9    Hypertension I10    Dyspnea R06.00    Dehydration E86.0    CRISTIANA (acute kidney injury) ([facility] N17.9    Chronic anticoagulation Z79.01    Heart failure, unspecified HF chronicity, unspecified heart failure type ([address] I50.9 Pacemaker Z95.0    High risk medication use Z79.899     Current Outpatient Medications   Medication Sig Dispense Refill    BUMETANIDE Take 2 mg by mouth daily. amLODIPine (Norvasc) 2.5 mg tablet Take 2.5 mg by mouth daily. amiodarone (PACERONE) 100 mg tablet Take 100 mg by mouth every other day. cetirizine (ZYRTEC) 10 mg tablet Take 10 mg by mouth daily. Biotin 2,500 mcg cap Take 2 Capsules by mouth daily. trospium (SANCTURA) 20 mg tablet Take 20 mg by mouth daily as needed. acetaminophen (TYLENOL) 325 mg tablet Take 650 mg by mouth every four (4) hours as needed for Pain (for back pain; takes tylenol before switching to tramadol). peg 400-propylene glycol (Systane, propylene glycoL,) 0.4-0.3 % drop Administer 1 Drop to both eyes as needed. traMADoL (ULTRAM) 50 mg tablet Take 50 mg by mouth every six (6) hours as needed for Pain. Calcium Carbonate-Vit D3-Min 600 mg calcium- 400 unit tab Take 1 Tab by mouth daily. vit A/vit C/vit E/zinc/copper (PRESERVISION AREDS PO) Take 1 Tablet by mouth.      rivaroxaban (XARELTO) 20 mg tab tablet Take 20 mg by mouth daily (with breakfast). gabapentin (NEURONTIN) 300 mg capsule Take 600 mg by mouth three (3) times daily. buPROPion SR (WELLBUTRIN SR) 150 mg SR tablet Take 150 mg by mouth two (2) times a day. therapeutic multivitamin (THERAGRAN) tablet Take 1 Tab by mouth daily. nebivolol (BYSTOLIC) 20 mg tablet Take 20 mg by mouth daily. potassium chloride (K-DUR, KLOR-CON) 20 mEq tablet Take 1 tablet by mouth two  times daily (Patient taking differently: 20 mEq daily.) 60 Tab 0    ascorbic acid, vitamin C, (VITAMIN C) 1,000 mg tablet Take 500 mg by mouth nightly. omeprazole (PRILOSEC) 20 mg capsule Take 20 mg by mouth daily.  Indications: GASTROESOPHAGEAL REFLUX       No Known Allergies  Past Medical History:   Diagnosis Date    Anxiety and depression     Atrial fibrillation (HCC)     Chronic anticoagulation     GERD (gastroesophageal reflux disease)     Hypertension     Hypothyroid     Migraines      Past Surgical History:   Procedure Laterality Date    HX CHOLECYSTECTOMY      HX HYSTERECTOMY      HX ORTHOPAEDIC      Right shoulder surgery    HX PACEMAKER      HX ELIA AND BSO      WI ANES NRV MUSC TNDN FSCIA BURSA SHOULDER & AXILLA      left     Family History   Problem Relation Age of Onset    Hypertension Other     Stroke Other     Heart Attack Other     Migraines Mother     Cancer Mother         melanoma    Heart Disease Father     Hypertension Father     Stroke Father     Heart Attack Father      Social History     Tobacco Use    Smoking status: Former     Packs/day: 1.00     Types: Cigarettes    Smokeless tobacco: Never    Tobacco comments:     quit 30 years ago   Substance Use Topics    Alcohol use: Yes     Comment: social drinker        Review of Systems:   12 point review of systems was performed.  All negative except for HPI     Objective:   BP (!) 152/84 (BP 1 Location: Left upper arm, BP Patient Position: Sitting, BP Cuff Size: Large adult) Comment: BP rechecked  Pulse 68   Resp 16   Ht 5' 4\" (1.626 m)   Wt 214 lb 6.4 oz (97.3 kg)   SpO2 97%   BMI 36.80 kg/m²     Physical Exam:   General:  Alert and oriented, in no acute distress  Head:  Atraumatic, normocephalic  Eyes:  extraocular muscles intact  Neck:  Supple, normal range of motion  Lungs:  Clear to auscultation bilaterally, no wheezes/rales/rhonchi   Cardiovascular:  Regular rate and rhythm, normal S1-S2, no murmurs/rubs/gallops  Abdomen:  Soft, nontender, nondistended, normoactive bowel sounds  Skin:  Intact, no rash  Extremities:, no clubbing, cyanosis, or edema  Musculoskeletal: normal range of motion  Neurological:  Alert and oriented, no focal neurologic deficits  Psychiatric:  Normal mood and affect    No results found for: HBA1C, OIS2RENG, NHR7CDRN, KIK4XLRL    11/09/22    ECHO ADULT COMPLETE 11/10/2022 11/10/2022    Interpretation Summary    Left Ventricle: Normal left ventricular systolic function with a visually estimated EF of 60 - 65%. Left ventricle size is normal. Normal wall thickness. Normal wall motion. Abnormal diastolic function. Right Ventricle: Right ventricle size is normal. Normal wall thickness. Lead present in the right ventricle. Left Atrium: Left atrium is mildly dilated. Signed by: George Bateman MD on 11/10/2022  9:13 PM      12/05/22    NUCLEAR CARDIAC STRESS TEST 12/06/2022 12/6/2022    Interpretation Summary  Normal stress myocardial perfusion imaging without ischemia or infarction on pharmacological stress test. Normal left ventricular systolic function with LVEF 70%. No EKG changes of ischemia on pharmacological stress test.    Signed by: Ermelinda Hurd MD on 12/6/2022 12:15 PM    Results for orders placed or performed during the hospital encounter of 12/05/22   EKG, 12 LEAD, INITIAL   Result Value Ref Range    Ventricular Rate 65 BPM    Atrial Rate 119 BPM    QRS Duration 160 ms    Q-T Interval 476 ms    QTC Calculation (Bezet) 495 ms    Calculated R Axis -72 degrees    Calculated T Axis 95 degrees    Diagnosis       Ventricular-paced rhythm  Abnormal ECG  When compared with ECG of 09-NOV-2022 12:05,  No significant change was found  Confirmed by Adan DAVIS, Phyllis Lemons ([id number]) on 12/7/2022 10:01:55 PM     Results for orders placed or performed in visit on 10/07/13   AMB POC EKG ROUTINE W/ 12 LEADS, INTER & REP    Narrative    See progress note. Thank you for involving me in this patient's care and please call with further concerns or questions. ________________________________________  An Caro Motley MD, [facility], [facility]  Cardiac Electrophysiology  [facility]  [address] [place] [address]                             [phone]     [address] [place] [address].  [address] [place] [address], [address] [facility]  [phone]

## 2023-02-08 NOTE — PROGRESS NOTES
Room EP 4    Chief Complaint   Patient presents with    New Patient    Irregular Heart Beat     AFIB    Other     SSS    Pacemaker Check     SHYLA       Vitals:    02/08/23 1335 02/08/23 1402   BP: (!) 170/90 (!) 152/84  Comment: BP rechecked   BP 1 Location: Left upper arm Left upper arm   BP Patient Position: Sitting Sitting   BP Cuff Size: Adult Large adult   Pulse: 68    Resp: 16    Height: 5' 4\" (1.626 m)    Weight: 214 lb 6.4 oz (97.3 kg)    SpO2: 97%        Injections in her back in 2/22/23 with  [facility]    Chest pain: no    Shortness of breath: no    Edema: no    Palpitations, Skipped beats, Rapid heartbeat: no    Dizziness: no    Fatigue:no    New diagnosis/Surgeries: no    1. Have you been to the ER, urgent care clinic since your last visit? Hospitalized since your last visit? 12/5/2204-[id number], COVID + Jan 15th, 2023 in [place], [place] after a cruise      2. Have you seen or consulted any other health care providers outside of the [address] [place] [address] since your last visit? Include any pap smears or colon screening.  NO     Refills:Need Amiodarone, Potassium CL, Xarelto, and Bystolic to [facility] and [facility] to [facility]

## 2023-02-09 ENCOUNTER — OFFICE VISIT (OUTPATIENT)
Dept: SLEEP MEDICINE | Age: 78
End: 2023-02-09
Payer: MEDICARE

## 2023-02-09 VITALS
WEIGHT: 214.9 LBS | SYSTOLIC BLOOD PRESSURE: 137 MMHG | BODY MASS INDEX: 36.69 KG/M2 | HEIGHT: 64 IN | OXYGEN SATURATION: 96 % | HEART RATE: 66 BPM | DIASTOLIC BLOOD PRESSURE: 80 MMHG

## 2023-02-09 DIAGNOSIS — G47.33 OSA (OBSTRUCTIVE SLEEP APNEA): Primary | ICD-10-CM

## 2023-02-09 PROCEDURE — G8427 DOCREV CUR MEDS BY ELIG CLIN: HCPCS | Performed by: SPECIALIST

## 2023-02-09 PROCEDURE — 99213 OFFICE O/P EST LOW 20 MIN: CPT | Performed by: SPECIALIST

## 2023-02-09 PROCEDURE — 1123F ACP DISCUSS/DSCN MKR DOCD: CPT | Performed by: SPECIALIST

## 2023-02-09 PROCEDURE — G8536 NO DOC ELDER MAL SCRN: HCPCS | Performed by: SPECIALIST

## 2023-02-09 PROCEDURE — G8417 CALC BMI ABV UP PARAM F/U: HCPCS | Performed by: SPECIALIST

## 2023-02-09 PROCEDURE — 1090F PRES/ABSN URINE INCON ASSESS: CPT | Performed by: SPECIALIST

## 2023-02-09 PROCEDURE — G8400 PT W/DXA NO RESULTS DOC: HCPCS | Performed by: SPECIALIST

## 2023-02-09 PROCEDURE — 1101F PT FALLS ASSESS-DOCD LE1/YR: CPT | Performed by: SPECIALIST

## 2023-02-09 PROCEDURE — 3079F DIAST BP 80-89 MM HG: CPT | Performed by: SPECIALIST

## 2023-02-09 PROCEDURE — 3075F SYST BP GE 130 - 139MM HG: CPT | Performed by: SPECIALIST

## 2023-02-09 PROCEDURE — 3017F COLORECTAL CA SCREEN DOC REV: CPT | Performed by: SPECIALIST

## 2023-02-09 PROCEDURE — G9717 DOC PT DX DEP/BP F/U NT REQ: HCPCS | Performed by: SPECIALIST

## 2023-02-09 NOTE — Clinical Note
Please follow-up with patient re. Mask placement. I  reviewed Rajiv FFM  placement with her and demonstrated proper fit.

## 2023-02-09 NOTE — PROGRESS NOTES
[address] [place], [address]  Tel.  [phone]  Fax. [address] [facility] [address]  [place], [address] [facility]  Tel.  [phone]  Fax. [phone] [address] [facility] Susy Lam   Tel.  [phone]  Fax. [phone]         Chief Complaint       Chief Complaint   Patient presents with    Sleep Problem     6 week follow up         HPI        Ronni Garcia is a 78 y.o. female seen for follow-up. She was evaluated with a sleep study which demonstrated 72 respiratory events occurred of which 52 hypopnea and 20 obstructive apnea. The overall AHI was 16/h. Events more prominent in REM sleep with the REM-related AHI of  60 per hour. Minimal SaO2 79%. Mild-moderate snoring noted. Recommendation: APAP 5-15 cm. Currently 7-15 cm. Has had ongoing difficulty adjusting to CPAP mask. Most recently has been using a F&P Rajiv FFM. Has had difficulty with mask placement. Compliance data downloaded and reviewed in detail with the patient today. During the past 30 days, APAP used during 8 days with the average daily use of 3.7 hours. CMS compliance criteria 17%. AHI 1.7 per hour. No Known Allergies    Current Outpatient Medications   Medication Sig Dispense Refill    BUMETANIDE Take 2 mg by mouth daily. amLODIPine (NORVASC) 2.5 mg tablet Take 2.5 mg by mouth daily. amiodarone (PACERONE) 100 mg tablet Take 100 mg by mouth every other day. cetirizine (ZYRTEC) 10 mg tablet Take 10 mg by mouth daily. Biotin 2,500 mcg cap Take 2 Capsules by mouth daily. trospium (SANCTURA) 20 mg tablet Take 20 mg by mouth daily as needed. acetaminophen (TYLENOL) 325 mg tablet Take 650 mg by mouth every four (4) hours as needed for Pain (for back pain; takes tylenol before switching to tramadol). peg 400-propylene glycol (Systane, propylene glycoL,) 0.4-0.3 % drop Administer 1 Drop to both eyes as needed.       Calcium Carbonate-Vit D3-Min 600 mg calcium- 400 unit tab Take 1 Tab by mouth daily. vit A/vit C/vit E/zinc/copper (PRESERVISION AREDS PO) Take 1 Tablet by mouth.      rivaroxaban (XARELTO) 20 mg tab tablet Take 20 mg by mouth daily (with breakfast). gabapentin (NEURONTIN) 300 mg capsule Take 600 mg by mouth three (3) times daily. buPROPion SR (WELLBUTRIN SR) 150 mg SR tablet Take 150 mg by mouth two (2) times a day. therapeutic multivitamin (THERAGRAN) tablet Take 1 Tab by mouth daily. nebivolol (BYSTOLIC) 20 mg tablet Take 20 mg by mouth daily. potassium chloride (K-DUR, KLOR-CON) 20 mEq tablet Take 1 tablet by mouth two  times daily (Patient taking differently: 20 mEq daily.) 60 Tab 0    ascorbic acid, vitamin C, (VITAMIN C) 1,000 mg tablet Take 500 mg by mouth nightly. omeprazole (PRILOSEC) 20 mg capsule Take 20 mg by mouth daily. Indications: GASTROESOPHAGEAL REFLUX          She  has a past medical history of Anxiety and depression, Atrial fibrillation ([address] Chronic anticoagulation, GERD (gastroesophageal reflux disease), Hypertension, Hypothyroid, and Migraines. She  has a past surgical history that includes hx cholecystectomy; hx amria and bso; pr anes nrv musc tndn fscia bursa shoulder & axilla; hx pacemaker; hx orthopaedic; and hx hysterectomy. She family history includes Cancer in her mother; Heart Attack in her father and another family member; Heart Disease in her father; Hypertension in her father and another family member; Migraines in her mother; Stroke in her father and another family member. She  reports that she has quit smoking. Her smoking use included cigarettes. She smoked an average of 1 pack per day. She has never used smokeless tobacco. She reports current alcohol use. She reports that she does not use drugs.      Review of Systems:  Unchanged per patient      Objective:   Visit Vitals  /80 (BP 1 Location: Left upper arm, BP Patient Position: Sitting)   Pulse 66   Ht 5' 4\" (1.626 m)   Wt 214 lb 14.4 oz (97.5 kg)   SpO2 96%   BMI 36.89 kg/m²     Body mass index is 36.89 kg/m². General:   Conversant, cooperative   Eyes:   no nystagmus   Oropharynx:   Mallampati score IV, tongue normal, tongue scalloped, uvula prominent                CVS:  Normal rate        Neuro:  Speech fluent, face symmetrical             Assessment:       ICD-10-CM ICD-9-CM    1. COLE (obstructive sleep apnea)  G47.33 327.23         PAP continues to benefit patient and remains necessary for control of her sleep apnea. Techniques for correct mask placement reviewed with the patient. Sleep technologist will contact the patient in several days to confirm usage. Plan:   No orders of the defined types were placed in this encounter. *A copy of compliance data was provided to the patient and reviewed in detail. *CPAP  will be continued at the above pressure settings. The patient is to contact the office if there are problems with either mask or pressure settings. Follow-up will be scheduled at which time compliance data will be reviewed. * She was provided information on sleep apnea including corresponding risk factors and the importance of proper treatment. * Treatment options if indicated were reviewed today. Mario Aguayo MD, [facility]  Electronically signed 02/09/23        This note was created using voice recognition software. Despite editing, there may be syntax errors. This note will not be viewable in [address]

## 2023-02-10 RX ORDER — NEBIVOLOL 20 MG/1
20 TABLET ORAL DAILY
Qty: 90 TABLET | Refills: 3 | Status: SHIPPED | OUTPATIENT
Start: 2023-02-10

## 2023-02-10 RX ORDER — POTASSIUM CHLORIDE 20 MEQ/1
20 TABLET, EXTENDED RELEASE ORAL 2 TIMES DAILY
Qty: 60 TABLET | Refills: 0 | Status: CANCELLED | OUTPATIENT
Start: 2023-02-10

## 2023-02-10 RX ORDER — AMIODARONE HYDROCHLORIDE 100 MG/1
100 TABLET ORAL EVERY OTHER DAY
Qty: 45 TABLET | Refills: 3 | Status: SHIPPED | OUTPATIENT
Start: 2023-02-10

## 2023-02-10 NOTE — TELEPHONE ENCOUNTER
Pt called to request a refill on all medications,Pt stated she is almost out of medicaions.  Please Advise    [place] [phone]      Bumetanide 2 mg Pt would like this sent to  [facility] [phone]      Pt would like 90 day refill on all medications

## 2023-02-10 NOTE — TELEPHONE ENCOUNTER
Requested Prescriptions     Signed Prescriptions Disp Refills    amiodarone (PACERONE) 100 mg tablet 45 Tablet 3     Sig: Take 1 Tablet by mouth every other day. Authorizing Provider: STACY PEREZ     Ordering User: Jeanne GOODWIN    rivaroxaban (XARELTO) 20 mg tab tablet 90 Tablet 3     Sig: Take 1 Tablet by mouth daily (with breakfast). Authorizing Provider: STACY PEREZ     Ordering User: Jeanne GOODWIN    nebivoloL (BYSTOLIC) 20 mg tablet 90 Tablet 3     Sig: Take 1 Tablet by mouth daily. Authorizing Provider: STACY PEREZ     Ordering User: Jeanne GOODWIN     Refills per verbal order from Dr. Maritza Russell.   Last OV: 2/8/23  Next OV: 2/7/24

## 2023-02-16 ENCOUNTER — OFFICE VISIT (OUTPATIENT)
Dept: NEUROLOGY | Age: 78
End: 2023-02-16
Payer: MEDICARE

## 2023-02-16 VITALS
OXYGEN SATURATION: 96 % | HEIGHT: 64 IN | HEART RATE: 76 BPM | BODY MASS INDEX: 36.88 KG/M2 | SYSTOLIC BLOOD PRESSURE: 132 MMHG | DIASTOLIC BLOOD PRESSURE: 78 MMHG | WEIGHT: 216 LBS

## 2023-02-16 DIAGNOSIS — I65.23 BILATERAL CAROTID ARTERY STENOSIS: ICD-10-CM

## 2023-02-16 DIAGNOSIS — I63.81 LACUNAR INFARCTION (HCC): Primary | ICD-10-CM

## 2023-02-16 RX ORDER — TRAMADOL HYDROCHLORIDE 50 MG/1
TABLET ORAL
COMMUNITY

## 2023-02-16 NOTE — PROGRESS NOTES
Patito Meier Identified pt with two pt identifiers. Reviewed record in preparation for visit and have obtained necessary documentation. All patient medications has been reviewed. Chief Complaint   Patient presents with    New Patient     Balance, headaches and dizziness     Additional information about chief complaint:    Visit Vitals  /78 (BP 1 Location: Left upper arm, BP Patient Position: Sitting, BP Cuff Size: Large adult)   Pulse 76   Ht 5' 4\" (1.626 m)   Wt 216 lb (98 kg)   SpO2 96%   BMI 37.08 kg/m²       Health Maintenance Due   Topic    Hepatitis C Screening     COVID-19 Vaccine (1)    Pneumococcal 65+ years (1 - PCV)    Depression Monitoring     DTaP/Tdap/Td series (1 - Tdap)    Colorectal Cancer Screening Combo     Shingles Vaccine (1 of 2)    Bone Densitometry (Dexa) Screening     Medicare Yearly Exam     Lipid Screen     Flu Vaccine (1)       1. Have you been to the ER, urgent care clinic since your last visit? Hospitalized since your last visit? N/a    2. Have you seen or consulted any other health care providers outside of the [address] [place] [address] since your last visit? Include any pap smears or colon screening.  N/a

## 2023-02-16 NOTE — PROGRESS NOTES
[facility] and [address] at [facility] at [address] [facility] [address] [place], [address] [facility] [address] [facility], [address] [place] [address]  [phone] Office  [phone] Facsimile           Referring: Ryne Bingham MD [address]  [address] [place] [address]     Chief Complaint   Patient presents with    New Patient     Balance, headaches and dizziness     46-year-old lady comes today for neurologic consultation regarding abnormal CT of the head. Patient notes that she was having a tough time of with shortness of breath with any kind of effort. She felt dizzy. As part of her evaluation she was hospitalized. She was evaluated by her cardiologist and they decided it was her pacemaker that needed to be replaced. She had her pacer replaced and now she feels much better. She does say that they did a head CT as part of that evaluation and it demonstrated an abnormality and hence she was referred. She has not had any acute stroke symptoms. No focal deficits. She does have essential tremor and notes that it does pretty well until she tries to do something fine motor. CT scan of the head performed at [facility] 11/28/2022 is read out as nothing acute but a remote lacunar infarct in the left corona radiata as well as small vessel ischemic change of aging. Record review finds patient was last seen by nurse practitioner Victor Hugo Beard June 26, 2018 following up migraine and tremor. She was continued on Topamax for both    Discharge summary from December 6, 2022 or patient was admitted and discharged after an overnight stay for dyspnea. Also noted to have atrial fibrillation and sinus node dysfunction and acute kidney injury secondary to dehydration.     Visit with cardiac electrophysiology, Dr. Jersey Son, dated February 8, 2023 with diagnoses of atrial fibrillation, heart failure sinus node dysfunction and pacemaker placement on chronic anticoagulation. She was said to have her rhythm controlled on amiodarone. Pacemaker was functioning properly. Recent laboratory analysis from December 2022  CBC unremarkable  Magnesium unremarkable  Metabolic panel with BUN and creatinine 48 and 1.69 respectively    Past Medical History:   Diagnosis Date    Anxiety and depression     Atrial fibrillation (HCC)     Chronic anticoagulation     GERD (gastroesophageal reflux disease)     Hypertension     Hypothyroid     Migraines        Past Surgical History:   Procedure Laterality Date    HX CHOLECYSTECTOMY      HX HYSTERECTOMY      HX ORTHOPAEDIC      Right shoulder surgery    HX PACEMAKER      HX ELIA AND BSO      OK ANES NRV MUSC TNDN FSCIA BURSA SHOULDER & AXILLA      left       Current Outpatient Medications   Medication Sig Dispense Refill    traMADoL (ULTRAM) 50 mg tablet tramadol 50 mg tablet   TAKE 1 TABLET BY MOUTH 3 TIMES A DAY WITH EXTRA STRENGTH TYLENOL AS NEEDED FOR 7 DAYS      amiodarone (PACERONE) 100 mg tablet Take 1 Tablet by mouth every other day. 45 Tablet 3    rivaroxaban (XARELTO) 20 mg tab tablet Take 1 Tablet by mouth daily (with breakfast). 90 Tablet 3    nebivoloL (BYSTOLIC) 20 mg tablet Take 1 Tablet by mouth daily. 90 Tablet 3    BUMETANIDE Take 2 mg by mouth daily. amLODIPine (NORVASC) 2.5 mg tablet Take 2.5 mg by mouth daily. cetirizine (ZYRTEC) 10 mg tablet Take 10 mg by mouth daily. Biotin 2,500 mcg cap Take 2 Capsules by mouth daily. trospium (SANCTURA) 20 mg tablet Take 20 mg by mouth daily as needed. acetaminophen (TYLENOL) 325 mg tablet Take 650 mg by mouth every four (4) hours as needed for Pain (for back pain; takes tylenol before switching to tramadol). peg 400-propylene glycol (Systane, propylene glycoL,) 0.4-0.3 % drop Administer 1 Drop to both eyes as needed.       Calcium Carbonate-Vit D3-Min 600 mg calcium- 400 unit tab Take 1 Tab by mouth daily. vit A/vit C/vit E/zinc/copper (PRESERVISION AREDS PO) Take 1 Tablet by mouth.      gabapentin (NEURONTIN) 300 mg capsule Take 600 mg by mouth three (3) times daily. buPROPion SR (WELLBUTRIN SR) 150 mg SR tablet Take 150 mg by mouth two (2) times a day. therapeutic multivitamin (THERAGRAN) tablet Take 1 Tab by mouth daily. potassium chloride (K-DUR, KLOR-CON) 20 mEq tablet Take 1 tablet by mouth two  times daily (Patient taking differently: 20 mEq daily.) 60 Tab 0    ascorbic acid, vitamin C, (VITAMIN C) 1,000 mg tablet Take 500 mg by mouth nightly. omeprazole (PRILOSEC) 20 mg capsule Take 20 mg by mouth daily. Indications: GASTROESOPHAGEAL REFLUX          No Known Allergies    Social History     Tobacco Use    Smoking status: Former     Packs/day: 1.00     Types: Cigarettes    Smokeless tobacco: Never    Tobacco comments:     quit 30 years ago   Vaping Use    Vaping Use: Never used   Substance Use Topics    Alcohol use: Yes     Comment: social drinker    Drug use: No       Family History   Problem Relation Age of Onset    Hypertension Other     Stroke Other     Heart Attack Other     Migraines Mother     Cancer Mother         melanoma    Heart Disease Father     Hypertension Father     Stroke Father     Heart Attack Father        Review of Systems  Pertinent positives and negatives as noted. Examination  Visit Vitals  /78 (BP 1 Location: Left upper arm, BP Patient Position: Sitting, BP Cuff Size: Large adult)   Pulse 76   Ht 5' 4\" (1.626 m)   Wt 98 kg (216 lb)   SpO2 96%   BMI 37.08 kg/m²     She is a very pleasant and engaging lady. She is awake alert and oriented. She has normal speech and normal language. Her cognition is normal.  Her cranial nerves are intact 2-12. There is no nystagmus. She has no pronation or drift. She has minimal postural tremor of the outstretched hands with intention on finger-nose-finger.   She has full strength in the upper and lower extremities in all muscle groups. Reflexes are symmetrical.  No ataxia. Impression/Plan  82-year-old lady with age-related changes on her CT scan. We discussed that these small lacunar infarcts are not uncommon as we age and its just the small blood vessels in the head that get clogged and we all will have some degree of this. I am reassured about her examination. Secondary to the small vessel disease and this lacunar infarct we will check a carotid Doppler to ensure she has no significant stenosis which would put her at risk for stroke  Otherwise she should continue to modify modifiable risk factors for stroke including keeping her cholesterol, blood pressure, blood sugar etc. well-controlled    Follow-up as needed    Petar Steward MD          This note was created using voice recognition software. Despite editing, there may be syntax errors.

## 2023-02-16 NOTE — PATIENT INSTRUCTIONS
[employer] Neuro[employer] Test Result Communication    Test results are available in [address] [employer] is the patient portal into our electronic health record. This feature allows patients to see diagnostic test results, immunizations, allergies, past medical and surgical history, current medications, and send messages directly to providers. Our team members at the  can provide additional information and assist with registration. The [employer] support team can be reached at [phone]. In some cases, a provider might need time to explain the results in detail during a follow-up appointment. This might include additional information or context that will help patients understand the reason for next steps in the plan of care recommended by their provider. If a patient chooses to receive diagnostic testing at an imaging center outside of the [facility], it is the patient's responsibility to bring the imaging report and disc to their [employer] follow-up appointment. If the test results reveal anything that is particularly noteworthy, we will contact you to discuss the matter and, if necessary, schedule a follow-up appointment at an earlier date. If you have not received your test results by [employer] or other communication within 7 days, please contact our office. An inquiry can be sent to your provider using [employer]. Alternatively, appointments can be scheduled via telephone to review results. If a follow-up appointment to review results has not been scheduled, [address] office can be reached at [phone]. For appointments at our [facility] or [facility] office, please call [phone].        [facility]   Statement to Patients  April 1, 2014      In an effort to ensure the large volume of patient prescription refills is processed in the most efficient and expeditious manner, we are asking our patients to assist us by calling your Pharmacy for all prescription refills, this will include also your  Mail Order Pharmacy. The pharmacy will contact our office electronically to continue the refill process. Please do not wait until the last minute to call your pharmacy. We need at least 48 hours (2days) to fill prescriptions. We also encourage you to call your pharmacy before going to  your prescription to make sure it is ready. With regard to controlled substance prescription refill requests (narcotic refills) that need to be picked up at our office, we ask your cooperation by providing us with at least 72 hours (3days) notice that you will need a refill. We will not refill narcotic prescription refill requests after 4:00pm on any weekday, Monday through Thursday, or after 2:00pm on Fridays, or on the weekends. We encourage everyone to explore another way of getting your prescription refill request processed using [employer], our patient web portal through our electronic medical record system. [employer] is an efficient and effective way to communicate your medication request directly to the office and  downloadable as an gwen on your smart phone . [employer] also features a review functionality that allows you to view your medication list as well as leave messages for your physician. Are you ready to get connected? If so please review the attatched instructions or speak to any of our staff to get you set up right away! Thank you so much for your cooperation. Should you have any questions please contact our Practice Administrator.

## 2023-03-09 ENCOUNTER — HOSPITAL ENCOUNTER (EMERGENCY)
Age: 78
Discharge: HOME | End: 2023-03-09
Attending: STUDENT IN AN ORGANIZED HEALTH CARE EDUCATION/TRAINING PROGRAM
Payer: MEDICARE

## 2023-03-09 VITALS
RESPIRATION RATE: 15 BRPM | HEIGHT: 64 IN | OXYGEN SATURATION: 97 % | BODY MASS INDEX: 36.96 KG/M2 | SYSTOLIC BLOOD PRESSURE: 151 MMHG | WEIGHT: 216.49 LBS | TEMPERATURE: 98.5 F | HEART RATE: 76 BPM | DIASTOLIC BLOOD PRESSURE: 89 MMHG

## 2023-03-09 DIAGNOSIS — R79.89 ELEVATED LFTS: ICD-10-CM

## 2023-03-09 DIAGNOSIS — R19.5 LOOSE STOOLS: Primary | ICD-10-CM

## 2023-03-09 LAB
ALBUMIN SERPL-MCNC: 3.9 G/DL (ref 3.5–5)
ALBUMIN/GLOB SERPL: 1.1 (ref 1.1–2.2)
ALP SERPL-CCNC: 67 U/L (ref 45–117)
ALT SERPL-CCNC: 100 U/L (ref 12–78)
ANION GAP SERPL CALC-SCNC: 7 MMOL/L (ref 5–15)
AST SERPL-CCNC: 72 U/L (ref 15–37)
BASOPHILS # BLD: 0.1 K/UL (ref 0–0.1)
BASOPHILS NFR BLD: 1 % (ref 0–1)
BILIRUB SERPL-MCNC: 0.4 MG/DL (ref 0.2–1)
BUN SERPL-MCNC: 20 MG/DL (ref 6–20)
BUN/CREAT SERPL: 21 (ref 12–20)
CALCIUM SERPL-MCNC: 8.8 MG/DL (ref 8.5–10.1)
CHLORIDE SERPL-SCNC: 103 MMOL/L (ref 97–108)
CO2 SERPL-SCNC: 32 MMOL/L (ref 21–32)
CREAT SERPL-MCNC: 0.95 MG/DL (ref 0.55–1.02)
DIFFERENTIAL METHOD BLD: NORMAL
EOSINOPHIL # BLD: 0.1 K/UL (ref 0–0.4)
EOSINOPHIL NFR BLD: 2 % (ref 0–7)
ERYTHROCYTE [DISTWIDTH] IN BLOOD BY AUTOMATED COUNT: 14.2 % (ref 11.5–14.5)
GLOBULIN SER CALC-MCNC: 3.5 G/DL (ref 2–4)
GLUCOSE SERPL-MCNC: 95 MG/DL (ref 65–100)
HCT VFR BLD AUTO: 41.1 % (ref 35–47)
HGB BLD-MCNC: 13.4 G/DL (ref 11.5–16)
IMM GRANULOCYTES # BLD AUTO: 0 K/UL (ref 0–0.04)
IMM GRANULOCYTES NFR BLD AUTO: 0 % (ref 0–0.5)
LIPASE SERPL-CCNC: 165 U/L (ref 73–393)
LYMPHOCYTES # BLD: 1.9 K/UL (ref 0.8–3.5)
LYMPHOCYTES NFR BLD: 27 % (ref 12–49)
MAGNESIUM SERPL-MCNC: 1.9 MG/DL (ref 1.6–2.4)
MCH RBC QN AUTO: 31.1 PG (ref 26–34)
MCHC RBC AUTO-ENTMCNC: 32.6 G/DL (ref 30–36.5)
MCV RBC AUTO: 95.4 FL (ref 80–99)
MONOCYTES # BLD: 0.8 K/UL (ref 0–1)
MONOCYTES NFR BLD: 11 % (ref 5–13)
NEUTS SEG # BLD: 4.2 K/UL (ref 1.8–8)
NEUTS SEG NFR BLD: 59 % (ref 32–75)
NRBC # BLD: 0 K/UL (ref 0–0.01)
NRBC BLD-RTO: 0 PER 100 WBC
PLATELET # BLD AUTO: 267 K/UL (ref 150–400)
PMV BLD AUTO: 10.8 FL (ref 8.9–12.9)
POTASSIUM SERPL-SCNC: 3.9 MMOL/L (ref 3.5–5.1)
PROT SERPL-MCNC: 7.4 G/DL (ref 6.4–8.2)
RBC # BLD AUTO: 4.31 M/UL (ref 3.8–5.2)
SODIUM SERPL-SCNC: 142 MMOL/L (ref 136–145)
WBC # BLD AUTO: 7 K/UL (ref 3.6–11)

## 2023-03-09 PROCEDURE — 99283 EMERGENCY DEPT VISIT LOW MDM: CPT

## 2023-03-09 PROCEDURE — 36415 COLL VENOUS BLD VENIPUNCTURE: CPT

## 2023-03-09 PROCEDURE — 80053 COMPREHEN METABOLIC PANEL: CPT

## 2023-03-09 PROCEDURE — 83735 ASSAY OF MAGNESIUM: CPT

## 2023-03-09 PROCEDURE — 85025 COMPLETE CBC W/AUTO DIFF WBC: CPT

## 2023-03-09 PROCEDURE — 83690 ASSAY OF LIPASE: CPT

## 2023-03-09 NOTE — ED PROVIDER NOTES
EMERGENCY DEPARTMENT HISTORY AND PHYSICAL EXAM      Date: 3/9/2023  Patient Name: Bassam Thomson    History of Presenting Illness     HPI: Bassam Thomson, 78 y.o. female with pmhx as listed below, presents to the ED with cc of frequent loose stools for the past month. Patient reports she has been having approximately 4 episodes of she has been having approximately 4 episodes of loose stools everyday over at least the past 4 months. She reports being prescribed an anti-diarrheal agent that she took briefly, which seemed to transiently help with frequent stooling. She was then placed on empiric antibiotics- flagyl, which she is almost done with (reports only having 1 more day of this left to take), without any interval changes in symptoms. She denies any associated abdominal pain, nausea, vomiting, fevers or chills. She reports she had COVID right before onset of current symptoms. She denies any antibiotic use preceding onset of current symptoms. No history of C.diff. No history of significant GI issues, or similar symptoms in the past.     PCP: Mateo Edwards MD    No current facility-administered medications on file prior to encounter. Current Outpatient Medications on File Prior to Encounter   Medication Sig Dispense Refill    rivaroxaban (XARELTO) 20 mg tab tablet Take 1 Tablet by mouth daily (with breakfast). 90 Tablet 3    nebivoloL (BYSTOLIC) 20 mg tablet Take 1 Tablet by mouth daily. 90 Tablet 3    BUMETANIDE Take 2 mg by mouth daily. amLODIPine (NORVASC) 2.5 mg tablet Take 2.5 mg by mouth daily. Biotin 2,500 mcg cap Take 2 Capsules by mouth daily. Calcium Carbonate-Vit D3-Min 600 mg calcium- 400 unit tab Take 1 Tab by mouth daily. gabapentin (NEURONTIN) 300 mg capsule Take 600 mg by mouth three (3) times daily. buPROPion SR (WELLBUTRIN SR) 150 mg SR tablet Take 150 mg by mouth two (2) times a day.       potassium chloride (K-DUR, KLOR-CON) 20 mEq tablet Take 1 tablet by mouth two  times daily (Patient taking differently: 20 mEq daily.) 60 Tab 0    ascorbic acid, vitamin C, (VITAMIN C) 1,000 mg tablet Take 500 mg by mouth nightly. omeprazole (PRILOSEC) 20 mg capsule Take 20 mg by mouth daily. Indications: GASTROESOPHAGEAL REFLUX      amiodarone (PACERONE) 100 mg tablet Take 1 Tablet by mouth every other day. 45 Tablet 3    cetirizine (ZYRTEC) 10 mg tablet Take 10 mg by mouth daily. trospium (SANCTURA) 20 mg tablet Take 20 mg by mouth daily as needed. acetaminophen (TYLENOL) 325 mg tablet Take 650 mg by mouth every four (4) hours as needed for Pain (for back pain; takes tylenol before switching to tramadol). peg 400-propylene glycol (Systane, propylene glycoL,) 0.4-0.3 % drop Administer 1 Drop to both eyes as needed. vit A/vit C/vit E/zinc/copper (PRESERVISION AREDS PO) Take 1 Tablet by mouth. therapeutic multivitamin (THERAGRAN) tablet Take 1 Tab by mouth daily.          Past History     Past Medical History:  Past Medical History:   Diagnosis Date    Anxiety and depression     Atrial fibrillation (HCC)     Chronic anticoagulation     GERD (gastroesophageal reflux disease)     Hypertension     Hypothyroid     Migraines        Past Surgical History:  Past Surgical History:   Procedure Laterality Date    HX CHOLECYSTECTOMY      HX HYSTERECTOMY      HX ORTHOPAEDIC      Right shoulder surgery    HX PACEMAKER      HX ELIA AND BSO      KY ANES NRV [facility]C TNDN FSCIA BURSA SHOULDER & AXILLA      left       Family History:  Family History   Problem Relation Age of Onset    Hypertension Other     Stroke Other     Heart Attack Other     Migraines Mother     Cancer Mother         melanoma    Heart Disease Father     Hypertension Father     Stroke Father     Heart Attack Father        Social History:  Social History     Tobacco Use    Smoking status: Former     Packs/day: 1.00     Types: Cigarettes    Smokeless tobacco: Never    Tobacco comments:     quit 30 years ago Vaping Use    Vaping Use: Never used   Substance Use Topics    Alcohol use: Yes     Comment: social drinker    Drug use: No       Allergies:  No Known Allergies      Review of Systems   Review of Systems   All other systems reviewed and are negative. Physical Exam   Physical Exam  Vitals and nursing note reviewed. Constitutional:       General: She is not in acute distress. Appearance: She is not ill-appearing or toxic-appearing. HENT:      Head: Normocephalic and atraumatic. Nose: Nose normal.      Mouth/Throat:      Mouth: Mucous membranes are moist.   Eyes:      Extraocular Movements: Extraocular movements intact. Pupils: Pupils are equal, round, and reactive to light. Cardiovascular:      Rate and Rhythm: Normal rate and regular rhythm. Pulses: Normal pulses. Pulmonary:      Effort: Pulmonary effort is normal.      Breath sounds: No stridor. No wheezing or rhonchi. Abdominal:      General: Abdomen is flat. There is no distension. Palpations: Abdomen is soft. Tenderness: There is no abdominal tenderness. Musculoskeletal:         General: Normal range of motion. Cervical back: Normal range of motion and neck supple. Skin:     General: Skin is warm and dry. Neurological:      General: No focal deficit present. Mental Status: She is alert and oriented to person, place, and time.    Psychiatric:         Judgment: Judgment normal.       Diagnostic Study Results     Labs -     Recent Results (from the past 24 hour(s))   CBC WITH AUTOMATED DIFF    Collection Time: 03/09/23  4:36 PM   Result Value Ref Range    WBC 7.0 3.6 - 11.0 K/uL    RBC 4.31 3.80 - 5.20 M/uL    HGB 13.4 11.5 - 16.0 g/dL    HCT 41.1 35.0 - 47.0 %    MCV 95.4 80.0 - 99.0 FL    MCH 31.1 26.0 - 34.0 PG    MCHC 32.6 30.0 - 36.5 g/dL    RDW 14.2 11.5 - 14.5 %    PLATELET 021 071 - 211 K/uL    MPV 10.8 8.9 - 12.9 FL    NRBC 0.0 0.0  WBC    ABSOLUTE NRBC 0.00 0.00 - 0.01 K/uL    NEUTROPHILS 59 32 - 75 %    LYMPHOCYTES 27 12 - 49 %    MONOCYTES 11 5 - 13 %    EOSINOPHILS 2 0 - 7 %    BASOPHILS 1 0 - 1 %    IMMATURE GRANULOCYTES 0 0 - 0.5 %    ABS. NEUTROPHILS 4.2 1.8 - 8.0 K/UL    ABS. LYMPHOCYTES 1.9 0.8 - 3.5 K/UL    ABS. MONOCYTES 0.8 0.0 - 1.0 K/UL    ABS. EOSINOPHILS 0.1 0.0 - 0.4 K/UL    ABS. BASOPHILS 0.1 0.0 - 0.1 K/UL    ABS. IMM. GRANS. 0.0 0.00 - 0.04 K/UL    DF AUTOMATED     METABOLIC PANEL, COMPREHENSIVE    Collection Time: 03/09/23  4:36 PM   Result Value Ref Range    Sodium 142 136 - 145 mmol/L    Potassium 3.9 3.5 - 5.1 mmol/L    Chloride 103 97 - 108 mmol/L    CO2 32 21 - 32 mmol/L    Anion gap 7 5 - 15 mmol/L    Glucose 95 65 - 100 mg/dL    BUN 20 6 - 20 MG/DL    Creatinine 0.95 0.55 - 1.02 MG/DL    BUN/Creatinine ratio 21 (H) 12 - 20      eGFR >60 >60 ml/min/1.73m2    Calcium 8.8 8.5 - 10.1 MG/DL    Bilirubin, total 0.4 0.2 - 1.0 MG/DL    ALT (SGPT) 100 (H) 12 - 78 U/L    AST (SGOT) 72 (H) 15 - 37 U/L    Alk. phosphatase 67 45 - 117 U/L    Protein, total 7.4 6.4 - 8.2 g/dL    Albumin 3.9 3.5 - 5.0 g/dL    Globulin 3.5 2.0 - 4.0 g/dL    A-G Ratio 1.1 1.1 - 2.2     LIPASE    Collection Time: 03/09/23  4:36 PM   Result Value Ref Range    Lipase 165 73 - 393 U/L   MAGNESIUM    Collection Time: 03/09/23  4:36 PM   Result Value Ref Range    Magnesium 1.9 1.6 - 2.4 mg/dL       Radiologic Studies -   No orders to display     CT Results  (Last 48 hours)      None          CXR Results  (Last 48 hours)      None            Medical Decision Making   IBennett MD-- am the first provider for this patient, and I am the attending of record for this patient encounter. I reviewed the vital signs, available nursing notes, past medical history, past surgical history, family history and social history. Vital Signs-Reviewed the patient's vital signs.   Patient Vitals for the past 24 hrs:   Temp Pulse Resp BP SpO2   03/09/23 1710 -- 76 15 (!) 151/89 97 %   03/09/23 1529 -- 72 15 (!) 148/95 96 % 03/09/23 1502 98.5 °F (36.9 °C) 79 16 (!) 184/108 96 %     Records Reviewed: Prior medical records and Nursing notes    Provider Notes (Medical Decision Making):   Ddx: Viral gastroenteritis, lung COVID manifesting as chronic diarrhea, food poisoning, C. difficile, functional diarrhea, dehydration, CRISTIANA, electrolyte derangements, etc.    Plan: CBC, CMP, lipase, magnesium, will send C. difficile study if patient is able to provide stool sample here. Patient otherwise clinically well-appearing, afebrile, nontoxic, in no acute distress. Abdominal exam is completely benign without reproducible tenderness. Low suspicion for acute intra abdominal process. No indication to obtain emergent CT imaging of abdomen unless labs return excessively worrisome. Work-up in the ER without leukocytosis. Renal function is normal.  Electrolytes within normal limits. Mild elevations in LFTs, which has previously been seen as well. Possibly secondary to fatty liver versus viral effect. Such low LFT elevations is unlikely to be secondary to hepatitis. Patient also has no right upper quadrant abdominal pain. She is not an alcohol user. She is status postcholecystectomy. No indication to obtain emergent RUQ imaging. Mild LFT elevations discussed with the patient, advised follow-up with PCP for routine monitoring, and if there are significant elevation in the near future-May benefit from outpatient nonemergent liver studies. Patient did provide a very small stool sample while in the ER. This is largely solid, and formed. Not consistent with that expected in C. difficile, and not an adequate sample to be sent for C. difficile studies. Patient appears to not be experiencing watery diarrhea, but rather rather looser stools. Advised to resume using the antidiarrheal agent prescribed by her PCP. Unclear exact etiology for her current ongoing symptoms.   Advised close follow-up with PCP along with GI should symptoms not improve in the near future with conservative management. Patient felt comfortable with plan for discharge. Return precautions discussed. ED Course:   Initial assessment performed. The patient's presenting problems have been discussed, and they are in agreement with the care plan formulated and outlined with them. I have encouraged them to ask questions as they arise throughout their visit. Jj Mclaughlin MD      Disposition:  DC      DISCHARGE PLAN:  1. Discharge Medication List as of 3/9/2023  6:49 PM        2. Follow-up Information       Follow up With Specialties Details Why Contact Info    Suzanne Bingham MD Family Medicine In 2 days  Samantha Pepper  8.  [phone]      [facility]  Schedule an appointment as soon as possible for a visit in 1 week As needed [address] [address] [phone]    [facility]  Schedule an appointment as soon as possible for a visit in 1 week As needed [address] [facility],[address] [address] [phone]          3. Return to ED if worse     Diagnosis     Clinical Impression:   1. Loose stools    2. Elevated LFTs        Attestations:    Jj Mclaughlin MD    Please note that this dictation was completed with [employer], the computer voice recognition software. Quite often unanticipated grammatical, syntax, homophones, and other interpretive errors are inadvertently transcribed by the computer software. Please disregard these errors. Please excuse any errors that have escaped final proofreading. Thank you.

## 2023-03-09 NOTE — ED TRIAGE NOTES
Patient has been seeing PCP for chronic diarrhea. Patient was prescribed flagyl by PCP. Patient does not feel flagyl is working and PCP is not in the office this week. Patient needs to be off antibiotics for steroid injection on 3/24/23.

## 2023-03-10 NOTE — ED NOTES
The patient was discharged home by Dr Ting Cottrell in stable condition. The patient is alert and oriented. The patient's diagnosis, condition and treatment were explained. The patient expressed understanding. A discharge plan has been developed. A  was not involved in the process. Aftercare instructions were given. Pt ambulatory out of the ED.

## 2023-04-02 ENCOUNTER — APPOINTMENT (OUTPATIENT)
Dept: CT IMAGING | Age: 78
DRG: 690 | End: 2023-04-02
Attending: EMERGENCY MEDICINE
Payer: MEDICARE

## 2023-04-02 ENCOUNTER — HOSPITAL ENCOUNTER (INPATIENT)
Age: 78
LOS: 3 days | DRG: 690 | End: 2023-04-02
Attending: EMERGENCY MEDICINE | Admitting: INTERNAL MEDICINE
Payer: MEDICARE

## 2023-04-02 ENCOUNTER — APPOINTMENT (OUTPATIENT)
Dept: GENERAL RADIOLOGY | Age: 78
DRG: 690 | End: 2023-04-02
Attending: EMERGENCY MEDICINE
Payer: MEDICARE

## 2023-04-02 DIAGNOSIS — N30.90 CYSTITIS: Primary | ICD-10-CM

## 2023-04-02 DIAGNOSIS — R33.9 URINARY RETENTION: ICD-10-CM

## 2023-04-02 DIAGNOSIS — K59.00 CONSTIPATION, UNSPECIFIED CONSTIPATION TYPE: ICD-10-CM

## 2023-04-02 PROBLEM — N39.0 UTI (URINARY TRACT INFECTION): Status: ACTIVE | Noted: 2023-04-02

## 2023-04-02 LAB
ALBUMIN SERPL-MCNC: 3.9 G/DL (ref 3.5–5)
ALBUMIN/GLOB SERPL: 1 (ref 1.1–2.2)
ALP SERPL-CCNC: 76 U/L (ref 45–117)
ALT SERPL-CCNC: 33 U/L (ref 12–78)
ANION GAP SERPL CALC-SCNC: 10 MMOL/L (ref 5–15)
APPEARANCE UR: ABNORMAL
AST SERPL-CCNC: 42 U/L (ref 15–37)
BACTERIA URNS QL MICRO: ABNORMAL /HPF
BASOPHILS # BLD: 0.1 K/UL (ref 0–0.1)
BASOPHILS NFR BLD: 1 % (ref 0–1)
BILIRUB SERPL-MCNC: 0.6 MG/DL (ref 0.2–1)
BILIRUB UR QL: NEGATIVE
BUN SERPL-MCNC: 27 MG/DL (ref 6–20)
BUN/CREAT SERPL: 22 (ref 12–20)
CALCIUM SERPL-MCNC: 9.2 MG/DL (ref 8.5–10.1)
CHLORIDE SERPL-SCNC: 101 MMOL/L (ref 97–108)
CO2 SERPL-SCNC: 29 MMOL/L (ref 21–32)
COLOR UR: ABNORMAL
CREAT SERPL-MCNC: 1.23 MG/DL (ref 0.55–1.02)
DIFFERENTIAL METHOD BLD: ABNORMAL
EOSINOPHIL # BLD: 0.1 K/UL (ref 0–0.4)
EOSINOPHIL NFR BLD: 1 % (ref 0–7)
EPITH CASTS URNS QL MICRO: ABNORMAL /LPF
ERYTHROCYTE [DISTWIDTH] IN BLOOD BY AUTOMATED COUNT: 14.4 % (ref 11.5–14.5)
GLOBULIN SER CALC-MCNC: 3.8 G/DL (ref 2–4)
GLUCOSE SERPL-MCNC: 111 MG/DL (ref 65–100)
GLUCOSE UR STRIP.AUTO-MCNC: NEGATIVE MG/DL
HCT VFR BLD AUTO: 42.1 % (ref 35–47)
HGB BLD-MCNC: 13.7 G/DL (ref 11.5–16)
HGB UR QL STRIP: ABNORMAL
IMM GRANULOCYTES # BLD AUTO: 0.1 K/UL (ref 0–0.04)
IMM GRANULOCYTES NFR BLD AUTO: 1 % (ref 0–0.5)
KETONES UR QL STRIP.AUTO: ABNORMAL MG/DL
LEUKOCYTE ESTERASE UR QL STRIP.AUTO: ABNORMAL
LYMPHOCYTES # BLD: 1.5 K/UL (ref 0.8–3.5)
LYMPHOCYTES NFR BLD: 10 % (ref 12–49)
MCH RBC QN AUTO: 31.4 PG (ref 26–34)
MCHC RBC AUTO-ENTMCNC: 32.5 G/DL (ref 30–36.5)
MCV RBC AUTO: 96.6 FL (ref 80–99)
MONOCYTES # BLD: 1.4 K/UL (ref 0–1)
MONOCYTES NFR BLD: 10 % (ref 5–13)
NEUTS SEG # BLD: 11.9 K/UL (ref 1.8–8)
NEUTS SEG NFR BLD: 79 % (ref 32–75)
NITRITE UR QL STRIP.AUTO: NEGATIVE
NRBC # BLD: 0 K/UL (ref 0–0.01)
NRBC BLD-RTO: 0 PER 100 WBC
PH UR STRIP: 6 (ref 5–8)
PLATELET # BLD AUTO: 356 K/UL (ref 150–400)
PMV BLD AUTO: 10.9 FL (ref 8.9–12.9)
POTASSIUM SERPL-SCNC: 5.2 MMOL/L (ref 3.5–5.1)
PROT SERPL-MCNC: 7.7 G/DL (ref 6.4–8.2)
PROT UR STRIP-MCNC: 30 MG/DL
RBC # BLD AUTO: 4.36 M/UL (ref 3.8–5.2)
RBC #/AREA URNS HPF: ABNORMAL /HPF (ref 0–5)
SODIUM SERPL-SCNC: 140 MMOL/L (ref 136–145)
SP GR UR REFRACTOMETRY: 1.01 (ref 1–1.03)
UROBILINOGEN UR QL STRIP.AUTO: 0.2 EU/DL (ref 0.2–1)
WBC # BLD AUTO: 15.1 K/UL (ref 3.6–11)
WBC URNS QL MICRO: >100 /HPF (ref 0–4)

## 2023-04-02 PROCEDURE — 36415 COLL VENOUS BLD VENIPUNCTURE: CPT

## 2023-04-02 PROCEDURE — 51702 INSERT TEMP BLADDER CATH: CPT

## 2023-04-02 PROCEDURE — 87186 SC STD MICRODIL/AGAR DIL: CPT

## 2023-04-02 PROCEDURE — 81001 URINALYSIS AUTO W/SCOPE: CPT

## 2023-04-02 PROCEDURE — 77030005513 HC CATH URETH FOL11 MDII -B

## 2023-04-02 PROCEDURE — 87086 URINE CULTURE/COLONY COUNT: CPT

## 2023-04-02 PROCEDURE — 74011000258 HC RX REV CODE- 258: Performed by: INTERNAL MEDICINE

## 2023-04-02 PROCEDURE — 99285 EMERGENCY DEPT VISIT HI MDM: CPT

## 2023-04-02 PROCEDURE — 74018 RADEX ABDOMEN 1 VIEW: CPT

## 2023-04-02 PROCEDURE — 96374 THER/PROPH/DIAG INJ IV PUSH: CPT

## 2023-04-02 PROCEDURE — 80053 COMPREHEN METABOLIC PANEL: CPT

## 2023-04-02 PROCEDURE — 87077 CULTURE AEROBIC IDENTIFY: CPT

## 2023-04-02 PROCEDURE — 74011250636 HC RX REV CODE- 250/636: Performed by: INTERNAL MEDICINE

## 2023-04-02 PROCEDURE — 74177 CT ABD & PELVIS W/CONTRAST: CPT

## 2023-04-02 PROCEDURE — 74011000636 HC RX REV CODE- 636: Performed by: HOSPITALIST

## 2023-04-02 PROCEDURE — 74011250636 HC RX REV CODE- 250/636: Performed by: EMERGENCY MEDICINE

## 2023-04-02 PROCEDURE — 85025 COMPLETE CBC W/AUTO DIFF WBC: CPT

## 2023-04-02 PROCEDURE — 74011250637 HC RX REV CODE- 250/637: Performed by: INTERNAL MEDICINE

## 2023-04-02 PROCEDURE — 87040 BLOOD CULTURE FOR BACTERIA: CPT

## 2023-04-02 PROCEDURE — 74011000250 HC RX REV CODE- 250: Performed by: EMERGENCY MEDICINE

## 2023-04-02 PROCEDURE — 74011000250 HC RX REV CODE- 250: Performed by: INTERNAL MEDICINE

## 2023-04-02 PROCEDURE — 51798 US URINE CAPACITY MEASURE: CPT

## 2023-04-02 PROCEDURE — 65270000029 HC RM PRIVATE

## 2023-04-02 RX ORDER — GABAPENTIN 300 MG/1
600 CAPSULE ORAL 3 TIMES DAILY
Status: DISCONTINUED
Start: 2023-04-02 | End: 2023-04-05 | Stop reason: HOSPADM

## 2023-04-02 RX ORDER — ATENOLOL 25 MG/1
25 TABLET ORAL DAILY
Status: DISCONTINUED
Start: 2023-04-03 | End: 2023-04-05 | Stop reason: HOSPADM

## 2023-04-02 RX ORDER — POLYETHYLENE GLYCOL 3350 17 G/17G
17 POWDER, FOR SOLUTION ORAL DAILY PRN
Status: DISCONTINUED
Start: 2023-04-02 | End: 2023-04-05 | Stop reason: HOSPADM

## 2023-04-02 RX ORDER — BUPROPION HYDROCHLORIDE 150 MG/1
150 TABLET, EXTENDED RELEASE ORAL 2 TIMES DAILY
Status: DISCONTINUED
Start: 2023-04-02 | End: 2023-04-05 | Stop reason: HOSPADM

## 2023-04-02 RX ORDER — ONDANSETRON 4 MG/1
4 TABLET, ORALLY DISINTEGRATING ORAL
Status: DISCONTINUED
Start: 2023-04-02 | End: 2023-04-05 | Stop reason: HOSPADM

## 2023-04-02 RX ORDER — ACETAMINOPHEN 325 MG/1
650 TABLET ORAL
Status: DISCONTINUED
Start: 2023-04-02 | End: 2023-04-05 | Stop reason: HOSPADM

## 2023-04-02 RX ORDER — SODIUM CHLORIDE, SODIUM LACTATE, POTASSIUM CHLORIDE, CALCIUM CHLORIDE 600; 310; 30; 20 MG/100ML; MG/100ML; MG/100ML; MG/100ML
100 INJECTION, SOLUTION INTRAVENOUS CONTINUOUS
Status: DISCONTINUED
Start: 2023-04-02 | End: 2023-04-04

## 2023-04-02 RX ORDER — AMIODARONE HYDROCHLORIDE 200 MG/1
100 TABLET ORAL EVERY OTHER DAY
Status: DISCONTINUED
Start: 2023-04-03 | End: 2023-04-05 | Stop reason: HOSPADM

## 2023-04-02 RX ORDER — ACETAMINOPHEN 650 MG/1
650 SUPPOSITORY RECTAL
Status: DISCONTINUED
Start: 2023-04-02 | End: 2023-04-05 | Stop reason: HOSPADM

## 2023-04-02 RX ORDER — TROSPIUM CHLORIDE 20 MG/1
20 TABLET, FILM COATED ORAL DAILY
Status: DISCONTINUED
Start: 2023-04-03 | End: 2023-04-05 | Stop reason: HOSPADM

## 2023-04-02 RX ORDER — PANTOPRAZOLE SODIUM 40 MG/1
40 TABLET, DELAYED RELEASE ORAL
Status: DISCONTINUED
Start: 2023-04-03 | End: 2023-04-05 | Stop reason: HOSPADM

## 2023-04-02 RX ORDER — PANTOPRAZOLE SODIUM 20 MG/1
20 TABLET, DELAYED RELEASE ORAL
Status: DISCONTINUED | OUTPATIENT
Start: 2023-04-03 | End: 2023-04-02 | Stop reason: CLARIF

## 2023-04-02 RX ORDER — AMLODIPINE BESYLATE 2.5 MG/1
2.5 TABLET ORAL DAILY
Status: DISCONTINUED
Start: 2023-04-03 | End: 2023-04-05 | Stop reason: HOSPADM

## 2023-04-02 RX ORDER — SODIUM CHLORIDE 0.9 % (FLUSH) 0.9 %
5-40 SYRINGE (ML) INJECTION EVERY 8 HOURS
Status: DISCONTINUED
Start: 2023-04-02 | End: 2023-04-05 | Stop reason: HOSPADM

## 2023-04-02 RX ORDER — ONDANSETRON 2 MG/ML
4 INJECTION INTRAMUSCULAR; INTRAVENOUS
Status: DISCONTINUED
Start: 2023-04-02 | End: 2023-04-05 | Stop reason: HOSPADM

## 2023-04-02 RX ORDER — CEPHALEXIN 500 MG/1
500 CAPSULE ORAL 2 TIMES DAILY
Qty: 14 CAPSULE | Refills: 0 | Status: SHIPPED | OUTPATIENT
Start: 2023-04-02 | End: 2023-04-09

## 2023-04-02 RX ORDER — SODIUM CHLORIDE 0.9 % (FLUSH) 0.9 %
5-40 SYRINGE (ML) INJECTION AS NEEDED
Status: DISCONTINUED
Start: 2023-04-02 | End: 2023-04-05 | Stop reason: HOSPADM

## 2023-04-02 RX ADMIN — CEFTRIAXONE 1 G: 1 INJECTION, POWDER, FOR SOLUTION INTRAMUSCULAR; INTRAVENOUS at 16:23

## 2023-04-02 RX ADMIN — AMPICILLIN SODIUM 1 G: 1 INJECTION, POWDER, FOR SOLUTION INTRAMUSCULAR; INTRAVENOUS at 21:53

## 2023-04-02 RX ADMIN — IOPAMIDOL 80 ML: 755 INJECTION, SOLUTION INTRAVENOUS at 17:55

## 2023-04-02 RX ADMIN — GABAPENTIN 600 MG: 300 CAPSULE ORAL at 21:50

## 2023-04-02 RX ADMIN — SODIUM CHLORIDE, POTASSIUM CHLORIDE, SODIUM LACTATE AND CALCIUM CHLORIDE 125 ML/HR: 600; 310; 30; 20 INJECTION, SOLUTION INTRAVENOUS at 21:57

## 2023-04-02 RX ADMIN — SODIUM CHLORIDE, PRESERVATIVE FREE 10 ML: 5 INJECTION INTRAVENOUS at 21:50

## 2023-04-02 RX ADMIN — BUPROPION HYDROCHLORIDE 150 MG: 150 TABLET, EXTENDED RELEASE ORAL at 21:50

## 2023-04-02 NOTE — Clinical Note
Status[de-identified] INPATIENT [101]   Type of Bed: Telemetry [19]   Cardiac Monitoring Required?: Yes   Inpatient Hospitalization Certified Necessary for the Following Reasons: 9.  Other (further clarification in H&P documentation)   Admitting Diagnosis: UTI (urinary tract infection) [779973]   Admitting Physician: Carloz Willingham [[id number]]   Attending Physician: Carloz Willingham [[id number]]   Estimated Length of Stay: 2 Midnights   Discharge Plan[de-identified] Home with Office Follow-up

## 2023-04-02 NOTE — ED NOTES
Pt report given to OLINDA Vicente paramedic with [facility], pt remains stable on cardiac monitor with mcgee cath and saline lock.

## 2023-04-02 NOTE — ED PROVIDER NOTES
Date of Service:  4/2/2023    Patient:  Bob Camp    Chief Complaint:  Abdominal Pain, Constipation, and Urinary Retention       HPI:  Bob Camp is a 78 y.o.  female who presents for evaluation of abdominal pain urinary retention. Patient states that she started having some dysuria frequency and cloudiness the other day. She was diagnosed with UTI and put on Bactrim. She is on day 3. Today she will try to urinate and is unable to. She also notes that she feels full and notes that she has not had a bowel movement in several days noting that she was passing gas up until today but has not passed gas. She describes some suprapubic fullness some bilateral lower quadrant fullness but none real pain. No nausea vomiting fevers or chills.        Past Medical History:   Diagnosis Date    Anxiety and depression     Atrial fibrillation (HCC)     Chronic anticoagulation     GERD (gastroesophageal reflux disease)     Hypertension     Hypothyroid     Migraines        Past Surgical History:   Procedure Laterality Date    HX CHOLECYSTECTOMY      HX HYSTERECTOMY      HX ORTHOPAEDIC      Right shoulder surgery    HX PACEMAKER      HX ELIA AND BSO      SC ANES NRV [facility] TNDN FSCIA BURSA SHOULDER & AXILLA      left         Family History:   Problem Relation Age of Onset    Hypertension Other     Stroke Other     Heart Attack Other     Migraines Mother     Cancer Mother         melanoma    Heart Disease Father     Hypertension Father     Stroke Father     Heart Attack Father        Social History     Socioeconomic History    Marital status:      Spouse name: Not on file    Number of children: Not on file    Years of education: Not on file    Highest education level: Not on file   Occupational History    Not on file   Tobacco Use    Smoking status: Former     Packs/day: 1.00     Types: Cigarettes    Smokeless tobacco: Never    Tobacco comments:     quit 30 years ago   Vaping Use    Vaping Use: Never used   Substance and Sexual Activity    Alcohol use: Yes     Comment: social drinker    Drug use: No    Sexual activity: Not on file   Other Topics Concern    Not on file   Social History Narrative    Not on file     Social Determinants of Health     Financial Resource Strain: Not on file   Food Insecurity: Not on file   Transportation Needs: Not on file   Physical Activity: Not on file   Stress: Not on file   Social Connections: Not on file   Intimate Partner Violence: Not on file   Housing Stability: Not on file         ALLERGIES: Patient has no known allergies. Review of Systems   All other systems reviewed and are negative. Vitals:    04/02/23 1449   BP: (!) 159/107   Pulse: 88   Resp: 16   Temp: 99.2 °F (37.3 °C)   SpO2: 98%   Weight: 100 kg (220 lb 7.4 oz)   Height: 5' 4\" (1.626 m)            Physical Exam  Vitals and nursing note reviewed. Constitutional:       Appearance: She is well-developed. HENT:      Head: Normocephalic and atraumatic. Cardiovascular:      Rate and Rhythm: Normal rate. Pulmonary:      Effort: Pulmonary effort is normal.   Abdominal:      Palpations: Abdomen is soft. Tenderness: There is no abdominal tenderness. There is no guarding. Negative signs include Nielsen's sign and McBurney's sign. Neurological:      Mental Status: She is alert. Medical Decision Making  Amount and/or Complexity of Data Reviewed  Labs: ordered. Decision-making details documented in ED Course. Radiology: ordered. Decision-making details documented in ED Course. Risk  Prescription drug management. Decision regarding hospitalization.       ED Course as of 04/02/23 1647   Sun Apr 02, 2023   1537 Leukocyte Esterase(!): LARGE [GG]   1537 Blood(!): MODERATE [GG]   1537 Appearance(!): CLOUDY [GG]   1537 WBC(!): 15.1 [GG]   1553 Bacteria(!): 4+ [GG]   1553 WBC(!): >100 [GG]      ED Course User Index  [GG] [place] DO Maggie     VITAL SIGNS:  Patient Vitals for the past 4 hrs:   Temp Pulse Resp BP SpO2 04/02/23 1449 99.2 °F (37.3 °C) 88 16 (!) 159/107 98 %           LABS:  The Following labs have been ordered while in the emergency department and I have independently evaluated them. Recent Results (from the past 6 hour(s))   CBC WITH AUTOMATED DIFF    Collection Time: 04/02/23  3:17 PM   Result Value Ref Range    WBC 15.1 (H) 3.6 - 11.0 K/uL    RBC 4.36 3.80 - 5.20 M/uL    HGB 13.7 11.5 - 16.0 g/dL    HCT 42.1 35.0 - 47.0 %    MCV 96.6 80.0 - 99.0 FL    MCH 31.4 26.0 - 34.0 PG    MCHC 32.5 30.0 - 36.5 g/dL    RDW 14.4 11.5 - 14.5 %    PLATELET 165 193 - 743 K/uL    MPV 10.9 8.9 - 12.9 FL    NRBC 0.0 0.0  WBC    ABSOLUTE NRBC 0.00 0.00 - 0.01 K/uL    NEUTROPHILS 79 (H) 32 - 75 %    LYMPHOCYTES 10 (L) 12 - 49 %    MONOCYTES 10 5 - 13 %    EOSINOPHILS 1 0 - 7 %    BASOPHILS 1 0 - 1 %    IMMATURE GRANULOCYTES 1 (H) 0 - 0.5 %    ABS. NEUTROPHILS 11.9 (H) 1.8 - 8.0 K/UL    ABS. LYMPHOCYTES 1.5 0.8 - 3.5 K/UL    ABS. MONOCYTES 1.4 (H) 0.0 - 1.0 K/UL    ABS. EOSINOPHILS 0.1 0.0 - 0.4 K/UL    ABS. BASOPHILS 0.1 0.0 - 0.1 K/UL    ABS. IMM. GRANS. 0.1 (H) 0.00 - 0.04 K/UL    DF AUTOMATED     METABOLIC PANEL, COMPREHENSIVE    Collection Time: 04/02/23  3:17 PM   Result Value Ref Range    Sodium 140 136 - 145 mmol/L    Potassium 5.2 (H) 3.5 - 5.1 mmol/L    Chloride 101 97 - 108 mmol/L    CO2 29 21 - 32 mmol/L    Anion gap 10 5 - 15 mmol/L    Glucose 111 (H) 65 - 100 mg/dL    BUN 27 (H) 6 - 20 MG/DL    Creatinine 1.23 (H) 0.55 - 1.02 MG/DL    BUN/Creatinine ratio 22 (H) 12 - 20      eGFR 46 (L) >60 ml/min/1.73m2    Calcium 9.2 8.5 - 10.1 MG/DL    Bilirubin, total 0.6 0.2 - 1.0 MG/DL    ALT (SGPT) 33 12 - 78 U/L    AST (SGOT) 42 (H) 15 - 37 U/L    Alk.  phosphatase 76 45 - 117 U/L    Protein, total 7.7 6.4 - 8.2 g/dL    Albumin 3.9 3.5 - 5.0 g/dL    Globulin 3.8 2.0 - 4.0 g/dL    A-G Ratio 1.0 (L) 1.1 - 2.2     URINALYSIS W/ RFLX MICROSCOPIC    Collection Time: 04/02/23  3:17 PM   Result Value Ref Range    Color YELLOW/STRAW      Appearance CLOUDY (A) CLEAR      Specific gravity 1.015 1.003 - 1.030      pH (UA) 6.0 5.0 - 8.0      Protein 30 (A) NEG mg/dL    Glucose Negative NEG mg/dL    Ketone TRACE (A) NEG mg/dL    Bilirubin Negative NEG      Blood MODERATE (A) NEG      Urobilinogen 0.2 0.2 - 1.0 EU/dL    Nitrites Negative NEG      Leukocyte Esterase LARGE (A) NEG     URINE MICROSCOPIC ONLY    Collection Time: 04/02/23  3:17 PM   Result Value Ref Range    WBC >100 (H) 0 - 4 /hpf    RBC 0-5 0 - 5 /hpf    Epithelial cells FEW FEW /lpf    Bacteria 4+ (A) NEG /hpf          IMAGING:  The Following imaging studies have been ordered while in the emergency department and I have reviewed them. XR ABD (KUB)   Final Result   No evidence for bowel obstruction. Medications During Visit:  I ordered/approved the ordering of the following medicines for the patient while in the emergency department. Medications   cefTRIAXone (ROCEPHIN) 1 g in 0.9% sodium chloride 10 mL IV syringe (1 g IntraVENous Given 4/2/23 1623)         DECISION MAKING:  Emiliano Freedman is a 78 y.o. female who comes in as above. Patient arrives with urinary retention and constipation. Treated for UTI several days ago, 3 days of Bactrim now with retention. Patient had over 250 mL in the bladder. Now has a Calvo catheter with gross infection. Patient has failed outpatient treatment now with elevated white cell count and some chills and shakiness. Concern for more systemic infection. She has no fever but does have constipation as well. We will add CT abdomen and pelvis with contrast given that she had when it ordered as an outpatient by urology she is here for urological issues. Patient has received first round of IV antibiotics. IMPRESSION:  1. Cystitis    2. Constipation, unspecified constipation type    3.  Urinary retention        DISPOSITION:  Admitted        Procedures    Perfect Serve Consult for Admission  4:48 PM    ED Room Number: WER01/01  Patient Name and age:  Bennie Leiva 78 y.o.  female  Working Diagnosis:   1. Cystitis    2. Constipation, unspecified constipation type    3. Urinary retention        COVID-19 Suspicion:  no  Sepsis present:  no  Reassessment needed: no  Code Status:  Full Code  Readmission: no  Isolation Requirements:  no  Recommended Level of Care:  med/surg  Department: [facility] ED - [phone]      Other:  Patient arrives with urinary retention and constipation. Treated for UTI several days ago, 3 days of Bactrim now with retention. Patient had over 250 mL in the bladder. Now has a Calvo catheter with gross infection. Patient has failed outpatient treatment now with elevated white cell count and some chills and shakiness. Concern for more systemic infection. She has no fever but does have constipation as well. We will add CT abdomen and pelvis with contrast given that she had when it ordered as an outpatient by urology she is here for urological issues. Patient has received first round of IV antibiotics.

## 2023-04-02 NOTE — ED NOTES
Bladder check:  Angled from the right 242ml                            Angled from the left 271ml  Informed Dr Niki Marcos

## 2023-04-02 NOTE — DISCHARGE INSTRUCTIONS
HOSPITALIST DISCHARGE INSTRUCTIONS  NAME: Gladys Grove   :  1945   MRN:  [id number]     Date/Time:  2023 1:37 PM    ADMIT DATE: 2023     DISCHARGE DATE: 2023     ADMITTING DIAGNOSIS:  UTI  Urinary retention    DISCHARGE DIAGNOSIS:  You were admitted for evaluation and treatment of the above. MEDICATIONS:    It is important that you take the medication exactly as they are prescribed. Keep your medication in the bottles provided by the pharmacist and keep a list of the medication names, dosages, and times to be taken in your wallet. Do not take other medications without consulting your doctor. DIET:  heart healthy    ACTIVITY:  as tolerated    OTHER INSTRUCTIONS:    Seek medical attention for recurrent dysuria or urinary retention      If you experience any of the following symptoms then please call your primary care physician or return to the emergency room if you cannot get hold of your doctor:  Fever, chills, nausea, vomiting, diarrhea, change in mentation, falling, bleeding, shortness of breath    Follow Up:  Please call and set up an appointment to see them in 1 week. Kelsy Bignham MD  [facility] 8.  [phone]          Your Specialist    Call in 1 day          Information obtained by :  I understand that if any problems occur once I am at home I am to contact my physician. I understand and acknowledge receipt of the instructions indicated above.                                                                                                                                            Physician's or R.N.'s Signature                                                                  Date/Time                                                                                                                                              Patient or Representative Signature Date/Time                                                                                                                                              Patient or Representative Signature                                                          Date/Time      Signed By: Lillian Sanders MD     April 5, 2023

## 2023-04-02 NOTE — ED NOTES
TRANSFER - OUT REPORT:    Verbal report given to Flora Duverney RN(name) on Angelo Castle  being transferred to [facility] ED HOLD(unit) for routine progression of care       Report consisted of patients Situation, Background, Assessment and   Recommendations(SBAR). Information from the following report(s) ED Summary, MAR, Recent Results, and Cardiac Rhythm NSR  was reviewed with the receiving nurse. Lines:   Peripheral IV 04/02/23 Left Wrist (Active)   Site Assessment Clean, dry, & intact 04/02/23 1618   Phlebitis Assessment 0 04/02/23 1618   Infiltration Assessment 0 04/02/23 1618   Dressing Status Clean, dry, & intact 04/02/23 1618   Dressing Type Transparent 04/02/23 1618   Hub Color/Line Status Blue;Flushed 04/02/23 1618        Opportunity for questions and clarification was provided.       Patient transported with:   Monitor, saline lock, mcgee cath

## 2023-04-02 NOTE — ED TRIAGE NOTES
Pt reports being unable to urinate today. Yesterday she reports urinating without difficulty. Pt reports constipation since Thursday. Pt took [address] today. Pt reports passing gas previously but none today. Pt reports frequent urination last week, was seen by [facility] and prescribed bactrim on 3/31. Pt saw urology 3/17 for urinary leaking and was prescribed tropsium. Pt had a spinal injection on 3/24 for back pain.

## 2023-04-03 LAB
ANION GAP SERPL CALC-SCNC: 4 MMOL/L (ref 5–15)
BUN SERPL-MCNC: 23 MG/DL (ref 6–20)
BUN/CREAT SERPL: 18 (ref 12–20)
CALCIUM SERPL-MCNC: 8.4 MG/DL (ref 8.5–10.1)
CHLORIDE SERPL-SCNC: 109 MMOL/L (ref 97–108)
CO2 SERPL-SCNC: 25 MMOL/L (ref 21–32)
CREAT SERPL-MCNC: 1.3 MG/DL (ref 0.55–1.02)
ERYTHROCYTE [DISTWIDTH] IN BLOOD BY AUTOMATED COUNT: 14.7 % (ref 11.5–14.5)
GLUCOSE SERPL-MCNC: 128 MG/DL (ref 65–100)
HCT VFR BLD AUTO: 37.4 % (ref 35–47)
HGB BLD-MCNC: 11.9 G/DL (ref 11.5–16)
MCH RBC QN AUTO: 31.1 PG (ref 26–34)
MCHC RBC AUTO-ENTMCNC: 31.8 G/DL (ref 30–36.5)
MCV RBC AUTO: 97.7 FL (ref 80–99)
NRBC # BLD: 0 K/UL (ref 0–0.01)
NRBC BLD-RTO: 0 PER 100 WBC
PLATELET # BLD AUTO: 283 K/UL (ref 150–400)
PMV BLD AUTO: 10.2 FL (ref 8.9–12.9)
POTASSIUM SERPL-SCNC: 4.3 MMOL/L (ref 3.5–5.1)
RBC # BLD AUTO: 3.83 M/UL (ref 3.8–5.2)
SODIUM SERPL-SCNC: 138 MMOL/L (ref 136–145)
WBC # BLD AUTO: 12.2 K/UL (ref 3.6–11)

## 2023-04-03 PROCEDURE — 74011250637 HC RX REV CODE- 250/637: Performed by: INTERNAL MEDICINE

## 2023-04-03 PROCEDURE — 74011250636 HC RX REV CODE- 250/636: Performed by: INTERNAL MEDICINE

## 2023-04-03 PROCEDURE — 80048 BASIC METABOLIC PNL TOTAL CA: CPT

## 2023-04-03 PROCEDURE — 85027 COMPLETE CBC AUTOMATED: CPT

## 2023-04-03 PROCEDURE — 36415 COLL VENOUS BLD VENIPUNCTURE: CPT

## 2023-04-03 PROCEDURE — 65270000029 HC RM PRIVATE

## 2023-04-03 PROCEDURE — 74011000250 HC RX REV CODE- 250: Performed by: INTERNAL MEDICINE

## 2023-04-03 PROCEDURE — 74011000258 HC RX REV CODE- 258: Performed by: INTERNAL MEDICINE

## 2023-04-03 RX ORDER — LOPERAMIDE HYDROCHLORIDE 2 MG/1
2 CAPSULE ORAL
Status: DISCONTINUED
Start: 2023-04-03 | End: 2023-04-05 | Stop reason: HOSPADM

## 2023-04-03 RX ADMIN — TROSPIUM CHLORIDE 20 MG: 20 TABLET, FILM COATED ORAL at 08:06

## 2023-04-03 RX ADMIN — BUPROPION HYDROCHLORIDE 150 MG: 150 TABLET, EXTENDED RELEASE ORAL at 17:50

## 2023-04-03 RX ADMIN — AMPICILLIN SODIUM 1 G: 1 INJECTION, POWDER, FOR SOLUTION INTRAMUSCULAR; INTRAVENOUS at 16:14

## 2023-04-03 RX ADMIN — RIVAROXABAN 15 MG: 15 TABLET, FILM COATED ORAL at 08:06

## 2023-04-03 RX ADMIN — GABAPENTIN 600 MG: 300 CAPSULE ORAL at 08:05

## 2023-04-03 RX ADMIN — ATENOLOL 25 MG: 25 TABLET ORAL at 08:06

## 2023-04-03 RX ADMIN — GABAPENTIN 600 MG: 300 CAPSULE ORAL at 16:13

## 2023-04-03 RX ADMIN — ONDANSETRON 4 MG: 4 TABLET, ORALLY DISINTEGRATING ORAL at 01:17

## 2023-04-03 RX ADMIN — SODIUM CHLORIDE, POTASSIUM CHLORIDE, SODIUM LACTATE AND CALCIUM CHLORIDE 100 ML/HR: 600; 310; 30; 20 INJECTION, SOLUTION INTRAVENOUS at 17:51

## 2023-04-03 RX ADMIN — GABAPENTIN 600 MG: 300 CAPSULE ORAL at 21:14

## 2023-04-03 RX ADMIN — LOPERAMIDE HYDROCHLORIDE 2 MG: 2 CAPSULE ORAL at 10:28

## 2023-04-03 RX ADMIN — SODIUM CHLORIDE, POTASSIUM CHLORIDE, SODIUM LACTATE AND CALCIUM CHLORIDE 125 ML/HR: 600; 310; 30; 20 INJECTION, SOLUTION INTRAVENOUS at 08:05

## 2023-04-03 RX ADMIN — AMIODARONE HYDROCHLORIDE 100 MG: 200 TABLET ORAL at 08:05

## 2023-04-03 RX ADMIN — SODIUM CHLORIDE 1 G: 9 INJECTION INTRAMUSCULAR; INTRAVENOUS; SUBCUTANEOUS at 20:39

## 2023-04-03 RX ADMIN — LOPERAMIDE HYDROCHLORIDE 2 MG: 2 CAPSULE ORAL at 21:19

## 2023-04-03 RX ADMIN — SODIUM CHLORIDE, PRESERVATIVE FREE 10 ML: 5 INJECTION INTRAVENOUS at 21:15

## 2023-04-03 RX ADMIN — SODIUM CHLORIDE, PRESERVATIVE FREE 5 ML: 5 INJECTION INTRAVENOUS at 06:16

## 2023-04-03 RX ADMIN — BUPROPION HYDROCHLORIDE 150 MG: 150 TABLET, EXTENDED RELEASE ORAL at 08:06

## 2023-04-03 RX ADMIN — AMPICILLIN SODIUM 1 G: 1 INJECTION, POWDER, FOR SOLUTION INTRAMUSCULAR; INTRAVENOUS at 20:39

## 2023-04-03 RX ADMIN — PANTOPRAZOLE SODIUM 40 MG: 40 TABLET, DELAYED RELEASE ORAL at 06:15

## 2023-04-03 RX ADMIN — AMPICILLIN SODIUM 1 G: 1 INJECTION, POWDER, FOR SOLUTION INTRAMUSCULAR; INTRAVENOUS at 10:28

## 2023-04-03 RX ADMIN — AMLODIPINE BESYLATE 2.5 MG: 2.5 TABLET ORAL at 08:05

## 2023-04-03 RX ADMIN — SODIUM CHLORIDE, PRESERVATIVE FREE 10 ML: 5 INJECTION INTRAVENOUS at 16:14

## 2023-04-03 RX ADMIN — AMPICILLIN SODIUM 1 G: 1 INJECTION, POWDER, FOR SOLUTION INTRAMUSCULAR; INTRAVENOUS at 02:54

## 2023-04-03 NOTE — PROGRESS NOTES
Problem: Infection - Risk of, Urinary Catheter-Associated Urinary Tract Infection  Goal: *Absence of infection signs and symptoms  Outcome: Progressing Towards Goal     Problem: Patient Education: Go to Patient Education Activity  Goal: Patient/Family Education  Outcome: Progressing Towards Goal     Problem: Falls - Risk of  Goal: *Absence of Falls  Description: Document Edelmira Fall Risk and appropriate interventions in the flowsheet.   Outcome: Progressing Towards Goal  Note: Fall Risk Interventions:                                Problem: Patient Education: Go to Patient Education Activity  Goal: Patient/Family Education  Outcome: Progressing Towards Goal

## 2023-04-03 NOTE — ED NOTES
TRANSFER - OUT REPORT:    Verbal report given to Toro on Geoffrey Guerrero  being transferred to  for routine progression of care       Report consisted of patients Situation, Background, Assessment and   Recommendations(SBAR). Information from the following report(s) SBAR, Kardex, ED Summary, Intake/Output, MAR, and Recent Results was reviewed with the receiving nurse. Lines:   Peripheral IV 04/02/23 Left Wrist (Active)   Site Assessment Clean, dry, & intact 04/02/23 1618   Phlebitis Assessment 0 04/02/23 1618   Infiltration Assessment 0 04/02/23 1618   Dressing Status Clean, dry, & intact 04/02/23 1618   Dressing Type Transparent 04/02/23 1618   Hub Color/Line Status Blue;Flushed 04/02/23 1618        Opportunity for questions and clarification was provided.       Patient transported with:   Registered Nurse

## 2023-04-03 NOTE — PROGRESS NOTES
0700 - Bedside and Verbal shift change report given to [employer] RN (oncoming nurse) by Jnaa Harris RN (offgoing nurse). Report included the following information SBAR, Kardex, ED Summary, Intake/Output, MAR, Recent Results, Cardiac Rhythm atrial paced, and Quality Measures.

## 2023-04-03 NOTE — CONSULTS
New Urology Consult Note    Patient: Radha Friedman MRN: [id number]  SSN: [id number]    YOB: 1945  Age: 78 y.o. Sex: female            Plan:     AFVSS   Leukocytosis improving   Cr 1.3 (1.23)   UCX and BCX pending     Plan:   UTI, concern for AUR, CRISTIANA   -Continue broad spectrum abx, adjust per final culture results. VU culture has yet to result. - Mcgee placed 4/2 yielding 270ml. Not true urinary retention given this volume. Would recommend hydrating overnight, repeating BMP in AM and VT tomorrow. No hydronephrosis noted on CT imaging, although mcgee catheter was in place at that time.   -Do not resume home trospium     Addendum: re-evaluated with Dr. Pollo Arias at bedside. Plan as above. Thank you for this consult. Please contact [facility] with any further questions/concerns. History of Present Illness:     Chief Complaint:  without complaints     Radha Friedman is seen in consultation for reasons noted above at the request of Fabricio Emery MD. Admitted to hospital for <principal problem not specified>    This is a 78 y.o. female with a history of anxiety, depression, afib on chronic AC, GERD, HTN, hypothyroid, migraines, recurrent UTI, cystocele, vaginal vault prolapse, gross hematuria was seen in office by [facility] 3/31 with complaints of malodorous urine, cloudy urine, and abdominal pain. Her UA was concerning for infection, UCX sent, and she was started on Bactrim same day. She reports compliance to bactrim, but no improvement in symptoms. She presented to ER 4/2 with complaints of inability to urinate, constipation, and persistent UTI symptoms. She was afebrile on arrival, hypertensive,  WBC 15.1, Cr 1.23, UA with WBC >100, bacteria 4+, large leuk esterase, few epis, RBC 0-5. CT abd/pelv images personally reviewed without acute findings. Mcgee catheter placed in ER given her inability to urinate, 270ml returned upon placement.  UCX and [address] pending, she is receiving ampicillin and rocephin. Urology consulted for AUR. On exam, she reports relief of urinary symptoms since admission. Denies abdominal pain sp mcgee placement. Office note from Dr. Rosario Pathak 3/31 as below:   Alex Romero is a 78year old female who presents today for \"UTI \". She returns for follow-up. Ms. Tiffany Whyte is a 78year old female followed by Sherrill Spangler for gorss hematuria, UTIs and prolapse. She presents today for due to one week history of malodourous urine, cloudy urine, abdominal pain. She denies any fevers, chills, or n/v. Urine today shows 3-5 WBCS and >25 Bacteria. She does not feel as though she is emptying to completetion. I wanted to check a PVR today, however the patient was reluctant to agree,     Due to recent episode of gross hematuria, Sherrill Spangler wanted her to have a CTIVP and follow up after. She has not scheduled this yet. PAST MEDICAL HISTORY:    Allergies: No known allergies. DENIES: Latex, Shellfish, X-Ray Dye, Iodine. Medications:  Bactrim -160 mg tablet (sulfamethoxazole-trimethoprim) Take 1 tablet by mouth twice a day   gabapentin 300 mg capsule (gabapentin) as directed   amiodarone 200 mg tablet (amiodarone) once a day   nebivolol 20 mg tablet (nebivolol) once a day   PreserVision AREDS 4,296 mcg-226 mg-90 mg capsule (vitamins a,c,e-zinc-copper) once a day   amlodipine 2.5 mg tablet (amlodipine) once a day   Prilosec 10 mg susp,delayed release for recon (omeprazole magnesium) once a day   Allergy (chlorpheniramine) 4 mg tablet (chlorpheniramine maleate) once a day   Vitamin C (ascorbate calcium) 814 mg/gram powder (ascorbate calcium (vitamin c)) once a day   * multivitamin once a day   bupropion HCl 150 mg tablet sustained-release 12 hr (bupropion hcl) once a day   * estradiol 0.87 gram/actuation gel in metered-dose pump (estradiol) as directed   Xarelto 20 mg tablet (rivaroxaban) once a day   bumetanide 2 mg tablet (bumetanide) once a day   potassium chloride 20 mEq tablet,ER particles/crystals (potassium chloride) once a day   * biotin once a day     Problems: Incontinence without sensory awareness (ICD-788.34) (QLB72-R99.42)  Incomplete bladder emptying (HTC-033.44) (AQQ04-E19.9)  Urinary Tract Infection (UTI), recurrent (ICD-599.0) (UXK02-P86.0)  Urinary frequency (ICD-788.41) (KAC45-Z30.0)  Urge incontinence (ICD-788.31) (WPC22-D57.41)  Prolapse of vaginal vault after hysterectomy (ICD-618.5) (FIT15-Y51.3)  Vaginal atrophy (ICD-627.3) (LHH85-D71.2)  Cystocele (ICD-618.01) (EZX28-I38.10)  Prolapse of female genital organs (ICD-618.9) (BDG30-B75.9)  Urethral caruncle (ICD-599.3) (XVD09-F41.2)  Adrenal adenoma (ICD-227.0) (FBR32-Z91.00)  Hematuria, gross (ICD-599.7) (ODL34-A06.0)  UTI (ICD-599.0) (IQA34-O25.0)    Illnesses: Heart Disease, Pacemaker/Defibrillator, High Blood Pressure, and Bleeding Problems. DENIES: Lung Disease, Diabetes, Bowel Problems, Stroke/Seizure, Kidney Problems, HIV, Hepatitis, or Cancer. Surgeries: Gallbladder Surgery,Cataract Surgery,Hysterectomy,Ovary Removed (One), andColonoscopy. Family History: DENIES: Kidney cancer, Kidney disease, Kidney stones, Breast cancer, Uterine cancer, Cervical cancer, Ovarian cancer. Social History: Retired. . Smoking status: former smoker. Drinks alcohol monthly or less. System Review: Admits to: None. DENIES: Unexplained Weight Loss, Dry Eyes, Dry Mouth, Leg Swelling, Shortness of Breath, Constipation, Involuntary Urine Loss, Lower Extremity Weakness, Dry Skin, Difficulty Walking, Psychiatric Problems, Impaired Sex Drive, Rash. EXAMINATION: Appearance: well-developed NAD Respiratory Effort: breathing easily Lower Extremity: no edema Abdomen/Flank: soft non-tender without masses; no CVA tenderness Liver/Spleen: no organomegaly Hernia: no ventral hernia     DIAGNOSTIC STUDIES:  TVUS Impression 12/29/2021:  1. Hysterectomy. Right ovary is not identified.   2. Normal-appearing postmenopausal left ovary. URODYNAMIC IMPRESSION 12/29/2021:  1. VLPP - Not Demonstrated  2. Detrusor Overactivity - Not Demonstrated      URINALYSIS from Voided specimen  Urine Dip: pH: 6.0, Bld: +++, LE: ++, Nit: Pos, Prot: Neg, Ket: Neg, Gluc: Neg  Urine Micro: WBC: 3-5, RBC: 0, Bacteria: >25    Prescription(s) Today: Bactrim -160 mg tablet (sulfamethoxazole-trimethoprim) Take 1 tablet by mouth twice a day   #10 tablet x 0,  03/31/2023, Jayro Anand LPN, SIGNED    IMPRESSION:    1. URINARY TRACT INFECTION (UTI) - RECURRENT (QUT99-E15.0) - Unchanged    2. INCOMPLETE BLADDER EMPTYING (QOB93-E56.9) - Unchanged    3. HEMATURIA - GROSS (ESG29-O21.0) - Unchanged    PLAN: Urine suspicious for infection. We will send for a culture and start her on a course of Bactrim. Patient understand that we may have to change abx pending culture. We will also arrange for her to have a CTIVP due to gross hematuria and follow up after with Domingo De León to review the results. All questions and concerns were addressed prior to the patient leaving the clinic. The patient understands and agrees with the plan.      cc: Cecelia Hayes MD  Today's Services  E&M Service    Upcoming Orders  Return Office Visit -  TBA after CT  Urine Culture\"     Subjective     Past Medical History  Past Medical History:   Diagnosis Date    Anxiety and depression     Atrial fibrillation (HCC)     Chronic anticoagulation     GERD (gastroesophageal reflux disease)     Hypertension     Hypothyroid     Migraines        Past Surgical History:   Past Surgical History:   Procedure Laterality Date    HX CHOLECYSTECTOMY      HX HYSTERECTOMY      HX ORTHOPAEDIC      Right shoulder surgery    HX PACEMAKER      HX ELIA AND BSO      CT ANES NRV MUSC TNDN FSCIA BURSA SHOULDER & AXILLA      left       Medication:  Current Facility-Administered Medications   Medication Dose Route Frequency Provider Last Rate Last Admin    loperamide (IMODIUM) capsule 2 mg  2 mg Oral TID PRN Estee Dick MD        amiodarone (CORDARONE) tablet 100 mg  100 mg Oral EVERY OTHER Aurora Martell MD   100 mg at 04/03/23 0805    amLODIPine (NORVASC) tablet 2.5 mg  2.5 mg Oral DAILY Isabel Stewart MD   2.5 mg at 04/03/23 0805    buPROPion SR (WELLBUTRIN SR) tablet 150 mg  150 mg Oral BID Isabel Stewart MD   150 mg at 04/03/23 0806    gabapentin (NEURONTIN) capsule 600 mg  600 mg Oral TID Isabel Stewart MD   600 mg at 04/03/23 0805    atenoloL (TENORMIN) tablet 25 mg  25 mg Oral DAILY Isabel Stewart MD   25 mg at 04/03/23 2067    rivaroxaban (XARELTO) tablet 15 mg  15 mg Oral DAILY WITH Savanah Ramirez MD   15 mg at 04/03/23 0806    trospium (SANCTURA) tablet 20 mg  20 mg Oral DAILY Isabel Stewart MD   20 mg at 04/03/23 1756    sodium chloride (NS) flush 5-40 mL  5-40 mL IntraVENous Q8H Isabel Stewart MD   5 mL at 04/03/23 0616    sodium chloride (NS) flush 5-40 mL  5-40 mL IntraVENous PRN Isabel Stewart MD        acetaminophen (TYLENOL) tablet 650 mg  650 mg Oral Q6H PRN Isabel Stewart MD        Or    acetaminophen (TYLENOL) suppository 650 mg  650 mg Rectal Q6H PRN Isabel Stewart MD        polyethylene glycol (MIRALAX) packet 17 g  17 g Oral DAILY PRN Isabel Stewart MD        ondansetron (ZOFRAN ODT) tablet 4 mg  4 mg Oral Q8H PRN Isabel Stewart MD   4 mg at 04/03/23 0117    Or    ondansetron (ZOFRAN) injection 4 mg  4 mg IntraVENous Q6H PRN Isabel Stewart MD        lactated Ringers infusion  125 mL/hr IntraVENous CONTINUOUS Isabel Stewart  mL/hr at 04/03/23 0805 125 mL/hr at 04/03/23 0805    cefTRIAXone (ROCEPHIN) 1 g in 0.9% sodium chloride 10 mL IV syringe  1 g IntraVENous Q24H Isabel Stewart MD        ampicillin (OMNIPEN) 1 g in 0.9% sodium chloride (MBP/ADV) 50 mL MBP  1 g IntraVENous Q6H Isabel Stewart MD   Transfusion Completed at 04/03/23 0324    pantoprazole (PROTONIX) tablet 40 mg  40 mg Oral ACB Isabel Stewart MD   40 mg at 04/03/23 9793       Allergies:  No Known Allergies    Social History:  Social History     Tobacco Use    Smoking status: Former     Packs/day: 1.00     Types: Cigarettes    Smokeless tobacco: Never    Tobacco comments:     quit 30 years ago   Vaping Use    Vaping Use: Never used   Substance Use Topics    Alcohol use: Yes     Comment: social drinker    Drug use: No       Family History  Family History   Problem Relation Age of Onset    Hypertension Other     Stroke Other     Heart Attack Other     Migraines Mother     Cancer Mother         melanoma    Heart Disease Father     Hypertension Father     Stroke Father     Heart Attack Father        Review of Systems  Unchanged from admitting provider note from 4/2/23 other than HPI.     Objective:     Vital signs in last 24 hours:  Visit Vitals  /60   Pulse 62   Temp 97.7 °F (36.5 °C)   Resp 17   Ht 5' 4\" (1.626 m)   Wt 100 kg (220 lb 7.4 oz)   SpO2 99%   BMI 37.84 kg/m²       Intake/Output last 3 shifts:  Date 04/02/23 0700 - 04/03/23 0659 04/03/23 0700 - 04/04/23 0659   Shift 9474-5053 6887-9423 24 Hour Total 4994-9491 8245-8536 24 Hour Total   INTAKE   I.V.(mL/kg/hr)  100(0.1) 100(0)        Volume (ampicillin (OMNIPEN) 1 g in 0.9% sodium chloride (MBP/ADV) 50 mL MBP)  100 100      Other 270  270        Irrigation Volume Input (mL) (Urinary Catheter 04/02/23 Mcgee) 270  270      Shift Total(mL/kg) 270(2.7) 100(1) 370(3.7)      OUTPUT   Urine(mL/kg/hr)  1200(1) 1200(0.5)        Urine Voided  600 600        Urine Output (mL) (Urinary Catheter 04/02/23 Mcgee)  600 600      Other 700  700        Other Output 700  700      Stool           Stool Occurrence(s)    2 x  2 x   Shift Total(mL/kg) 700(7) 1200(12) 1900(19)      NET -430 -1100 -1530      Weight (kg) 100 100 100 100 100 100       Physical Exam  General Appearance: NAD, awake  HENT: atraumatic, normal ears  Cardiovascular: not tachycardic, no distress  Respiratory: no distress, room air  Abdomen: soft, no suprapubic fullness or tenderness  : no CVA tenderness, mcgee draining clear urine   Extremities: moves all  Musculoskeletal: normal alignment of neck and head  Neuro: A&OX3, no focal neurological deficits  Mood/Affect: appropriate, pleasant    Lab/Imaging Review:       Most Recent Labs:  Lab Results   Component Value Date/Time    WBC 12.2 (H) 04/03/2023 02:36 AM    Hemoglobin (POC) 13.3 11/17/2012 12:54 AM    HGB 11.9 04/03/2023 02:36 AM    Hematocrit (POC) 39 11/17/2012 12:54 AM    HCT 37.4 04/03/2023 02:36 AM    PLATELET 046 12/55/8447 02:36 AM    MCV 97.7 04/03/2023 02:36 AM        Lab Results   Component Value Date/Time    Sodium 138 04/03/2023 02:36 AM    Potassium 4.3 04/03/2023 02:36 AM    Chloride 109 (H) 04/03/2023 02:36 AM    CO2 25 04/03/2023 02:36 AM    Anion gap 4 (L) 04/03/2023 02:36 AM    Glucose 128 (H) 04/03/2023 02:36 AM    BUN 23 (H) 04/03/2023 02:36 AM    Creatinine 1.30 (H) 04/03/2023 02:36 AM    BUN/Creatinine ratio 18 04/03/2023 02:36 AM    GFR est AA >60 02/16/2020 10:44 PM    GFR est non-AA 55 (L) 02/16/2020 10:44 PM    Calcium 8.4 (L) 04/03/2023 02:36 AM    Bilirubin, total 0.6 04/02/2023 03:17 PM    Alk.  phosphatase 76 04/02/2023 03:17 PM    Protein, total 7.7 04/02/2023 03:17 PM    Albumin 3.9 04/02/2023 03:17 PM    Globulin 3.8 04/02/2023 03:17 PM    A-G Ratio 1.0 (L) 04/02/2023 03:17 PM    ALT (SGPT) 33 04/02/2023 03:17 PM        No results found for: PSA, PSA2, PSAR1, Ghassan Yaneth, PSAR3, ERQ095067, UFT077331, 21216, PSAEXT     COAGS:  No results found for: APTT, PTP, INR, INREXT    No results found for: HBA1C, QUI5GHEB, YDW9ZZSA     Lab Results   Component Value Date/Time    CK 37 02/08/2012 07:30 PM    CK - MB <0.5 (L) 02/08/2012 07:30 PM    CK-MB Index CANNOT BE CALCULATED 02/08/2012 07:30 PM    Troponin-I, Qt. <0.05 09/17/2019 09:58 PM          Urine/Blood Cultures:  Results       Procedure Component Value Units Date/Time    CULTURE, BLOOD [[id number]] Collected: 04/02/23 1700    Order Status: Sent Specimen: Blood Updated: 04/02/23 8238 CULTURE, URINE [[id number]] Collected: 04/02/23 1700    Order Status: Sent Specimen: Cath Urine Updated: 04/02/23 9401               IMAGING:  XR ABD (KUB)    Result Date: 4/2/2023  EXAM:  XR ABD (KUB) INDICATION: Constipation COMPARISON: CT 6/8/2019. TECHNIQUE: Supine frontal abdomen (KUB) on 2 images. FINDINGS: No dilated small bowel. Stool and gas are present in the colon. No pathologic calcification. Right upper quadrant clips. Levoconvex lumbar curve centered at L1-2 with moderate lumbar spondylosis. No evidence for bowel obstruction. CT ABD PELV W CONT    Result Date: 4/2/2023  EXAM: CT ABD PELV W CONT INDICATION: Constipation, retention, infection COMPARISON: CT 6/8/2019, earlier radiographs CONTRAST: 80 mL of Isovue-370. ORAL CONTRAST: None. Lack of oral contrast material diminishes the capacity of CT to evaluate the bowel and adjacent structures. TECHNIQUE: Following the uneventful intravenous administration of contrast, thin axial images were obtained through the abdomen and pelvis. Coronal and sagittal reconstructions were generated. CT dose reduction was achieved through use of a standardized protocol tailored for this examination and automatic exposure control for dose modulation. FINDINGS: LOWER THORAX: Right atrioventricular pacemaker leads again shown. Cardiac size upper limits of normal. No consolidation or pleural-pericardial effusion. LIVER: Mild diffuse hepatic steatosis. Subcapsular hypoattenuating lesion in segment 7 measuring 14 mm in size consistent with cyst. No intrahepatic bile duct dilation. BILIARY TREE: Status post cholecystectomy. CBD is not dilated. SPLEEN: within normal limits. PANCREAS: No mass or ductal dilatation. ADRENALS: Left adrenal mass again shown measuring 26 x 18 mm transverse and 18 mm craniocaudal. Size unchanged. KIDNEYS: No mass, calculus, or hydronephrosis. STOMACH: Unremarkable. SMALL BOWEL: No dilatation or wall thickening.  COLON: No dilatation or wall thickening. APPENDIX: Normal. PERITONEUM: No ascites or pneumoperitoneum. RETROPERITONEUM: No lymphadenopathy or aortic aneurysm. Atherosclerotic calcifications. REPRODUCTIVE ORGANS: Status post hysterectomy. URINARY BLADDER: Calvo catheter. Assessment therefore suboptimal. No obvious abnormality. BONES: No destructive bone lesion. The bones are mildly to moderately osteopenic. Thoracic and lumbar spondylosis is shown with a mild-moderate levoconvex curve centered at L2. ABDOMINAL WALL: No mass or hernia. ADDITIONAL COMMENTS: N/A     No acute findings. Status post cholecystectomy and hysterectomy. Stable left adrenal nodule.          Signed By: Yaron Carey, DANELLE  - April 3, 2023

## 2023-04-03 NOTE — PROGRESS NOTES
Bedside shift change report given to Judy Weaver RN (oncoming nurse) by Jonnie Thompson RN (offgoing nurse). Report included the following information SBAR, Kardex, Intake/Output, MAR, and Recent Results. The opportunity for questions was presented.

## 2023-04-03 NOTE — H&P
Hospitalist Admission Note    NAME:  Gladys Grove   :  1945   MRN:  [id number]     Date/Time:  2023 8:26 PM    Patient PCP: Caleb Galvez MD  ________________________________________________________________________    Given the patient's current clinical presentation, I have a high level of concern for decompensation if discharged from the emergency department. Complex decision making was performed, which includes reviewing the patient's available past medical records, laboratory results, and x-ray films. My assessment of this patient's clinical condition and my plan of care is as follows. Assessment / Plan:    UTI:    The patient comes in w/ constipation and an inability to urinate. She was recently tx for UTI    VSS  WBC 15.1, Hg 13.7  K 5.2  BUN 27, Cr 1.23  UA +large LE, >100 WBC, 4+bacteria  Ucx (22) E faecalis  CT Abdomen - No acute findings. Abd XR - no bowel obstruction    Admit patient and cont to monitor. The patient was recently tx for UTI w/ PO Bactrim w/o any improvement  Start IVF and IV CTX and IV Ampicillin w/ recent hx of E Faecalis. If Ucx does not grow E. Faecalis then D/C.    2. Urinary retention:    S/p Calvo  Consult Urology    3. Constipation:    She reports of having a BM 1 hour ago  Cont to monitor    4. pAFIB:    Cont w/ Amiodarone 100mg every other day  Cont w/ Xarelto 20mg daily  Cont w/ Nebivolol 20mg daily    5. HTN:    Cont w/ Norvasc 2.5mg daily    6. Anxiety/Depression:    Cont w/ Wellbutri SR 150mg bid    7. SSS:    S/p pacemaker    Body mass index is 37.84 kg/m².:  30.0 - 39.9:  Obese    I have personally reviewed the radiographs, laboratory data in Epic and decisions and statements above are based partially on this personal interpretation.     Code Status: Full Code  Surrogate Decision Maker  Ebonie Jiménez ()  [phone]    Prophylaxis:  Xarelto     Subjective:   CHIEF COMPLAINT: I have not had a BM or able to urinate    HISTORY OF PRESENT ILLNESS:       The patient is a 77 y/o C F w/ PMH lacunar infarct, pAFIB and SSS s/p pacemaker who comes in w/ concerns of no bowel movement since this past Thursday and an inability to urinate since last night. She does report of generalized bilateral lower quadrant abdominal pain without any aggravating or alleviating symptoms. She denies any nausea, vomiting, melena or hematochezia. She has no fever, chills or night sweats. She has no chest pain, palpitations, coughing, wheezing or dyspnea. She does have a recent hx of UTI which was tx w/ Bactrim on 3/31. Past Medical History:   Diagnosis Date    Anxiety and depression     Atrial fibrillation (HCC)     Chronic anticoagulation     GERD (gastroesophageal reflux disease)     Hypertension     Hypothyroid     Migraines       Past Surgical History:   Procedure Laterality Date    HX CHOLECYSTECTOMY      HX HYSTERECTOMY      HX ORTHOPAEDIC      Right shoulder surgery    HX PACEMAKER      HX ELIA AND BSO      AR ANES NRV MUSC TNDN FSCIA BURSA SHOULDER & AXILLA      left     Social History     Tobacco Use    Smoking status: Former     Packs/day: 1.00     Types: Cigarettes    Smokeless tobacco: Never    Tobacco comments:     quit 30 years ago   Substance Use Topics    Alcohol use: Yes     Comment: social drinker      Family History   Problem Relation Age of Onset    Hypertension Other     Stroke Other     Heart Attack Other     Migraines Mother     Cancer Mother         melanoma    Heart Disease Father     Hypertension Father     Stroke Father     Heart Attack Father         No Known Allergies     Prior to Admission medications    Medication Sig Start Date End Date Taking? Authorizing Provider   cephALEXin (Keflex) 500 mg capsule Take 1 Capsule by mouth two (2) times a day for 7 days. 4/2/23 4/9/23 Yes Kirk Jiménez,    amiodarone (PACERONE) 100 mg tablet Take 1 Tablet by mouth every other day.  2/10/23  Yes Lizett Klein MD rivaroxaban (XARELTO) 20 mg tab tablet Take 1 Tablet by mouth daily (with breakfast). 2/10/23  Yes Hermelinda Martinez MD   nebivoloL (BYSTOLIC) 20 mg tablet Take 1 Tablet by mouth daily. 2/10/23  Yes Hermelinda Martinez MD   BUMETANIDE Take 2 mg by mouth daily. 1/1/08  Yes Provider, Historical   amLODIPine (NORVASC) 2.5 mg tablet Take 2.5 mg by mouth daily. Yes Provider, Historical   cetirizine (ZYRTEC) 10 mg tablet Take 10 mg by mouth daily. Yes Provider, Historical   Biotin 2,500 mcg cap Take 2 Capsules by mouth daily. Yes Provider, Historical   trospium (SANCTURA) 20 mg tablet Take 20 mg by mouth daily as needed. Yes Provider, Historical   peg 400-propylene glycol (Systane, propylene glycoL,) 0.4-0.3 % drop Administer 1 Drop to both eyes as needed. Yes Provider, Historical   Calcium Carbonate-Vit D3-Min 600 mg calcium- 400 unit tab Take 1 Tab by mouth daily. Yes Other, MD Keith   gabapentin (NEURONTIN) 300 mg capsule Take 600 mg by mouth three (3) times daily. Yes Other, MD Keith   buPROPion SR (WELLBUTRIN SR) 150 mg SR tablet Take 150 mg by mouth two (2) times a day. Yes Provider, Historical   therapeutic multivitamin (THERAGRAN) tablet Take 1 Tab by mouth daily. Yes Provider, Historical   potassium chloride (K-DUR, KLOR-CON) 20 mEq tablet Take 1 tablet by mouth two  times daily  Patient taking differently: 20 mEq daily. 3/24/16  Yes Latisha Phelan MD   ascorbic acid, vitamin C, (VITAMIN C) 1,000 mg tablet Take 500 mg by mouth nightly. Yes Provider, Historical   omeprazole (PRILOSEC) 20 mg capsule Take 20 mg by mouth daily. Indications: GASTROESOPHAGEAL REFLUX   Yes Other, MD Keith   acetaminophen (TYLENOL) 325 mg tablet Take 650 mg by mouth every four (4) hours as needed for Pain (for back pain; takes tylenol before switching to tramadol). Provider, Historical   vit A/vit C/vit E/zinc/copper (PRESERVISION AREDS PO) Take 1 Tablet by mouth.     Provider, Historical       REVIEW OF SYSTEMS:  See HPI for details  General: negative for fever, chills, sweats, weakness, weight loss  Eyes: negative for blurred vision, eye pain, loss of vision, diplopia  Ear Nose and Throat: negative for rhinorrhea, pharyngitis, otalgia, tinnitus, speech or swallowing difficulties  Respiratory:  negative for pleuritic pain, cough, sputum production, wheezing, SOB, IBARRA  Cardiology:  negative for chest pain, palpitations, orthopnea, PND, edema, syncope   Gastrointestinal: +abdominal pain, N/V, dysphagia, +constipation, bleeding  Genitourinary: negative for frequency, urgency, dysuria, hematuria, incontinence  Muskuloskeletal : negative for arthralgia, myalgia  Hematology: negative for easy bruising, bleeding, lymphadenopathy  Dermatological: negative for rash, ulceration, mole change, new lesion  Endocrine: negative for hot flashes or polydipsia  Neurological: negative for headache, dizziness, confusion, focal weakness, paresthesia, memory loss, gait disturbance  Psychological: negative for anxiety, depression, agitation    Objective:   VITALS:    Visit Vitals  BP (!) 148/70   Pulse 60   Temp 99.2 °F (37.3 °C)   Resp 19   Ht 5' 4\" (1.626 m)   Wt 100 kg (220 lb 7.4 oz)   SpO2 96%   BMI 37.84 kg/m²     PHYSICAL EXAM:    Physical Exam:    Gen: Well-developed, well-nourished, in no acute distress  HEENT:  Pink conjunctivae, PERRL, hearing intact to voice, moist mucous membranes  Neck: Supple, without masses, thyroid non-tender  Resp: No accessory muscle use, clear breath sounds without wheezes rales or rhonchi  Card: No murmurs, normal S1, S2 without thrills, bruits or +non-pittiing peripheral edema  Abd:  Soft, non-tender, non-distended, normoactive bowel sounds are present, no palpable organomegaly and no detectable hernias  Lymph:  No cervical or inguinal adenopathy  Musc: No cyanosis or clubbing  Skin: No rashes or ulcers, skin turgor is good  Neuro:  Cranial nerves are grossly intact, no focal motor weakness, follows commands appropriately  Psych:  Good insight, oriented to person, place and time, alert    _______________________________________________________________________  Care Plan discussed with:  Pt's condition, Imaging findings, Lab findings, Assessment, and Care Plan discussed with: Patient  _______________________________________________________________________    Probable disposition:  Home  ________________________________________________________________________    Comments   >50% of visit spent in counseling and coordination of care  Chart reviewed  Discussion with patient and/or family and questions answered     ________________________________________________________________________  Signed: Victor Northern, MD        Procedures: see electronic medical records for all procedures/Xrays and details which were not copied into this note but were reviewed prior to creation of Plan. LAB DATA REVIEWED:    Recent Results (from the past 24 hour(s))   CBC WITH AUTOMATED DIFF    Collection Time: 04/02/23  3:17 PM   Result Value Ref Range    WBC 15.1 (H) 3.6 - 11.0 K/uL    RBC 4.36 3.80 - 5.20 M/uL    HGB 13.7 11.5 - 16.0 g/dL    HCT 42.1 35.0 - 47.0 %    MCV 96.6 80.0 - 99.0 FL    MCH 31.4 26.0 - 34.0 PG    MCHC 32.5 30.0 - 36.5 g/dL    RDW 14.4 11.5 - 14.5 %    PLATELET 046 729 - 673 K/uL    MPV 10.9 8.9 - 12.9 FL    NRBC 0.0 0.0  WBC    ABSOLUTE NRBC 0.00 0.00 - 0.01 K/uL    NEUTROPHILS 79 (H) 32 - 75 %    LYMPHOCYTES 10 (L) 12 - 49 %    MONOCYTES 10 5 - 13 %    EOSINOPHILS 1 0 - 7 %    BASOPHILS 1 0 - 1 %    IMMATURE GRANULOCYTES 1 (H) 0 - 0.5 %    ABS. NEUTROPHILS 11.9 (H) 1.8 - 8.0 K/UL    ABS. LYMPHOCYTES 1.5 0.8 - 3.5 K/UL    ABS. MONOCYTES 1.4 (H) 0.0 - 1.0 K/UL    ABS. EOSINOPHILS 0.1 0.0 - 0.4 K/UL    ABS. BASOPHILS 0.1 0.0 - 0.1 K/UL    ABS. IMM.  GRANS. 0.1 (H) 0.00 - 0.04 K/UL    DF AUTOMATED     METABOLIC PANEL, COMPREHENSIVE    Collection Time: 04/02/23  3:17 PM   Result Value Ref Range    Sodium 140 136 - 145 mmol/L    Potassium 5.2 (H) 3.5 - 5.1 mmol/L    Chloride 101 97 - 108 mmol/L    CO2 29 21 - 32 mmol/L    Anion gap 10 5 - 15 mmol/L    Glucose 111 (H) 65 - 100 mg/dL    BUN 27 (H) 6 - 20 MG/DL    Creatinine 1.23 (H) 0.55 - 1.02 MG/DL    BUN/Creatinine ratio 22 (H) 12 - 20      eGFR 46 (L) >60 ml/min/1.73m2    Calcium 9.2 8.5 - 10.1 MG/DL    Bilirubin, total 0.6 0.2 - 1.0 MG/DL    ALT (SGPT) 33 12 - 78 U/L    AST (SGOT) 42 (H) 15 - 37 U/L    Alk.  phosphatase 76 45 - 117 U/L    Protein, total 7.7 6.4 - 8.2 g/dL    Albumin 3.9 3.5 - 5.0 g/dL    Globulin 3.8 2.0 - 4.0 g/dL    A-G Ratio 1.0 (L) 1.1 - 2.2     URINALYSIS W/ RFLX MICROSCOPIC    Collection Time: 04/02/23  3:17 PM   Result Value Ref Range    Color YELLOW/STRAW      Appearance CLOUDY (A) CLEAR      Specific gravity 1.015 1.003 - 1.030      pH (UA) 6.0 5.0 - 8.0      Protein 30 (A) NEG mg/dL    Glucose Negative NEG mg/dL    Ketone TRACE (A) NEG mg/dL    Bilirubin Negative NEG      Blood MODERATE (A) NEG      Urobilinogen 0.2 0.2 - 1.0 EU/dL    Nitrites Negative NEG      Leukocyte Esterase LARGE (A) NEG     URINE MICROSCOPIC ONLY    Collection Time: 04/02/23  3:17 PM   Result Value Ref Range    WBC >100 (H) 0 - 4 /hpf    RBC 0-5 0 - 5 /hpf    Epithelial cells FEW FEW /lpf    Bacteria 4+ (A) NEG /hpf

## 2023-04-03 NOTE — PROGRESS NOTES
4/3/2023  4:29 PM  CM completed assessment w/ pt in person. Charted demographics verified, pt lives w/ spouse Gerlado Peter (Sharifa Retana) [phone] in a story home, there are 13 interior steps to her bedroom.   At baseline pt is ambulatory, independent w/ ADLs, and drives, no fall history   Reason for Admission:  Emergent for UTI  Pt is a 79 yo female who presents to [facility] c/o constipation and inability to urinate   Past Medical History:   Diagnosis Date    Anxiety and depression      Atrial fibrillation (HCC)      Chronic anticoagulation      GERD (gastroesophageal reflux disease)      Hypertension      Hypothyroid      Migraines                       RUR Score:    13 % Low Risk of Readmission/Green                 Plan for utilizing home health:    No history of HH or Rehab, pt is independent at baseline for her ADLs    DME: None  Rx: [employer], pt uses [employer] and is usually covered for her medications    PCP: First and Last name:  Aminah Mora MD     Name of Practice:    Are you a current patient: Yes/No: yes    Approximate date of last visit: 30 days    Can you participate in a virtual visit with your PCP: yes                     Current Advanced Directive/Advance Care Plan: Full Code  Spouse Abigail Khan  [phone]    Healthcare Decision Maker:   Click here to complete [address] including selection of the Healthcare Decision Maker Relationship (ie \"Primary\")                             Transition of Care Plan:       RUR 13 % Low Risk of Readmission/Green  LOS 1 Day           Hospital admission for medical management   Urology following, plan for VT tomorrow  CM to follow through for treatment/response  DC when stable to home     Outpatient follow up PCP, urology  Spouse will transport at [facility] Management Interventions  PCP Verified by CM:  (Sharri Shaw MD)  Palliative Care Criteria Met (RRAT>21 & CHF Dx)?: No  Mode of Transport at Discharge: Self (spouse )  Physical Therapy Consult: No  Occupational Therapy Consult: No  Speech Therapy Consult: No  Support Systems: Spouse/Significant Other  Confirm Follow Up Transport: Family  Discharge Location  Patient Expects to be Discharged to[de-identified] Home with family assistance  SHAN Carroll

## 2023-04-03 NOTE — PROGRESS NOTES
Hospitalist Progress Note      NAME: Emiliano Freedman   :  1945  MRM:  [id number]    Date/Time: 4/3/2023  5:21 PM             Subjective:     Chief Complaint: Urinary retention    Calvo catheter inserted. Constipation resolved. Now having multiple loose bowel movements, asking for Imodium. Urology has been consulted. Urine culture pending. Objective:       Vitals:          Last 24hrs VS reviewed since prior progress note. Most recent are:    Visit Vitals  /70 (BP 1 Location: Right upper arm, BP Patient Position: Semi fowlers)   Pulse 64   Temp 98.2 °F (36.8 °C)   Resp 16   Ht 5' 4\" (1.626 m)   Wt 100 kg (220 lb 7.4 oz)   SpO2 95%   BMI 37.84 kg/m²     SpO2 Readings from Last 6 Encounters:   23 95%   23 97%   23 96%   23 96%   23 97%   22 100%          Intake/Output Summary (Last 24 hours) at 4/3/2023 1721  Last data filed at 4/3/2023 1410  Gross per 24 hour   Intake 100 ml   Output 3325 ml   Net -3225 ml          Exam:     Physical Exam:    General:   No acute distress, resting comfortably in bed. Heart:  RRR, No MRG  Lungs:  CTAB. Non-labored breathing. Abdomen:  Soft . Nondistended. NTTP. BS present. Extremities:  No edema. : Calvo, clear urine in bag  Neuro:  Alert and interactive. No focal deficits. Psych:  Mood stable.       Medications Reviewed: (see below)    Lab Data Reviewed: (see below)    ______________________________________________________________________    Medications:     Current Facility-Administered Medications   Medication Dose Route Frequency    loperamide (IMODIUM) capsule 2 mg  2 mg Oral TID PRN    amiodarone (CORDARONE) tablet 100 mg  100 mg Oral EVERY OTHER DAY    amLODIPine (NORVASC) tablet 2.5 mg  2.5 mg Oral DAILY    buPROPion SR (WELLBUTRIN SR) tablet 150 mg  150 mg Oral BID    gabapentin (NEURONTIN) capsule 600 mg  600 mg Oral TID    atenoloL (TENORMIN) tablet 25 mg  25 mg Oral DAILY    rivaroxaban ([place]) tablet 15 mg  15 mg Oral DAILY WITH BREAKFAST    trospium (SANCTURA) tablet 20 mg  20 mg Oral DAILY    sodium chloride (NS) flush 5-40 mL  5-40 mL IntraVENous Q8H    sodium chloride (NS) flush 5-40 mL  5-40 mL IntraVENous PRN    acetaminophen (TYLENOL) tablet 650 mg  650 mg Oral Q6H PRN    Or    acetaminophen (TYLENOL) suppository 650 mg  650 mg Rectal Q6H PRN    polyethylene glycol (MIRALAX) packet 17 g  17 g Oral DAILY PRN    ondansetron (ZOFRAN ODT) tablet 4 mg  4 mg Oral Q8H PRN    Or    ondansetron (ZOFRAN) injection 4 mg  4 mg IntraVENous Q6H PRN    lactated Ringers infusion  125 mL/hr IntraVENous CONTINUOUS    cefTRIAXone (ROCEPHIN) 1 g in 0.9% sodium chloride 10 mL IV syringe  1 g IntraVENous Q24H    ampicillin (OMNIPEN) 1 g in 0.9% sodium chloride (MBP/ADV) 50 mL MBP  1 g IntraVENous Q6H    pantoprazole (PROTONIX) tablet 40 mg  40 mg Oral ACB            Lab Review:     Recent Labs     04/03/23  0236 04/02/23  1517   WBC 12.2* 15.1*   HGB 11.9 13.7   HCT 37.4 42.1    356     Recent Labs     04/03/23  0236 04/02/23  1517    140   K 4.3 5.2*   * 101   CO2 25 29   * 111*   BUN 23* 27*   CREA 1.30* 1.23*   CA 8.4* 9.2   ALB  --  3.9   ALT  --  33     No components found for: [place]         Assessment / Plan: Active Problems:    UTI (urinary tract infection) (4/2/2023)        Emiliano Freedman is a 78 y.o.  female with history of  lacunar infarct, pAFIB and SSS s/p pacemaker who comes in w/ concerns of no bowel movement since this past Thursday and an inability to urinate.     UTI  -Continue Rocephin and ampicillin empirically due to history of Enterococcus  -Failed outpatient treatment with Bactrim  -Plan discharge on p.o. antibiotics pending urine culture    Urinary retention  -Calvo catheter inserted  -Urology consulted    Constipation  -Resolved  -Asking for Imodium, use sparingly    Atrial fibrillation  Sick sinus syndrome status post pacemaker  - Continue home amiodarone, nebivolol, and Xarelto    Hypertension  -Continue home Norvasc    Anxiety/depression  -Continue home Wellbutrin           DVT ppx:  Xarelto    Dispo:  home    FULL CODE        Risk of deterioration: high         Total time spent with patient care: 35 min. I personally saw and examined the patient during this time period.                                                                                                                  Care Plan discussed with: patient, CM, nursing    Discussed:  Care Plan                ___________________________________________________    Attending Physician: Sophy Infante MD

## 2023-04-04 LAB
ANION GAP SERPL CALC-SCNC: 3 MMOL/L (ref 5–15)
BASOPHILS # BLD: 0 K/UL (ref 0–0.1)
BASOPHILS NFR BLD: 1 % (ref 0–1)
BUN SERPL-MCNC: 20 MG/DL (ref 6–20)
BUN/CREAT SERPL: 20 (ref 12–20)
CALCIUM SERPL-MCNC: 8.3 MG/DL (ref 8.5–10.1)
CHLORIDE SERPL-SCNC: 111 MMOL/L (ref 97–108)
CO2 SERPL-SCNC: 27 MMOL/L (ref 21–32)
CREAT SERPL-MCNC: 1.02 MG/DL (ref 0.55–1.02)
DIFFERENTIAL METHOD BLD: ABNORMAL
EOSINOPHIL # BLD: 0.3 K/UL (ref 0–0.4)
EOSINOPHIL NFR BLD: 4 % (ref 0–7)
ERYTHROCYTE [DISTWIDTH] IN BLOOD BY AUTOMATED COUNT: 14.6 % (ref 11.5–14.5)
GLUCOSE SERPL-MCNC: 104 MG/DL (ref 65–100)
HCT VFR BLD AUTO: 35 % (ref 35–47)
HGB BLD-MCNC: 11.1 G/DL (ref 11.5–16)
IMM GRANULOCYTES # BLD AUTO: 0 K/UL (ref 0–0.04)
IMM GRANULOCYTES NFR BLD AUTO: 0 % (ref 0–0.5)
LYMPHOCYTES # BLD: 1.9 K/UL (ref 0.8–3.5)
LYMPHOCYTES NFR BLD: 26 % (ref 12–49)
MAGNESIUM SERPL-MCNC: 2.3 MG/DL (ref 1.6–2.4)
MCH RBC QN AUTO: 31.3 PG (ref 26–34)
MCHC RBC AUTO-ENTMCNC: 31.7 G/DL (ref 30–36.5)
MCV RBC AUTO: 98.6 FL (ref 80–99)
MONOCYTES # BLD: 1 K/UL (ref 0–1)
MONOCYTES NFR BLD: 14 % (ref 5–13)
NEUTS SEG # BLD: 3.9 K/UL (ref 1.8–8)
NEUTS SEG NFR BLD: 55 % (ref 32–75)
NRBC # BLD: 0 K/UL (ref 0–0.01)
NRBC BLD-RTO: 0 PER 100 WBC
PLATELET # BLD AUTO: 227 K/UL (ref 150–400)
PMV BLD AUTO: 10 FL (ref 8.9–12.9)
POTASSIUM SERPL-SCNC: 4.6 MMOL/L (ref 3.5–5.1)
RBC # BLD AUTO: 3.55 M/UL (ref 3.8–5.2)
SODIUM SERPL-SCNC: 141 MMOL/L (ref 136–145)
WBC # BLD AUTO: 7 K/UL (ref 3.6–11)

## 2023-04-04 PROCEDURE — 85025 COMPLETE CBC W/AUTO DIFF WBC: CPT

## 2023-04-04 PROCEDURE — 80048 BASIC METABOLIC PNL TOTAL CA: CPT

## 2023-04-04 PROCEDURE — 51798 US URINE CAPACITY MEASURE: CPT

## 2023-04-04 PROCEDURE — 74011000258 HC RX REV CODE- 258: Performed by: INTERNAL MEDICINE

## 2023-04-04 PROCEDURE — 83735 ASSAY OF MAGNESIUM: CPT

## 2023-04-04 PROCEDURE — 74011250637 HC RX REV CODE- 250/637: Performed by: INTERNAL MEDICINE

## 2023-04-04 PROCEDURE — 74011250636 HC RX REV CODE- 250/636: Performed by: INTERNAL MEDICINE

## 2023-04-04 PROCEDURE — 65270000029 HC RM PRIVATE

## 2023-04-04 PROCEDURE — 36415 COLL VENOUS BLD VENIPUNCTURE: CPT

## 2023-04-04 PROCEDURE — 74011000250 HC RX REV CODE- 250: Performed by: INTERNAL MEDICINE

## 2023-04-04 RX ADMIN — AMPICILLIN SODIUM 1 G: 1 INJECTION, POWDER, FOR SOLUTION INTRAMUSCULAR; INTRAVENOUS at 08:22

## 2023-04-04 RX ADMIN — BUPROPION HYDROCHLORIDE 150 MG: 150 TABLET, EXTENDED RELEASE ORAL at 18:15

## 2023-04-04 RX ADMIN — SODIUM CHLORIDE 1 G: 9 INJECTION INTRAMUSCULAR; INTRAVENOUS; SUBCUTANEOUS at 18:15

## 2023-04-04 RX ADMIN — AMLODIPINE BESYLATE 2.5 MG: 2.5 TABLET ORAL at 08:20

## 2023-04-04 RX ADMIN — RIVAROXABAN 15 MG: 15 TABLET, FILM COATED ORAL at 08:20

## 2023-04-04 RX ADMIN — BUPROPION HYDROCHLORIDE 150 MG: 150 TABLET, EXTENDED RELEASE ORAL at 08:20

## 2023-04-04 RX ADMIN — GABAPENTIN 600 MG: 300 CAPSULE ORAL at 16:13

## 2023-04-04 RX ADMIN — SODIUM CHLORIDE, POTASSIUM CHLORIDE, SODIUM LACTATE AND CALCIUM CHLORIDE 100 ML/HR: 600; 310; 30; 20 INJECTION, SOLUTION INTRAVENOUS at 02:13

## 2023-04-04 RX ADMIN — GABAPENTIN 600 MG: 300 CAPSULE ORAL at 20:42

## 2023-04-04 RX ADMIN — GABAPENTIN 600 MG: 300 CAPSULE ORAL at 08:20

## 2023-04-04 RX ADMIN — AMPICILLIN SODIUM 1 G: 1 INJECTION, POWDER, FOR SOLUTION INTRAMUSCULAR; INTRAVENOUS at 16:13

## 2023-04-04 RX ADMIN — TROSPIUM CHLORIDE 20 MG: 20 TABLET, FILM COATED ORAL at 08:20

## 2023-04-04 RX ADMIN — AMPICILLIN SODIUM 1 G: 1 INJECTION, POWDER, FOR SOLUTION INTRAMUSCULAR; INTRAVENOUS at 02:13

## 2023-04-04 RX ADMIN — SODIUM CHLORIDE, PRESERVATIVE FREE 10 ML: 5 INJECTION INTRAVENOUS at 16:14

## 2023-04-04 RX ADMIN — PANTOPRAZOLE SODIUM 40 MG: 40 TABLET, DELAYED RELEASE ORAL at 08:20

## 2023-04-04 RX ADMIN — ATENOLOL 25 MG: 25 TABLET ORAL at 08:20

## 2023-04-04 RX ADMIN — SODIUM CHLORIDE, PRESERVATIVE FREE 10 ML: 5 INJECTION INTRAVENOUS at 20:42

## 2023-04-04 RX ADMIN — ACETAMINOPHEN 650 MG: 325 TABLET ORAL at 21:56

## 2023-04-04 NOTE — PROGRESS NOTES
Problem: Infection - Risk of, Urinary Catheter-Associated Urinary Tract Infection  Goal: *Absence of infection signs and symptoms  Outcome: Progressing Towards Goal     Problem: Patient Education: Go to Patient Education Activity  Goal: Patient/Family Education  Outcome: Progressing Towards Goal     Problem: Falls - Risk of  Goal: *Absence of Falls  Description: Document Ed[place]ira Fall Risk and appropriate interventions in the flowsheet.   Outcome: Progressing Towards Goal  Note: Fall Risk Interventions:                                Problem: Patient Education: Go to Patient Education Activity  Goal: Patient/Family Education  Outcome: Progressing Towards Goal     Problem: Urinary Tract Infection - Adult  Goal: *Absence of infection signs and symptoms  Outcome: Progressing Towards Goal     Problem: Patient Education: Go to Patient Education Activity  Goal: Patient/Family Education  Outcome: Progressing Towards Goal

## 2023-04-04 NOTE — PROGRESS NOTES
[facility] Dosing Services    Rivaroxaban was automatically dose-adjusted per [facility] P&T Committee Protocol, with respect to renal function. Assessment/Plan: CrCl has improved. Estimated Creatinine Clearance: 54.8 mL/min (based on SCr of 1.02 mg/dL). Rivaroxaban changed back to home regimen of 20 mg daily with breakfast per P&T approved protocol for patient with CrCl greater than 50 mL/min. Serum Creatinine Lab Results   Component Value Date/Time    Creatinine 1.02 04/04/2023 04:33 AM    Creatinine (POC) 1.0 11/17/2012 12:54 AM       Creatinine Clearance Estimated Creatinine Clearance: 54.8 mL/min (based on SCr of 1.02 mg/dL).    BUN Lab Results   Component Value Date/Time    BUN 20 04/04/2023 04:33 AM    BUN (POC) 18 11/17/2012 12:54 AM         Pharmacist Ritu Sorto

## 2023-04-04 NOTE — PROGRESS NOTES
Hospitalist Progress Note      NAME: Tito Solorzano   :  1945  MRM:  [id number]    Date/Time: 2023  5:21 PM             Subjective:     Chief Complaint: Urinary retention    Calvo removed for voiding trial.  Constipation resolved. Urine culture with gram-negative rods, results pending. No abdominal pain or dysuria. No fever. Creatinine improved with IV fluids. Objective:       Vitals:          Last 24hrs VS reviewed since prior progress note. Most recent are:    Visit Vitals  /81   Pulse 63   Temp 97.6 °F (36.4 °C)   Resp 17   Ht 5' 4\" (1.626 m)   Wt 100 kg (220 lb 7.4 oz)   SpO2 97%   BMI 37.84 kg/m²     SpO2 Readings from Last 6 Encounters:   23 97%   23 97%   23 96%   23 96%   23 97%   22 100%          Intake/Output Summary (Last 24 hours) at 2023 1613  Last data filed at 2023 0457  Gross per 24 hour   Intake 3707.08 ml   Output 1200 ml   Net 2507.08 ml            Exam:     Physical Exam:    General:   No acute distress, resting comfortably in bed. Heart:  RRR, No MRG  Lungs:  CTAB. Non-labored breathing. Abdomen:  Soft . Nondistended. NTTP. BS present. Extremities:  No edema. : Calvo now removed. Neuro:  Alert and interactive. No focal deficits. Psych:  Mood stable.       Medications Reviewed: (see below)    Lab Data Reviewed: (see below)    ______________________________________________________________________    Medications:     Current Facility-Administered Medications   Medication Dose Route Frequency    loperamide (IMODIUM) capsule 2 mg  2 mg Oral TID PRN    amiodarone (CORDARONE) tablet 100 mg  100 mg Oral EVERY OTHER DAY    amLODIPine (NORVASC) tablet 2.5 mg  2.5 mg Oral DAILY    buPROPion SR (WELLBUTRIN SR) tablet 150 mg  150 mg Oral BID    gabapentin (NEURONTIN) capsule 600 mg  600 mg Oral TID    atenoloL (TENORMIN) tablet 25 mg  25 mg Oral DAILY    rivaroxaban (XARELTO) tablet 15 mg  15 mg Oral DAILY WITH BREAKFAST trospium (SANCTURA) tablet 20 mg  20 mg Oral DAILY    sodium chloride (NS) flush 5-40 mL  5-40 mL IntraVENous Q8H    sodium chloride (NS) flush 5-40 mL  5-40 mL IntraVENous PRN    acetaminophen (TYLENOL) tablet 650 mg  650 mg Oral Q6H PRN    Or    acetaminophen (TYLENOL) suppository 650 mg  650 mg Rectal Q6H PRN    polyethylene glycol (MIRALAX) packet 17 g  17 g Oral DAILY PRN    ondansetron (ZOFRAN ODT) tablet 4 mg  4 mg Oral Q8H PRN    Or    ondansetron (ZOFRAN) injection 4 mg  4 mg IntraVENous Q6H PRN    lactated Ringers infusion  100 mL/hr IntraVENous CONTINUOUS    cefTRIAXone (ROCEPHIN) 1 g in 0.9% sodium chloride 10 mL IV syringe  1 g IntraVENous Q24H    ampicillin (OMNIPEN) 1 g in 0.9% sodium chloride (MBP/ADV) 50 mL MBP  1 g IntraVENous Q6H    pantoprazole (PROTONIX) tablet 40 mg  40 mg Oral ACB            Lab Review:     Recent Labs     04/04/23  0433 04/03/23  0236 04/02/23  1517   WBC 7.0 12.2* 15.1*   HGB 11.1* 11.9 13.7   HCT 35.0 37.4 42.1    283 356       Recent Labs     04/04/23  0433 04/03/23  0236 04/02/23  1517    138 140   K 4.6 4.3 5.2*   * 109* 101   CO2 27 25 29   * 128* 111*   BUN 20 23* 27*   CREA 1.02 1.30* 1.23*   CA 8.3* 8.4* 9.2   MG 2.3  --   --    ALB  --   --  3.9   ALT  --   --  33       No components found for: [place]         Assessment / Plan: Active Problems:    UTI (urinary tract infection) (4/2/2023)      Tito Solorzano is a 78 y.o.  female with history of  lacunar infarct, pAFIB and SSS s/p pacemaker who comes in w/ concerns of no bowel movement since this past Thursday and an inability to urinate.     UTI  -Was started on Rocephin and ampicillin empirically due to history of Enterococcus  -Urine culture with gram-negative rods, discontinue ampicillin  -Failed outpatient treatment with Bactrim  -Plan discharge on p.o. antibiotics pending urine culture    Urinary retention  -Calvo catheter inserted, now removed for voiding trial  -Urology following    Constipation  -Resolved    CRISTIANA  -Improved with IV fluids,  Creatinine peaked at 1.3    Atrial fibrillation  Sick sinus syndrome status post pacemaker  - Continue home amiodarone, nebivolol, and Xarelto    Hypertension  -Continue home Norvasc    Anxiety/depression  -Continue home Wellbutrin           DVT ppx:  Xarelto    Dispo:  home    FULL CODE         Total time spent with patient care: 35 min. I personally saw and examined the patient during this time period.                                                                                                                  Care Plan discussed with: patient, CM, nursing    Discussed:  Care Plan                ___________________________________________________    Attending Physician: Mele Marroquin MD

## 2023-04-04 NOTE — PROGRESS NOTES
Urology Progress Note    Patient: Tevin Liu MRN: [id number]  SSN: [id number]    YOB: 1945  Age: 78 y.o. Sex: female        Assessment:     Resting in bed, NAD   Reports feeling better this AM   AF, leukocytosis resolved  CRISTIANA resolved     Plan:     UTI, concern for AUR, CRISTIANA   -Continue broad spectrum abx, adjust per final culture results. Prelim GNR here. VU culture has yet to result. - Calvo placed 4/2 yielding 270ml. Not true urinary retention given this volume. VT held 4/4- passed with PVR of 41ml. Do not resume home trospium     She was scheduled for CTIVP with [facility]U this week. She has canceled given admission. CT abd/pelv W performed during this admission. She is aware that she had a CT abd/pelv WWO ordered in office, and will need to discuss imaging needs with primary urologist at [facility]. She has scheduled her FU for 4/21 and is aware to call to be seen sooner should she need. Urology signing off. Please call with questions or concerns.      Reason For Visit:     Follow up for UTI, concern for AUR, CRISTIANA     ROS:     Denies fevers  Denies abdominal pain  Denies hematuria, frequency    Objective:     Visit Vitals  BP (!) 141/72   Pulse 64   Temp 98.4 °F (36.9 °C)   Resp 18   Ht 5' 4\" (1.626 m)   Wt 100 kg (220 lb 7.4 oz)   SpO2 95%   BMI 37.84 kg/m²         Intake/Output Summary (Last 24 hours) at 4/4/2023 0831  Last data filed at 4/4/2023 0457  Gross per 24 hour   Intake 3707.08 ml   Output 2625 ml   Net 1082.08 ml       Physical Exam  General: NAD  Respiratory: no distress, room air  Abdomen: soft, no distention  : no CVA tenderness, voiding independently   Neuro: Appropriate, no focal neurological deficits    Labs reviewed, April 4, 2023  Recent Results (from the past 24 hour(s))   METABOLIC PANEL, BASIC    Collection Time: 04/04/23  4:33 AM   Result Value Ref Range    Sodium 141 136 - 145 mmol/L    Potassium 4.6 3.5 - 5.1 mmol/L    Chloride 111 (H) 97 - 108 mmol/L    CO2 27 21 - 32 mmol/L    Anion gap 3 (L) 5 - 15 mmol/L    Glucose 104 (H) 65 - 100 mg/dL    BUN 20 6 - 20 MG/DL    Creatinine 1.02 0.55 - 1.02 MG/DL    BUN/Creatinine ratio 20 12 - 20      eGFR 57 (L) >60 ml/min/1.73m2    Calcium 8.3 (L) 8.5 - 10.1 MG/DL   CBC WITH AUTOMATED DIFF    Collection Time: 04/04/23  4:33 AM   Result Value Ref Range    WBC 7.0 3.6 - 11.0 K/uL    RBC 3.55 (L) 3.80 - 5.20 M/uL    HGB 11.1 (L) 11.5 - 16.0 g/dL    HCT 35.0 35.0 - 47.0 %    MCV 98.6 80.0 - 99.0 FL    MCH 31.3 26.0 - 34.0 PG    MCHC 31.7 30.0 - 36.5 g/dL    RDW 14.6 (H) 11.5 - 14.5 %    PLATELET 570 549 - 223 K/uL    MPV 10.0 8.9 - 12.9 FL    NRBC 0.0 0  WBC    ABSOLUTE NRBC 0.00 0.00 - 0.01 K/uL    NEUTROPHILS 55 32 - 75 %    LYMPHOCYTES 26 12 - 49 %    MONOCYTES 14 (H) 5 - 13 %    EOSINOPHILS 4 0 - 7 %    BASOPHILS 1 0 - 1 %    IMMATURE GRANULOCYTES 0 0.0 - 0.5 %    ABS. NEUTROPHILS 3.9 1.8 - 8.0 K/UL    ABS. LYMPHOCYTES 1.9 0.8 - 3.5 K/UL    ABS. MONOCYTES 1.0 0.0 - 1.0 K/UL    ABS. EOSINOPHILS 0.3 0.0 - 0.4 K/UL    ABS. BASOPHILS 0.0 0.0 - 0.1 K/UL    ABS. IMM. GRANS. 0.0 0.00 - 0.04 K/UL    DF AUTOMATED     MAGNESIUM    Collection Time: 04/04/23  4:33 AM   Result Value Ref Range    Magnesium 2.3 1.6 - 2.4 mg/dL       Imaging:  CT Results  (Last 48 hours)                 04/02/23 1755  CT ABD PELV W CONT Final result    Impression:  No acute findings. Status post cholecystectomy and hysterectomy. Stable left   adrenal nodule. Narrative:  EXAM: CT ABD PELV W CONT       INDICATION: Constipation, retention, infection       COMPARISON: CT 6/8/2019, earlier radiographs        CONTRAST: 80 mL of Isovue-370. ORAL CONTRAST: None. Lack of oral contrast material diminishes the capacity of   CT to evaluate the bowel and adjacent structures. TECHNIQUE:    Following the uneventful intravenous administration of contrast, thin axial   images were obtained through the abdomen and pelvis. Coronal and sagittal   reconstructions were generated. CT dose reduction was achieved through use of a   standardized protocol tailored for this examination and automatic exposure   control for dose modulation. FINDINGS:    LOWER THORAX: Right atrioventricular pacemaker leads again shown. Cardiac size   upper limits of normal. No consolidation or pleural-pericardial effusion. LIVER: Mild diffuse hepatic steatosis. Subcapsular hypoattenuating lesion in   segment 7 measuring 14 mm in size consistent with cyst. No intrahepatic bile   duct dilation. BILIARY TREE: Status post cholecystectomy. CBD is not dilated. SPLEEN: within normal limits. PANCREAS: No mass or ductal dilatation. ADRENALS: Left adrenal mass again shown measuring 26 x 18 mm transverse and 18   mm craniocaudal. Size unchanged. KIDNEYS: No mass, calculus, or hydronephrosis. STOMACH: Unremarkable. SMALL BOWEL: No dilatation or wall thickening. COLON: No dilatation or wall thickening. APPENDIX: Normal.   PERITONEUM: No ascites or pneumoperitoneum. RETROPERITONEUM: No lymphadenopathy or aortic aneurysm. Atherosclerotic   calcifications. REPRODUCTIVE ORGANS: Status post hysterectomy. URINARY BLADDER: Calvo catheter. Assessment therefore suboptimal. No obvious   abnormality. BONES: No destructive bone lesion. The bones are mildly to moderately   osteopenic. Thoracic and lumbar spondylosis is shown with a mild-moderate   levoconvex curve centered at L2.   ABDOMINAL WALL: No mass or hernia.    ADDITIONAL COMMENTS: N/A                   Signed By: Abdiaziz Engel NP - April 4, 2023

## 2023-04-04 NOTE — PROGRESS NOTES
Bedside shift change report given to [facility] Maya RN (oncoming nurse) by Blanco Nicholson RN (offgoing nurse). Report included the following information SBAR, Kardex, Intake/Output, MAR, and Recent Results. The opportunity for questions was presented.

## 2023-04-04 NOTE — PROGRESS NOTES
Bedside and Verbal shift change report given to [place], RN (oncoming nurse) by Kuldeep Reyna RN (offgoing nurse). Report included the following information SBAR, Kardex, Accordion, and Recent Results.

## 2023-04-04 NOTE — PROGRESS NOTES
4/4/2023  12:58 PM  Pt emergently admitted 4/2/23 for UTI, discussed in IDR is continuing to require medical management for UTI, urinary retention, constipation, PAFib, HTN, Anxiety/depression, SSS  Transition of Care Plan:       RUR 13 % Low Risk of Readmission/Green  LOS 2 Days           medical management continues, urine Cx pending   Urology signed off ,plan for outpatient follow up   CM to follow through for treatment/response  DC when stable to home     Outpatient follow up PCP, urology  Spouse will transport at [address] [place] [address]

## 2023-04-05 LAB
BACTERIA SPEC CULT: ABNORMAL
BACTERIA SPEC CULT: NORMAL
CC UR VC: ABNORMAL
SERVICE CMNT-IMP: ABNORMAL
SERVICE CMNT-IMP: NORMAL

## 2023-04-05 PROCEDURE — 74011000250 HC RX REV CODE- 250: Performed by: INTERNAL MEDICINE

## 2023-04-05 PROCEDURE — 74011250637 HC RX REV CODE- 250/637: Performed by: INTERNAL MEDICINE

## 2023-04-05 PROCEDURE — 74011250636 HC RX REV CODE- 250/636: Performed by: NURSE PRACTITIONER

## 2023-04-05 RX ORDER — HYDRALAZINE HYDROCHLORIDE 20 MG/ML
10 INJECTION INTRAMUSCULAR; INTRAVENOUS
Status: DISCONTINUED
Start: 2023-04-05 | End: 2023-04-05 | Stop reason: HOSPADM

## 2023-04-05 RX ADMIN — RIVAROXABAN 20 MG: 20 TABLET, FILM COATED ORAL at 10:11

## 2023-04-05 RX ADMIN — ATENOLOL 25 MG: 25 TABLET ORAL at 10:11

## 2023-04-05 RX ADMIN — AMLODIPINE BESYLATE 2.5 MG: 2.5 TABLET ORAL at 10:11

## 2023-04-05 RX ADMIN — HYDRALAZINE HYDROCHLORIDE 10 MG: 20 INJECTION INTRAMUSCULAR; INTRAVENOUS at 06:22

## 2023-04-05 RX ADMIN — PANTOPRAZOLE SODIUM 40 MG: 40 TABLET, DELAYED RELEASE ORAL at 06:22

## 2023-04-05 RX ADMIN — SODIUM CHLORIDE, PRESERVATIVE FREE 10 ML: 5 INJECTION INTRAVENOUS at 05:57

## 2023-04-05 RX ADMIN — BUPROPION HYDROCHLORIDE 150 MG: 150 TABLET, EXTENDED RELEASE ORAL at 10:11

## 2023-04-05 RX ADMIN — GABAPENTIN 600 MG: 300 CAPSULE ORAL at 10:11

## 2023-04-05 RX ADMIN — AMIODARONE HYDROCHLORIDE 100 MG: 200 TABLET ORAL at 10:11

## 2023-04-05 NOTE — PROGRESS NOTES
Problem: Falls - Risk of  Goal: *Absence of Falls  Description: Document Harshilirasema Lynch Fall Risk and appropriate interventions in the flowsheet.   Outcome: Progressing Towards Goal  Note: Fall Risk Interventions:

## 2023-04-05 NOTE — PROGRESS NOTES
Spiritual Care Partner Volunteer visited patient at [address] in [facility] 4M POST SURG ORT 1 on 4/5/2023  Documented by:  Eddie Tellez Anjel 87, Ade 68, [facility]  Staff [address]  Paging service: [phone] (CRISTINO)

## 2023-04-05 NOTE — PROGRESS NOTES
4/5/2023  1:51 PM  DC order noted, pt medically stable for DC to home on PO Keflex. There are no CM DC needs.   Family will transport  There are no CM DC needs   Care Management Interventions  PCP Verified by CM:  (Evelia De La Cruz MD)  Palliative Care Criteria Met (RRAT>21 & CHF Dx)?: No  Mode of Transport at Discharge: Self (spouse )  Physical Therapy Consult: No  Occupational Therapy Consult: No  Speech Therapy Consult: No  Support Systems: Spouse/Significant Other  Confirm Follow Up Transport: Family  Discharge Location  Patient Expects to be Discharged to[de-identified] Home with family assistance  SHAN Ravi       9:34 AM  Pt emergently admitted 4/2/23 for UTI, discussed in IDR is continuing to require medical management for UTI, urinary retention, constipation, PAFib, HTN, Anxiety/depression, SSS  Transition of Care Plan:       RUR 13 % Low Risk of Readmission/Green  LOS 3 Days           medical management continues, urine Cx pending   Urology signed off ,outpatient follow up   CM to follow through for treatment/response  DC when stable to home     Outpatient follow up PCP, urology  Spouse will transport at [address]

## 2023-04-05 NOTE — PROGRESS NOTES
Patient discharged home, VSS, Bps stable to d/c, PIV removed. Paperwork reviewed with the patient and  in detail no questions or concerns. Problem: Infection - Risk of, Urinary Catheter-Associated Urinary Tract Infection  Goal: *Absence of infection signs and symptoms  4/5/2023 1413 by Na Riggs RN  Outcome: Resolved/Met  4/5/2023 1052 by Na Riggs RN  Outcome: Progressing Towards Goal     Problem: Patient Education: Go to Patient Education Activity  Goal: Patient/Family Education  4/5/2023 1413 by Na Riggs RN  Outcome: Resolved/Met  4/5/2023 1052 by Na Riggs RN  Outcome: Progressing Towards Goal     Problem: Falls - Risk of  Goal: *Absence of Falls  Description: Document [facility] Fall Risk and appropriate interventions in the flowsheet.   4/5/2023 1413 by Na Riggs RN  Outcome: Resolved/Met  Note: Fall Risk Interventions:                             4/5/2023 1052 by Na Riggs RN  Outcome: Progressing Towards Goal  Note: Fall Risk Interventions:                                Problem: Patient Education: Go to Patient Education Activity  Goal: Patient/Family Education  4/5/2023 1413 by Na Riggs RN  Outcome: Resolved/Met  4/5/2023 1052 by Na Riggs RN  Outcome: Progressing Towards Goal     Problem: Urinary Tract Infection - Adult  Goal: *Absence of infection signs and symptoms  4/5/2023 1413 by Na Riggs RN  Outcome: Resolved/Met  4/5/2023 1052 by Na Riggs RN  Outcome: Progressing Towards Goal     Problem: Patient Education: Go to Patient Education Activity  Goal: Patient/Family Education  4/5/2023 1413 by Na Riggs RN  Outcome: Resolved/Met  4/5/2023 1052 by Na Riggs RN  Outcome: Progressing Towards Goal

## 2023-04-05 NOTE — DISCHARGE SUMMARY
Hospitalist Discharge Summary     Patient ID:  Mik Quiros  [id number]  22 y.o.  1945    Admit date: 4/2/2023    Discharge date and time: 4/5/2023    Admission Diagnoses: UTI (urinary tract infection) [N39.0]      Discharge Diagnoses: Active Problems:    UTI (urinary tract infection) (4/2/2023)         UTI  Urinary retention  Constipation  CRISTIANA  Atrial fibrillation  Sick sinus syndrome status post pacemaker  Hypertension  Anxiety/depression           Hospital Course:     Mik Quiros is a 78 y.o.  female with history of  lacunar infarct, pAFIB and SSS s/p pacemaker presenting with concerns of no bowel movement for several days and inability to urinate. Patient's urinalysis was positive. She had recently been treated with Bactrim for UTI as an outpatient. Patient was admitted to the hospitalist service and started empirically on Rocephin and ampicillin empirically due to her history of Enterococcus UTI. Calvo catheter was placed and urology was consulted. The patient subsequently had a voiding trial and the Calvo was removed. Her constipation was alleviated with MiraLAX. Patient's creatinine trended down with IV fluids and her Bumex was held while she was in the hospital.  Patient she was continued on amiodarone and Xarelto and nebivolol for her atrial fibrillation with history of sick sinus syndrome. She was also continued on Norvasc for hypertension and Wellbutrin for anxiety and depression. The patient's urine culture came back positive for gram-negative rods at which time ampicillin was discontinued and she was continued on Rocephin monotherapy. The urine culture finally speciated as E. coli which was sensitive to Keflex but resistant to Bactrim. Patient will be discharged on 7 days of Keflex. Follow-up with PCP in 1 to 2 weeks. May resume Bumex on discharge per previous home regimen creatinine has stabilized.                   PCP: Talia Hernandez MD     Consults: Urology    Condition of patient at discharge: improved    Discharge Exam:     Visit Vitals  BP (!) 138/97 (BP 1 Location: Right upper arm, BP Patient Position: Sitting)   Pulse 73   Temp 97.5 °F (36.4 °C)   Resp 18   Ht 5' 4\" (1.626 m)   Wt 100 kg (220 lb 7.4 oz)   SpO2 96%   BMI 37.84 kg/m²        General:   No acute distress, resting comfortably in bed. Heart:  RRR, No MRG  Lungs:  CTAB. Non-labored breathing. Abdomen:  Soft . Nondistended. NTTP. BS present. Extremities:  No edema. Neuro:  Alert and interactive. No focal deficits. Disposition: home    Current Discharge Medication List        START taking these medications    Details   cephALEXin (Keflex) 500 mg capsule Take 1 Capsule by mouth two (2) times a day for 7 days. Qty: 14 Capsule, Refills: 0  Start date: 4/2/2023, End date: 4/9/2023           CONTINUE these medications which have NOT CHANGED    Details   amiodarone (PACERONE) 100 mg tablet Take 1 Tablet by mouth every other day. Qty: 45 Tablet, Refills: 3      rivaroxaban (XARELTO) 20 mg tab tablet Take 1 Tablet by mouth daily (with breakfast). Qty: 90 Tablet, Refills: 3      nebivoloL (BYSTOLIC) 20 mg tablet Take 1 Tablet by mouth daily. Qty: 90 Tablet, Refills: 3      BUMETANIDE Take 2 mg by mouth daily. amLODIPine (NORVASC) 2.5 mg tablet Take 2.5 mg by mouth daily. cetirizine (ZYRTEC) 10 mg tablet Take 10 mg by mouth daily. Biotin 2,500 mcg cap Take 2 Capsules by mouth daily. trospium (SANCTURA) 20 mg tablet Take 20 mg by mouth daily as needed. peg 400-propylene glycol (Systane, propylene glycoL,) 0.4-0.3 % drop Administer 1 Drop to both eyes as needed. Calcium Carbonate-Vit D3-Min 600 mg calcium- 400 unit tab Take 1 Tab by mouth daily. gabapentin (NEURONTIN) 300 mg capsule Take 600 mg by mouth three (3) times daily. buPROPion SR (WELLBUTRIN SR) 150 mg SR tablet Take 150 mg by mouth two (2) times a day.       therapeutic multivitamin [facility]) tablet Take 1 Tab by mouth daily. potassium chloride (K-DUR, KLOR-CON) 20 mEq tablet Take 1 tablet by mouth two  times daily  Qty: 60 Tab, Refills: 0      ascorbic acid, vitamin C, (VITAMIN C) 1,000 mg tablet Take 500 mg by mouth nightly. omeprazole (PRILOSEC) 20 mg capsule Take 20 mg by mouth daily. Indications: GASTROESOPHAGEAL REFLUX      acetaminophen (TYLENOL) 325 mg tablet Take 650 mg by mouth every four (4) hours as needed for Pain (for back pain; takes tylenol before switching to tramadol). vit A/vit C/vit E/zinc/copper (PRESERVISION AREDS PO) Take 1 Tablet by mouth. I have reviewed discharge instructions with the patient. The patient verbalized understanding.      Approximate time spent in patient care on day of discharge: 30 mins    Signed:  Gunjan Harman MD  4/5/2023  1:39 PM

## 2023-04-06 ENCOUNTER — PATIENT OUTREACH (OUTPATIENT)
Dept: CASE MANAGEMENT | Age: 78
End: 2023-04-06

## 2023-04-06 NOTE — PROGRESS NOTES
Care Transitions Initial Call    Call within 2 business days of discharge: Yes     Patient Current Location: [place]    Patient: Gladys Grove Patient : 1945 MRN: [id number]    Last Discharge  Street       Date Complaint Diagnosis Description Type Department Provider    23 Abdominal Pain; Constipation; Urinary Retention Cystitis . .. ED to Hosp-Admission (Discharged) (ADMIT) Kate Rogers MD; Susanne Rehman... Was this an external facility discharge? No     Challenges to be reviewed by the provider   Additional needs identified to be addressed with provider: no           Method of communication with provider : none    Discussed COVID-19 related testing which was not done at this time. Advance Care Planning:   Does patient have an Advance Directive: not on file. Inpatient Readmission Risk score: Unplanned Readmit Risk Score: 13.2    Was this a readmission? no   Patient stated reason for the admission: UTI    Patients top risk factors for readmission: medical condition-UTI    Interventions to address risk factors: Obtained and reviewed discharge summary and/or continuity of care documents, Education of patient/family/caregiver/guardian to support self-management-importance of completion of abx, and Assessment and support for treatment adherence and medication management-[facility]    Care Transition Nurse (CTN) contacted the patient by telephone to perform post hospital discharge assessment. Verified name and  with patient as identifiers. Provided introduction to self, and explanation of the CTN role. CTN reviewed discharge instructions, medical action plan and red flags with patient who verbalized understanding. Were discharge instructions available to patient? yes. Reviewed appropriate site of care based on symptoms and resources available to patient including: PCP, Urgent Care Clinics, and Condition related references.  Patient given an opportunity to ask questions and does not have any further questions or concerns at this time. The patient agrees to contact the PCP office for questions related to their healthcare. Medication reconciliation was performed with patient, who verbalizes understanding of administration of home medications. Advised obtaining a 90-day supply of all daily and as-needed medications. Referral to Pharm D needed: no     Home Health/Outpatient orders at discharge: none      Durable Medical Equipment ordered at discharge: None      Was patient discharged with a pulse oximeter? no    Discussed follow-up appointments. If no appointment was previously scheduled, appointment scheduling offered:  Patient wants to schedule own appt . Is follow up appointment scheduled within 7 days of discharge? no.   [facility] follow up appointment(s):   Future Appointments   Date Time Provider Castro Holcomb   5/9/2023 10:45 AM [address] [facility] BS AMB   8/18/2023 10:40 AM Varun Mart NP [facility] BS AMB   8/18/2023 11:00 AM Lucas Conway MD [facility] BS AMB   2/7/2024  1:00 PM PACEMAKER, STFRANCES [facility] BS AMB   2/7/2024  1:20 PM Hermelinda Martinez MD [facility] BS AMB     Non-[facility] follow up appointment(s): none    Plan for follow-up call in 1-2 days based on severity of symptoms and risk factors. Plan for next call: follow up appointment-PCP appt scheduled? CTN provided contact information for future needs. Goals Addressed                   This Visit's Progress     Understands red flags post discharge. 4/6/23  Activity- activity intolerance/energy conservation/frequent rest, exercise to increase mobility and prevent falls. Medication compliance-keflex x 7 days  Schedule SHELLY appt  Using teach back, reviewed red flags. Patient will monitor/report red flags- fever/chills, difficulty breathing, low blood pressure, fast heart rate, and mental confusion, N/V-dehydration.  WILDER

## 2023-04-06 NOTE — PROGRESS NOTES
Physician Progress Note      PATIENTSande Para  [facility] #:                  [id number]  :                       1945  ADMIT DATE:       2023 2:43 PM  [address] [place] DATE:        2023 2:44 PM  RESPONDING  PROVIDER #:        Darell Hernandez MD          QUERY TEXT:    Good afternoon. Patient was admitted from - with UTI. Noted documentation of Acute Kidney Injury in PNs dated  and  DC summary. In order to support the diagnosis of CRISTIANA, please include additional clinical indicators in your documentation. ? Or please document if the diagnosis of CRISTIANA has been ruled out after further study. The medical record reflects the following:    Risk Factors: HTN, Atrial Fibrillation, SSS, anxiety/depression, UTI    Clinical Indicators: crea 1.23, 1.30, 1.02; BUN 27, 23, 20; GFR 46, 43, 57    Treatment: serial labs, IV fluids, monitor      Thank you,  Lala Peña RN, CDI  ___________________________________________________________________________________________________    Defined by Kidney Disease Improving Global Outcomes (KDIGO) clinical practice guideline for acute kidney injury:  -Increase in SCr by greater than or equal to 0.3 mg/dl within 48 hours; or  -Increase or decrease in SCr to greater than or equal to 1.5 times baseline, which is known or presumed to have occurred within the prior 7 days; or  -Urine volume < 0.5ml/kg/h for 6 hours. Options provided:  -- Acute kidney injury evidenced by, Please document evidence as well as a numerical baseline creatinine, if known. -- Currently resolved acute kidney injury was evidenced by, Please document evidence as well as a numerical baseline creatinine, if known.   -- Acute kidney injury ruled out after study  -- Other - I will add my own diagnosis  -- Disagree - Not applicable / Not valid  -- Disagree - Clinically unable to determine / Unknown  -- Refer to Clinical Documentation Reviewer    PROVIDER RESPONSE TEXT:    This patient has acute kidney injury as evidenced by increase in Cr    Query created by: Omer Lyon on 4/5/2023 5:13 PM      QUERY TEXT:    Good afternoon. Patient admitted with UTI, noted to have atrial fibrillation and is maintained on Xarelto. If possible, please document in progress notes and discharge summary if you are evaluating and/or treating any of the following: The medical record reflects the following:    Risk Factors: Atrial fibrillation, SSS, HTN    Clinical Indicators: hgb 13.7,11.9, 11.1; hct 42.1, 37.4, 35.0; plt 356, 283, 227    Treatment: labs, monitor, Xarelto      Thank you,  Autumn England RN, CDI  Options provided:  -- Secondary hypercoagulable state in a patient with atrial fibrillation  -- Other - I will add my own diagnosis  -- Disagree - Not applicable / Not valid  -- Disagree - Clinically unable to determine / Unknown  -- Refer to Clinical Documentation Reviewer    PROVIDER RESPONSE TEXT:    This patient has secondary hypercoagulable state in a patient with atrial fibrillation.     Query created by: Omer Lyon on 4/4/2023 4:13 PM      Electronically signed by:  Dennis Beebe MD 4/6/2023 5:53 PM

## 2023-04-21 ENCOUNTER — PATIENT OUTREACH (OUTPATIENT)
Dept: CASE MANAGEMENT | Age: 78
End: 2023-04-21

## 2023-04-21 NOTE — PROGRESS NOTES
Goals Addressed                   This Visit's Progress     Understands red flags post discharge. 4/6/23  Activity- activity intolerance/energy conservation/frequent rest, exercise to increase mobility and prevent falls. Medication compliance-keflex x 7 days  Schedule SHELLY appt  Using teach back, reviewed red flags. Patient will monitor/report red flags- fever/chills, difficulty breathing, low blood pressure, fast heart rate, and mental confusion, N/V-dehydration. [facility]  4/13/23 Patient called on home number on file. Message left on voicemail with contact information to return call to this writer. [facility]  4/21/23 Patient and spouse called on home /mobilenumber on file. Message left on voicemail with contact information to return call to this writer. My chart message sent. WILDER

## 2023-04-27 ENCOUNTER — PATIENT OUTREACH (OUTPATIENT)
Dept: CASE MANAGEMENT | Age: 78
End: 2023-04-27

## 2023-04-28 NOTE — PROGRESS NOTES
Goals Addressed                   This Visit's Progress     Understands red flags post discharge. 4/6/23  Activity- activity intolerance/energy conservation/frequent rest, exercise to increase mobility and prevent falls. Medication compliance-keflex x 7 days  Schedule SHELLY appt  Using teach back, reviewed red flags. Patient will monitor/report red flags- fever/chills, difficulty breathing, low blood pressure, fast heart rate, and mental confusion, N/V-dehydration. [facility]  4/13/23 Patient called on home number on file. Message left on voicemail with contact information to return call to this writer. [facility]  4/21/23 Patient and spouse called on home /mobilenumber on file. Message left on voicemail with contact information to return call to this writer. My chart message sent. [facility]  4/27/23 Patient called on home number on file. Message left on voicemail with contact information to return call to this writer. [facility]

## 2023-05-02 PROBLEM — N39.0 UTI (URINARY TRACT INFECTION): Status: RESOLVED | Noted: 2023-04-02 | Resolved: 2023-05-02

## 2023-05-13 ENCOUNTER — HOSPITAL ENCOUNTER (EMERGENCY)
Facility: HOSPITAL | Age: 78
Discharge: HOME | End: 2023-05-13
Attending: EMERGENCY MEDICINE
Payer: MEDICARE

## 2023-05-13 ENCOUNTER — APPOINTMENT (OUTPATIENT)
Facility: HOSPITAL | Age: 78
End: 2023-05-13
Payer: MEDICARE

## 2023-05-13 VITALS
SYSTOLIC BLOOD PRESSURE: 188 MMHG | DIASTOLIC BLOOD PRESSURE: 90 MMHG | HEIGHT: 64 IN | WEIGHT: 224.87 LBS | RESPIRATION RATE: 16 BRPM | TEMPERATURE: 98.6 F | HEART RATE: 71 BPM | OXYGEN SATURATION: 98 % | BODY MASS INDEX: 38.39 KG/M2

## 2023-05-13 DIAGNOSIS — M54.6 ACUTE LEFT-SIDED THORACIC BACK PAIN: Primary | ICD-10-CM

## 2023-05-13 LAB
APPEARANCE UR: ABNORMAL
BACTERIA URNS QL MICRO: ABNORMAL /HPF
BILIRUB UR QL: NEGATIVE
COLOR UR: ABNORMAL
EPITH CASTS URNS QL MICRO: ABNORMAL /LPF
GLUCOSE UR STRIP.AUTO-MCNC: NEGATIVE MG/DL
HGB UR QL STRIP: ABNORMAL
KETONES UR QL STRIP.AUTO: NEGATIVE MG/DL
LEUKOCYTE ESTERASE UR QL STRIP.AUTO: ABNORMAL
NITRITE UR QL STRIP.AUTO: NEGATIVE
PH UR STRIP: 6.5 (ref 5–8)
PROT UR STRIP-MCNC: NEGATIVE MG/DL
RBC #/AREA URNS HPF: ABNORMAL /HPF (ref 0–5)
SP GR UR REFRACTOMETRY: 1.01 (ref 1–1.03)
URINE CULTURE IF INDICATED: ABNORMAL
UROBILINOGEN UR QL STRIP.AUTO: 0.2 EU/DL (ref 0.2–1)
WBC URNS QL MICRO: ABNORMAL /HPF (ref 0–4)

## 2023-05-13 PROCEDURE — 96372 THER/PROPH/DIAG INJ SC/IM: CPT

## 2023-05-13 PROCEDURE — 72128 CT CHEST SPINE W/O DYE: CPT

## 2023-05-13 PROCEDURE — 99284 EMERGENCY DEPT VISIT MOD MDM: CPT

## 2023-05-13 PROCEDURE — 6360000002 HC RX W HCPCS: Performed by: EMERGENCY MEDICINE

## 2023-05-13 PROCEDURE — 6370000000 HC RX 637 (ALT 250 FOR IP): Performed by: EMERGENCY MEDICINE

## 2023-05-13 PROCEDURE — 81001 URINALYSIS AUTO W/SCOPE: CPT

## 2023-05-13 RX ORDER — PREDNISONE 20 MG/1
60 TABLET ORAL
Status: COMPLETED | OUTPATIENT
Start: 2023-05-13 | End: 2023-05-13

## 2023-05-13 RX ORDER — METHOCARBAMOL 500 MG/1
750 TABLET, FILM COATED ORAL
Status: COMPLETED | OUTPATIENT
Start: 2023-05-13 | End: 2023-05-13

## 2023-05-13 RX ORDER — METHOCARBAMOL 500 MG/1
500 TABLET, FILM COATED ORAL 3 TIMES DAILY PRN
Qty: 21 TABLET | Refills: 0 | Status: SHIPPED | OUTPATIENT
Start: 2023-05-13 | End: 2023-05-20

## 2023-05-13 RX ORDER — CYCLOBENZAPRINE HCL 5 MG
5 TABLET ORAL 3 TIMES DAILY PRN
COMMUNITY

## 2023-05-13 RX ORDER — PREDNISONE 20 MG/1
60 TABLET ORAL DAILY
Qty: 12 TABLET | Refills: 0 | Status: SHIPPED | OUTPATIENT
Start: 2023-05-13 | End: 2023-05-17

## 2023-05-13 RX ORDER — KETOROLAC TROMETHAMINE 30 MG/ML
15 INJECTION, SOLUTION INTRAMUSCULAR; INTRAVENOUS
Status: COMPLETED | OUTPATIENT
Start: 2023-05-13 | End: 2023-05-13

## 2023-05-13 RX ADMIN — METHOCARBAMOL TABLETS 750 MG: 500 TABLET, COATED ORAL at 19:21

## 2023-05-13 RX ADMIN — KETOROLAC TROMETHAMINE 15 MG: 30 INJECTION, SOLUTION INTRAMUSCULAR at 19:21

## 2023-05-13 RX ADMIN — PREDNISONE 60 MG: 20 TABLET ORAL at 19:20

## 2023-05-13 ASSESSMENT — LIFESTYLE VARIABLES
HOW OFTEN DO YOU HAVE A DRINK CONTAINING ALCOHOL: NEVER
HOW MANY STANDARD DRINKS CONTAINING ALCOHOL DO YOU HAVE ON A TYPICAL DAY: PATIENT DOES NOT DRINK

## 2023-05-13 ASSESSMENT — ENCOUNTER SYMPTOMS
DIARRHEA: 0
SHORTNESS OF BREATH: 0
VOMITING: 0
CONSTIPATION: 0
BACK PAIN: 1
NAUSEA: 0
BOWEL INCONTINENCE: 0
COLOR CHANGE: 0
ABDOMINAL PAIN: 0
ABDOMINAL SWELLING: 0

## 2023-05-13 ASSESSMENT — PAIN SCALES - GENERAL
PAINLEVEL_OUTOF10: 2
PAINLEVEL_OUTOF10: 9

## 2023-05-13 ASSESSMENT — PAIN DESCRIPTION - LOCATION
LOCATION: BACK
LOCATION: BACK

## 2023-05-13 ASSESSMENT — PAIN DESCRIPTION - DESCRIPTORS: DESCRIPTORS: SQUEEZING

## 2023-05-13 ASSESSMENT — PAIN DESCRIPTION - ORIENTATION: ORIENTATION: MID;LEFT

## 2023-05-13 ASSESSMENT — PAIN DESCRIPTION - ONSET: ONSET: OTHER (COMMENT)

## 2023-05-13 NOTE — ED NOTES
Clean catch urine collected and sent to lab     Barb Real RN  05/13/23 9536
Dr Maikol Vega into evaluate pt     Isatu Abraham, RN  05/13/23 5414
Pt's spouse states that pt's back pain is getting worse informing when the physician will be in Informed Dr Benjy Mcadams and Dr Luisa Dixon, RN  05/13/23 2616
Verbal shift change report given to [address] [place] [address] Guanaco (oncoming nurse) by Yanique Parra (offgoing nurse). Report included the following information ED Encounter Summary.        Sam Davila RN  05/13/23 0111
Pupils: Pupils are equal, round, and reactive to light. Cardiovascular:      Comments: Warm and well perfused  Pulmonary:      Effort: Pulmonary effort is normal.   Musculoskeletal:         General: Normal range of motion. Cervical back: Normal and normal range of motion. Thoracic back: Spasms and tenderness present. No bony tenderness. Lumbar back: Normal.   Skin:     General: Skin is warm and dry. Neurological:      General: No focal deficit present. Mental Status: She is alert and oriented to person, place, and time. Psychiatric:         Mood and Affect: Mood normal.         Behavior: Behavior normal.         Thought Content: Thought content normal.         Judgment: Judgment normal.       DIAGNOSTIC RESULTS     EKG: All EKG's are interpreted by the Emergency Department Physician who either signs or Co-signs this chart in the absence of a cardiologist.        RADIOLOGY:   Non-plain film images such as CT, Ultrasound and MRI are read by the radiologist. Plain radiographic images are visualized and preliminarily interpreted by the emergency physician with the below findings:        Interpretation per the Radiologist below, if available at the time of this note:    CT THORACIC SPINE WO CONTRAST    (Results Pending)        LABS:  Labs Reviewed   URINALYSIS WITH REFLEX TO CULTURE - Abnormal; Notable for the following components:       Result Value    Appearance HAZY (*)     Blood, Urine SMALL (*)     Leukocyte Esterase, Urine SMALL (*)     Epithelial Cells UA MANY (*)     BACTERIA, URINE 1+ (*)     All other components within normal limits       All other labs were within normal range or not returned as of this dictation.     EMERGENCY DEPARTMENT COURSE and DIFFERENTIAL DIAGNOSIS/MDM:   Vitals:    Vitals:    05/13/23 1700   BP: (!) 190/84   Pulse: 84   Resp: 18   Temp: 98.5 °F (36.9 °C)   TempSrc: Oral   SpO2: 99%   Weight: 102 kg (224 lb 13.9 oz)   Height: 1.626 m (5' 4\")           Medical

## 2023-05-13 NOTE — ED TRIAGE NOTES
Pt presents to ED with c/o left low back pain over the last two days. Pt states she was unloading camping gear and it may have exacerbated her chronic back problem. Pt is scheduled for steroid injection 5/28/2023. Pt states he pain starts in the front and radiates to back.

## 2023-05-18 ENCOUNTER — TELEPHONE (OUTPATIENT)
Age: 78
End: 2023-05-18

## 2023-05-30 ENCOUNTER — NURSE ONLY (OUTPATIENT)
Age: 78
End: 2023-05-30

## 2023-05-30 DIAGNOSIS — Z95.0 PRESENCE OF CARDIAC PACEMAKER: Primary | ICD-10-CM

## 2023-06-05 NOTE — PROGRESS NOTES
Scheduled remote transmission. RV threshold continues to be borderline \"high\". Occasional oversensing t-waves noted, adjust sensitivity? (Not triggering false tachy alerts)  See scanned documents.  Chargeable visit

## 2023-06-08 ENCOUNTER — PROCEDURE VISIT (OUTPATIENT)
Age: 78
End: 2023-06-08

## 2023-06-08 DIAGNOSIS — G47.33 OSA (OBSTRUCTIVE SLEEP APNEA): Primary | ICD-10-CM

## 2023-06-08 NOTE — PROGRESS NOTES
Patient unable to get comfortable with F&P Maryann Full face small was fit with ResMed Airtouch F-20 size small to use and is to call to let us know if it is working

## 2023-06-09 ENCOUNTER — CLINICAL DOCUMENTATION (OUTPATIENT)
Age: 78
End: 2023-06-09

## 2023-06-09 NOTE — PROGRESS NOTES
Received fax from [facility] for MRI order. Per Dr Sevilla Kras set 10 above intrinsic. Faxed to [facility] MRI fax #[phone]. Received confirmation.

## 2023-06-22 ENCOUNTER — HOSPITAL ENCOUNTER (OUTPATIENT)
Facility: HOSPITAL | Age: 78
Discharge: HOME | End: 2023-06-22
Payer: MEDICARE

## 2023-06-22 DIAGNOSIS — M54.16 RADICULOPATHY, LUMBAR REGION: ICD-10-CM

## 2023-06-22 PROCEDURE — 72148 MRI LUMBAR SPINE W/O DYE: CPT

## 2023-07-31 ENCOUNTER — HOSPITAL ENCOUNTER (EMERGENCY)
Facility: HOSPITAL | Age: 78
Discharge: HOME | End: 2023-07-31
Attending: EMERGENCY MEDICINE
Payer: MEDICARE

## 2023-07-31 VITALS
WEIGHT: 205 LBS | OXYGEN SATURATION: 97 % | BODY MASS INDEX: 35 KG/M2 | RESPIRATION RATE: 18 BRPM | SYSTOLIC BLOOD PRESSURE: 180 MMHG | HEIGHT: 64 IN | TEMPERATURE: 98.8 F | DIASTOLIC BLOOD PRESSURE: 79 MMHG | HEART RATE: 86 BPM

## 2023-07-31 DIAGNOSIS — N30.00 ACUTE CYSTITIS WITHOUT HEMATURIA: Primary | ICD-10-CM

## 2023-07-31 DIAGNOSIS — R51.9 NONINTRACTABLE HEADACHE, UNSPECIFIED CHRONICITY PATTERN, UNSPECIFIED HEADACHE TYPE: ICD-10-CM

## 2023-07-31 LAB
ALBUMIN SERPL-MCNC: 3.7 G/DL (ref 3.5–5)
ALBUMIN/GLOB SERPL: 1.1 (ref 1.1–2.2)
ALP SERPL-CCNC: 66 U/L (ref 45–117)
ALT SERPL-CCNC: 27 U/L (ref 12–78)
ANION GAP SERPL CALC-SCNC: 6 MMOL/L (ref 5–15)
APPEARANCE UR: ABNORMAL
AST SERPL-CCNC: 19 U/L (ref 15–37)
BACTERIA URNS QL MICRO: ABNORMAL /HPF
BASOPHILS # BLD: 0 K/UL (ref 0–0.1)
BASOPHILS NFR BLD: 0 % (ref 0–1)
BILIRUB SERPL-MCNC: 0.5 MG/DL (ref 0.2–1)
BILIRUB UR QL: NEGATIVE
BUN SERPL-MCNC: 18 MG/DL (ref 6–20)
BUN/CREAT SERPL: 19 (ref 12–20)
CALCIUM SERPL-MCNC: 8.8 MG/DL (ref 8.5–10.1)
CHLORIDE SERPL-SCNC: 107 MMOL/L (ref 97–108)
CO2 SERPL-SCNC: 29 MMOL/L (ref 21–32)
COLOR UR: ABNORMAL
CREAT SERPL-MCNC: 0.95 MG/DL (ref 0.55–1.02)
DIFFERENTIAL METHOD BLD: NORMAL
EOSINOPHIL # BLD: 0.2 K/UL (ref 0–0.4)
EOSINOPHIL NFR BLD: 2 % (ref 0–7)
EPITH CASTS URNS QL MICRO: ABNORMAL /LPF
ERYTHROCYTE [DISTWIDTH] IN BLOOD BY AUTOMATED COUNT: 13.4 % (ref 11.5–14.5)
GLOBULIN SER CALC-MCNC: 3.3 G/DL (ref 2–4)
GLUCOSE SERPL-MCNC: 98 MG/DL (ref 65–100)
GLUCOSE UR STRIP.AUTO-MCNC: NEGATIVE MG/DL
HCT VFR BLD AUTO: 39.2 % (ref 35–47)
HGB BLD-MCNC: 12.2 G/DL (ref 11.5–16)
HGB UR QL STRIP: ABNORMAL
IMM GRANULOCYTES # BLD AUTO: 0 K/UL (ref 0–0.04)
IMM GRANULOCYTES NFR BLD AUTO: 0 % (ref 0–0.5)
KETONES UR QL STRIP.AUTO: NEGATIVE MG/DL
LEUKOCYTE ESTERASE UR QL STRIP.AUTO: ABNORMAL
LYMPHOCYTES # BLD: 1.5 K/UL (ref 0.8–3.5)
LYMPHOCYTES NFR BLD: 22 % (ref 12–49)
MCH RBC QN AUTO: 29.9 PG (ref 26–34)
MCHC RBC AUTO-ENTMCNC: 31.1 G/DL (ref 30–36.5)
MCV RBC AUTO: 96.1 FL (ref 80–99)
MONOCYTES # BLD: 0.5 K/UL (ref 0–1)
MONOCYTES NFR BLD: 8 % (ref 5–13)
NEUTS SEG # BLD: 4.5 K/UL (ref 1.8–8)
NEUTS SEG NFR BLD: 68 % (ref 32–75)
NITRITE UR QL STRIP.AUTO: NEGATIVE
NRBC # BLD: 0 K/UL (ref 0–0.01)
NRBC BLD-RTO: 0 PER 100 WBC
PH UR STRIP: 7 (ref 5–8)
PLATELET # BLD AUTO: 240 K/UL (ref 150–400)
PMV BLD AUTO: 10.2 FL (ref 8.9–12.9)
POTASSIUM SERPL-SCNC: 4.3 MMOL/L (ref 3.5–5.1)
PROT SERPL-MCNC: 7 G/DL (ref 6.4–8.2)
PROT UR STRIP-MCNC: NEGATIVE MG/DL
RBC # BLD AUTO: 4.08 M/UL (ref 3.8–5.2)
RBC #/AREA URNS HPF: ABNORMAL /HPF (ref 0–5)
SODIUM SERPL-SCNC: 142 MMOL/L (ref 136–145)
SP GR UR REFRACTOMETRY: 1.01 (ref 1–1.03)
URINE CULTURE IF INDICATED: ABNORMAL
UROBILINOGEN UR QL STRIP.AUTO: 0.2 EU/DL (ref 0.2–1)
WBC # BLD AUTO: 6.7 K/UL (ref 3.6–11)
WBC URNS QL MICRO: ABNORMAL /HPF (ref 0–4)

## 2023-07-31 PROCEDURE — 87186 SC STD MICRODIL/AGAR DIL: CPT

## 2023-07-31 PROCEDURE — 81001 URINALYSIS AUTO W/SCOPE: CPT

## 2023-07-31 PROCEDURE — 87086 URINE CULTURE/COLONY COUNT: CPT

## 2023-07-31 PROCEDURE — 6370000000 HC RX 637 (ALT 250 FOR IP): Performed by: EMERGENCY MEDICINE

## 2023-07-31 PROCEDURE — 85025 COMPLETE CBC W/AUTO DIFF WBC: CPT

## 2023-07-31 PROCEDURE — 36415 COLL VENOUS BLD VENIPUNCTURE: CPT

## 2023-07-31 PROCEDURE — 99283 EMERGENCY DEPT VISIT LOW MDM: CPT

## 2023-07-31 PROCEDURE — 80053 COMPREHEN METABOLIC PANEL: CPT

## 2023-07-31 PROCEDURE — 87077 CULTURE AEROBIC IDENTIFY: CPT

## 2023-07-31 RX ORDER — CEPHALEXIN 500 MG/1
500 CAPSULE ORAL 2 TIMES DAILY
Qty: 14 CAPSULE | Refills: 0 | Status: SHIPPED | OUTPATIENT
Start: 2023-07-31 | End: 2023-08-07

## 2023-07-31 RX ORDER — TRAMADOL HYDROCHLORIDE 50 MG/1
50 TABLET ORAL ONCE
Status: COMPLETED | OUTPATIENT
Start: 2023-07-31 | End: 2023-07-31

## 2023-07-31 RX ADMIN — TRAMADOL HYDROCHLORIDE 50 MG: 50 TABLET, COATED ORAL at 12:13

## 2023-07-31 ASSESSMENT — ENCOUNTER SYMPTOMS
SINUS PRESSURE: 0
ABDOMINAL PAIN: 0

## 2023-07-31 ASSESSMENT — PAIN DESCRIPTION - LOCATION: LOCATION: HEAD

## 2023-07-31 ASSESSMENT — PAIN - FUNCTIONAL ASSESSMENT: PAIN_FUNCTIONAL_ASSESSMENT: 0-10

## 2023-07-31 ASSESSMENT — PAIN DESCRIPTION - ONSET: ONSET: ON-GOING

## 2023-07-31 ASSESSMENT — PAIN SCALES - GENERAL: PAINLEVEL_OUTOF10: 6

## 2023-07-31 ASSESSMENT — PAIN DESCRIPTION - DESCRIPTORS: DESCRIPTORS: ACHING

## 2023-07-31 NOTE — ED NOTES
Pt discharged to home at this time. All discharge instructions provided to patient. All questions answered. No distress noted at time of discharge.       Milana Quinonez RN  07/31/23 6032

## 2023-07-31 NOTE — ED TRIAGE NOTES
Pt presents with urinary frequency that started yesterday. Reports urine is cloudy. Pt thinks she has a UTI. Denies N/V. No fevers reported.

## 2023-07-31 NOTE — ED PROVIDER NOTES
7/31/2023  1:00 PM        START taking these medications    Details   cephALEXin (KEFLEX) 500 MG capsule Take 1 capsule by mouth 2 times daily for 7 days, Disp-14 capsule, R-0Normal               (Please note that portions of this note were completed with a voice recognition program.  Efforts were made to edit the dictations but occasionally words are mis-transcribed.)    Reshma Mcdaniels DO (electronically signed)  Emergency Attending Physician / Physician Assistant / Nurse Practitioner             Fritz Hampton DO  07/31/23 9978

## 2023-08-02 LAB
BACTERIA SPEC CULT: ABNORMAL
CC UR VC: ABNORMAL
SERVICE CMNT-IMP: ABNORMAL

## 2023-08-18 ENCOUNTER — OFFICE VISIT (OUTPATIENT)
Age: 78
End: 2023-08-18
Payer: MEDICARE

## 2023-08-18 VITALS
DIASTOLIC BLOOD PRESSURE: 68 MMHG | HEART RATE: 66 BPM | BODY MASS INDEX: 36.12 KG/M2 | RESPIRATION RATE: 18 BRPM | OXYGEN SATURATION: 96 % | HEIGHT: 64 IN | SYSTOLIC BLOOD PRESSURE: 138 MMHG | WEIGHT: 211.6 LBS

## 2023-08-18 DIAGNOSIS — Z79.01 LONG TERM (CURRENT) USE OF ANTICOAGULANTS: ICD-10-CM

## 2023-08-18 DIAGNOSIS — I48.0 PAROXYSMAL ATRIAL FIBRILLATION (HCC): Primary | ICD-10-CM

## 2023-08-18 DIAGNOSIS — Z79.899 HIGH RISK MEDICATION USE: ICD-10-CM

## 2023-08-18 PROCEDURE — 93010 ELECTROCARDIOGRAM REPORT: CPT | Performed by: NURSE PRACTITIONER

## 2023-08-18 PROCEDURE — 1123F ACP DISCUSS/DSCN MKR DOCD: CPT | Performed by: NURSE PRACTITIONER

## 2023-08-18 PROCEDURE — 3078F DIAST BP <80 MM HG: CPT | Performed by: NURSE PRACTITIONER

## 2023-08-18 PROCEDURE — 93005 ELECTROCARDIOGRAM TRACING: CPT | Performed by: NURSE PRACTITIONER

## 2023-08-18 PROCEDURE — 3075F SYST BP GE 130 - 139MM HG: CPT | Performed by: NURSE PRACTITIONER

## 2023-08-18 PROCEDURE — 99214 OFFICE O/P EST MOD 30 MIN: CPT | Performed by: NURSE PRACTITIONER

## 2023-08-18 RX ORDER — PROCHLORPERAZINE MALEATE 5 MG/1
5 TABLET ORAL EVERY 6 HOURS PRN
COMMUNITY

## 2023-08-18 RX ORDER — ALUMINUM ZIRCONIUM OCTACHLOROHYDREX GLY 16 G/100G
1 GEL TOPICAL DAILY
COMMUNITY

## 2023-08-18 RX ORDER — TRAMADOL HYDROCHLORIDE 50 MG/1
50 TABLET ORAL 2 TIMES DAILY PRN
COMMUNITY
Start: 2023-06-22

## 2023-08-18 RX ORDER — MIRABEGRON 25 MG/1
25 TABLET, FILM COATED, EXTENDED RELEASE ORAL DAILY
COMMUNITY
Start: 2023-08-09

## 2023-08-18 RX ORDER — ESTRADIOL 0.1 MG/G
2 CREAM VAGINAL DAILY
COMMUNITY

## 2023-08-18 RX ORDER — BUMETANIDE 2 MG/1
2 TABLET ORAL DAILY
COMMUNITY
Start: 2020-09-03

## 2023-08-18 ASSESSMENT — PATIENT HEALTH QUESTIONNAIRE - PHQ9
1. LITTLE INTEREST OR PLEASURE IN DOING THINGS: 0
2. FEELING DOWN, DEPRESSED OR HOPELESS: 0
SUM OF ALL RESPONSES TO PHQ QUESTIONS 1-9: 0
SUM OF ALL RESPONSES TO PHQ9 QUESTIONS 1 & 2: 0
SUM OF ALL RESPONSES TO PHQ QUESTIONS 1-9: 0

## 2023-08-18 NOTE — PROGRESS NOTES
Room #:     C/o occasional SOB and palps. Scheduled to get spinal injection next week. Chief Complaint   Patient presents with    Follow-up     6 month    Atrial Fibrillation    Congestive Heart Failure       Vitals:    08/18/23 1013   BP: 138/68   Site: Right Upper Arm   Position: Sitting   Cuff Size: Large Adult   Pulse: 66   Resp: 18   SpO2: 96%   Weight: 211 lb 9.6 oz (96 kg)   Height: 5' 4\" (1.626 m)         Chest pain:  NO  Shortness of breath:  YES  Edema: NO  Palpitations, skipped beats, rapid heartbeat:  YES  Dizziness:  NO    1. Have you been to the ER, urgent care clinic since your last visit? Hospitalized since your last visit? YES 7/31/23 - [facility] - cystitis    2. Have you seen or consulted any other health care providers outside of the [address] [place] [address] since your last visit? Include any pap smears or colon screening.  NO      Refills:  NO
capsules by mouth daily      cetirizine (ZYRTEC) 10 MG tablet Take 1 tablet by mouth daily      gabapentin (NEURONTIN) 300 MG capsule Take 2 capsules by mouth 3 times daily. nebivolol (BYSTOLIC) 20 MG TABS tablet Take 1 tablet by mouth daily      omeprazole (PRILOSEC) 20 MG delayed release capsule Take 1 capsule by mouth daily      polyethyl glycol-propyl glycol 0.4-0.3 % (SYSTANE) 0.4-0.3 % ophthalmic solution Apply 1 drop to eye as needed      potassium chloride (KLOR-CON M) 20 MEQ extended release tablet Take 1 tablet by mouth two  times daily      rivaroxaban (XARELTO) 20 MG TABS tablet Take 1 tablet by mouth daily (with breakfast)       No current facility-administered medications for this visit. No Known Allergies       Past Medical History:   Diagnosis Date    Anxiety and depression     Atrial fibrillation (HCC)     Chronic anticoagulation     GERD (gastroesophageal reflux disease)     Hypertension     Hypothyroid     Migraines            Past Surgical History:   Procedure Laterality Date    ANESTH,SURGERY OF SHOULDER      left    CHOLECYSTECTOMY      HYSTERECTOMY (CERVIX STATUS UNKNOWN)      ORTHOPEDIC SURGERY      Right shoulder surgery    PACEMAKER      JASMYN AND BSO (CERVIX REMOVED)           Family History   Problem Relation Age of Onset    Heart Attack Other     Hypertension Other     Stroke Other     Migraines Mother     Cancer Mother         melanoma    Heart Disease Father     Hypertension Father     Stroke Father     Heart Attack Father        Social Connections: Not on file            Review of Systems:    12 point review of systems was performed.  All negative except for HPI         Objective:     Vitals:    08/18/23 1013   BP: 138/68   Pulse: 66   Resp: 18   SpO2: 96%           Physical Exam:    General:  Alert and oriented, in no acute distress; obese   Head:  Atraumatic, normocephalic   Eyes:  extraocular muscles intact   Neck:  Supple, normal range of motion   Lungs:  Clear to

## 2023-08-21 ENCOUNTER — OFFICE VISIT (OUTPATIENT)
Age: 78
End: 2023-08-21
Payer: MEDICARE

## 2023-08-21 VITALS
SYSTOLIC BLOOD PRESSURE: 120 MMHG | HEIGHT: 64 IN | OXYGEN SATURATION: 98 % | BODY MASS INDEX: 37.22 KG/M2 | HEART RATE: 72 BPM | WEIGHT: 218 LBS | DIASTOLIC BLOOD PRESSURE: 80 MMHG

## 2023-08-21 DIAGNOSIS — I50.32 CHRONIC DIASTOLIC HEART FAILURE (HCC): Primary | ICD-10-CM

## 2023-08-21 DIAGNOSIS — I48.0 PAROXYSMAL ATRIAL FIBRILLATION (HCC): ICD-10-CM

## 2023-08-21 DIAGNOSIS — I10 PRIMARY HYPERTENSION: ICD-10-CM

## 2023-08-21 DIAGNOSIS — I49.5 SINUS NODE DYSFUNCTION (HCC): ICD-10-CM

## 2023-08-21 PROCEDURE — 99215 OFFICE O/P EST HI 40 MIN: CPT | Performed by: SPECIALIST

## 2023-08-21 PROCEDURE — 3079F DIAST BP 80-89 MM HG: CPT | Performed by: SPECIALIST

## 2023-08-21 PROCEDURE — 1090F PRES/ABSN URINE INCON ASSESS: CPT | Performed by: SPECIALIST

## 2023-08-21 PROCEDURE — 1123F ACP DISCUSS/DSCN MKR DOCD: CPT | Performed by: SPECIALIST

## 2023-08-21 PROCEDURE — G8427 DOCREV CUR MEDS BY ELIG CLIN: HCPCS | Performed by: SPECIALIST

## 2023-08-21 PROCEDURE — G8417 CALC BMI ABV UP PARAM F/U: HCPCS | Performed by: SPECIALIST

## 2023-08-21 PROCEDURE — 3074F SYST BP LT 130 MM HG: CPT | Performed by: SPECIALIST

## 2023-08-21 PROCEDURE — G8400 PT W/DXA NO RESULTS DOC: HCPCS | Performed by: SPECIALIST

## 2023-08-21 PROCEDURE — 1036F TOBACCO NON-USER: CPT | Performed by: SPECIALIST

## 2023-08-21 ASSESSMENT — PATIENT HEALTH QUESTIONNAIRE - PHQ9
1. LITTLE INTEREST OR PLEASURE IN DOING THINGS: 0
SUM OF ALL RESPONSES TO PHQ QUESTIONS 1-9: 0
SUM OF ALL RESPONSES TO PHQ9 QUESTIONS 1 & 2: 0
SUM OF ALL RESPONSES TO PHQ QUESTIONS 1-9: 0
2. FEELING DOWN, DEPRESSED OR HOPELESS: 0

## 2023-08-21 NOTE — PROGRESS NOTES
Destini Verdugo MD. [facility]          Patient: Alice Sharma  : 1945      Today's Date: 2023        HISTORY OF PRESENT ILLNESS:     History of Present Illness:    Alice Sharma is a 70 y.o. female with PMH significant for PAF on Xarelto, SSS s/p pacer, HFpEF, HTN, hypothyroidism and obesity. She is doing OK overall. No new changes. Has chronic RODRIGUEZ - stable - class 2. No CP. Has chronic back pain . PAST MEDICAL HISTORY:     Past Medical History:   Diagnosis Date    (HFpEF) heart failure with preserved ejection fraction (HCC)     Anxiety and depression     Atrial fibrillation (HCC)     Chronic anticoagulation     GERD (gastroesophageal reflux disease)     Hypertension     Hypothyroid     Migraines     Pacemaker     SSS (sick sinus syndrome) ([address])        Past Surgical History:   Procedure Laterality Date    ANESTH,SURGERY OF SHOULDER      left    CHOLECYSTECTOMY      HYSTERECTOMY (CERVIX STATUS UNKNOWN)      ORTHOPEDIC SURGERY      Right shoulder surgery    PACEMAKER      JASMYN AND BSO (CERVIX REMOVED)               CURRENT MEDICATIONS:    .  Current Outpatient Medications   Medication Sig Dispense Refill    bumetanide (BUMEX) 2 MG tablet Take 1 tablet by mouth daily      MYRBETRIQ 25 MG TB24 Take 1 tablet by mouth daily      Multiple Vitamins-Minerals (PRESERVISION AREDS PO) Take 1 tablet by mouth      prochlorperazine (COMPAZINE) 5 MG tablet Take 1 tablet by mouth every 6 hours as needed      traMADol (ULTRAM) 50 MG tablet Take 1 tablet by mouth 2 times daily as needed.       Probiotic Product (ACIDOPHILUS PROBIOTIC) CAPS capsule Take 1 capsule by mouth daily      Rimegepant Sulfate (NURTEC PO) Take 75 mg by mouth as needed      Denosumab (PROLIA SC) Inject into the skin every 6 months      estradiol (ESTRACE) 0.1 MG/GM vaginal cream Place 2 g vaginally daily      Calcium Citrate-Vitamin D (CALCIUM CITRATE +D PO) Take by mouth Calcium 400 mg & Vitamin D 500 IU      acetaminophen (TYLENOL)

## 2023-08-21 NOTE — PROGRESS NOTES
Chief Complaint   Patient presents with    Follow-Up from [facility]     7/31 ER    Atrial Fibrillation     Vitals:    08/21/23 1441   BP: 120/80   Site: Right Upper Arm   Position: Sitting   Pulse: 72   SpO2: 98%   Weight: 218 lb (98.9 kg)   Height: 5' 4\" (1.626 m)           Chest pain denied     SOB denied     Palpitations denied     Swelling in hands/feet denied     Dizziness denied     Recent hospital stays denied     Refills denied

## 2023-09-06 ENCOUNTER — TELEPHONE (OUTPATIENT)
Age: 78
End: 2023-09-06

## 2023-09-06 DIAGNOSIS — G47.33 OSA (OBSTRUCTIVE SLEEP APNEA): Primary | ICD-10-CM

## 2023-09-06 NOTE — TELEPHONE ENCOUNTER
Patient was last seen in PAP clinic for mask fit. She was given a Resmed Airtouch F-20 small and stated that it worked very well for her. She contacted [facility] Osorio for a new replacement and she was sent a Resmed Airfit F-20 cushion small. It does not work well for her and she is experiencing mask leaks. Informed patient will have our nurse practitioner write her a script specfically stating Resmed Airtouch F-20 small.

## 2023-09-08 ENCOUNTER — TELEPHONE (OUTPATIENT)
Age: 78
End: 2023-09-08

## 2023-09-08 ENCOUNTER — HOSPITAL ENCOUNTER (OUTPATIENT)
Facility: HOSPITAL | Age: 78
Setting detail: OBSERVATION
Discharge: HOME | End: 2023-09-09
Attending: EMERGENCY MEDICINE | Admitting: STUDENT IN AN ORGANIZED HEALTH CARE EDUCATION/TRAINING PROGRAM
Payer: MEDICARE

## 2023-09-08 ENCOUNTER — APPOINTMENT (OUTPATIENT)
Facility: HOSPITAL | Age: 78
End: 2023-09-08
Payer: MEDICARE

## 2023-09-08 DIAGNOSIS — R77.8 ELEVATED TROPONIN: Primary | ICD-10-CM

## 2023-09-08 PROBLEM — R79.89 ELEVATED TROPONIN: Status: ACTIVE | Noted: 2023-09-08

## 2023-09-08 LAB
ANION GAP SERPL CALC-SCNC: 3 MMOL/L (ref 5–15)
BASOPHILS # BLD: 0 K/UL (ref 0–0.1)
BASOPHILS NFR BLD: 0 % (ref 0–1)
BUN SERPL-MCNC: 19 MG/DL (ref 6–20)
BUN/CREAT SERPL: 18 (ref 12–20)
CALCIUM SERPL-MCNC: 9 MG/DL (ref 8.5–10.1)
CHLORIDE SERPL-SCNC: 111 MMOL/L (ref 97–108)
CO2 SERPL-SCNC: 30 MMOL/L (ref 21–32)
COMMENT:: NORMAL
CREAT SERPL-MCNC: 1.06 MG/DL (ref 0.55–1.02)
DIFFERENTIAL METHOD BLD: NORMAL
EOSINOPHIL # BLD: 0.1 K/UL (ref 0–0.4)
EOSINOPHIL NFR BLD: 1 % (ref 0–7)
ERYTHROCYTE [DISTWIDTH] IN BLOOD BY AUTOMATED COUNT: 13.6 % (ref 11.5–14.5)
GLUCOSE SERPL-MCNC: 119 MG/DL (ref 65–100)
HCT VFR BLD AUTO: 39.8 % (ref 35–47)
HGB BLD-MCNC: 12.8 G/DL (ref 11.5–16)
IMM GRANULOCYTES # BLD AUTO: 0 K/UL (ref 0–0.04)
IMM GRANULOCYTES NFR BLD AUTO: 0 % (ref 0–0.5)
LYMPHOCYTES # BLD: 1.5 K/UL (ref 0.8–3.5)
LYMPHOCYTES NFR BLD: 21 % (ref 12–49)
MAGNESIUM SERPL-MCNC: 2 MG/DL (ref 1.6–2.4)
MCH RBC QN AUTO: 30.3 PG (ref 26–34)
MCHC RBC AUTO-ENTMCNC: 32.2 G/DL (ref 30–36.5)
MCV RBC AUTO: 94.1 FL (ref 80–99)
MONOCYTES # BLD: 0.7 K/UL (ref 0–1)
MONOCYTES NFR BLD: 10 % (ref 5–13)
NEUTS SEG # BLD: 4.8 K/UL (ref 1.8–8)
NEUTS SEG NFR BLD: 68 % (ref 32–75)
NRBC # BLD: 0 K/UL (ref 0–0.01)
NRBC BLD-RTO: 0 PER 100 WBC
PLATELET # BLD AUTO: 259 K/UL (ref 150–400)
PMV BLD AUTO: 10.8 FL (ref 8.9–12.9)
POTASSIUM SERPL-SCNC: 3.7 MMOL/L (ref 3.5–5.1)
RBC # BLD AUTO: 4.23 M/UL (ref 3.8–5.2)
SODIUM SERPL-SCNC: 144 MMOL/L (ref 136–145)
SPECIMEN HOLD: NORMAL
TROPONIN I SERPL HS-MCNC: 46 NG/L (ref 0–51)
TROPONIN I SERPL HS-MCNC: 57 NG/L (ref 0–51)
WBC # BLD AUTO: 7.1 K/UL (ref 3.6–11)

## 2023-09-08 PROCEDURE — 84443 ASSAY THYROID STIM HORMONE: CPT

## 2023-09-08 PROCEDURE — 71045 X-RAY EXAM CHEST 1 VIEW: CPT

## 2023-09-08 PROCEDURE — G0378 HOSPITAL OBSERVATION PER HR: HCPCS

## 2023-09-08 PROCEDURE — 85025 COMPLETE CBC W/AUTO DIFF WBC: CPT

## 2023-09-08 PROCEDURE — 85379 FIBRIN DEGRADATION QUANT: CPT

## 2023-09-08 PROCEDURE — 80048 BASIC METABOLIC PNL TOTAL CA: CPT

## 2023-09-08 PROCEDURE — 83735 ASSAY OF MAGNESIUM: CPT

## 2023-09-08 PROCEDURE — 94761 N-INVAS EAR/PLS OXIMETRY MLT: CPT

## 2023-09-08 PROCEDURE — 93005 ELECTROCARDIOGRAM TRACING: CPT | Performed by: EMERGENCY MEDICINE

## 2023-09-08 PROCEDURE — 84484 ASSAY OF TROPONIN QUANT: CPT

## 2023-09-08 PROCEDURE — 36415 COLL VENOUS BLD VENIPUNCTURE: CPT

## 2023-09-08 PROCEDURE — 99285 EMERGENCY DEPT VISIT HI MDM: CPT

## 2023-09-08 PROCEDURE — 6370000000 HC RX 637 (ALT 250 FOR IP): Performed by: STUDENT IN AN ORGANIZED HEALTH CARE EDUCATION/TRAINING PROGRAM

## 2023-09-08 PROCEDURE — 83880 ASSAY OF NATRIURETIC PEPTIDE: CPT

## 2023-09-08 PROCEDURE — 94762 N-INVAS EAR/PLS OXIMTRY CONT: CPT

## 2023-09-08 RX ORDER — AMLODIPINE BESYLATE 5 MG/1
2.5 TABLET ORAL DAILY
Status: DISCONTINUED | OUTPATIENT
Start: 2023-09-09 | End: 2023-09-09

## 2023-09-08 RX ORDER — TROSPIUM CHLORIDE 20 MG/1
20 TABLET, FILM COATED ORAL NIGHTLY
Status: DISCONTINUED | OUTPATIENT
Start: 2023-09-08 | End: 2023-09-09 | Stop reason: HOSPADM

## 2023-09-08 RX ORDER — GABAPENTIN 300 MG/1
600 CAPSULE ORAL 3 TIMES DAILY
Status: DISCONTINUED | OUTPATIENT
Start: 2023-09-08 | End: 2023-09-09 | Stop reason: HOSPADM

## 2023-09-08 RX ORDER — BUMETANIDE 1 MG/1
2 TABLET ORAL DAILY
Status: DISCONTINUED | OUTPATIENT
Start: 2023-09-09 | End: 2023-09-09 | Stop reason: HOSPADM

## 2023-09-08 RX ORDER — POTASSIUM CHLORIDE 750 MG/1
20 TABLET, FILM COATED, EXTENDED RELEASE ORAL DAILY
Status: DISCONTINUED | OUTPATIENT
Start: 2023-09-09 | End: 2023-09-09 | Stop reason: HOSPADM

## 2023-09-08 RX ORDER — CETIRIZINE HYDROCHLORIDE 10 MG/1
10 TABLET ORAL DAILY
Status: DISCONTINUED | OUTPATIENT
Start: 2023-09-09 | End: 2023-09-09 | Stop reason: HOSPADM

## 2023-09-08 RX ORDER — METOPROLOL SUCCINATE 50 MG/1
200 TABLET, EXTENDED RELEASE ORAL DAILY
Status: DISCONTINUED | OUTPATIENT
Start: 2023-09-09 | End: 2023-09-09 | Stop reason: HOSPADM

## 2023-09-08 RX ORDER — AMIODARONE HYDROCHLORIDE 200 MG/1
100 TABLET ORAL EVERY OTHER DAY
Status: DISCONTINUED | OUTPATIENT
Start: 2023-09-09 | End: 2023-09-09 | Stop reason: HOSPADM

## 2023-09-08 RX ADMIN — GABAPENTIN 600 MG: 300 CAPSULE ORAL at 23:13

## 2023-09-08 RX ADMIN — TROSPIUM CHLORIDE 20 MG: 20 TABLET, FILM COATED ORAL at 23:13

## 2023-09-08 ASSESSMENT — ENCOUNTER SYMPTOMS
NAUSEA: 0
VOMITING: 0
SHORTNESS OF BREATH: 0

## 2023-09-08 ASSESSMENT — PAIN - FUNCTIONAL ASSESSMENT: PAIN_FUNCTIONAL_ASSESSMENT: NONE - DENIES PAIN

## 2023-09-08 NOTE — TELEPHONE ENCOUNTER
Pt called in,  176/102, Pulse 125, monitor reading irregular, feeling palps. Started checking around 2:30 and still high. Confirmed cardiac medications & that she took all meds today. Recommended pt head to ED for afib w RVR & HTN; she was agreeable.

## 2023-09-08 NOTE — ED TRIAGE NOTES
Pt reports at 1200 today she felt flushed and SOB. She states 'I have a history of AFIB and I can normally tell when I am in it, I can feel it in my throat\". Denies chest pain. States her pacemaker was changed this past December.

## 2023-09-08 NOTE — ED NOTES
Bedside and Verbal shift change report given to john (oncoming nurse) by Judy Gusman (offgoing nurse). Report included the following information ED SBAR, MAR, Recent Results, and Cardiac Rhythm Paced .         Marialuisa Adrian RN  09/08/23 0886

## 2023-09-09 VITALS
RESPIRATION RATE: 12 BRPM | HEART RATE: 65 BPM | OXYGEN SATURATION: 93 % | HEIGHT: 64 IN | DIASTOLIC BLOOD PRESSURE: 76 MMHG | TEMPERATURE: 98 F | SYSTOLIC BLOOD PRESSURE: 152 MMHG | WEIGHT: 200 LBS | BODY MASS INDEX: 34.15 KG/M2

## 2023-09-09 PROBLEM — Z95.0 PACEMAKER: Status: ACTIVE | Noted: 2023-02-08

## 2023-09-09 PROBLEM — I50.33 ACUTE ON CHRONIC HEART FAILURE WITH PRESERVED EJECTION FRACTION (HFPEF) (HCC): Status: ACTIVE | Noted: 2023-09-09

## 2023-09-09 PROBLEM — I10 HTN (HYPERTENSION), BENIGN: Status: ACTIVE | Noted: 2022-11-10

## 2023-09-09 PROBLEM — I50.32 CHRONIC HEART FAILURE WITH PRESERVED EJECTION FRACTION (HFPEF) (HCC): Status: ACTIVE | Noted: 2023-09-09

## 2023-09-09 LAB
ALBUMIN SERPL-MCNC: 3.2 G/DL (ref 3.5–5)
ALBUMIN/GLOB SERPL: 1 (ref 1.1–2.2)
ALP SERPL-CCNC: 55 U/L (ref 45–117)
ALT SERPL-CCNC: 25 U/L (ref 12–78)
ANION GAP SERPL CALC-SCNC: 5 MMOL/L (ref 5–15)
AST SERPL-CCNC: 18 U/L (ref 15–37)
BASOPHILS # BLD: 0 K/UL (ref 0–0.1)
BASOPHILS NFR BLD: 1 % (ref 0–1)
BILIRUB SERPL-MCNC: 0.6 MG/DL (ref 0.2–1)
BUN SERPL-MCNC: 14 MG/DL (ref 6–20)
BUN/CREAT SERPL: 16 (ref 12–20)
CALCIUM SERPL-MCNC: 8.7 MG/DL (ref 8.5–10.1)
CHLORIDE SERPL-SCNC: 112 MMOL/L (ref 97–108)
CO2 SERPL-SCNC: 27 MMOL/L (ref 21–32)
CREAT SERPL-MCNC: 0.88 MG/DL (ref 0.55–1.02)
D DIMER PPP FEU-MCNC: 1.04 MG/L FEU (ref 0–0.65)
DIFFERENTIAL METHOD BLD: NORMAL
EOSINOPHIL # BLD: 0.2 K/UL (ref 0–0.4)
EOSINOPHIL NFR BLD: 3 % (ref 0–7)
ERYTHROCYTE [DISTWIDTH] IN BLOOD BY AUTOMATED COUNT: 13.6 % (ref 11.5–14.5)
GLOBULIN SER CALC-MCNC: 3.2 G/DL (ref 2–4)
GLUCOSE SERPL-MCNC: 111 MG/DL (ref 65–100)
HCT VFR BLD AUTO: 37.7 % (ref 35–47)
HGB BLD-MCNC: 12.4 G/DL (ref 11.5–16)
IMM GRANULOCYTES # BLD AUTO: 0 K/UL (ref 0–0.04)
IMM GRANULOCYTES NFR BLD AUTO: 0 % (ref 0–0.5)
LYMPHOCYTES # BLD: 1.9 K/UL (ref 0.8–3.5)
LYMPHOCYTES NFR BLD: 30 % (ref 12–49)
MAGNESIUM SERPL-MCNC: 2.2 MG/DL (ref 1.6–2.4)
MCH RBC QN AUTO: 30.6 PG (ref 26–34)
MCHC RBC AUTO-ENTMCNC: 32.9 G/DL (ref 30–36.5)
MCV RBC AUTO: 93.1 FL (ref 80–99)
MONOCYTES # BLD: 0.7 K/UL (ref 0–1)
MONOCYTES NFR BLD: 10 % (ref 5–13)
NEUTS SEG # BLD: 3.7 K/UL (ref 1.8–8)
NEUTS SEG NFR BLD: 56 % (ref 32–75)
NRBC # BLD: 0 K/UL (ref 0–0.01)
NRBC BLD-RTO: 0 PER 100 WBC
NT PRO BNP: 1821 PG/ML
PLATELET # BLD AUTO: 245 K/UL (ref 150–400)
PMV BLD AUTO: 10.4 FL (ref 8.9–12.9)
POTASSIUM SERPL-SCNC: 3.8 MMOL/L (ref 3.5–5.1)
PROT SERPL-MCNC: 6.4 G/DL (ref 6.4–8.2)
RBC # BLD AUTO: 4.05 M/UL (ref 3.8–5.2)
SODIUM SERPL-SCNC: 144 MMOL/L (ref 136–145)
TROPONIN I SERPL HS-MCNC: 30 NG/L (ref 0–51)
TROPONIN I SERPL HS-MCNC: 36 NG/L (ref 0–51)
TSH SERPL DL<=0.05 MIU/L-ACNC: 2.1 UIU/ML (ref 0.36–3.74)
WBC # BLD AUTO: 6.5 K/UL (ref 3.6–11)

## 2023-09-09 PROCEDURE — 80053 COMPREHEN METABOLIC PANEL: CPT

## 2023-09-09 PROCEDURE — 6370000000 HC RX 637 (ALT 250 FOR IP): Performed by: STUDENT IN AN ORGANIZED HEALTH CARE EDUCATION/TRAINING PROGRAM

## 2023-09-09 PROCEDURE — 83735 ASSAY OF MAGNESIUM: CPT

## 2023-09-09 PROCEDURE — 99204 OFFICE O/P NEW MOD 45 MIN: CPT | Performed by: STUDENT IN AN ORGANIZED HEALTH CARE EDUCATION/TRAINING PROGRAM

## 2023-09-09 PROCEDURE — 2580000003 HC RX 258: Performed by: STUDENT IN AN ORGANIZED HEALTH CARE EDUCATION/TRAINING PROGRAM

## 2023-09-09 PROCEDURE — 36415 COLL VENOUS BLD VENIPUNCTURE: CPT

## 2023-09-09 PROCEDURE — 6360000002 HC RX W HCPCS: Performed by: STUDENT IN AN ORGANIZED HEALTH CARE EDUCATION/TRAINING PROGRAM

## 2023-09-09 PROCEDURE — 85025 COMPLETE CBC W/AUTO DIFF WBC: CPT

## 2023-09-09 PROCEDURE — G0378 HOSPITAL OBSERVATION PER HR: HCPCS

## 2023-09-09 PROCEDURE — 6370000000 HC RX 637 (ALT 250 FOR IP): Performed by: INTERNAL MEDICINE

## 2023-09-09 PROCEDURE — 96374 THER/PROPH/DIAG INJ IV PUSH: CPT

## 2023-09-09 PROCEDURE — C9113 INJ PANTOPRAZOLE SODIUM, VIA: HCPCS | Performed by: STUDENT IN AN ORGANIZED HEALTH CARE EDUCATION/TRAINING PROGRAM

## 2023-09-09 PROCEDURE — 94761 N-INVAS EAR/PLS OXIMETRY MLT: CPT

## 2023-09-09 PROCEDURE — 84484 ASSAY OF TROPONIN QUANT: CPT

## 2023-09-09 PROCEDURE — A4216 STERILE WATER/SALINE, 10 ML: HCPCS | Performed by: STUDENT IN AN ORGANIZED HEALTH CARE EDUCATION/TRAINING PROGRAM

## 2023-09-09 RX ORDER — SODIUM CHLORIDE 0.9 % (FLUSH) 0.9 %
5-40 SYRINGE (ML) INJECTION EVERY 12 HOURS SCHEDULED
Status: DISCONTINUED | OUTPATIENT
Start: 2023-09-09 | End: 2023-09-09 | Stop reason: HOSPADM

## 2023-09-09 RX ORDER — ACETAMINOPHEN 650 MG/1
650 SUPPOSITORY RECTAL EVERY 6 HOURS PRN
Status: DISCONTINUED | OUTPATIENT
Start: 2023-09-09 | End: 2023-09-09 | Stop reason: HOSPADM

## 2023-09-09 RX ORDER — SODIUM CHLORIDE 0.9 % (FLUSH) 0.9 %
5-40 SYRINGE (ML) INJECTION PRN
Status: DISCONTINUED | OUTPATIENT
Start: 2023-09-09 | End: 2023-09-09 | Stop reason: HOSPADM

## 2023-09-09 RX ORDER — ONDANSETRON 2 MG/ML
4 INJECTION INTRAMUSCULAR; INTRAVENOUS EVERY 6 HOURS PRN
Status: DISCONTINUED | OUTPATIENT
Start: 2023-09-09 | End: 2023-09-09 | Stop reason: HOSPADM

## 2023-09-09 RX ORDER — ONDANSETRON 4 MG/1
4 TABLET, ORALLY DISINTEGRATING ORAL EVERY 8 HOURS PRN
Status: DISCONTINUED | OUTPATIENT
Start: 2023-09-09 | End: 2023-09-09 | Stop reason: HOSPADM

## 2023-09-09 RX ORDER — POLYETHYLENE GLYCOL 3350 17 G/17G
17 POWDER, FOR SOLUTION ORAL DAILY PRN
Status: DISCONTINUED | OUTPATIENT
Start: 2023-09-09 | End: 2023-09-09 | Stop reason: HOSPADM

## 2023-09-09 RX ORDER — SODIUM CHLORIDE 9 MG/ML
INJECTION, SOLUTION INTRAVENOUS PRN
Status: DISCONTINUED | OUTPATIENT
Start: 2023-09-09 | End: 2023-09-09 | Stop reason: HOSPADM

## 2023-09-09 RX ORDER — BUMETANIDE 0.25 MG/ML
1 INJECTION INTRAMUSCULAR; INTRAVENOUS ONCE
Status: DISCONTINUED | OUTPATIENT
Start: 2023-09-09 | End: 2023-09-09

## 2023-09-09 RX ORDER — ACETAMINOPHEN 325 MG/1
650 TABLET ORAL EVERY 6 HOURS PRN
Status: DISCONTINUED | OUTPATIENT
Start: 2023-09-09 | End: 2023-09-09 | Stop reason: HOSPADM

## 2023-09-09 RX ORDER — HYDRALAZINE HYDROCHLORIDE 20 MG/ML
10 INJECTION INTRAMUSCULAR; INTRAVENOUS EVERY 6 HOURS PRN
Status: DISCONTINUED | OUTPATIENT
Start: 2023-09-09 | End: 2023-09-09 | Stop reason: HOSPADM

## 2023-09-09 RX ORDER — AMIODARONE HYDROCHLORIDE 100 MG/1
100 TABLET ORAL DAILY
Qty: 30 TABLET | Refills: 0 | Status: SHIPPED | OUTPATIENT
Start: 2023-09-09 | End: 2023-10-09

## 2023-09-09 RX ORDER — AMLODIPINE BESYLATE 5 MG/1
10 TABLET ORAL DAILY
Status: DISCONTINUED | OUTPATIENT
Start: 2023-09-09 | End: 2023-09-09 | Stop reason: HOSPADM

## 2023-09-09 RX ADMIN — METOPROLOL SUCCINATE 200 MG: 50 TABLET, EXTENDED RELEASE ORAL at 08:22

## 2023-09-09 RX ADMIN — RIVAROXABAN 20 MG: 20 TABLET, FILM COATED ORAL at 08:21

## 2023-09-09 RX ADMIN — SODIUM CHLORIDE 40 MG: 9 INJECTION INTRAMUSCULAR; INTRAVENOUS; SUBCUTANEOUS at 08:22

## 2023-09-09 RX ADMIN — AMIODARONE HYDROCHLORIDE 100 MG: 200 TABLET ORAL at 08:20

## 2023-09-09 RX ADMIN — POTASSIUM CHLORIDE 20 MEQ: 750 TABLET, FILM COATED, EXTENDED RELEASE ORAL at 08:21

## 2023-09-09 RX ADMIN — BUMETANIDE 2 MG: 1 TABLET ORAL at 08:21

## 2023-09-09 RX ADMIN — GABAPENTIN 600 MG: 300 CAPSULE ORAL at 08:20

## 2023-09-09 RX ADMIN — CETIRIZINE HYDROCHLORIDE 10 MG: 10 TABLET, FILM COATED ORAL at 08:21

## 2023-09-09 RX ADMIN — AMLODIPINE BESYLATE 10 MG: 5 TABLET ORAL at 08:20

## 2023-09-09 RX ADMIN — GABAPENTIN 600 MG: 300 CAPSULE ORAL at 13:34

## 2023-09-09 ASSESSMENT — PAIN - FUNCTIONAL ASSESSMENT
PAIN_FUNCTIONAL_ASSESSMENT: NONE - DENIES PAIN

## 2023-09-09 NOTE — DISCHARGE INSTRUCTIONS
Hospital Medicine DISCHARGE INSTRUCTIONS    NAME:   Jericho Mckeon   :  1945   MRN:  [id number]     Date:     2023    ADMIT DATE: 2023     DISCHARGE DATE: 2023     PRINCIPAL ADMITTING DIAGNOSIS:  Elevated troponin [R77.8]    Discharge Diagnoses:  A fib RVR    MEDICATIONS:    It is important that medications are taken exactly as they are prescribed on the discharge medication instructions and keep them your  in the bottles provided by the pharmacist.   Keep a list of the medication names, dosages, and times to be taken at all times. Do not take other medications without consulting your doctor. Recommended diet:  cardiac diet    Recommended activity: activity as tolerated    Post discharge care:    Notify follow up health care provider or return to the emergency department if you cannot get hold of your doctor if you feel worse or experience symptoms similar to those that brought you to hospital    Philip Faustin MD  [address] [place] [address]  [phone]    Schedule an appointment as soon as possible for a visit  to schedule follow up as needed    [address]  [phone]    Follow up  Office to arrange follow up and will notify you       Information obtained by :  I understand that if any problems occur once I am at home I am to contact my physician and I understand and acknowledge receipt of the instructions indicated above.                                                                                                                                            Physician's or R.N.'s Signature                                                                  Date/Time                                                                                                                                              Patient or Representative Signature

## 2023-09-09 NOTE — CONSULTS
[facility]                    Cardiology Care Note     [x]Initial Encounter     []Follow-up    Patient Name: Jesus Manuel Fall - :1945 - [facility]:[id number]  Primary Cardiologist: Sabrina collado  Consulting Cardiologist: Luann Fernandez DO     Reason for encounter: lew    HPI:       Jesus Manuel Fall is a 70 y.o. female with PMH significant for with a history of paroxysmal atrial fibrillation, sick sinus syndrome status post permanent pacemaker, HFpEF, morbid obesity presented to ED yesterday due to tachycardia and shortness of breath. Patient noticed yesterday walking up and down stairs carrying laundry that she was more short winded than normal.  She checked her heart rates at home and it was recorded at 120 bpm.  Due to shortness of breath and persistent tachycardia at home patient presented to the ED for further evaluation. EKG performed in the ED showed atrial paced rhythm. Patient's dyspnea symptoms resolved with normalization of her heart rates. Subjective:      Jesus Manuel Fall reports dyspnea. Assessment and Plan       Suspect patient's dyspnea was related to a paroxysmal episode of atrial fibrillation. Her cardiac biomarkers are not significantly elevated. She has no ongoing anginal chest pain or chest pressure symptoms. Resolved dyspnea. She is currently in a atrial paced rhythm. Recommend to continue her current home medical regimens. Recommend to increase her amiodarone from every other day to daily. We will arrange follow-up visit with cardiology. Patient may need to be considered for an ablation for breakthrough symptomatic atrial fibrillation. Safe to discharge from cardiovascular disease standpoint     ____________________________________________________________    Cardiac testing  No results found for this or any previous visit. No results found for this or any previous visit. No results found for this or any previous visit.       Most recent HS hours as needed (Patient not taking: Reported on 9/8/2023), Disp: , Rfl:     amiodarone (PACERONE) 100 MG tablet, Take 1 tablet by mouth every other day, Disp: , Rfl:     amLODIPine (NORVASC) 2.5 MG tablet, Take 1 tablet by mouth daily, Disp: , Rfl:     ascorbic acid (VITAMIN C) 1000 MG tablet, Take 0.5 tablets by mouth, Disp: , Rfl:     Biotin 2.5 MG CAPS, Take 2 capsules by mouth daily, Disp: , Rfl:     cetirizine (ZYRTEC) 10 MG tablet, Take 1 tablet by mouth daily, Disp: , Rfl:     gabapentin (NEURONTIN) 300 MG capsule, Take 2 capsules by mouth 3 times daily. , Disp: , Rfl:     nebivolol (BYSTOLIC) 20 MG TABS tablet, Take 1 tablet by mouth daily, Disp: , Rfl:     omeprazole (PRILOSEC) 20 MG delayed release capsule, Take 1 capsule by mouth daily, Disp: , Rfl:     polyethyl glycol-propyl glycol 0.4-0.3 % (SYSTANE) 0.4-0.3 % ophthalmic solution, Apply 1 drop to eye as needed, Disp: , Rfl:     potassium chloride (KLOR-CON M) 20 MEQ extended release tablet, nightly, Disp: , Rfl:     rivaroxaban (XARELTO) 20 MG TABS tablet, Take 1 tablet by mouth daily (with breakfast), Disp: , Rfl:     Julia Marte, [address] [facility] Cardiology  Call center: I) [phone]  (F) [phone]      Pepe Hugo MD

## 2023-09-09 NOTE — ACP (ADVANCE CARE PLANNING)
[address]                                   Advance Care Planning Note    Name: Hermes Jang  YOB: 1945  MRN: [id number]  Admission Date: 9/8/2023  4:13 PM    Date of discussion: 9/8/2023    Active Diagnoses: These active diagnoses are of sufficient risk that focused discussion on advance care planning is indicated in order to allow the patient to thoughtfully consider personal goals of care, and if situations arise that prevent the ability to personally give input, to ensure appropriate representation of their personal desires for different levels and aggressiveness of care. Discussion:     Persons present and participating in discussion: Janusz Lang MD    Topics Discussed:  Patient's medical condition and diagnosis: [ X ] yes [  ] no   Surrogate decision maker: [ X ] yes [  ] no   Patient's current physical function/cognitive function/frailty: [ X ] yes [  ] no   Code Status: [ X ] yes [  ] no   Artificial Nutrition / Dialysis / Non-Invasive Ventilation / Blood Transfusion: [  ] yes [ X ] no  Potential Resources for home (durable medical equipment, home nursing, home O2): [  ] yes [  [facility] no    Overview of Discussion: Discussed code status, its definition and components. Patient elected to be Full code. Discussed POA. Its definition and under which circumstances POA would be able to make decisions. Patients POA is her spouse. Discussed current diagnosis, current plan and expected hospital course. Answered all questions. Time Spent:     Total time spent face-to-face in education and discussion: 20 minutes.      Pernell Combs MD  Date of Service:  9/8/2023  9:36 PM

## 2023-09-09 NOTE — DISCHARGE SUMMARY
[address] [place] [address]  Tel: [phone]    Hospital Medicine Discharge Summary    Patient ID:    Shahida Woodard  Age:              70 y.o.    : 1945  MRN:             [id number]     PCP: Aravind Castillo MD     Date of Admission: 2023    Date of Discharge:  2023    Discharge Diagnoses:  Principal Problem:    Acute on chronic heart failure with preserved ejection fraction (HFpEF) (HCC)    Sinus node dysfunction (HCC)    Anxiety and depression    PAF (paroxysmal atrial fibrillation) (HCC)    GERD (gastroesophageal reflux disease)    HTN (hypertension), benign    Hypothyroidism    Pacemaker    Elevated troponin    Reason for admission:    Elevated troponin [R77.8]    Hospital Course:     Ms. Chau Caldwell is a 70 y.o. admitted to [facility] and treated for the following:    Acute on chronic heart failure with preserved ejection fraction (HFpEF) POA: suspected given her dyspnea, elevated pro-BNP and pulmonary edema noted on CXR. Unclear trigger but partly from uncontrolled HTN, A fib. Echo done 2022 showed EF 60-65%. Troponin flat. Seen by cardiology who made adjustments to her Amiodarone. Continue current home medications. She will follow up with cardiology for further management. PAF (paroxysmal atrial fibrillation) / Sinus node dysfunction / Pacemaker POA: rate controlled. Continue adjusted Amiodarone, Metoprolol and Rivaroxaban. Outpatient follow up     HTN (hypertension), benign POA: BP variable. Continue Amlodipine and Bystolic. Elevated troponin POA: marginal elevation and now normal. Suspect from demand ischemia. No further testing.       GERD (gastroesophageal reflux disease) POA: continue Pantoprazole     Hypothyroidism POA: TSH normal. Continue Levothyroxine    Diagnostic testing:    Laboratory data reviewed and independently interpreted:    Recent Labs     23  7667 (NORVASC) 2.5 MG tablet Take 1 tablet by mouth daily      ascorbic acid (VITAMIN C) 1000 MG tablet Take 0.5 tablets by mouth      Biotin 2.5 MG CAPS Take 2 capsules by mouth daily      cetirizine (ZYRTEC) 10 MG tablet Take 1 tablet by mouth daily      gabapentin (NEURONTIN) 300 MG capsule Take 2 capsules by mouth 3 times daily. nebivolol (BYSTOLIC) 20 MG TABS tablet Take 1 tablet by mouth daily      omeprazole (PRILOSEC) 20 MG delayed release capsule Take 1 capsule by mouth daily      polyethyl glycol-propyl glycol 0.4-0.3 % (SYSTANE) 0.4-0.3 % ophthalmic solution Apply 1 drop to eye as needed      potassium chloride (KLOR-CON M) 20 MEQ extended release tablet nightly      rivaroxaban (XARELTO) 20 MG TABS tablet Take 1 tablet by mouth daily (with breakfast)           STOP taking these medications       prochlorperazine (COMPAZINE) 5 MG tablet Comments:   Reason for Stopping:         acetaminophen (TYLENOL) 325 MG tablet Comments:   Reason for Stopping:               Gaston Mesa MD  [address] [address]  [phone]    Schedule an appointment as soon as possible for a visit  to schedule follow up as needed    Luann Fernandez DO  [address] [facility] [address] [facility]  [phone]    Follow up  Office to arrange follow up and will notify you      Follow-up tests or labs: None    Discharge Condition: Stable    Disposition: home    Time taken to co-ordinate and arrange discharge:  35 minutes.     Signed:  Dedrick Boles MD       9/9/2023   1:53 PM

## 2023-09-09 NOTE — H&P
Hospitalist Admission Note    NAME:  Avani Austin   :  1945   MRN:  [id number]     Date/Time:  2023 9:37 PM    Patient PCP: Candace Mayorga MD  ________________________________________________________________________    Given the patient's current clinical presentation, I have a high level of concern for decompensation if discharged from the emergency department. Complex decision making was performed, which includes reviewing the patient's available past medical records, laboratory results, and x-ray films. My assessment of this patient's clinical condition and my plan of care is as follows. Assessment / Plan:    SOB: POA. Cards recommended admit for trend trop and obs overnight. Currently symptoms improved. EKG nonischemic and first trop nonimpressive, albeit elevated. CXR and D dimer not performed in ED.  - Admit to tele for obs  - VS per unit  - cards consult  - trend trop  - CXR  - D dimer    Afib / SSS s/p pacemaker: POA. Pacemaker pacing well on tele. Episode patient was describing may have been RVR episode. - Telemetry  - cards eval  - Cont home amio, switch nebivolol to toprol  - Cont xarelto  - TSH    HFpEF chronic: POA. Not fluid overloaded on exam. Lungs CTAB  - BNP    HTN: Stable. POA  - Cont home norvasc 2.5    GERD: Stable. POA  - Hold home meds  - PPI    Neuropathic pain: POA. Chronic .stable  - Cont home gabapentin    Urinary retention / incontinence: POA. Stable  - Cont myrbetric (or pharmacy sub)    Allergies: POA stable. - Cont antihistamine and nasal steroid    H/o hypoK: Chronic. Stable. POA  - cont potassium supps    I have personally reviewed the radiographs, laboratory data in Epic and decisions and statements above are based partially on this personal interpretation. Code Status: Full Code  DVT Prophylaxis:  on xarelto  GI Prophylaxis: PPI       Subjective:   CHIEF COMPLAINT: \"SOB\"    HISTORY OF PRESENT ILLNESS:     Ellie Santacruz is a 70 y.o.    female with ________________________________________________________________________  TOTAL TIME:  45 Minutes        Comments   >50% of visit spent in counseling and coordination of care X Chart reviewed  Discussion with patient and/or family and questions answered     ________________________________________________________________________  Signed: Percy Vernon MD        Procedures: see electronic medical records for all procedures/Xrays/labs and details which were not copied into this note but were reviewed prior to creation of Plan.

## 2023-09-09 NOTE — ED NOTES
Patient was given discharge paperwork. Discharge paperwork reviewed. Patient has no concerns or questions at this time. Prescriptions reviewed and pharmacy confirmed with patient. Patient's IV line removed. Medications given today were reviewed with pt.       Murtaza Flores RN  09/09/23 659 Rosacyndi Nieves RN  09/09/23 7023

## 2023-09-11 LAB
EKG ATRIAL RATE: 108 BPM
EKG DIAGNOSIS: NORMAL
EKG Q-T INTERVAL: 360 MS
EKG QRS DURATION: 78 MS
EKG QTC CALCULATION (BAZETT): 425 MS
EKG R AXIS: -12 DEGREES
EKG T AXIS: 29 DEGREES
EKG VENTRICULAR RATE: 84 BPM

## 2023-09-11 PROCEDURE — 93010 ELECTROCARDIOGRAM REPORT: CPT | Performed by: SPECIALIST

## 2023-09-11 NOTE — PROGRESS NOTES
Cardiac Electrophysiology OFFICE Follow Up Note             Assessment/Plan:   1. PAF (paroxysmal atrial fibrillation) ([address])  2. High risk medication use  3. Anticoagulated  4. Pacemaker      Paroxysmal atrial fibrillation  History of very symptomatic atrial fibrillation. Admitted recently with symptomatic AF  - Amiodarone increased from 100 mg QOD to daily several days ago   - TSH and LFTs September 2023 WNL   - CXR September 2023 WNL   - Continue amiodarone 100 mg daily   - Follow up with Dr. Jesse Sommers in February as scheduled. We discussed potential ablation. She was given an informational handout and will let us know if she wishes to proceed. - Because of this breakthrough episode and hospitalization, she wishes to cancel her cruise next week. She will get me the paperwork. Pacemaker  Date of gen change 12/8/2022, Medtronic dual chamber pacemaker with normal function.   - Continue Q3 month remote device transmissions. High risk med management  - We discussed in great detail regarding high risk medication management and the associated risks of antiarrhythmic therapy. Patient reports full understanding of recommendations.   -Risks associated with Amiodarone including but not limited to risks of thyroid toxicity, liver toxicity, and lung injury was explained to the patient including the need for long term monitoring with labs and imaging.  - EKG demonstrated QTc of 425 ms   - obtain LFTs and TSH every 6 months     Anticoagulation  Denies of any bleeding issues. Know to contact clinic with any bleeding side effects (BRBPR, melena, hemotysis, hematuria). -  Continue Xarelto for thromboembolic prophylaxis    Hypertension  -  Continue amlodipine 2.5 mg, Bystolic 20 mg daily          Subjective:         Ibrahima Corey is a 70 y.o. patient who is seen for follow up of AF and her pacemaker. She was admitted several days at to [facility] for breakthrough AF. She converted on her own.  Her PO

## 2023-09-12 ENCOUNTER — OFFICE VISIT (OUTPATIENT)
Age: 78
End: 2023-09-12
Payer: MEDICARE

## 2023-09-12 VITALS
OXYGEN SATURATION: 94 % | HEART RATE: 62 BPM | HEIGHT: 64 IN | DIASTOLIC BLOOD PRESSURE: 70 MMHG | RESPIRATION RATE: 18 BRPM | SYSTOLIC BLOOD PRESSURE: 144 MMHG | BODY MASS INDEX: 34.33 KG/M2

## 2023-09-12 DIAGNOSIS — Z79.01 ANTICOAGULATED: ICD-10-CM

## 2023-09-12 DIAGNOSIS — Z95.0 PACEMAKER: ICD-10-CM

## 2023-09-12 DIAGNOSIS — I48.0 PAF (PAROXYSMAL ATRIAL FIBRILLATION) (HCC): Primary | ICD-10-CM

## 2023-09-12 DIAGNOSIS — Z79.899 HIGH RISK MEDICATION USE: ICD-10-CM

## 2023-09-12 PROCEDURE — 3077F SYST BP >= 140 MM HG: CPT | Performed by: NURSE PRACTITIONER

## 2023-09-12 PROCEDURE — 99214 OFFICE O/P EST MOD 30 MIN: CPT | Performed by: NURSE PRACTITIONER

## 2023-09-12 PROCEDURE — 3078F DIAST BP <80 MM HG: CPT | Performed by: NURSE PRACTITIONER

## 2023-09-12 PROCEDURE — 1123F ACP DISCUSS/DSCN MKR DOCD: CPT | Performed by: NURSE PRACTITIONER

## 2023-09-12 NOTE — PROGRESS NOTES
Room #: 5    Recent ER visit with AFIB w/ RVR and hypertension. Chief Complaint   Patient presents with    Atrial Fibrillation    Follow-Up from Hospital       Vitals:    09/12/23 0946   BP: (!) 144/70   Site: Right Upper Arm   Position: Sitting   Cuff Size: Large Adult   Pulse: 62   Resp: 18   SpO2: 94%   Height: 5' 4\" (1.626 m)         Chest pain:  NO  Shortness of breath:  NO  Edema: NO  Palpitations, skipped beats, rapid heartbeat:  YES  Dizziness:  NO    1. Have you been to the ER, urgent care clinic since your last visit? Hospitalized since your last visit? YES - AFIB    2. Have you seen or consulted any other health care providers outside of the [address] [place] [address] since your last visit? Include any pap smears or colon screening.  NO      Refills:  NO

## 2023-09-19 ENCOUNTER — TELEPHONE (OUTPATIENT)
Age: 78
End: 2023-09-19

## 2023-09-20 ENCOUNTER — TELEPHONE (OUTPATIENT)
Age: 78
End: 2023-09-20

## 2023-09-20 DIAGNOSIS — G47.33 OSA (OBSTRUCTIVE SLEEP APNEA): Primary | ICD-10-CM

## 2023-09-20 NOTE — TELEPHONE ENCOUNTER
Orders Placed This Encounter   Procedures    DME Order for (Specify) as OP     Diagnosis: (G47.33) EDUARDA (obstructive sleep apnea)  (primary encounter diagnosis)     Replacement Supplies for Positive Airway Pressure Therapy Device:   Duration of need: 99 months.  Full Face Mask 1 every 3 months.  Full Face Mask Cushion 1 per month. Resmed Airtouch F-20 size SMALL        Headgear 1 every 6 months.  Positive Airway Pressure chinstrap 1 every 6 months.  Tubing with heating element 1 every 3 months.  Filter(s) Disposable 2 per month.  Filter(s) Non-Disposable 1 every 6 months. .   [address]  for Humidifier (Replace) 1 every 6 months. GORAN Gomez NPI: [id number]    Electronically signed.  Date:- 09/20/23     GORAN Gomez, [facility]   Nurse Practitioner  [employer] [facility]

## 2023-09-29 ENCOUNTER — TELEPHONE (OUTPATIENT)
Age: 78
End: 2023-09-29

## 2023-09-29 NOTE — TELEPHONE ENCOUNTER
Returned call, ID verified using two patient identifiers. Informed patient  that I have the forms that she dropped off and that Pratima Reyes is out of the office until Tuesday 10/3, once she returns we will have her complete the forms. Patient is also ready to proceed with an AFib ablation with Dr. Gisell Sánchez. Advised her that I would relay her decision to Dr. Nickie Halsted and Ge Reeder . Meredith IRVIN Will contact her to setup her procedure date and time. Patient verbalizes understanding and is agreeable to this plan.

## 2023-09-29 NOTE — TELEPHONE ENCOUNTER
Pt called to get a update on a form she dropped off and she also states that she would like to scheduled for an ablation.      Pt # [phone]

## 2023-10-03 NOTE — TELEPHONE ENCOUNTER
Called  patient, ID verified using two patient identifiers. Informed patient forms have been completed and are ready for pickup from [address] [place] [address]. Patient verbalizes understanding of all information.

## 2023-10-06 ENCOUNTER — TELEPHONE (OUTPATIENT)
Age: 78
End: 2023-10-06

## 2023-10-06 DIAGNOSIS — I49.5 SINUS NODE DYSFUNCTION (HCC): ICD-10-CM

## 2023-10-06 DIAGNOSIS — I50.32 CHRONIC DIASTOLIC HEART FAILURE (HCC): ICD-10-CM

## 2023-10-06 DIAGNOSIS — I48.91 UNSPECIFIED ATRIAL FIBRILLATION (HCC): ICD-10-CM

## 2023-10-06 DIAGNOSIS — I50.9 HEART FAILURE, UNSPECIFIED (HCC): ICD-10-CM

## 2023-10-06 DIAGNOSIS — I48.0 PAF (PAROXYSMAL ATRIAL FIBRILLATION) (HCC): Primary | ICD-10-CM

## 2023-10-06 DIAGNOSIS — I48.0 PAROXYSMAL ATRIAL FIBRILLATION (HCC): ICD-10-CM

## 2023-10-06 NOTE — TELEPHONE ENCOUNTER
Pre procedure lab orders placed.     Orders Placed This Encounter   Procedures    CBC     Standing Status:   Future     Standing Expiration Date:   55/1/8899    Basic Metabolic Panel     Standing Status:   Future     Standing Expiration Date:   10/6/2024

## 2023-10-06 NOTE — TELEPHONE ENCOUNTER
Spoke with Pt of Afib Ablation w/Dr. Salud Ricks at [address] [facility]. For 12/14/23 At 12:30pm  arrive at 10:30am Pt aware that they need a  NPO from [address] the night before. Check in at the first  floor Outpt. Reg. Desk. Pt is to have Labs done between 11/14/23-12/7/23.  Medications:  Hold Xarelto the morning of procedure   VO by Dr. Marco Cabello.

## 2023-10-09 PROBLEM — R79.89 ELEVATED TROPONIN: Status: RESOLVED | Noted: 2023-09-08 | Resolved: 2023-10-09

## 2023-10-23 ENCOUNTER — OFFICE VISIT (OUTPATIENT)
Age: 78
End: 2023-10-23
Payer: MEDICARE

## 2023-10-23 VITALS
SYSTOLIC BLOOD PRESSURE: 138 MMHG | HEIGHT: 64 IN | HEART RATE: 63 BPM | WEIGHT: 219 LBS | DIASTOLIC BLOOD PRESSURE: 70 MMHG | OXYGEN SATURATION: 97 % | BODY MASS INDEX: 37.39 KG/M2

## 2023-10-23 DIAGNOSIS — I48.0 PAF (PAROXYSMAL ATRIAL FIBRILLATION) (HCC): Primary | ICD-10-CM

## 2023-10-23 DIAGNOSIS — I50.32 CHRONIC DIASTOLIC HEART FAILURE (HCC): ICD-10-CM

## 2023-10-23 DIAGNOSIS — I10 HTN (HYPERTENSION), BENIGN: ICD-10-CM

## 2023-10-23 PROCEDURE — G8417 CALC BMI ABV UP PARAM F/U: HCPCS | Performed by: SPECIALIST

## 2023-10-23 PROCEDURE — G8427 DOCREV CUR MEDS BY ELIG CLIN: HCPCS | Performed by: SPECIALIST

## 2023-10-23 PROCEDURE — 3078F DIAST BP <80 MM HG: CPT | Performed by: SPECIALIST

## 2023-10-23 PROCEDURE — 3075F SYST BP GE 130 - 139MM HG: CPT | Performed by: SPECIALIST

## 2023-10-23 PROCEDURE — 99214 OFFICE O/P EST MOD 30 MIN: CPT | Performed by: SPECIALIST

## 2023-10-23 PROCEDURE — G8400 PT W/DXA NO RESULTS DOC: HCPCS | Performed by: SPECIALIST

## 2023-10-23 PROCEDURE — G8484 FLU IMMUNIZE NO ADMIN: HCPCS | Performed by: SPECIALIST

## 2023-10-23 PROCEDURE — 1123F ACP DISCUSS/DSCN MKR DOCD: CPT | Performed by: SPECIALIST

## 2023-10-23 PROCEDURE — 1090F PRES/ABSN URINE INCON ASSESS: CPT | Performed by: SPECIALIST

## 2023-10-23 PROCEDURE — 1036F TOBACCO NON-USER: CPT | Performed by: SPECIALIST

## 2023-10-23 RX ORDER — AMIODARONE HYDROCHLORIDE 100 MG/1
100 TABLET ORAL DAILY
Qty: 90 TABLET | Refills: 3 | Status: SHIPPED | OUTPATIENT
Start: 2023-10-23

## 2023-10-23 RX ORDER — PROCHLORPERAZINE MALEATE 5 MG/1
5 TABLET ORAL EVERY 6 HOURS PRN
COMMUNITY

## 2023-10-31 ENCOUNTER — TELEPHONE (OUTPATIENT)
Age: 78
End: 2023-10-31

## 2023-10-31 NOTE — TELEPHONE ENCOUNTER
Pt called in reference to wanting to speak to Carmen June NP due to some paperwork for reimbursement for a cruise that she had to cancel because she was in the hospital. Would like a ph call back.          Pt ph# [phone]

## 2023-11-01 ENCOUNTER — TELEPHONE (OUTPATIENT)
Age: 78
End: 2023-11-01

## 2023-11-01 NOTE — TELEPHONE ENCOUNTER
Called patient, ID verified using two patient identifiers. Patient states she has received a letter from the company handling her cruise reimbursement case stating that they have requested further information from our office to be able to complete her claim. Advised patient that we have not received any request for records. Advised patient to call the company and give them our fax # ([phone]) so that they can send request for information to us directly. Patient agreeable to this plan and will call them.     Claim # [id number]  Ref # [id number]

## 2023-11-01 NOTE — TELEPHONE ENCOUNTER
Pt would like to go over a form and  answer some questions  with a nurse for a canceled cruise.        Pt # [phone]

## 2023-11-06 ENCOUNTER — TELEPHONE (OUTPATIENT)
Age: 78
End: 2023-11-06

## 2023-11-06 RX ORDER — POTASSIUM CHLORIDE 20 MEQ/1
20 TABLET, EXTENDED RELEASE ORAL NIGHTLY
Qty: 90 TABLET | Refills: 1 | Status: SHIPPED | OUTPATIENT
Start: 2023-11-06

## 2023-11-06 NOTE — TELEPHONE ENCOUNTER
Patient called requesting medication refill Potassium Chloride submitted to [facility]# [phone] patient only have 1 week left of medication . Patient ?  If office received insurance paperwork for [employer] [facility]    Patient# [phone]

## 2023-11-06 NOTE — TELEPHONE ENCOUNTER
Requested Prescriptions     Signed Prescriptions Disp Refills    potassium chloride (KLOR-CON M) 20 MEQ extended release tablet 90 tablet 1     Sig: Take 1 tablet by mouth nightly     Authorizing Provider: DORITA CASTILLO     Ordering User: Delight Tereso     Refill per verbal order Dr. Hope Haq. Last visit:9/12/213  Next visit: 1/16/24    See previous encounter about insurance forms.

## 2023-11-06 NOTE — TELEPHONE ENCOUNTER
Called patient, ID verified using two patient identifiers. Advised patient that we have not received any forms from [facility] [employer]. Patient will contact them and have forms faxed as previously discussed.

## 2023-11-20 NOTE — TELEPHONE ENCOUNTER
Forms received, completed and sent as requested. [Follow-Up - Clinic] : a clinic follow-up of [Chest Pain] : chest pain

## 2023-12-08 ENCOUNTER — PREP FOR PROCEDURE (OUTPATIENT)
Age: 78
End: 2023-12-08

## 2023-12-08 LAB
BASOPHILS # BLD AUTO: 0 X10E3/UL (ref 0–0.2)
BASOPHILS NFR BLD AUTO: 1 %
BUN SERPL-MCNC: 17 MG/DL (ref 8–27)
BUN/CREAT SERPL: 18 (ref 12–28)
CALCIUM SERPL-MCNC: 9.7 MG/DL (ref 8.7–10.3)
CHLORIDE SERPL-SCNC: 103 MMOL/L (ref 96–106)
CO2 SERPL-SCNC: 24 MMOL/L (ref 20–29)
CREAT SERPL-MCNC: 0.94 MG/DL (ref 0.57–1)
EGFRCR SERPLBLD CKD-EPI 2021: 63 ML/MIN/1.73
EOSINOPHIL # BLD AUTO: 0.2 X10E3/UL (ref 0–0.4)
EOSINOPHIL NFR BLD AUTO: 2 %
ERYTHROCYTE [DISTWIDTH] IN BLOOD BY AUTOMATED COUNT: 13.2 % (ref 11.7–15.4)
GLUCOSE SERPL-MCNC: 92 MG/DL (ref 70–99)
HCT VFR BLD AUTO: 40.9 % (ref 34–46.6)
HGB BLD-MCNC: 14 G/DL (ref 11.1–15.9)
IMM GRANULOCYTES # BLD AUTO: 0 X10E3/UL (ref 0–0.1)
IMM GRANULOCYTES NFR BLD AUTO: 0 %
LYMPHOCYTES # BLD AUTO: 1.7 X10E3/UL (ref 0.7–3.1)
LYMPHOCYTES NFR BLD AUTO: 24 %
MCH RBC QN AUTO: 31.8 PG (ref 26.6–33)
MCHC RBC AUTO-ENTMCNC: 34.2 G/DL (ref 31.5–35.7)
MCV RBC AUTO: 93 FL (ref 79–97)
MONOCYTES # BLD AUTO: 0.7 X10E3/UL (ref 0.1–0.9)
MONOCYTES NFR BLD AUTO: 9 %
NEUTROPHILS # BLD AUTO: 4.4 X10E3/UL (ref 1.4–7)
NEUTROPHILS NFR BLD AUTO: 64 %
PLATELET # BLD AUTO: 277 X10E3/UL (ref 150–450)
POTASSIUM SERPL-SCNC: 4.5 MMOL/L (ref 3.5–5.2)
RBC # BLD AUTO: 4.4 X10E6/UL (ref 3.77–5.28)
SODIUM SERPL-SCNC: 145 MMOL/L (ref 134–144)
WBC # BLD AUTO: 7 X10E3/UL (ref 3.4–10.8)

## 2023-12-11 RX ORDER — SODIUM CHLORIDE 0.9 % (FLUSH) 0.9 %
5-40 SYRINGE (ML) INJECTION PRN
Status: CANCELLED | OUTPATIENT
Start: 2023-12-11

## 2023-12-11 RX ORDER — SODIUM CHLORIDE 9 MG/ML
INJECTION, SOLUTION INTRAVENOUS PRN
Status: CANCELLED | OUTPATIENT
Start: 2023-12-11

## 2023-12-11 RX ORDER — SODIUM CHLORIDE 0.9 % (FLUSH) 0.9 %
5-40 SYRINGE (ML) INJECTION EVERY 12 HOURS SCHEDULED
Status: CANCELLED | OUTPATIENT
Start: 2023-12-11

## 2023-12-11 NOTE — DISCHARGE INSTRUCTIONS
Atrial Fibrillation Ablation   Discharge Instructions      You have just had an Atrial Fibrillation Ablation. There were catheters temporarily placed in your heart through a puncture in the veins and/or arteries in your groin. WHAT TO EXPECT     If you have had an Atrial Fibrillation Ablation please be aware that you may experience mild chest pain that will resolve within 24-48 hours. If the chest pain persists or becomes severe, please call the office. Mild to moderate, non-painful, bruising or mild swelling at the puncture site is not un-common, and will resolve in 7 - 14 days, and may extend down your thigh as it heals. Application of Ice to the site may help with any tenderness. You have a small gauze dressing applied to the puncture site in your groin. You may remove this the following morning. It is not uncommon to feel palpitations during the healing phase after your ablation. If you feel as though you are having recurrence of atrial fibrillation lasting longer than 30 minutes, please contact the office. Palpitations/AFIB can occur during the healing phase (1-2 months) post ablation. MEDICATIONS     Start pantoprazole 40 mg by mouth twice daily x 30 days post ablation. A prescription has been sent electronically to your pharmacy on record. Please pick it up & take as directed. Please hold your omeprazole while taking pantoprazole, but you may resume it once you finish the 30 days of pantoprazole. Resume other medications as previously prescribed. ACTIVITY     A responsible adult must take you home. Do not drive a car for 24 hours. Rest quietly for the remainder of the day. Do not lift anything greater than 10 pounds for 7 days. Limit bending at the puncture site and use of stairs for at least 2 days. You may remove the bandage and shower the morning after the procedure. Do not take a bath for 3 days.         SYMPTOMS THAT NEED TO BE REPORTED IMMEDIATELY     Bleeding

## 2023-12-11 NOTE — H&P
"Travelers' Diarrhea  Travelers' diarrhea (TD) is the most common illness affecting travelers. Each year many travelers develop diarrhea. TD usually occurs within the first week of travel. However, it may occur at any time while traveling. It may even occur after returning home. The most important risk factor is where you are going. High-risk places are the developing countries of:  · [place].   · [place].   · [place].   · [place].   High risk people include young adults and those with:  · Transplants.   · HIV infections.   · Medicine that suppresses the immune system.   · Inflammatory-bowel disease.   · Diabetes.   · H-2 blockers or antacids.   Attack rates are similar for men and women. The primary source of TD is eating or drinking food or water tainted with feces (stool or bowel movements).  CAUSES   Infectious agents are the primary cause of TD. Germs cause almost 80% of TD cases. The most common germ produces:  · Watery diarrhea with cramps.   · Low-grade or no fever.   There are many other bacterial, viral and parasitic pathogens (disease causing \"bugs\").   SYMPTOMS   Most TD cases begin suddenly. Symptoms include stool that is increased in:  · Frequency.   · Volume.   · Weight.   Altered stool consistency also is common. Typically, you have four to five loose or watery bowel movements each day. Other common symptoms are:  · Nausea.   · Vomiting.   · Diarrhea.   · Abdominal cramping.   · Bloating.   · Fever.   · Urgency.   · Malaise.   Most cases are not dangerous. Most cases go away in 1-2 days without treatment. TD is rarely life threatening. 90% of cases resolve within 1 week. 98% resolve within 1 month.  PREVENTION   · Avoid foods or beverages purchased from street vendors in high risk countries.   · Avoid food from places where unclean conditions are present.   · Avoid raw or undercooked meat and seafood.   · Avoid raw fruits (e.g., oranges, bananas, avocados) and vegetables unless you peel them " Office note from 9/12/23 reviewed. No interim changes. --------------------------------------------------------   Cardiac Electrophysiology OFFICE Follow Up Note             Assessment/Plan:   1. PAF (paroxysmal atrial fibrillation) ([address])  2. High risk medication use  3. Anticoagulated  4. Pacemaker        Paroxysmal atrial fibrillation  History of very symptomatic atrial fibrillation. Admitted recently with symptomatic AF  - Amiodarone increased from 100 mg QOD to daily several days ago   - TSH and LFTs September 2023 WNL   - CXR September 2023 WNL   - Continue amiodarone 100 mg daily   - Follow up with Dr. Sheri Landau in February as scheduled. We discussed potential ablation. She was given an informational handout and will let us know if she wishes to proceed. - Because of this breakthrough episode and hospitalization, she wishes to cancel her cruise next week. She will get me the paperwork. Pacemaker  Date of gen change 12/8/2022, Medtronic dual chamber pacemaker with normal function.   - Continue Q3 month remote device transmissions. High risk med management  - We discussed in great detail regarding high risk medication management and the associated risks of antiarrhythmic therapy. Patient reports full understanding of recommendations.   -Risks associated with Amiodarone including but not limited to risks of thyroid toxicity, liver toxicity, and lung injury was explained to the patient including the need for long term monitoring with labs and imaging.  - EKG demonstrated QTc of 425 ms   - obtain LFTs and TSH every 6 months     Anticoagulation  Denies of any bleeding issues. Know to contact clinic with any bleeding side effects (BRBPR, melena, hemotysis, hematuria). -  Continue Xarelto for thromboembolic prophylaxis    Hypertension  -  Continue amlodipine 2.5 mg, Bystolic 20 mg daily           Subjective:         Dhruv Del Toro is a 70 y.o. patient who is seen for follow up of AF and her pacemaker. yourself.   · If handled properly, well-cooked and packaged foods usually are safe. Foods associated with increased risk for TD include:   · Tap water.   · Ice.   · Unpasteurized milk.   · Dairy products.   · Safe beverages include:   · Bottled carbonated beverages.   · Hot tea or coffee.   · Beer.   · Wine.   · Boiled water.   · Water treated with iodine or chlorine.   ANTIBIOTICS ARE NOT RECOMMENDED AS PREVENTION  · CDC (Centers for Disease Control) does not recommend antimicrobial drugs (medicine that kill germs) to prevent TD. Several studies show that Pepto-Bismol® taken as either 2 tablets 4 times daily, or 2 fluid ounces 4 times daily, reduces the incidence of travelers' diarrhea. People that should avoid Pepto-Bismol® include those who are:   · Pregnant.   · Allergic to aspirin.   · Taking anticoagulants medicine (probenecid, methotrexate).   · Be informed about potential side effects, in particular about temporary blackening of the tongue and stool, and rarely ringing in the ears. Because of potential adverse side effects, preventative Pepto-Bismol® should not be used for more than 3 weeks.   · Some antibiotics taken in a once-a-day dose are 90% effective at preventing travelers' diarrhea. However, antibiotics are not recommended as prevention. Routine antimicrobial prophylaxis increases your risk for:   · Adverse reactions.   · Infections with resistant organisms.   · Antibiotics can increase your susceptibility to resistant bacterial pathogens and provide no protection against either viral or parasitic pathogens. This can give travelers a false sense of security. As a result, strict adherence to preventive measures is encouraged. Pepto-Bismol® should be used as an extra effort if prophylaxis is needed.   TREATMENT   · TD usually is a self-limited disorder. It gets well without treatment. It often goes away without specific treatment. Oral re-hydration is often helpful to replace lost fluids and  electrolytes. Clear liquids are routinely recommended for adults. You may be helped with antimicrobial therapy if you develop three or more loose stools in an 8-hour period, especially if associated with:   · Nausea.   · Vomiting.   · Abdominal cramps.   · Fever.   · Blood in stools.   · Antibiotics usually are given for 3-5 days. Pepto-Bismol® also may be used as treatment. Take one fluid ounce, or two 262 mg tablets every 30 minutes, for up to 8 doses in a 24-hour period. This can be repeated on a second day. If diarrhea persists despite therapy, you should be evaluated by a caregiver and treated for possible parasitic infection.   · Because drug resistance is a continuing problem and may vary from country to country, professional assistance should be looked for if problems persist.   · Antimotility agents (loperamide, diphenoxylate, and paregoric) mostly reduce diarrhea by slowing down the passage of food and drink in the gut. This allows more time for absorption. Some persons believe diarrhea is the body's defense mechanism to minimize contact time between gut pathogens and lining of the bowel. In several studies, antimotility agents have been useful in treating travelers' diarrhea by decreasing the duration of diarrhea. However, these agents should never be used by persons with fever or bloody diarrhea because they can increase the severity of disease by delaying clearance of causative organisms. Because antimotility agents are now available over the counter, their improper use is of concern. Complications have been reported from the use of these medicines such as:   · Toxic megacolon.   · Sepsis.   · Disseminated intravascular coagulation.   SEEK IMMEDIATE MEDICAL CARE IF:   · You are unable to keep fluids down.   · Vomiting or diarrhea becomes persistent.   · Abdominal (belly) pain develops or increases or localizes. (Right sided pain can be appendicitis and left sided pain in adults can be diverticulitis).    · You develop an oral temperature above 102° F (38.9° C), or as your caregiver suggests.   · Diarrhea becomes excessive or contains blood or mucous.   · Excessive weakness, dizziness, fainting or extreme thirst.   · Checking weight 2 to 3 times per day in babies and children will help verify adequate fluid replacement. Your caregiver will tell you what loss should concern you or suggest another visit to your personal physician.   · Record your weight or your child's weight today. Compare this to your home scale and record all weights and time and date weighed. Try to check weight at the same times every day. Bring this chart to your caregivers if you or your child needs to be seen again.   FOR MORE INFORMATION   Travelers should consult with a caregiver before departing on a trip abroad. Information about TD is available from:  · Your local or state health departments.   · World Health Organization (WHO).   Other information that may be of interest to travelers can be found at the [facility] Travelers' Health homepage at http://www.cdc.gov/travel.  Document Released: 12/08/2003 Document Revised: 03/11/2013 Document Reviewed: 02/25/2010  ExitCare® Patient Information ©2013 [employer].    Food Choices to Help Relieve Diarrhea, Adult  When you have diarrhea, the foods you eat and your eating habits are very important. Choosing the right foods and drinks can help relieve diarrhea. Also, because diarrhea can last up to 7 days, you need to replace lost fluids and electrolytes (such as sodium, potassium, and chloride) in order to help prevent dehydration.   WHAT GENERAL GUIDELINES DO I NEED TO FOLLOW?  · Slowly drink 1 cup (8 oz) of fluid for each episode of diarrhea. If you are getting enough fluid, your urine will be clear or pale yellow.  · Eat starchy foods. Some good choices include white rice, white toast, pasta, low-fiber cereal, baked potatoes (without the skin), saltine crackers, and bagels.  · Avoid large servings  of any cooked vegetables.  · Limit fruit to two servings per day. A serving is ½ cup or 1 small piece.  · Choose foods with less than 2 g of fiber per serving.  · Limit fats to less than 8 tsp (38 g) per day.  · Avoid fried foods.  · Eat foods that have probiotics in them. Probiotics can be found in certain dairy products.  · Avoid foods and beverages that may increase the speed at which food moves through the stomach and intestines (gastrointestinal tract). Things to avoid include:  ¨ High-fiber foods, such as dried fruit, raw fruits and vegetables, nuts, seeds, and whole grain foods.  ¨ Spicy foods and high-fat foods.  ¨ Foods and beverages sweetened with high-fructose corn syrup, honey, or sugar alcohols such as xylitol, sorbitol, and mannitol.  WHAT FOODS ARE RECOMMENDED?  Grains  White rice. White, French, or shantell breads (fresh or toasted), including plain rolls, buns, or bagels. White pasta. Saltine, soda, or marisa crackers. Pretzels. Low-fiber cereal. Cooked cereals made with water (such as cornmeal, farina, or cream cereals). Plain muffins. Matzo. [place] toast. Zwieback.   Vegetables  Potatoes (without the skin). Strained tomato and vegetable juices. Most well-cooked and canned vegetables without seeds. Tender lettuce.  Fruits  Cooked or canned applesauce, apricots, cherries, fruit cocktail, grapefruit, peaches, pears, or plums. Fresh bananas, apples without skin, cherries, grapes, cantaloupe, grapefruit, peaches, oranges, or plums.   Meat and Other Protein Products  Baked or boiled chicken. Eggs. Tofu. Fish. Seafood. Smooth peanut butter. Ground or well-cooked tender beef, ham, veal, lamb, pork, or poultry.   Dairy  Plain yogurt, kefir, and unsweetened liquid yogurt. Lactose-free milk, buttermilk, or soy milk. Plain hard cheese.  Beverages  Sport drinks. Clear broths. Diluted fruit juices (except prune). Regular, caffeine-free sodas such as ginger ale. Water. Decaffeinated teas. Oral rehydration solutions.  Sugar-free beverages not sweetened with sugar alcohols.  Other  Bouillon, broth, or soups made from recommended foods.   The items listed above may not be a complete list of recommended foods or beverages. Contact your dietitian for more options.  WHAT FOODS ARE NOT RECOMMENDED?  Grains  Whole grain, whole wheat, bran, or rye breads, rolls, pastas, crackers, and cereals. Wild or brown rice. Cereals that contain more than 2 g of fiber per serving. Corn tortillas or taco shells. Cooked or dry oatmeal. Granola. Popcorn.  Vegetables  Raw vegetables. Cabbage, broccoli, [place] sprouts, artichokes, baked beans, beet greens, corn, kale, legumes, peas, sweet potatoes, and yams. Potato skins. Cooked spinach and cabbage.  Fruits  Dried fruit, including raisins and dates. Raw fruits. Stewed or dried prunes. Fresh apples with skin, apricots, mangoes, pears, raspberries, and strawberries.   Meat and Other Protein Products  [place] peanut butter. Nuts and seeds. Beans and lentils. [place].   Dairy  High-fat cheeses. Milk, chocolate milk, and beverages made with milk, such as milk shakes. Cream. Ice cream.  Sweets and Desserts  Sweet rolls, doughnuts, and sweet breads. Pancakes and waffles.  Fats and Oils  Butter. Cream sauces. Margarine. Salad oils. Plain salad dressings. Olives. Avocados.   Beverages  Caffeinated beverages (such as coffee, tea, soda, or energy drinks). Alcoholic beverages. Fruit juices with pulp. Prune juice. Soft drinks sweetened with high-fructose corn syrup or sugar alcohols.  Other  Coconut. Hot sauce. Chili powder. Mayonnaise. Gravy. Cream-based or milk-based soups.   The items listed above may not be a complete list of foods and beverages to avoid. Contact your dietitian for more information.  WHAT SHOULD I DO IF I BECOME DEHYDRATED?  Diarrhea can sometimes lead to dehydration. Signs of dehydration include dark urine and dry mouth and skin. If you think you are dehydrated, you should rehydrate with an oral  rehydration solution. These solutions can be purchased at pharmacies, retail stores, or online.   Drink ½-1 cup (120-240 mL) of oral rehydration solution each time you have an episode of diarrhea. If drinking this amount makes your diarrhea worse, try drinking smaller amounts more often. For example, drink 1-3 tsp (5-15 mL) every 5-10 minutes.   A general rule for staying hydrated is to drink 1½-2 L of fluid per day. Talk to your health care provider about the specific amount you should be drinking each day. Drink enough fluids to keep your urine clear or pale yellow.     This information is not intended to replace advice given to you by your health care provider. Make sure you discuss any questions you have with your health care provider.     Document Released: 03/09/2005 Document Revised: 01/08/2016 Document Reviewed: 11/10/2014  [employer] Interactive Patient Education ©2016 [employer]

## 2023-12-14 ENCOUNTER — ANESTHESIA (OUTPATIENT)
Facility: HOSPITAL | Age: 78
End: 2023-12-14
Payer: MEDICARE

## 2023-12-14 ENCOUNTER — ANESTHESIA EVENT (OUTPATIENT)
Facility: HOSPITAL | Age: 78
End: 2023-12-14
Payer: MEDICARE

## 2023-12-14 ENCOUNTER — HOSPITAL ENCOUNTER (OUTPATIENT)
Facility: HOSPITAL | Age: 78
Discharge: HOME | End: 2023-12-14
Attending: INTERNAL MEDICINE | Admitting: INTERNAL MEDICINE
Payer: MEDICARE

## 2023-12-14 VITALS
RESPIRATION RATE: 22 BRPM | BODY MASS INDEX: 35 KG/M2 | OXYGEN SATURATION: 98 % | WEIGHT: 205 LBS | HEIGHT: 64 IN | HEART RATE: 61 BPM | TEMPERATURE: 97.9 F | SYSTOLIC BLOOD PRESSURE: 149 MMHG | DIASTOLIC BLOOD PRESSURE: 76 MMHG

## 2023-12-14 DIAGNOSIS — I48.91 ATRIAL FIBRILLATION, UNSPECIFIED TYPE (HCC): ICD-10-CM

## 2023-12-14 LAB
ABO + RH BLD: NORMAL
ACT BLD: 298 SECS (ref 79–138)
ACT BLD: 320 SECS (ref 79–138)
BLOOD GROUP ANTIBODIES SERPL: NORMAL
ECHO BSA: 2.05 M2
SPECIMEN EXP DATE BLD: NORMAL

## 2023-12-14 PROCEDURE — 36415 COLL VENOUS BLD VENIPUNCTURE: CPT

## 2023-12-14 PROCEDURE — 86900 BLOOD TYPING SEROLOGIC ABO: CPT

## 2023-12-14 PROCEDURE — 2580000003 HC RX 258: Performed by: NURSE ANESTHETIST, CERTIFIED REGISTERED

## 2023-12-14 PROCEDURE — 93613 INTRACARDIAC EPHYS 3D MAPG: CPT | Performed by: INTERNAL MEDICINE

## 2023-12-14 PROCEDURE — C1759 CATH, INTRA ECHOCARDIOGRAPHY: HCPCS | Performed by: INTERNAL MEDICINE

## 2023-12-14 PROCEDURE — 93662 INTRACARDIAC ECG (ICE): CPT | Performed by: INTERNAL MEDICINE

## 2023-12-14 PROCEDURE — 6360000002 HC RX W HCPCS: Performed by: INTERNAL MEDICINE

## 2023-12-14 PROCEDURE — 2500000003 HC RX 250 WO HCPCS: Performed by: NURSE ANESTHETIST, CERTIFIED REGISTERED

## 2023-12-14 PROCEDURE — 93656 COMPRE EP EVAL ABLTJ ATR FIB: CPT | Performed by: INTERNAL MEDICINE

## 2023-12-14 PROCEDURE — 2709999900 HC NON-CHARGEABLE SUPPLY: Performed by: INTERNAL MEDICINE

## 2023-12-14 PROCEDURE — C1766 INTRO/SHEATH,STRBLE,NON-PEEL: HCPCS | Performed by: INTERNAL MEDICINE

## 2023-12-14 PROCEDURE — 2720000010 HC SURG SUPPLY STERILE: Performed by: INTERNAL MEDICINE

## 2023-12-14 PROCEDURE — 3700000000 HC ANESTHESIA ATTENDED CARE: Performed by: INTERNAL MEDICINE

## 2023-12-14 PROCEDURE — 86901 BLOOD TYPING SEROLOGIC RH(D): CPT

## 2023-12-14 PROCEDURE — 6360000002 HC RX W HCPCS: Performed by: NURSE ANESTHETIST, CERTIFIED REGISTERED

## 2023-12-14 PROCEDURE — 93622 COMP EP EVAL L VENTR PAC&REC: CPT | Performed by: INTERNAL MEDICINE

## 2023-12-14 PROCEDURE — 2580000003 HC RX 258: Performed by: NURSE PRACTITIONER

## 2023-12-14 PROCEDURE — 76937 US GUIDE VASCULAR ACCESS: CPT | Performed by: INTERNAL MEDICINE

## 2023-12-14 PROCEDURE — 93623 PRGRMD STIMJ&PACG IV RX NFS: CPT | Performed by: INTERNAL MEDICINE

## 2023-12-14 PROCEDURE — C1760 CLOSURE DEV, VASC: HCPCS | Performed by: INTERNAL MEDICINE

## 2023-12-14 PROCEDURE — 85347 COAGULATION TIME ACTIVATED: CPT

## 2023-12-14 PROCEDURE — C1732 CATH, EP, DIAG/ABL, 3D/VECT: HCPCS | Performed by: INTERNAL MEDICINE

## 2023-12-14 PROCEDURE — C1894 INTRO/SHEATH, NON-LASER: HCPCS | Performed by: INTERNAL MEDICINE

## 2023-12-14 PROCEDURE — 86850 RBC ANTIBODY SCREEN: CPT

## 2023-12-14 PROCEDURE — 3700000001 HC ADD 15 MINUTES (ANESTHESIA): Performed by: INTERNAL MEDICINE

## 2023-12-14 RX ORDER — SODIUM CHLORIDE 0.9 % (FLUSH) 0.9 %
5-40 SYRINGE (ML) INJECTION EVERY 12 HOURS SCHEDULED
Status: DISCONTINUED | OUTPATIENT
Start: 2023-12-14 | End: 2023-12-14 | Stop reason: HOSPADM

## 2023-12-14 RX ORDER — HYDRALAZINE HYDROCHLORIDE 20 MG/ML
10 INJECTION INTRAMUSCULAR; INTRAVENOUS PRN
Status: DISCONTINUED | OUTPATIENT
Start: 2023-12-14 | End: 2023-12-14 | Stop reason: HOSPADM

## 2023-12-14 RX ORDER — SODIUM CHLORIDE 0.9 % (FLUSH) 0.9 %
5-40 SYRINGE (ML) INJECTION PRN
Status: DISCONTINUED | OUTPATIENT
Start: 2023-12-14 | End: 2023-12-14 | Stop reason: HOSPADM

## 2023-12-14 RX ORDER — PANTOPRAZOLE SODIUM 40 MG/1
40 TABLET, DELAYED RELEASE ORAL
Qty: 60 TABLET | Refills: 0 | Status: SHIPPED | OUTPATIENT
Start: 2023-12-14

## 2023-12-14 RX ORDER — OMEPRAZOLE 20 MG/1
20 CAPSULE, DELAYED RELEASE ORAL DAILY
COMMUNITY

## 2023-12-14 RX ORDER — DEXAMETHASONE SODIUM PHOSPHATE 4 MG/ML
INJECTION, SOLUTION INTRA-ARTICULAR; INTRALESIONAL; INTRAMUSCULAR; INTRAVENOUS; SOFT TISSUE PRN
Status: DISCONTINUED | OUTPATIENT
Start: 2023-12-14 | End: 2023-12-14 | Stop reason: SDUPTHER

## 2023-12-14 RX ORDER — SODIUM CHLORIDE 9 MG/ML
INJECTION, SOLUTION INTRAVENOUS PRN
Status: DISCONTINUED | OUTPATIENT
Start: 2023-12-14 | End: 2023-12-14 | Stop reason: HOSPADM

## 2023-12-14 RX ORDER — ROCURONIUM BROMIDE 10 MG/ML
INJECTION, SOLUTION INTRAVENOUS PRN
Status: DISCONTINUED | OUTPATIENT
Start: 2023-12-14 | End: 2023-12-14 | Stop reason: SDUPTHER

## 2023-12-14 RX ORDER — ACETAMINOPHEN 325 MG/1
650 TABLET ORAL EVERY 4 HOURS PRN
Status: DISCONTINUED | OUTPATIENT
Start: 2023-12-14 | End: 2023-12-14 | Stop reason: HOSPADM

## 2023-12-14 RX ORDER — HEPARIN SODIUM 10000 [USP'U]/100ML
INJECTION, SOLUTION INTRAVENOUS CONTINUOUS PRN
Status: DISCONTINUED | OUTPATIENT
Start: 2023-12-14 | End: 2023-12-14 | Stop reason: SDUPTHER

## 2023-12-14 RX ORDER — SUCCINYLCHOLINE/SOD CL,ISO/PF 200MG/10ML
SYRINGE (ML) INTRAVENOUS PRN
Status: DISCONTINUED | OUTPATIENT
Start: 2023-12-14 | End: 2023-12-14 | Stop reason: SDUPTHER

## 2023-12-14 RX ORDER — FENTANYL CITRATE 50 UG/ML
INJECTION, SOLUTION INTRAMUSCULAR; INTRAVENOUS PRN
Status: DISCONTINUED | OUTPATIENT
Start: 2023-12-14 | End: 2023-12-14 | Stop reason: SDUPTHER

## 2023-12-14 RX ORDER — HEPARIN SODIUM 1000 [USP'U]/ML
INJECTION, SOLUTION INTRAVENOUS; SUBCUTANEOUS PRN
Status: DISCONTINUED | OUTPATIENT
Start: 2023-12-14 | End: 2023-12-14 | Stop reason: SDUPTHER

## 2023-12-14 RX ORDER — ONDANSETRON 2 MG/ML
INJECTION INTRAMUSCULAR; INTRAVENOUS PRN
Status: DISCONTINUED | OUTPATIENT
Start: 2023-12-14 | End: 2023-12-14 | Stop reason: SDUPTHER

## 2023-12-14 RX ORDER — PROTAMINE SULFATE 10 MG/ML
INJECTION, SOLUTION INTRAVENOUS PRN
Status: DISCONTINUED | OUTPATIENT
Start: 2023-12-14 | End: 2023-12-14 | Stop reason: SDUPTHER

## 2023-12-14 RX ORDER — OMEPRAZOLE 20 MG/1
20 CAPSULE, DELAYED RELEASE ORAL DAILY
Qty: 30 CAPSULE | Refills: 3 | Status: SHIPPED
Start: 2024-01-13

## 2023-12-14 RX ORDER — PHENYLEPHRINE HCL IN 0.9% NACL 0.4MG/10ML
SYRINGE (ML) INTRAVENOUS PRN
Status: DISCONTINUED | OUTPATIENT
Start: 2023-12-14 | End: 2023-12-14 | Stop reason: SDUPTHER

## 2023-12-14 RX ORDER — LIDOCAINE HYDROCHLORIDE 20 MG/ML
INJECTION, SOLUTION EPIDURAL; INFILTRATION; INTRACAUDAL; PERINEURAL PRN
Status: DISCONTINUED | OUTPATIENT
Start: 2023-12-14 | End: 2023-12-14 | Stop reason: SDUPTHER

## 2023-12-14 RX ADMIN — SODIUM CHLORIDE: 9 INJECTION, SOLUTION INTRAVENOUS at 13:07

## 2023-12-14 RX ADMIN — PHENYLEPHRINE HYDROCHLORIDE 50 MCG/MIN: 10 INJECTION INTRAVENOUS at 14:11

## 2023-12-14 RX ADMIN — HEPARIN SODIUM 1200 UNITS/HR: 10000 INJECTION, SOLUTION INTRAVENOUS at 14:32

## 2023-12-14 RX ADMIN — SODIUM CHLORIDE: 9 INJECTION, SOLUTION INTRAVENOUS at 14:47

## 2023-12-14 RX ADMIN — ROCURONIUM BROMIDE 10 MG: 10 SOLUTION INTRAVENOUS at 13:58

## 2023-12-14 RX ADMIN — ISOPROTERENOL HYDROCHLORIDE 10 MCG/MIN: 0.2 INJECTION, SOLUTION INTRAMUSCULAR; INTRAVENOUS at 15:12

## 2023-12-14 RX ADMIN — DEXAMETHASONE SODIUM PHOSPHATE 4 MG: 4 INJECTION, SOLUTION INTRAMUSCULAR; INTRAVENOUS at 13:58

## 2023-12-14 RX ADMIN — HEPARIN SODIUM 1000 UNITS: 1000 INJECTION, SOLUTION INTRAVENOUS; SUBCUTANEOUS at 15:10

## 2023-12-14 RX ADMIN — Medication 80 MCG: at 14:40

## 2023-12-14 RX ADMIN — PROPOFOL 150 MG: 10 INJECTION, EMULSION INTRAVENOUS at 13:58

## 2023-12-14 RX ADMIN — PROPOFOL 50 MG: 10 INJECTION, EMULSION INTRAVENOUS at 14:06

## 2023-12-14 RX ADMIN — Medication 140 MG: at 13:59

## 2023-12-14 RX ADMIN — HYDRALAZINE HYDROCHLORIDE 10 MG: 20 INJECTION, SOLUTION INTRAMUSCULAR; INTRAVENOUS at 17:15

## 2023-12-14 RX ADMIN — HEPARIN SODIUM 12000 UNITS: 1000 INJECTION, SOLUTION INTRAVENOUS; SUBCUTANEOUS at 14:32

## 2023-12-14 RX ADMIN — ONDANSETRON 4 MG: 2 INJECTION INTRAMUSCULAR; INTRAVENOUS at 15:21

## 2023-12-14 RX ADMIN — PROTAMINE SULFATE 50 MG: 10 INJECTION, SOLUTION INTRAVENOUS at 15:21

## 2023-12-14 RX ADMIN — FENTANYL CITRATE 100 MCG: 50 INJECTION, SOLUTION INTRAMUSCULAR; INTRAVENOUS at 13:58

## 2023-12-14 RX ADMIN — HEPARIN SODIUM 3000 UNITS: 1000 INJECTION, SOLUTION INTRAVENOUS; SUBCUTANEOUS at 14:52

## 2023-12-14 RX ADMIN — LIDOCAINE HYDROCHLORIDE 60 MG: 20 INJECTION, SOLUTION EPIDURAL; INFILTRATION; INTRACAUDAL; PERINEURAL at 13:58

## 2023-12-14 NOTE — ANESTHESIA PRE PROCEDURE
Cardiovascular:    (+) hypertension:, pacemaker: pacemaker, CHF:        Rhythm: irregular  Rate: normal                    Neuro/Psych:   (+) headaches: migraine headaches, psychiatric history:            GI/Hepatic/Renal:   (+) GERD:          Endo/Other:    (+) hypothyroidism::..                 Abdominal:             Vascular: negative vascular ROS. Other Findings:         Anesthesia Plan      general     ASA 3       Induction: intravenous. Anesthetic plan and risks discussed with patient. Plan discussed with CRNA.                 Alicia Miles MD   12/14/2023

## 2023-12-14 NOTE — PROCEDURES
RADIOFREQUENCY ATRIAL FIBRILLATION ABLATION  SUBSTRATE MODIFICATION ABLATION    Procedure Date: 12/14/23   Lab Physician: Vero Myrick MD    Indications:  69 yo woman with a history of SSS s/p dual chamber PPM (12/8/22), HTN, PAF on Amiodarone now referred for Afib ablation. SHEATH INSERTION  All sheaths were placed using the modified Seldinger technique with ultrasound guided assistance  Right Femoral Vein: 8.5Fr Agilis sheath  Left Femoral Vein: 8Fr and a 11F sheath    CATHETER INSERTION  Catheters were advanced to the following positions using fluoroscopic guidance:  Coronary Sinus: : Biosense Bidirectional Decapolar  LA: [email or web address] OctaRay Mapping catheter  Ablation: Biosense ST/SF D/F ablation catheter  ICE in RA/RV: [email or web address] Intracardiac Ultrasound    PROCEDURE NARRATIVE:  EPS and RFA were discussed with the patient in great detail. The risks of bleeding, infection, vascular injury, pulmonary embolus, cardiac perforation, heart block necessitating pacemaker insertion, stroke, MI and death were among those discussed. Alternatives were reviewed including the option of no therapy. All questions were answered. The patient agreed to proceed. Written informed consent was obtained from the patient after a full explanation of the risks and benefits of the procedure. The patient was brought to the lab in the fasting state. Continuous electrocardiographic and hemodynamic monitoring was initiated. The patient was prepped and draped in the usual sterile fashion. General anesthesia with intubation and mechanical conventional ventilation was used. Anesthesia staff performed the intubation and monitoring during the case. Esophageal temperature monitoring was performed with the CIRCA esophageal temperature probe throughout the case. Vascular Access  Vascular sites were accessed using the percutaneous modified Seldinger technique with ultrasound guidance (ultrasound evaluation of possible access sites.  Patency of the

## 2023-12-14 NOTE — PROGRESS NOTES
EP LAB to Recovery Room Report    Procedure: Afib Ablation    MD: Catie  Pre-procedure heart rhythm: SR  Verbal Report given to Recovery Nurse on patient being transferred to Recovery Room for routine post-op. Patient stable upon transfer to . Pt had general anesthesia Vitals, mental status, MAR, procedural summary discussed with recovery RN. A total of Heparin 45797 units IV bolus in addition to heparin drip given. Protamine 50mg given post ablation. Post-procedure heart rhythm: SR    Sheaths:  1523- Right femoral vein 8.5fr sheath removed, closed with perclose. 1525- Left femoral vein 8fr sheath removed, closed with perclose. 1527- Left femoral vein 11fr sheath removed, closed with perclose.

## 2023-12-14 NOTE — PROGRESS NOTES
Cardiac Cath Lab Procedure Area Arrival Note:    Ibrahima Corey arrived to Cardiac Cath Lab, Procedure Area. Patient identifiers verified with NAME and DATE OF BIRTH. Procedure verified with patient. Consent forms verified. Allergies verified. Patient informed of procedure and plan of care. Questions answered with review. Patient voiced understanding of procedure and plan of care. Patient on cardiac monitor, non-invasive blood pressure, SPO2 monitor. On RA. IV of NSS on pump at 25 ml/hr. Patient status stable. Patient is A&Ox 4. Patient reports no pain. Patient medicated during procedure with orders obtained and verified by Dr. Jesse Sommers. Refer to patients Cardiac Cath Lab PROCEDURE REPORT for vital signs, assessment, status, and response during procedure, printed at end of case. Printed report on chart or scanned into chart.

## 2023-12-14 NOTE — PROGRESS NOTES
Cardiac Cath Lab Recovery Arrival Note:      Seng Aleman arrived to Cardiac Cath Lab, Recovery Area. Staff introduced to patient. Patient identifiers verified with NAME and DATE OF BIRTH. Procedure verified with patient. Consent forms reviewed and signed by patient or authorized representative and verified. Allergies verified. Patient and family oriented to department. Patient and family informed of procedure and plan of care. Questions answered with review. Patient prepped for procedure, per orders from physician, prior to arrival.    Patient on cardiac monitor, non-invasive blood pressure, SPO2 monitor. On RA. Patient is A&Ox 4. Patient reports no discomfort. Patient in stretcher, in low position, with side rails up, call bell within reach, patient instructed to call if assistance as needed. Patient prep in: [facility], [facility].

## 2023-12-15 ENCOUNTER — TELEPHONE (OUTPATIENT)
Age: 78
End: 2023-12-15

## 2023-12-15 NOTE — TELEPHONE ENCOUNTER
Called patient, ID verified using two patient identifiers. Advised patient that she should plan on continuing to take the amiodarone until her follow up appointment with Dr. Isabel Chan. Patient verbalized understanding and will call back with any other questions or concerns.     Future Appointments   Date Time Provider [address]   2/7/2024  1:00 PM PACEMAKER, STFRANCES CARROLL BS AMB   2/7/2024  1:20 PM Vero Haddad MD [facility] BS AMB   2/13/2024  2:00 PM Ruma Melendez MD [facility] BS AMB   8/26/2024  2:00 PM MD CARROLL Flores BS AMB   10/28/2024  2:40 PM MD CARROLL Flores BS AMB

## 2023-12-15 NOTE — PROGRESS NOTES
[address]  care of pt. Celia Man RN provided full bedside SBAR report consisting of:    2737  Pt arrived to recovery room post procedure. GENERAL ANESTHESIA:  RN at bedside for first 20 min obtaining q 5 min vitals with temps. 1605  First 20 min of recovery is complete. Pt placed in q 15 min vitals. GROIN ACCESS:  Bed Rest for 2 hours. Crys Escudero RN's care consists of:    4228  Pt Sat up after 1 hour    1646  Pt eating and tolerating PO diet well. 200  RN assisted pt to stand up; no bleeding and no hematoma at site(s)      1746  Pt walked to the restroom and voided successfully. Site(s) have no bleeding and no hematoma present    1756  RN removed pt IV. Pt getting dressed    1815  RN called pt ride home. Educated patient about their sedation precautions such as not driving, operating any machinery, or signing legal documents 24 hours post procedure. Reviewed discharge instructions, including medications and site care using the teach back method. Answered all questions. Verbalized understanding. Pt had an opportunity to ask questions. Pt is also aware of how to handle a site if it starts bleeding or develops a hematoma. 1830  Pt discharged to ride at main entrance of hospital via wheel chair by RN.   Pt discharged withDischarge Paperwork, Extra Band Aids, Clothing, Cell Phone, Home Medications, \"[employer], and [employer]

## 2023-12-15 NOTE — TELEPHONE ENCOUNTER
Pt. Had an ablation yesterday, asking if she needs to stop taking her Amiodarone. Pt.  Phone - [phone]

## 2023-12-29 RX ORDER — PANTOPRAZOLE SODIUM 40 MG/1
TABLET, DELAYED RELEASE ORAL
Qty: 60 TABLET | Refills: 0 | OUTPATIENT
Start: 2023-12-29

## 2024-02-07 ENCOUNTER — PROCEDURE VISIT (OUTPATIENT)
Age: 79
End: 2024-02-07
Payer: MEDICARE

## 2024-02-07 ENCOUNTER — OFFICE VISIT (OUTPATIENT)
Age: 79
End: 2024-02-07
Payer: MEDICARE

## 2024-02-07 VITALS
BODY MASS INDEX: 38.07 KG/M2 | HEART RATE: 62 BPM | DIASTOLIC BLOOD PRESSURE: 70 MMHG | RESPIRATION RATE: 18 BRPM | SYSTOLIC BLOOD PRESSURE: 144 MMHG | HEIGHT: 64 IN | OXYGEN SATURATION: 99 % | WEIGHT: 223 LBS

## 2024-02-07 DIAGNOSIS — I48.0 PAF (PAROXYSMAL ATRIAL FIBRILLATION) (HCC): Primary | ICD-10-CM

## 2024-02-07 DIAGNOSIS — Z95.0 CARDIAC PACEMAKER IN SITU: Primary | ICD-10-CM

## 2024-02-07 DIAGNOSIS — Z79.899 HIGH RISK MEDICATION USE: ICD-10-CM

## 2024-02-07 DIAGNOSIS — Z95.0 PACEMAKER: ICD-10-CM

## 2024-02-07 DIAGNOSIS — I10 HTN (HYPERTENSION), BENIGN: ICD-10-CM

## 2024-02-07 DIAGNOSIS — I50.9 HEART FAILURE, UNSPECIFIED HF CHRONICITY, UNSPECIFIED HEART FAILURE TYPE (HCC): ICD-10-CM

## 2024-02-07 DIAGNOSIS — E03.9 HYPOTHYROIDISM, UNSPECIFIED TYPE: ICD-10-CM

## 2024-02-07 DIAGNOSIS — Z79.01 CHRONIC ANTICOAGULATION: ICD-10-CM

## 2024-02-07 DIAGNOSIS — I49.5 SINUS NODE DYSFUNCTION (HCC): ICD-10-CM

## 2024-02-07 PROCEDURE — 93280 PM DEVICE PROGR EVAL DUAL: CPT | Performed by: INTERNAL MEDICINE

## 2024-02-07 PROCEDURE — 99214 OFFICE O/P EST MOD 30 MIN: CPT | Performed by: INTERNAL MEDICINE

## 2024-02-07 RX ORDER — LORATADINE 10 MG/1
10 TABLET ORAL DAILY
COMMUNITY

## 2024-02-07 RX ORDER — AMLODIPINE BESYLATE 5 MG/1
5 TABLET ORAL DAILY
Qty: 30 TABLET | Refills: 8 | Status: SHIPPED | OUTPATIENT
Start: 2024-02-07

## 2024-02-07 ASSESSMENT — PATIENT HEALTH QUESTIONNAIRE - PHQ9
SUM OF ALL RESPONSES TO PHQ QUESTIONS 1-9: 0
SUM OF ALL RESPONSES TO PHQ QUESTIONS 1-9: 0
SUM OF ALL RESPONSES TO PHQ9 QUESTIONS 1 & 2: 0
SUM OF ALL RESPONSES TO PHQ QUESTIONS 1-9: 0
SUM OF ALL RESPONSES TO PHQ QUESTIONS 1-9: 0
2. FEELING DOWN, DEPRESSED OR HOPELESS: 0
1. LITTLE INTEREST OR PLEASURE IN DOING THINGS: 0

## 2024-02-07 NOTE — PROGRESS NOTES
Room #: 3    Chief Complaint   Patient presents with    Follow-up     PM check today     Atrial Fibrillation    Congestive Heart Failure       Vitals:    02/07/24 1249   BP: (!) 144/70   Site: Right Upper Arm   Position: Sitting   Cuff Size: Large Adult   Pulse: 62   Resp: 18   SpO2: 99%   Weight: 101.2 kg (223 lb)   Height: 1.626 m (5' 4\")         Chest pain:  NO    Have you been to the ER, urgent care, or hospitalized outside of [facility] since your last visit?   NO    Refills:  NO

## 2024-02-07 NOTE — PROGRESS NOTES
Cardiac Electrophysiology OFFICE Follow Up Note             Assessment/Plan:   1. PAF (paroxysmal atrial fibrillation) (HCC)  -     EKG 12 Lead  2. Sinus node dysfunction (HCC)  3. HTN (hypertension), benign  4. Heart failure, unspecified HF chronicity, unspecified heart failure type (HCC)  5. Pacemaker  6. Hypothyroidism, unspecified type  7. High risk medication use  8. Chronic anticoagulation         Paroxysmal atrial fibrillation  History of very symptomatic atrial fibrillation.   - S/p PVI GP and Ligament of Jose D ablation 12/14/24 (Haddad)  - no recurrent Afib since the ablation, burden 0% since the ablation  -  I'm pleased to hear how well the patient has done post ablation. We spoke in great detail regarding the importance of multidisciplinary management of AFib and the role of risk factors modification in preventing recurrent AF including focusing on diet, weight loss, control of blood pressure, glycemic control, EDURADA diagnosis and treatment, and medication compliance.  - Stop amiodarone today  - Continue Bystolic  - Continue Xarelto  - CPAP compliance  - goal weight loss, 22 lbs  - goal BP<130/90 mmHg  - updated echocardiogram due to heart murmur heard on exam  - Follow up with EP NP in 3 months  - Follow up with me in 9 months     Pacemaker  Date of gen change 12/8/2022, Medtronic dual chamber pacemaker with normal function.   11.6 years on battery   99.8% A-paced  7.4% V-paced  No AF since ablation   - Continue Q3 month remote device transmissions.     High risk med management  - We discussed in great detail regarding high risk medication management and the associated risks of antiarrhythmic therapy.  Patient reports full understanding of recommendations.   -Risks associated with Amiodarone including but not limited to risks of thyroid toxicity, liver toxicity, and lung injury was explained to the patient including the need for long term monitoring with labs and imaging.  - Stopping amiodarone

## 2024-02-07 NOTE — PATIENT INSTRUCTIONS
Stop Amiodarone today  Increase Amlodipine 5 mg daily for BP management  Schedule echocardiogram  FU with NP in 3 months  FU with Dr. Haddad in 9 months    [facility]  Compliant with the CPAP  Goal weight loss of 10%, 22 lbs    Goal blood pressure less than 130/90 mmHg

## 2024-02-13 ENCOUNTER — OFFICE VISIT (OUTPATIENT)
Age: 79
End: 2024-02-13
Payer: MEDICARE

## 2024-02-13 VITALS
OXYGEN SATURATION: 97 % | DIASTOLIC BLOOD PRESSURE: 78 MMHG | BODY MASS INDEX: 37.8 KG/M2 | HEIGHT: 64 IN | HEART RATE: 66 BPM | WEIGHT: 221.4 LBS | SYSTOLIC BLOOD PRESSURE: 147 MMHG

## 2024-02-13 DIAGNOSIS — G47.33 OSA (OBSTRUCTIVE SLEEP APNEA): Primary | ICD-10-CM

## 2024-02-13 DIAGNOSIS — E66.01 SEVERE OBESITY (BMI 35.0-39.9) WITH COMORBIDITY (HCC): ICD-10-CM

## 2024-02-13 DIAGNOSIS — I48.0 PAF (PAROXYSMAL ATRIAL FIBRILLATION) (HCC): ICD-10-CM

## 2024-02-13 PROCEDURE — 1036F TOBACCO NON-USER: CPT | Performed by: SPECIALIST

## 2024-02-13 PROCEDURE — G8417 CALC BMI ABV UP PARAM F/U: HCPCS | Performed by: SPECIALIST

## 2024-02-13 PROCEDURE — 3078F DIAST BP <80 MM HG: CPT | Performed by: SPECIALIST

## 2024-02-13 PROCEDURE — 1123F ACP DISCUSS/DSCN MKR DOCD: CPT | Performed by: SPECIALIST

## 2024-02-13 PROCEDURE — 3077F SYST BP >= 140 MM HG: CPT | Performed by: SPECIALIST

## 2024-02-13 PROCEDURE — G8427 DOCREV CUR MEDS BY ELIG CLIN: HCPCS | Performed by: SPECIALIST

## 2024-02-13 PROCEDURE — 99213 OFFICE O/P EST LOW 20 MIN: CPT | Performed by: SPECIALIST

## 2024-02-13 PROCEDURE — G8400 PT W/DXA NO RESULTS DOC: HCPCS | Performed by: SPECIALIST

## 2024-02-13 PROCEDURE — 1090F PRES/ABSN URINE INCON ASSESS: CPT | Performed by: SPECIALIST

## 2024-02-13 PROCEDURE — G8484 FLU IMMUNIZE NO ADMIN: HCPCS | Performed by: SPECIALIST

## 2024-02-13 NOTE — PROGRESS NOTES
[facility]  [facility] - [place]  [address] [address] [address]  Dept: [phone]              [address] [place]  [place] [address]  Tel.  [phone]  Fax. [phone] [address] [place]  [place] [address]  Tel.  [phone]  Fax. [phone] [address] [place] [address]  Tel.  [phone]  Fax. [phone]         Chief Complaint       Chief Complaint   Patient presents with    Sleep Problem     Yearly follow up         HPI        Mavis Teejda is a 78 y.o. female seen for follow-up.  She was evaluated with a sleep study which demonstrated 72 respiratory events  of which 52 hypopnea and 20 obstructive apnea.  The  overall AHI was 16/h.  Events more prominent in REM sleep with the REM-related AHI of  60 per hour.  Minimal SaO2 79%.  Mild- moderate snoring noted.       Recommendation: APAP 5-15 cm.  Currently 7-15 cm.  Device set up date: 10/15/2020  CPAP mask: AirTouch F20 (S).    Compliance data downloaded and reviewed in detail with the patient today. During the past 90 days, APAP used during 68 days with the average daily use of 5.65 hours. CMS compliance criteria 54%. AHI 3.3 per hour.  AHI does demonstrate degree of variability.  Current maximum pressure: 13.8 cm.    Currently takes tramadol, at least 1/day, since September.    History of atrial fibrillation; recent ablation.    No Known Allergies    No current facility-administered medications for this visit.     She  has a past medical history of (HFpEF) heart failure with preserved ejection fraction (HCC), Anxiety and depression, Atrial fibrillation ([facility]), Chronic anticoagulation, GERD (gastroesophageal reflux disease), Hypertension, Hypothyroid, Migraines, Pacemaker, and SSS (sick sinus syndrome) ([facility]).    She  has a past surgical history that includes Hysterectomy; pacemaker; Cholecystectomy; Total abdominal hysterectomy w/ bilateral

## 2024-02-15 ENCOUNTER — CLINICAL DOCUMENTATION (OUTPATIENT)
Age: 79
End: 2024-02-15

## 2024-03-05 ENCOUNTER — ANCILLARY PROCEDURE (OUTPATIENT)
Age: 79
End: 2024-03-05
Payer: MEDICARE

## 2024-03-05 VITALS
WEIGHT: 221 LBS | DIASTOLIC BLOOD PRESSURE: 78 MMHG | BODY MASS INDEX: 37.73 KG/M2 | HEART RATE: 78 BPM | SYSTOLIC BLOOD PRESSURE: 138 MMHG | HEIGHT: 64 IN

## 2024-03-05 DIAGNOSIS — I50.9 HEART FAILURE, UNSPECIFIED HF CHRONICITY, UNSPECIFIED HEART FAILURE TYPE (HCC): ICD-10-CM

## 2024-03-05 DIAGNOSIS — Z79.899 HIGH RISK MEDICATION USE: ICD-10-CM

## 2024-03-05 DIAGNOSIS — I48.0 PAF (PAROXYSMAL ATRIAL FIBRILLATION) (HCC): ICD-10-CM

## 2024-03-05 DIAGNOSIS — I10 HTN (HYPERTENSION), BENIGN: ICD-10-CM

## 2024-03-05 DIAGNOSIS — I49.5 SINUS NODE DYSFUNCTION (HCC): ICD-10-CM

## 2024-03-05 DIAGNOSIS — Z79.01 CHRONIC ANTICOAGULATION: ICD-10-CM

## 2024-03-05 DIAGNOSIS — Z95.0 PACEMAKER: ICD-10-CM

## 2024-03-05 DIAGNOSIS — E03.9 HYPOTHYROIDISM, UNSPECIFIED TYPE: ICD-10-CM

## 2024-03-05 LAB
ECHO AO ASC DIAM: 3.2 CM
ECHO AO ASCENDING AORTA INDEX: 1.57 CM/M2
ECHO AO ROOT DIAM: 3 CM
ECHO AO ROOT INDEX: 1.47 CM/M2
ECHO AV AREA PEAK VELOCITY: 2.7 CM2
ECHO AV AREA VTI: 2.4 CM2
ECHO AV AREA/BSA PEAK VELOCITY: 1.3 CM2/M2
ECHO AV AREA/BSA VTI: 1.2 CM2/M2
ECHO AV MEAN GRADIENT: 4 MMHG
ECHO AV MEAN VELOCITY: 1 M/S
ECHO AV PEAK GRADIENT: 8 MMHG
ECHO AV PEAK VELOCITY: 1.4 M/S
ECHO AV VELOCITY RATIO: 0.86
ECHO AV VTI: 34.3 CM
ECHO BSA: 2.13 M2
ECHO EST RA PRESSURE: 8 MMHG
ECHO LA DIAMETER INDEX: 2.16 CM/M2
ECHO LA DIAMETER: 4.4 CM
ECHO LA TO AORTIC ROOT RATIO: 1.47
ECHO LA VOL A-L A2C: 82 ML (ref 22–52)
ECHO LA VOL A-L A4C: 56 ML (ref 22–52)
ECHO LA VOL MOD A2C: 80 ML (ref 22–52)
ECHO LA VOL MOD A4C: 53 ML (ref 22–52)
ECHO LA VOLUME AREA LENGTH: 75 ML
ECHO LA VOLUME INDEX A-L A2C: 40 ML/M2 (ref 16–34)
ECHO LA VOLUME INDEX A-L A4C: 27 ML/M2 (ref 16–34)
ECHO LA VOLUME INDEX AREA LENGTH: 37 ML/M2 (ref 16–34)
ECHO LA VOLUME INDEX MOD A2C: 39 ML/M2 (ref 16–34)
ECHO LA VOLUME INDEX MOD A4C: 26 ML/M2 (ref 16–34)
ECHO LV E' LATERAL VELOCITY: 8 CM/S
ECHO LV E' SEPTAL VELOCITY: 6 CM/S
ECHO LV EDV A2C: 123 ML
ECHO LV EDV A4C: 96 ML
ECHO LV EDV BP: 109 ML (ref 56–104)
ECHO LV EDV INDEX A4C: 47 ML/M2
ECHO LV EDV INDEX BP: 53 ML/M2
ECHO LV EDV NDEX A2C: 60 ML/M2
ECHO LV EJECTION FRACTION A2C: 65 %
ECHO LV EJECTION FRACTION A4C: 59 %
ECHO LV EJECTION FRACTION BIPLANE: 62 % (ref 55–100)
ECHO LV ESV A2C: 44 ML
ECHO LV ESV A4C: 40 ML
ECHO LV ESV BP: 42 ML (ref 19–49)
ECHO LV ESV INDEX A2C: 22 ML/M2
ECHO LV ESV INDEX A4C: 20 ML/M2
ECHO LV ESV INDEX BP: 21 ML/M2
ECHO LV FRACTIONAL SHORTENING: 31 % (ref 28–44)
ECHO LV INTERNAL DIMENSION DIASTOLE INDEX: 1.91 CM/M2
ECHO LV INTERNAL DIMENSION DIASTOLIC: 3.9 CM (ref 3.9–5.3)
ECHO LV INTERNAL DIMENSION SYSTOLIC INDEX: 1.32 CM/M2
ECHO LV INTERNAL DIMENSION SYSTOLIC: 2.7 CM
ECHO LV IVSD: 0.9 CM (ref 0.6–0.9)
ECHO LV MASS 2D: 97.4 G (ref 67–162)
ECHO LV MASS INDEX 2D: 47.7 G/M2 (ref 43–95)
ECHO LV POSTERIOR WALL DIASTOLIC: 0.8 CM (ref 0.6–0.9)
ECHO LV RELATIVE WALL THICKNESS RATIO: 0.41
ECHO LVOT AREA: 3.1 CM2
ECHO LVOT AV VTI INDEX: 0.76
ECHO LVOT DIAM: 2 CM
ECHO LVOT MEAN GRADIENT: 3 MMHG
ECHO LVOT PEAK GRADIENT: 6 MMHG
ECHO LVOT PEAK VELOCITY: 1.2 M/S
ECHO LVOT STROKE VOLUME INDEX: 40 ML/M2
ECHO LVOT SV: 81.6 ML
ECHO LVOT VTI: 26 CM
ECHO MV A VELOCITY: 0.9 M/S
ECHO MV AREA PHT: 4.8 CM2
ECHO MV AREA VTI: 2.7 CM2
ECHO MV E DECELERATION TIME (DT): 157 MS
ECHO MV E VELOCITY: 0.92 M/S
ECHO MV E/A RATIO: 1.02
ECHO MV E/E' LATERAL: 11.5
ECHO MV E/E' RATIO (AVERAGED): 13.42
ECHO MV LVOT VTI INDEX: 1.15
ECHO MV MAX VELOCITY: 1 M/S
ECHO MV MEAN GRADIENT: 2 MMHG
ECHO MV MEAN VELOCITY: 0.6 M/S
ECHO MV PEAK GRADIENT: 4 MMHG
ECHO MV PRESSURE HALF TIME (PHT): 45.5 MS
ECHO MV VTI: 29.9 CM
ECHO RA AREA 4C: 26 CM2
ECHO RA END SYSTOLIC VOLUME APICAL 4 CHAMBER INDEX BSA: 42 ML/M2
ECHO RA VOLUME: 85 ML
ECHO RIGHT VENTRICULAR SYSTOLIC PRESSURE (RVSP): 51 MMHG
ECHO RV INTERNAL DIMENSION: 4 CM
ECHO RV TAPSE: 2.5 CM (ref 1.7–?)
ECHO TV REGURGITANT MAX VELOCITY: 3.27 M/S
ECHO TV REGURGITANT PEAK GRADIENT: 43 MMHG

## 2024-03-05 PROCEDURE — 93306 TTE W/DOPPLER COMPLETE: CPT | Performed by: SPECIALIST

## 2024-03-18 RX ORDER — RIVAROXABAN 20 MG/1
20 TABLET, FILM COATED ORAL DAILY
Qty: 90 TABLET | Refills: 3 | Status: SHIPPED | OUTPATIENT
Start: 2024-03-18

## 2024-03-18 NOTE — TELEPHONE ENCOUNTER
Requested Prescriptions     Signed Prescriptions Disp Refills    rivaroxaban (XARELTO) 20 MG TABS tablet 90 tablet 3     Sig: TAKE 1 TABLET BY MOUTH DAILY WITH BREAKFAST     Authorizing Provider: DORITA HADDAD     Ordering User: ELMO ESPAÑA     Refills per verbal order from Dr. Haddad.  Last OV: 2/7/24  Next OV: 5/9/24

## 2024-03-29 ENCOUNTER — TRANSCRIBE ORDERS (OUTPATIENT)
Facility: HOSPITAL | Age: 79
End: 2024-03-29

## 2024-03-29 DIAGNOSIS — M54.17 LUMBOSACRAL NEURITIS: Primary | ICD-10-CM

## 2024-04-25 ENCOUNTER — HOSPITAL ENCOUNTER (OUTPATIENT)
Facility: HOSPITAL | Age: 79
Discharge: HOME | End: 2024-04-25
Payer: MEDICARE

## 2024-04-25 DIAGNOSIS — M54.17 LUMBOSACRAL NEURITIS: ICD-10-CM

## 2024-04-25 PROCEDURE — 72148 MRI LUMBAR SPINE W/O DYE: CPT

## 2024-05-02 RX ORDER — POTASSIUM CHLORIDE 20 MEQ/1
20 TABLET, EXTENDED RELEASE ORAL NIGHTLY
Qty: 90 TABLET | Refills: 3 | Status: SHIPPED | OUTPATIENT
Start: 2024-05-02

## 2024-05-02 NOTE — TELEPHONE ENCOUNTER
Requested Prescriptions     Signed Prescriptions Disp Refills    potassium chloride (KLOR-CON M) 20 MEQ extended release tablet 90 tablet 3     Sig: TAKE 1 TABLET BY MOUTH EVERY NIGHT     Authorizing Provider: VERO HADDAD     Ordering User: BRITTANY SHIPMAN     Refill per verbal order Dr. Vero Haddad.   Last visit:2/7/24  Next visit: 5/9/24

## 2024-05-03 ENCOUNTER — TELEPHONE (OUTPATIENT)
Age: 79
End: 2024-05-03

## 2024-05-03 NOTE — TELEPHONE ENCOUNTER
Patient states she is experiencing blood shot red eyes for last 2 weeks in her R eye and it went away and yesterday the L eye started doing the same thing. She stated she thinks it may be her medication (Xarelto) causing this. She also wants to know if  can send in a prescription for her (Potassium).    Phone [phone]

## 2024-05-06 NOTE — TELEPHONE ENCOUNTER
Returned patient call, ID verified using two patient identifiers.  Ms. Tejeda states she has been having some blood shot eyes over the past few weeks and she is wondering if it is related to her xarelto.    She noticed her right eye was blood shot a few weeks ago and then it cleared up.  It came back a week later then cleared again.  She then said her left eye got blood shot.  Both eyes are now clear.    Asked if she had any itchiness in her eyes and she states that she has had some itchy eyes.  Advised her that the redness is most likely due to allergies and not the xarelto.  Suggested she use OTC allergy eye drops and call back if her symptoms return or worsen.    Patient appreciative of the call back and will with any other questions or concerns.    Future Appointments   Date Time Provider Department Center   5/9/2024  1:00 PM Leticia Bui APRN - NP [facility] AM[facility]   5/14/2024  2:00 PM Garcia Woods MD [facility] AMB   10/28/2024  2:40 PM Gilda Sorenson MD [facility] AMB   11/15/2024 10:20 AM Vero Haddad MD [facility] AM[facility]   2/14/2025  1:20 PM Erum Momin APRN - NP [facility] AM[facility]   2/14/2025  1:40 PM RICHAR KRISHNA [facility] AM[facility]

## 2024-05-09 ENCOUNTER — OFFICE VISIT (OUTPATIENT)
Age: 79
End: 2024-05-09
Payer: MEDICARE

## 2024-05-09 VITALS
HEIGHT: 64 IN | RESPIRATION RATE: 20 BRPM | BODY MASS INDEX: 36.64 KG/M2 | WEIGHT: 214.6 LBS | HEART RATE: 64 BPM | SYSTOLIC BLOOD PRESSURE: 142 MMHG | OXYGEN SATURATION: 97 % | DIASTOLIC BLOOD PRESSURE: 78 MMHG

## 2024-05-09 DIAGNOSIS — Z95.0 PACEMAKER: ICD-10-CM

## 2024-05-09 DIAGNOSIS — Z86.79 S/P ABLATION OF ATRIAL FIBRILLATION: ICD-10-CM

## 2024-05-09 DIAGNOSIS — Z79.01 ANTICOAGULATED: ICD-10-CM

## 2024-05-09 DIAGNOSIS — Z98.890 S/P ABLATION OF ATRIAL FIBRILLATION: ICD-10-CM

## 2024-05-09 DIAGNOSIS — I10 PRIMARY HYPERTENSION: ICD-10-CM

## 2024-05-09 DIAGNOSIS — I48.0 PAF (PAROXYSMAL ATRIAL FIBRILLATION) (HCC): Primary | ICD-10-CM

## 2024-05-09 DIAGNOSIS — I49.5 SSS (SICK SINUS SYNDROME) (HCC): ICD-10-CM

## 2024-05-09 PROCEDURE — 3077F SYST BP >= 140 MM HG: CPT | Performed by: NURSE PRACTITIONER

## 2024-05-09 PROCEDURE — G8417 CALC BMI ABV UP PARAM F/U: HCPCS | Performed by: NURSE PRACTITIONER

## 2024-05-09 PROCEDURE — G8400 PT W/DXA NO RESULTS DOC: HCPCS | Performed by: NURSE PRACTITIONER

## 2024-05-09 PROCEDURE — 1090F PRES/ABSN URINE INCON ASSESS: CPT | Performed by: NURSE PRACTITIONER

## 2024-05-09 PROCEDURE — 99214 OFFICE O/P EST MOD 30 MIN: CPT | Performed by: NURSE PRACTITIONER

## 2024-05-09 PROCEDURE — 1036F TOBACCO NON-USER: CPT | Performed by: NURSE PRACTITIONER

## 2024-05-09 PROCEDURE — G8427 DOCREV CUR MEDS BY ELIG CLIN: HCPCS | Performed by: NURSE PRACTITIONER

## 2024-05-09 PROCEDURE — 1123F ACP DISCUSS/DSCN MKR DOCD: CPT | Performed by: NURSE PRACTITIONER

## 2024-05-09 PROCEDURE — 3078F DIAST BP <80 MM HG: CPT | Performed by: NURSE PRACTITIONER

## 2024-05-09 RX ORDER — BUMETANIDE 2 MG/1
2 TABLET ORAL DAILY
Qty: 90 TABLET | Refills: 3 | Status: SHIPPED | OUTPATIENT
Start: 2024-05-09

## 2024-05-09 ASSESSMENT — PATIENT HEALTH QUESTIONNAIRE - PHQ9
SUM OF ALL RESPONSES TO PHQ QUESTIONS 1-9: 0
SUM OF ALL RESPONSES TO PHQ9 QUESTIONS 1 & 2: 0
2. FEELING DOWN, DEPRESSED OR HOPELESS: NOT AT ALL
1. LITTLE INTEREST OR PLEASURE IN DOING THINGS: NOT AT ALL

## 2024-05-09 NOTE — PROGRESS NOTES
Room #: 2    Chief Complaint   Patient presents with    Follow-up     3 mo    Atrial Fibrillation       Vitals:    05/09/24 1256 05/09/24 1316   BP: (!) 160/80 (!) 152/76   Site: Right Upper Arm Left Upper Arm   Position: Sitting Sitting   Cuff Size: Large Adult Large Adult   Pulse: 64    Resp: 20    SpO2: 97%    Weight: 97.3 kg (214 lb 9.6 oz)    Height: 1.626 m (5' 4\")          Chest pain:  NO    Have you been to the ER, urgent care, or hospitalized outside of [facility] since your last visit?   NO    Refills:  Bumex  
                                                                                                                                                                                                                                                                                                                                 -Device check on 05/08/2024 showed proper lead & generator function other than chronically elevated RV lead threshold (stable).  Generator longevity 11.4 years.  RA 99.6%, RV 7%.  AT/AF <0.1%.  NSVT x 2 episodes, both <0.1 seconds; max v rate 176 bpm.  -Remote checks q 3 months.    HTN:  -BP controlled.  -Continue amlodipine & Bystolic as previously prescribed.  -Recommend low sodium diet.    Anticoagulation:  -Continue Xarelto for thromboembolic prophylaxis.  -Denies bleeding issues.  -Knows to contact clinic for BRBPR, melena, hematuria, or hemoptysis.    EDUARDA:  -Continue use of CPAP.  -Significant role of uncontrolled EDUARDA in the pathophysiology of AF.       Follow up in EP clinic as noted below.  Follow up with Dr. Sorenson as previously scheduled.    Future Appointments   Date Time Provider Department Center   8/13/2024 11:00 AM Garcia Woods MD [facility]   10/28/2024  2:40 PM [facility]   11/15/2024 10:20 AM Vero Haddad MD [facility]   2/14/2025  1:20 PM [facility]   2/14/2025  1:40 [facility]         ____________________________________________________________    Cardiac testing    03/05/24    ECHO (TTE) COMPLETE (PRN CONTRAST/BUBBLE/STRAIN/3D) 03/05/2024  2:50 PM (Final)    Interpretation Summary    Left Ventricle: Normal left ventricular systolic function. EF by 2D Simpsons Biplane is 62%. Left ventricle size is normal. LVIDd is 3.9 cm. Normal wall thickness. IVSd is 0.9 cm. LVPWd is 0.8 cm. Normal wall motion. Abnormal diastolic function.    Left Atrium: Left atrium is mildly dilated. LA Vol Index A/L is 37 mL/m2.

## 2024-05-13 PROCEDURE — 93294 REM INTERROG EVL PM/LDLS PM: CPT | Performed by: INTERNAL MEDICINE

## 2024-06-28 RX ORDER — NEBIVOLOL 20 MG/1
20 TABLET ORAL DAILY
Qty: 90 TABLET | Refills: 1 | Status: SHIPPED | OUTPATIENT
Start: 2024-06-28

## 2024-06-28 RX ORDER — AMLODIPINE BESYLATE 5 MG/1
5 TABLET ORAL DAILY
Qty: 90 TABLET | Refills: 1 | Status: SHIPPED | OUTPATIENT
Start: 2024-06-28

## 2024-06-28 RX ORDER — POTASSIUM CHLORIDE 20 MEQ/1
20 TABLET, EXTENDED RELEASE ORAL DAILY
Qty: 90 TABLET | Refills: 1 | Status: SHIPPED | OUTPATIENT
Start: 2024-06-28

## 2024-06-28 NOTE — TELEPHONE ENCOUNTER
Requested Prescriptions     Signed Prescriptions Disp Refills    amLODIPine (NORVASC) 5 MG tablet 90 tablet 1     Sig: Take 1 tablet by mouth daily     Authorizing Provider: VERO HADDAD H     Ordering User: BRITTANY SHIPMAN    potassium chloride (KLOR-CON M20) 20 MEQ extended release tablet 90 tablet 1     Sig: Take 1 tablet by mouth daily     Authorizing Provider: VERO HADDAD H     Ordering User: BRITTANY SHIPMAN    nebivolol (BYSTOLIC) 20 MG TABS tablet 90 tablet 1     Sig: Take 1 tablet by mouth daily     Authorizing Provider: EVRO HADDAD     Ordering User: BRITTANY SHIPMAN    rivaroxaban (XARELTO) 20 MG TABS tablet 90 tablet 1     Sig: Take 1 tablet by mouth daily (with breakfast)     Authorizing Provider: VERO HADDAD     Ordering User: BRITTANY SHIPMAN     Refill per verbal order Dr. Vero Haddad.   Last visit:5/9/24  Next visit: 11/15/24

## 2024-07-02 RX ORDER — BUMETANIDE 2 MG/1
2 TABLET ORAL DAILY
Qty: 90 TABLET | Refills: 1 | Status: SHIPPED | OUTPATIENT
Start: 2024-07-02

## 2024-07-02 NOTE — TELEPHONE ENCOUNTER
Refill per VO of Dr. Sorenson  Last appt: 10/23/2023    Future Appointments   Date Time Provider Department Center   8/13/2024 11:00 AM Garcia Woods MD [facility] AMB   10/28/2024  2:40 PM Carissa Sorenson MD [facility] AMB   11/15/2024 10:20 AM eVro Haddad MD [facility] AMB   2/14/2025  1:20 PM Erum Momin APRN - NP [facility] AMB   2/14/2025  1:40 PM PACEMAKER, STFRVIOLETA [facility] AMB       Requested Prescriptions     Signed Prescriptions Disp Refills    bumetanide (BUMEX) 2 MG tablet 90 tablet 1     Sig: Take 1 tablet by mouth daily     Authorizing Provider: CARISSA SORENSON     Ordering User: SYD PACHECO

## 2024-07-18 ENCOUNTER — HOSPITAL ENCOUNTER (EMERGENCY)
Facility: HOSPITAL | Age: 79
Discharge: HOME | End: 2024-07-18
Attending: EMERGENCY MEDICINE
Payer: MEDICARE

## 2024-07-18 VITALS
OXYGEN SATURATION: 98 % | DIASTOLIC BLOOD PRESSURE: 74 MMHG | SYSTOLIC BLOOD PRESSURE: 157 MMHG | HEIGHT: 64 IN | WEIGHT: 210 LBS | RESPIRATION RATE: 18 BRPM | BODY MASS INDEX: 35.85 KG/M2 | TEMPERATURE: 98.2 F | HEART RATE: 78 BPM

## 2024-07-18 DIAGNOSIS — M54.31 SCIATICA OF RIGHT SIDE: Primary | ICD-10-CM

## 2024-07-18 PROCEDURE — 96372 THER/PROPH/DIAG INJ SC/IM: CPT

## 2024-07-18 PROCEDURE — 6360000002 HC RX W HCPCS: Performed by: EMERGENCY MEDICINE

## 2024-07-18 PROCEDURE — 99284 EMERGENCY DEPT VISIT MOD MDM: CPT

## 2024-07-18 RX ORDER — DEXAMETHASONE SODIUM PHOSPHATE 10 MG/ML
10 INJECTION, SOLUTION INTRAMUSCULAR; INTRAVENOUS ONCE
Status: COMPLETED | OUTPATIENT
Start: 2024-07-18 | End: 2024-07-18

## 2024-07-18 RX ORDER — HYDROCODONE BITARTRATE AND ACETAMINOPHEN 10; 325 MG/1; MG/1
1 TABLET ORAL EVERY 6 HOURS PRN
Qty: 20 TABLET | Refills: 0 | Status: SHIPPED | OUTPATIENT
Start: 2024-07-18 | End: 2024-07-23

## 2024-07-18 RX ORDER — KETOROLAC TROMETHAMINE 30 MG/ML
30 INJECTION, SOLUTION INTRAMUSCULAR; INTRAVENOUS ONCE
Status: COMPLETED | OUTPATIENT
Start: 2024-07-18 | End: 2024-07-18

## 2024-07-18 RX ADMIN — DEXAMETHASONE SODIUM PHOSPHATE 10 MG: 10 INJECTION, SOLUTION INTRAMUSCULAR; INTRAVENOUS at 17:31

## 2024-07-18 RX ADMIN — KETOROLAC TROMETHAMINE 30 MG: 30 INJECTION, SOLUTION INTRAMUSCULAR at 17:32

## 2024-07-18 RX ADMIN — HYDROMORPHONE HYDROCHLORIDE 1 MG: 1 INJECTION, SOLUTION INTRAMUSCULAR; INTRAVENOUS; SUBCUTANEOUS at 17:28

## 2024-07-18 ASSESSMENT — PAIN SCALES - GENERAL
PAINLEVEL_OUTOF10: 1
PAINLEVEL_OUTOF10: 10
PAINLEVEL_OUTOF10: 1
PAINLEVEL_OUTOF10: 10

## 2024-07-18 ASSESSMENT — PAIN DESCRIPTION - DESCRIPTORS
DESCRIPTORS: STABBING;SHOOTING
DESCRIPTORS: ACHING

## 2024-07-18 ASSESSMENT — PAIN DESCRIPTION - LOCATION
LOCATION: BACK
LOCATION: HIP;LEG
LOCATION: LEG;HIP
LOCATION: BACK;HIP;LEG;FOOT

## 2024-07-18 ASSESSMENT — PAIN - FUNCTIONAL ASSESSMENT
PAIN_FUNCTIONAL_ASSESSMENT: PREVENTS OR INTERFERES SOME ACTIVE ACTIVITIES AND ADLS
PAIN_FUNCTIONAL_ASSESSMENT: PREVENTS OR INTERFERES WITH MANY ACTIVE NOT PASSIVE ACTIVITIES
PAIN_FUNCTIONAL_ASSESSMENT: 0-10
PAIN_FUNCTIONAL_ASSESSMENT: PREVENTS OR INTERFERES WITH MANY ACTIVE NOT PASSIVE ACTIVITIES
PAIN_FUNCTIONAL_ASSESSMENT: 0-10

## 2024-07-18 ASSESSMENT — ENCOUNTER SYMPTOMS
EYE PAIN: 0
BACK PAIN: 1
SHORTNESS OF BREATH: 0
ABDOMINAL PAIN: 0

## 2024-07-18 ASSESSMENT — PAIN DESCRIPTION - PAIN TYPE
TYPE: ACUTE PAIN
TYPE: ACUTE PAIN;CHRONIC PAIN
TYPE: CHRONIC PAIN

## 2024-07-18 ASSESSMENT — PAIN DESCRIPTION - ORIENTATION
ORIENTATION: LOWER
ORIENTATION: RIGHT
ORIENTATION: LOWER;RIGHT
ORIENTATION: RIGHT

## 2024-07-18 ASSESSMENT — PAIN DESCRIPTION - FREQUENCY: FREQUENCY: CONTINUOUS

## 2024-07-18 NOTE — ED TRIAGE NOTES
Pt wheeled to treatment area and assisted to stretcher by this RN. Pt to ED c/o persistent low back pain that radiates down right hip, leg, foot. Pt states [facility] and has had [facility] complete. Pt states she arrived home from a vacation in [place] today where the pain became significantly worse. Pt denies new known injury. Pt states she is in so much pain that she can no longer walk independently. Pt ambulatory with walker at baseline. Pt appears uncomfortable during triage assessment.

## 2024-07-18 NOTE — ED NOTES
Patient reports that she feels much better, patient able to independently transfer into private vehicle from wheelchair at time of discharge.

## 2024-07-18 NOTE — ED NOTES
Bedside and Verbal shift change report given to Xi ABEL (oncoming nurse) by Mariana ABEL (offgoing nurse). Report included the following information Nurse Handoff Report, ED Encounter Summary, ED SBAR, Intake/Output, MAR, Recent Results, Med Rec Status, and Neuro Assessment.

## 2024-07-18 NOTE — DISCHARGE INSTRUCTIONS
I suggest you follow-up with a pain management doctor for additional recommendations on managing her chronic back pain.

## 2024-07-26 ENCOUNTER — HOSPITAL ENCOUNTER (EMERGENCY)
Facility: HOSPITAL | Age: 79
Discharge: HOME | End: 2024-07-26
Attending: STUDENT IN AN ORGANIZED HEALTH CARE EDUCATION/TRAINING PROGRAM
Payer: MEDICARE

## 2024-07-26 ENCOUNTER — APPOINTMENT (OUTPATIENT)
Facility: HOSPITAL | Age: 79
End: 2024-07-26
Payer: MEDICARE

## 2024-07-26 VITALS
HEART RATE: 71 BPM | WEIGHT: 212.2 LBS | TEMPERATURE: 97.6 F | SYSTOLIC BLOOD PRESSURE: 178 MMHG | OXYGEN SATURATION: 98 % | HEIGHT: 64 IN | DIASTOLIC BLOOD PRESSURE: 77 MMHG | BODY MASS INDEX: 36.23 KG/M2 | RESPIRATION RATE: 18 BRPM

## 2024-07-26 DIAGNOSIS — M25.551 RIGHT HIP PAIN: Primary | ICD-10-CM

## 2024-07-26 DIAGNOSIS — M25.561 ACUTE PAIN OF RIGHT KNEE: ICD-10-CM

## 2024-07-26 DIAGNOSIS — M25.551 ACUTE PAIN OF RIGHT HIP: ICD-10-CM

## 2024-07-26 LAB
COMMENT:: NORMAL
SPECIMEN HOLD: NORMAL

## 2024-07-26 PROCEDURE — 73562 X-RAY EXAM OF KNEE 3: CPT

## 2024-07-26 PROCEDURE — 70450 CT HEAD/BRAIN W/O DYE: CPT

## 2024-07-26 PROCEDURE — 96374 THER/PROPH/DIAG INJ IV PUSH: CPT

## 2024-07-26 PROCEDURE — 99284 EMERGENCY DEPT VISIT MOD MDM: CPT

## 2024-07-26 PROCEDURE — 73502 X-RAY EXAM HIP UNI 2-3 VIEWS: CPT

## 2024-07-26 PROCEDURE — 6360000002 HC RX W HCPCS

## 2024-07-26 PROCEDURE — 72125 CT NECK SPINE W/O DYE: CPT

## 2024-07-26 RX ORDER — MORPHINE SULFATE 2 MG/ML
2 INJECTION, SOLUTION INTRAMUSCULAR; INTRAVENOUS
Status: COMPLETED | OUTPATIENT
Start: 2024-07-26 | End: 2024-07-26

## 2024-07-26 RX ADMIN — MORPHINE SULFATE 2 MG: 2 INJECTION, SOLUTION INTRAMUSCULAR; INTRAVENOUS at 16:29

## 2024-07-26 NOTE — ED NOTES
Pt was discharged by OK Biggs/Dr. Moreno  Pt verbalized good understanding of all discharge instructions, and follow up care.   All questions answered.  Pt in stable condition on discharge.

## 2024-07-26 NOTE — ED PROVIDER NOTES
[facility] EMERGENCY DEPT  EMERGENCY DEPARTMENT ENCOUNTER      Pt Name: Mavis Tejeda  MRN: [id number]  Birthdate 1945  Date of evaluation: 7/26/2024  Provider: Tray Biggs PA-C    CHIEF COMPLAINT       Chief Complaint   Patient presents with    Hip Pain         HISTORY OF PRESENT ILLNESS   (Location/Symptom, Timing/Onset, Context/Setting, Quality, Duration, Modifying Factors, Severity)  Note limiting factors.   HPI  79 year old female past medical history of sciatica, A fib with pacemaker, HTN, blood thinners, hypothyroid comes today after falling at her house this afternoon.  She was walking to the bathroom and her walker would not fit into the door so she decided to scoot her rolling chair into the bathroom.  When doing this she fell off the chair and landed on her right side.  She says that she hit the back of her head.  She currently takes blood thinners but had an epidural shot yesterday for back pain and has not been taking them for the past 4 days.  She is unsure if she took them this morning.  She says that her right hip is the most painful and she is also having some pain on the right knee and the back of her head.  Denies loss of consciousness or prodromal symptoms leading up to the fall. Denies visual changes, vomitting, changes in mental status following the fall. Denies chest pain, shortness of breath, abdominal pain. She is accompanied today by her .     Review of External Medical Records:     Nursing Notes were reviewed.    REVIEW OF SYSTEMS    (2-9 systems for level 4, 10 or more for level 5)     Review of Systems   Musculoskeletal:         Right hip pain  Right knee pain  Pain back of head       Except as noted above the remainder of the review of systems was reviewed and negative.       PAST MEDICAL HISTORY     Past Medical History:   Diagnosis Date    (HFpEF) heart failure with preserved ejection fraction (HCC)     Anxiety and depression     Atrial fibrillation (HCC)

## 2024-07-26 NOTE — ED TRIAGE NOTES
Pt presents to the ER today via EMS.  Pt chief complaint is that of right knee and hip pain.  From home.  Pt was sitting in an office chair moving through the house secondary to not walking well.  Chair got caught on something falling from the chair.  Pt hit right knee, hip and head.  Pt takes blood thinners but has not taken her blood thinner since Monday secondary to an epidural yesterday for back pain.

## 2024-08-06 ENCOUNTER — TRANSCRIBE ORDERS (OUTPATIENT)
Facility: HOSPITAL | Age: 79
End: 2024-08-06

## 2024-08-06 DIAGNOSIS — M16.11 PRIMARY OSTEOARTHRITIS OF RIGHT HIP: ICD-10-CM

## 2024-08-06 DIAGNOSIS — M25.551 RIGHT HIP PAIN: Primary | ICD-10-CM

## 2024-08-07 ENCOUNTER — HOSPITAL ENCOUNTER (OUTPATIENT)
Facility: HOSPITAL | Age: 79
Discharge: HOME | End: 2024-08-10
Attending: FAMILY MEDICINE
Payer: MEDICARE

## 2024-08-07 DIAGNOSIS — M16.11 PRIMARY OSTEOARTHRITIS OF RIGHT HIP: ICD-10-CM

## 2024-08-07 DIAGNOSIS — M25.551 RIGHT HIP PAIN: ICD-10-CM

## 2024-08-07 PROCEDURE — 72192 CT PELVIS W/O DYE: CPT

## 2024-08-10 ASSESSMENT — SLEEP AND FATIGUE QUESTIONNAIRES
ESS TOTAL SCORE: 8
HOW LIKELY ARE YOU TO NOD OFF OR FALL ASLEEP WHILE SITTING INACTIVE IN A PUBLIC PLACE: WOULD NEVER DOZE
DO YOU HAVE DIFFICULTY OPERATING A MOTOR VEHICLE FOR SHORT DISTANCES (LESS THAN 100 MILES) BECAUSE YOU BECOME SLEEPY: NO
HOW LIKELY ARE YOU TO NOD OFF OR FALL ASLEEP WHILE SITTING AND TALKING TO SOMEONE: WOULD NEVER DOZE
DO YOU HAVE DIFFICULTY CONCENTRATING ON THE THINGS YOU DO BECAUSE YOU ARE SLEEPY OR TIRED: YES, A LITTLE
HOW LIKELY ARE YOU TO NOD OFF OR FALL ASLEEP WHILE WATCHING TV: MODERATE CHANCE OF DOZING
HOW LIKELY ARE YOU TO NOD OFF OR FALL ASLEEP WHILE LYING DOWN TO REST IN THE AFTERNOON WHEN CIRCUMSTANCES PERMIT: MODERATE CHANCE OF DOZING
HOW LIKELY ARE YOU TO NOD OFF OR FALL ASLEEP WHEN YOU ARE A PASSENGER IN A CAR FOR AN HOUR WITHOUT A BREAK: SLIGHT CHANCE OF DOZING
HOW LIKELY ARE YOU TO NOD OFF OR FALL ASLEEP WHILE WATCHING TV: MODERATE CHANCE OF DOZING
HOW LIKELY ARE YOU TO NOD OFF OR FALL ASLEEP WHILE LYING DOWN TO REST IN THE AFTERNOON WHEN CIRCUMSTANCES PERMIT: MODERATE CHANCE OF DOZING
DO YOU HAVE DIFFICULTY BEING AS ACTIVE AS YOU WANT TO BE IN THE MORNING BECAUSE YOU ARE SLEEPY OR TIRED: NO
HAS YOUR MOOD BEEN AFFECTED BECAUSE YOU ARE SLEEPY OR TIRED: NO
HOW LIKELY ARE YOU TO NOD OFF OR FALL ASLEEP WHILE SITTING AND READING: MODERATE CHANCE OF DOZING
HOW LIKELY ARE YOU TO NOD OFF OR FALL ASLEEP WHILE SITTING QUIETLY AFTER LUNCH WITHOUT ALCOHOL: SLIGHT CHANCE OF DOZING
HOW LIKELY ARE YOU TO NOD OFF OR FALL ASLEEP WHILE SITTING QUIETLY AFTER LUNCH WITHOUT ALCOHOL: SLIGHT CHANCE OF DOZING
DO YOU HAVE DIFFICULTY BEING AS ACTIVE AS YOU WANT TO BE IN THE EVENING BECAUSE YOU ARE SLEEPY OR TIRED: NO
DO YOU HAVE DIFFICULTY OPERATING A MOTOR VEHICLE FOR LONG DISTANCES (GREATER THAN 100 MILES) BECAUSE YOU BECOME SLEEPY: NO
HOW LIKELY ARE YOU TO NOD OFF OR FALL ASLEEP IN A CAR, WHILE STOPPED FOR A FEW MINUTES IN TRAFFIC: WOULD NEVER DOZE
HOW LIKELY ARE YOU TO NOD OFF OR FALL ASLEEP WHEN YOU ARE A PASSENGER IN A CAR FOR AN HOUR WITHOUT A BREAK: SLIGHT CHANCE OF DOZING
FOSQ SCORE: 19
DO YOU HAVE DIFFICULTY VISITING YOUR FAMILY OR FRIENDS IN THEIR HOME BECAUSE YOU BECOME SLEEPY OR TIRED: NO
DO YOU GENERALLY HAVE DIFFICULTY REMEMBERING THINGS BECAUSE YOU ARE SLEEPY OR TIRED: NO
HAS YOUR RELATIONSHIP WITH FAMILY, FRIENDS OR WORK COLLEAGUES BEEN AFFECTED BECAUSE YOU ARE SLEEPY OR TIRED: NO
HOW LIKELY ARE YOU TO NOD OFF OR FALL ASLEEP IN A CAR, WHILE STOPPED FOR A FEW MINUTES IN TRAFFIC: WOULD NEVER DOZE
DO YOU HAVE DIFFICULTY WATCHING A MOVIE OR VIDEO BECAUSE YOU BECOME SLEEPY OR TIRED: YES, A LITTLE
HOW LIKELY ARE YOU TO NOD OFF OR FALL ASLEEP WHILE SITTING AND TALKING TO SOMEONE: WOULD NEVER DOZE
HOW LIKELY ARE YOU TO NOD OFF OR FALL ASLEEP WHILE SITTING INACTIVE IN A PUBLIC PLACE: WOULD NEVER DOZE
HOW LIKELY ARE YOU TO NOD OFF OR FALL ASLEEP WHILE SITTING AND READING: MODERATE CHANCE OF DOZING

## 2024-08-13 ENCOUNTER — TELEMEDICINE (OUTPATIENT)
Age: 79
End: 2024-08-13
Payer: MEDICARE

## 2024-08-13 DIAGNOSIS — G47.33 OSA (OBSTRUCTIVE SLEEP APNEA): Primary | ICD-10-CM

## 2024-08-13 PROCEDURE — 1123F ACP DISCUSS/DSCN MKR DOCD: CPT | Performed by: SPECIALIST

## 2024-08-13 PROCEDURE — G8427 DOCREV CUR MEDS BY ELIG CLIN: HCPCS | Performed by: SPECIALIST

## 2024-08-13 PROCEDURE — 99213 OFFICE O/P EST LOW 20 MIN: CPT | Performed by: SPECIALIST

## 2024-08-13 PROCEDURE — 1090F PRES/ABSN URINE INCON ASSESS: CPT | Performed by: SPECIALIST

## 2024-08-13 PROCEDURE — G8400 PT W/DXA NO RESULTS DOC: HCPCS | Performed by: SPECIALIST

## 2024-08-13 NOTE — PROGRESS NOTES
detail with the patient today. During the past 90 days, APAP used during 65 days with the average daily use of 5.15 hours. CMS compliance criteria 46%. AHI 3.6  per hour.     Patient notes having ongoing low back issues for which she is followed by orthopedics; uses as needed tramadol twice daily.  Often awakens due to pain.  Will sleep in a separate room in her recliner at that time.    No Known Allergies    No current facility-administered medications for this visit.     She  has a past medical history of (HFpEF) heart failure with preserved ejection fraction (HCC), Anxiety and depression, Atrial fibrillation (HCC), Chronic anticoagulation, GERD (gastroesophageal reflux disease), Hypertension, Hypothyroid, Migraines, Pacemaker, and SSS (sick sinus syndrome) (HCC).    She  has a past surgical history that includes Hysterectomy; pacemaker; Cholecystectomy; Total abdominal hysterectomy w/ bilateral salpingoophorectomy; anesth,surgery of shoulder; orthopedic surgery; ep device procedure (N/A, 12/14/2023); invasive vascular (N/A, 12/14/2023); Cardiac procedure (N/A, 12/14/2023); ep device procedure (N/A, 12/14/2023); and ep device procedure (N/A, 12/14/2023).    She family history includes Cancer in her mother; Heart Attack in her father and another family member; Heart Disease in her father; Hypertension in her father and another family member; Migraines in her mother; Stroke in her father and another family member.    She  reports that she has quit smoking. Her smoking use included cigarettes. She has never used smokeless tobacco. She reports current alcohol use. She reports that she does not use drugs.     Review of Systems:  Unchanged per patient      Objective:   There were no vitals taken for this visit.  There is no height or weight on file to calculate BMI.     General:   Conversant, cooperative       Oropharynx:   Mallampati score III, tongue normal                         Neuro:  Speech fluent, face

## 2024-08-14 ENCOUNTER — CLINICAL DOCUMENTATION (OUTPATIENT)
Age: 79
End: 2024-08-14

## 2024-09-20 ENCOUNTER — TELEPHONE (OUTPATIENT)
Age: 79
End: 2024-09-20

## 2024-10-04 ENCOUNTER — OFFICE VISIT (OUTPATIENT)
Age: 79
End: 2024-10-04
Payer: MEDICARE

## 2024-10-04 ENCOUNTER — PROCEDURE VISIT (OUTPATIENT)
Age: 79
End: 2024-10-04

## 2024-10-04 VITALS
SYSTOLIC BLOOD PRESSURE: 142 MMHG | BODY MASS INDEX: 36.19 KG/M2 | HEIGHT: 64 IN | OXYGEN SATURATION: 96 % | HEART RATE: 70 BPM | WEIGHT: 212 LBS | DIASTOLIC BLOOD PRESSURE: 72 MMHG

## 2024-10-04 DIAGNOSIS — I50.32 CHRONIC DIASTOLIC CONGESTIVE HEART FAILURE (HCC): ICD-10-CM

## 2024-10-04 DIAGNOSIS — Z79.01 CHRONIC ANTICOAGULATION: ICD-10-CM

## 2024-10-04 DIAGNOSIS — I48.0 PAF (PAROXYSMAL ATRIAL FIBRILLATION) (HCC): Primary | ICD-10-CM

## 2024-10-04 DIAGNOSIS — Z95.0 PACEMAKER: Primary | ICD-10-CM

## 2024-10-04 DIAGNOSIS — I49.5 SINUS NODE DYSFUNCTION (HCC): ICD-10-CM

## 2024-10-04 DIAGNOSIS — I10 HTN (HYPERTENSION), BENIGN: ICD-10-CM

## 2024-10-04 DIAGNOSIS — Z95.0 PACEMAKER: ICD-10-CM

## 2024-10-04 PROCEDURE — 3077F SYST BP >= 140 MM HG: CPT | Performed by: INTERNAL MEDICINE

## 2024-10-04 PROCEDURE — G8417 CALC BMI ABV UP PARAM F/U: HCPCS | Performed by: INTERNAL MEDICINE

## 2024-10-04 PROCEDURE — 1123F ACP DISCUSS/DSCN MKR DOCD: CPT | Performed by: INTERNAL MEDICINE

## 2024-10-04 PROCEDURE — G8427 DOCREV CUR MEDS BY ELIG CLIN: HCPCS | Performed by: INTERNAL MEDICINE

## 2024-10-04 PROCEDURE — G8400 PT W/DXA NO RESULTS DOC: HCPCS | Performed by: INTERNAL MEDICINE

## 2024-10-04 PROCEDURE — 1090F PRES/ABSN URINE INCON ASSESS: CPT | Performed by: INTERNAL MEDICINE

## 2024-10-04 PROCEDURE — 3078F DIAST BP <80 MM HG: CPT | Performed by: INTERNAL MEDICINE

## 2024-10-04 PROCEDURE — 93010 ELECTROCARDIOGRAM REPORT: CPT | Performed by: INTERNAL MEDICINE

## 2024-10-04 PROCEDURE — 1036F TOBACCO NON-USER: CPT | Performed by: INTERNAL MEDICINE

## 2024-10-04 PROCEDURE — 99214 OFFICE O/P EST MOD 30 MIN: CPT | Performed by: INTERNAL MEDICINE

## 2024-10-04 PROCEDURE — 93005 ELECTROCARDIOGRAM TRACING: CPT | Performed by: INTERNAL MEDICINE

## 2024-10-04 PROCEDURE — G8484 FLU IMMUNIZE NO ADMIN: HCPCS | Performed by: INTERNAL MEDICINE

## 2024-10-04 NOTE — PROGRESS NOTES
Chief Complaint   Patient presents with    Other     PAF  SSS     Vitals:    10/04/24 0826   BP: (!) 142/72   Site: Left Upper Arm   Position: Sitting   Pulse: 70   SpO2: 96%   Weight: 96.2 kg (212 lb)   Height: 1.626 m (5' 4\")     Chest pain: DENIED     Recent hospital stays: DENIED     Refills: DENIED

## 2024-10-04 NOTE — PROGRESS NOTES
Cardiac Electrophysiology OFFICE Follow Up Note             Assessment/Plan:   1. PAF (paroxysmal atrial fibrillation) (HCC)  -     EKG 12 Lead  2. Sinus node dysfunction (HCC)  3. Pacemaker  4. HTN (hypertension), benign  5. Chronic diastolic congestive heart failure (HCC)  6. Chronic anticoagulation      Preoperative evaluation   The patient has no active cardiac conditions, which would require further work-up. The patient has good (> 4METS) functional capacity without symptoms. Therefore, in accordance with ACC/AHA 2007 Guidelines for perioperative evaluation, this patient would be a low cardiovascular risk for a moderate risk surgery.  - TTE from 3/5/24 demonstrated normal heart function with LVEF 62%, mild-mod MR, mod TR.  - PPM with appropriate function, no AF detected since ablation  - hold Xarelto 2-3 days prior to the surgery, resume it ASAP per surgeon recommendation       Paroxysmal atrial fibrillation  History of very symptomatic atrial fibrillation.   - S/p PVI GP and Ligament of Jose D ablation 12/14/23 (Haddad)  - no recurrent Afib since the ablation, burden 0% since the ablation  -  I'm pleased to hear how well the patient has done post ablation. We spoke in great detail regarding the importance of multidisciplinary management of AFib and the role of risk factors modification in preventing recurrent AF including focusing on diet, weight loss, control of blood pressure, glycemic control, EDUARDA diagnosis and treatment, and medication compliance.  - Now off of amiodarone   - Continue Bystolic  - Continue Xarelto  - CPAP compliance  - goal weight loss, 22 lbs  - goal BP<130/90 mmHg  - TTE from 3/5/24 demonstrated normal heart function with LVEF 62%, mild-mod MR, mod TR.  - Follow up with in 1 year    Pacemaker  Date of gen change 12/8/2022 (Dr. Mishra/[facility]), Medtronic dual chamber pacemaker with normal function. Known elevated RV lead impedance and RV lead threshold (R sensed 4.6 mV, 1881 ohms,

## 2024-10-04 NOTE — PATIENT INSTRUCTIONS
hold Xarelto 2-3 days prior to the surgery, resume it ASAP per surgeon recommendation  Continue remote transmission every 3 months  FU with NP in 1 year  FU with Dr. Haddad in 2 years

## 2024-10-15 ENCOUNTER — APPOINTMENT (OUTPATIENT)
Facility: HOSPITAL | Age: 79
End: 2024-10-15
Payer: MEDICARE

## 2024-10-15 ENCOUNTER — HOSPITAL ENCOUNTER (EMERGENCY)
Facility: HOSPITAL | Age: 79
Discharge: HOME | End: 2024-10-16
Attending: EMERGENCY MEDICINE
Payer: MEDICARE

## 2024-10-15 DIAGNOSIS — G89.18 POST-OPERATIVE PAIN: ICD-10-CM

## 2024-10-15 DIAGNOSIS — M25.551 RIGHT HIP PAIN: Primary | ICD-10-CM

## 2024-10-15 PROCEDURE — 73502 X-RAY EXAM HIP UNI 2-3 VIEWS: CPT

## 2024-10-15 PROCEDURE — 99284 EMERGENCY DEPT VISIT MOD MDM: CPT

## 2024-10-15 ASSESSMENT — PAIN SCALES - GENERAL: PAINLEVEL_OUTOF10: 10

## 2024-10-15 ASSESSMENT — PAIN DESCRIPTION - DESCRIPTORS: DESCRIPTORS: ACHING

## 2024-10-15 ASSESSMENT — PAIN DESCRIPTION - LOCATION: LOCATION: HIP

## 2024-10-15 ASSESSMENT — PAIN - FUNCTIONAL ASSESSMENT: PAIN_FUNCTIONAL_ASSESSMENT: 0-10

## 2024-10-15 ASSESSMENT — PAIN DESCRIPTION - ORIENTATION: ORIENTATION: RIGHT

## 2024-10-16 VITALS
HEART RATE: 81 BPM | HEIGHT: 64 IN | RESPIRATION RATE: 20 BRPM | TEMPERATURE: 99.1 F | OXYGEN SATURATION: 97 % | WEIGHT: 230 LBS | BODY MASS INDEX: 39.27 KG/M2 | DIASTOLIC BLOOD PRESSURE: 65 MMHG | SYSTOLIC BLOOD PRESSURE: 135 MMHG

## 2024-10-16 LAB
COMMENT:: NORMAL
SPECIMEN HOLD: NORMAL

## 2024-10-16 PROCEDURE — 2500000003 HC RX 250 WO HCPCS: Performed by: EMERGENCY MEDICINE

## 2024-10-16 PROCEDURE — 6370000000 HC RX 637 (ALT 250 FOR IP): Performed by: EMERGENCY MEDICINE

## 2024-10-16 PROCEDURE — 6360000002 HC RX W HCPCS: Performed by: EMERGENCY MEDICINE

## 2024-10-16 PROCEDURE — 96375 TX/PRO/DX INJ NEW DRUG ADDON: CPT

## 2024-10-16 PROCEDURE — 96374 THER/PROPH/DIAG INJ IV PUSH: CPT

## 2024-10-16 RX ORDER — OXYCODONE HYDROCHLORIDE 5 MG/1
5 TABLET ORAL
Status: COMPLETED | OUTPATIENT
Start: 2024-10-16 | End: 2024-10-16

## 2024-10-16 RX ORDER — ONDANSETRON 2 MG/ML
4 INJECTION INTRAMUSCULAR; INTRAVENOUS ONCE
Status: COMPLETED | OUTPATIENT
Start: 2024-10-16 | End: 2024-10-16

## 2024-10-16 RX ORDER — HYDROMORPHONE HYDROCHLORIDE 1 MG/ML
0.5 INJECTION, SOLUTION INTRAMUSCULAR; INTRAVENOUS; SUBCUTANEOUS
Status: COMPLETED | OUTPATIENT
Start: 2024-10-16 | End: 2024-10-16

## 2024-10-16 RX ADMIN — ONDANSETRON 4 MG: 2 INJECTION INTRAMUSCULAR; INTRAVENOUS at 00:14

## 2024-10-16 RX ADMIN — HYDROMORPHONE HYDROCHLORIDE 0.5 MG: 1 INJECTION, SOLUTION INTRAMUSCULAR; INTRAVENOUS; SUBCUTANEOUS at 00:08

## 2024-10-16 RX ADMIN — OXYCODONE 5 MG: 5 TABLET ORAL at 01:53

## 2024-10-16 ASSESSMENT — PAIN DESCRIPTION - LOCATION: LOCATION: HIP

## 2024-10-16 ASSESSMENT — PAIN SCALES - GENERAL: PAINLEVEL_OUTOF10: 2

## 2024-10-16 ASSESSMENT — PAIN DESCRIPTION - DESCRIPTORS: DESCRIPTORS: ACHING

## 2024-10-16 ASSESSMENT — PAIN DESCRIPTION - ORIENTATION: ORIENTATION: RIGHT

## 2024-10-16 NOTE — ED TRIAGE NOTES
Patient to ED by EMS for R hip and back pain following hip replacement done yesterday at [facility] in [place].    Pt denying any known injury, pt was transported home this afternoon.     Last dose of oxycodone at approx 5pm

## 2024-10-16 NOTE — ED PROVIDER NOTES
[facility] EMERGENCY DEPT  EMERGENCY DEPARTMENT ENCOUNTER      Pt Name: Mavis Tejeda  MRN: [id number]  Birthdate 1945  Date of evaluation: 10/15/2024  Provider: Eliseo Steiner DO    CHIEF COMPLAINT       Chief Complaint   Patient presents with    Hip Pain         HISTORY OF PRESENT ILLNESS   (Location/Symptom, Timing/Onset, Context/Setting, Quality, Duration, Modifying Factors, Severity)  Note limiting factors.   79-year-old female comes in for hip pain.  Patient had a right total hip done yesterday at [facility] in [place].  She was transported home today.  She was taking a nap on the couch this evening and when she woke up began having severe right hip pain.  No trauma.  She denies other complaints.            Review of External Medical Records:     Nursing Notes were reviewed.    REVIEW OF SYSTEMS    (2-9 systems for level 4, 10 or more for level 5)     Review of Systems    Except as noted above the remainder of the review of systems was reviewed and negative.       PAST MEDICAL HISTORY     Past Medical History:   Diagnosis Date    (HFpEF) heart failure with preserved ejection fraction (HCC)     Anxiety and depression     Atrial fibrillation (HCC)     Chronic anticoagulation     GERD (gastroesophageal reflux disease)     Hypertension     Hypothyroid     Migraines     Pacemaker     SSS (sick sinus syndrome) (HCC)          SURGICAL HISTORY       Past Surgical History:   Procedure Laterality Date    ANESTH,SURGERY OF SHOULDER      left    CARDIAC PROCEDURE N/A 12/14/2023    Intracardiac echocardiogram performed by Vero Haddad MD at [facility] CARDIAC CATH LAB    CHOLECYSTECTOMY      EP DEVICE PROCEDURE N/A 12/14/2023    Ablation A-fib w complete ep study performed by Vero Haddad MD at [facility] CARDIAC CATH LAB    EP DEVICE PROCEDURE N/A 12/14/2023    Ep 3d mapping performed by Vero Haddad MD at [facility] CARDIAC CATH LAB    EP DEVICE PROCEDURE N/A 12/14/2023    Drug stimulation performed by Vero Haddad MD at [facility]

## 2024-10-16 NOTE — ED NOTES
Patient ambulatory with walker at discharge. Assisted into family vehicle by primary RN and tech. Discharge instructions reviewed with patient, opportunity to answer discharge questions given.

## 2024-10-31 ENCOUNTER — OFFICE VISIT (OUTPATIENT)
Age: 79
End: 2024-10-31
Payer: MEDICARE

## 2024-10-31 VITALS
HEIGHT: 64 IN | DIASTOLIC BLOOD PRESSURE: 58 MMHG | OXYGEN SATURATION: 98 % | SYSTOLIC BLOOD PRESSURE: 118 MMHG | WEIGHT: 205 LBS | HEART RATE: 68 BPM | BODY MASS INDEX: 35 KG/M2

## 2024-10-31 DIAGNOSIS — I49.5 SSS (SICK SINUS SYNDROME) (HCC): ICD-10-CM

## 2024-10-31 DIAGNOSIS — I48.0 PAROXYSMAL ATRIAL FIBRILLATION (HCC): ICD-10-CM

## 2024-10-31 DIAGNOSIS — I10 HTN (HYPERTENSION), BENIGN: ICD-10-CM

## 2024-10-31 DIAGNOSIS — I50.32 CHRONIC DIASTOLIC HEART FAILURE (HCC): Primary | ICD-10-CM

## 2024-10-31 PROCEDURE — 99214 OFFICE O/P EST MOD 30 MIN: CPT | Performed by: SPECIALIST

## 2024-10-31 NOTE — PROGRESS NOTES
Chief Complaint   Patient presents with    Annual Exam    Atrial Fibrillation    Congestive Heart Failure    Hypertension     Vitals:    10/31/24 1516   BP: (!) 118/58   Site: Left Upper Arm   Position: Sitting   Cuff Size: Large Adult   Pulse: 68   SpO2: 98%   Weight: 93 kg (205 lb)   Height: 1.626 m (5' 4\")       Chest pain NO     ER, urgent care, or hospitalized outside of [facility] since your last visit?  NO     Refills NO     Hip replacement about 2 weeks ago.    Bumex; since surgery, has been struggling to get to bathroom fast enough.  Started taking 1/2 tab daily.  
tablet Take 1 tablet by mouth daily 90 tablet 1    rivaroxaban (XARELTO) 20 MG TABS tablet Take 1 tablet by mouth daily (with breakfast) 90 tablet 1    loratadine (CLARITIN) 10 MG tablet Take 1 tablet by mouth daily      omeprazole (PRILOSEC) 20 MG delayed release capsule Take 1 capsule by mouth daily 30 capsule 3    mirabegron (MYRBETRIQ) 50 MG TB24 Take 50 mg by mouth daily      Multiple Vitamins-Minerals (PRESERVISION AREDS PO) Take 1 tablet by mouth      traMADol (ULTRAM) 50 MG tablet Take 1 tablet by mouth 2 times daily as needed.      Denosumab (PROLIA SC) Inject into the skin every 6 months      estradiol (ESTRACE) 0.1 MG/GM vaginal cream Place 2 g vaginally daily Twice weekly      Calcium Citrate-Vitamin D (CALCIUM CITRATE +D PO) Take by mouth Calcium 400 mg & Vitamin D 500 IU      ascorbic acid (VITAMIN C) 1000 MG tablet Take 0.5 tablets by mouth      Biotin 2.5 MG CAPS Take 2 capsules by mouth daily Taking 5000 mcq daily      gabapentin (NEURONTIN) 300 MG capsule Take 2 capsules by mouth 3 times daily.      prochlorperazine (COMPAZINE) 5 MG tablet Take 1 tablet by mouth every 6 hours as needed for Nausea (Patient not taking: Reported on 10/31/2024)      Rimegepant Sulfate (NURTEC PO) Take 75 mg by mouth as needed (Patient not taking: Reported on 10/31/2024)       No current facility-administered medications for this visit.       No Known Allergies      SOCIAL HISTORY:     Social History     Tobacco Use    Smoking status: Former     Current packs/day: 1.00     Types: Cigarettes    Smokeless tobacco: Never   Substance Use Topics    Alcohol use: Yes    Drug use: No         FAMILY HISTORY:     Family History   Problem Relation Age of Onset    Heart Attack Other     Hypertension Other     Stroke Other     Migraines Mother     Cancer Mother         melanoma    Heart Disease Father     Hypertension Father     Stroke Father     Heart Attack Father          REVIEW OF SYMPTOMS:     Review of

## 2024-10-31 NOTE — PATIENT INSTRUCTIONS
Patient Education        Learning About the Mediterranean Diet  What is the Mediterranean diet?     The Mediterranean diet is a style of eating rather than a diet plan. It features foods eaten in [place], [place], southern [place] and [place], and other countries along the [place] Sea. It emphasizes eating foods like fish, fruits, vegetables, beans, high-fiber breads and whole grains, nuts, and olive oil. This style of eating includes limited red meat, cheese, and sweets.  Why choose the Mediterranean diet?  A Mediterranean-style diet may improve heart health. It contains more fat than other heart-healthy diets. But the fats are mainly from nuts, unsaturated oils (such as fish oils and olive oil), and certain nut or seed oils (such as canola, soybean, or flaxseed oil). These fats may help protect the heart and blood vessels.  How can you get started on the Mediterranean diet?  Here are some things you can do to switch to a more Mediterranean way of eating.  What to eat  Eat a variety of fruits and vegetables each day, such as grapes, blueberries, tomatoes, broccoli, peppers, figs, olives, spinach, eggplant, beans, lentils, and chickpeas.  Eat a variety of whole-grain foods each day, such as oats, brown rice, and whole wheat bread, pasta, and couscous.  Eat fish at least 2 times a week. Try tuna, salmon, mackerel, lake trout, herring, or sardines.  Eat moderate amounts of low-fat dairy products, such as milk, cheese, or yogurt.  Eat moderate amounts of poultry and eggs.  Choose healthy (unsaturated) fats, such as nuts, olive oil, and certain nut or seed oils like canola, soybean, and flaxseed.  Limit unhealthy (saturated) fats, such as butter, palm oil, and coconut oil. And limit fats found in animal products, such as meat and dairy products made with whole milk. Try to eat red meat only a few times a month in very small amounts.  Limit sweets and desserts to only a few times a week. This includes sugar-sweetened

## 2024-11-14 ENCOUNTER — HOSPITAL ENCOUNTER (EMERGENCY)
Facility: HOSPITAL | Age: 79
Discharge: HOME | End: 2024-11-14
Attending: EMERGENCY MEDICINE
Payer: MEDICARE

## 2024-11-14 VITALS
TEMPERATURE: 97.9 F | DIASTOLIC BLOOD PRESSURE: 89 MMHG | OXYGEN SATURATION: 99 % | WEIGHT: 205 LBS | SYSTOLIC BLOOD PRESSURE: 178 MMHG | BODY MASS INDEX: 35 KG/M2 | HEART RATE: 88 BPM | HEIGHT: 64 IN | RESPIRATION RATE: 18 BRPM

## 2024-11-14 DIAGNOSIS — N39.0 URINARY TRACT INFECTION WITH HEMATURIA, SITE UNSPECIFIED: ICD-10-CM

## 2024-11-14 DIAGNOSIS — R31.9 URINARY TRACT INFECTION WITH HEMATURIA, SITE UNSPECIFIED: ICD-10-CM

## 2024-11-14 DIAGNOSIS — A04.72 C. DIFFICILE DIARRHEA: Primary | ICD-10-CM

## 2024-11-14 LAB
ALBUMIN SERPL-MCNC: 3.8 G/DL (ref 3.5–5)
ALBUMIN/GLOB SERPL: 1.2 (ref 1.1–2.2)
ALP SERPL-CCNC: 87 U/L (ref 45–117)
ALT SERPL-CCNC: 31 U/L (ref 12–78)
ANION GAP SERPL CALC-SCNC: 4 MMOL/L (ref 2–12)
APPEARANCE UR: ABNORMAL
AST SERPL-CCNC: 34 U/L (ref 15–37)
BACTERIA URNS QL MICRO: ABNORMAL /HPF
BASOPHILS # BLD: 0 K/UL (ref 0–0.1)
BASOPHILS NFR BLD: 0 % (ref 0–1)
BILIRUB SERPL-MCNC: 0.4 MG/DL (ref 0.2–1)
BILIRUB UR QL: NEGATIVE
BUN SERPL-MCNC: 10 MG/DL (ref 6–20)
BUN/CREAT SERPL: 10 (ref 12–20)
CALCIUM SERPL-MCNC: 9 MG/DL (ref 8.5–10.1)
CHLORIDE SERPL-SCNC: 109 MMOL/L (ref 97–108)
CO2 SERPL-SCNC: 27 MMOL/L (ref 21–32)
COLOR UR: ABNORMAL
CREAT SERPL-MCNC: 0.98 MG/DL (ref 0.55–1.02)
DIFFERENTIAL METHOD BLD: NORMAL
EOSINOPHIL # BLD: 0.1 K/UL (ref 0–0.4)
EOSINOPHIL NFR BLD: 1 % (ref 0–7)
EPITH CASTS URNS QL MICRO: ABNORMAL /LPF
ERYTHROCYTE [DISTWIDTH] IN BLOOD BY AUTOMATED COUNT: 13.8 % (ref 11.5–14.5)
GLOBULIN SER CALC-MCNC: 3.2 G/DL (ref 2–4)
GLUCOSE SERPL-MCNC: 114 MG/DL (ref 65–100)
GLUCOSE UR STRIP.AUTO-MCNC: NEGATIVE MG/DL
HCT VFR BLD AUTO: 39.7 % (ref 35–47)
HGB BLD-MCNC: 12.6 G/DL (ref 11.5–16)
HGB UR QL STRIP: ABNORMAL
IMM GRANULOCYTES # BLD AUTO: 0 K/UL (ref 0–0.04)
IMM GRANULOCYTES NFR BLD AUTO: 0 % (ref 0–0.5)
KETONES UR QL STRIP.AUTO: ABNORMAL MG/DL
LEUKOCYTE ESTERASE UR QL STRIP.AUTO: ABNORMAL
LYMPHOCYTES # BLD: 1.6 K/UL (ref 0.8–3.5)
LYMPHOCYTES NFR BLD: 16 % (ref 12–49)
MCH RBC QN AUTO: 30.3 PG (ref 26–34)
MCHC RBC AUTO-ENTMCNC: 31.7 G/DL (ref 30–36.5)
MCV RBC AUTO: 95.4 FL (ref 80–99)
MONOCYTES # BLD: 0.9 K/UL (ref 0–1)
MONOCYTES NFR BLD: 9 % (ref 5–13)
NEUTS SEG # BLD: 7.5 K/UL (ref 1.8–8)
NEUTS SEG NFR BLD: 74 % (ref 32–75)
NITRITE UR QL STRIP.AUTO: POSITIVE
NRBC # BLD: 0 K/UL (ref 0–0.01)
NRBC BLD-RTO: 0 PER 100 WBC
PH UR STRIP: 6.5 (ref 5–8)
PLATELET # BLD AUTO: 325 K/UL (ref 150–400)
PMV BLD AUTO: 9.8 FL (ref 8.9–12.9)
POTASSIUM SERPL-SCNC: 4.1 MMOL/L (ref 3.5–5.1)
PROT SERPL-MCNC: 7 G/DL (ref 6.4–8.2)
PROT UR STRIP-MCNC: 100 MG/DL
RBC # BLD AUTO: 4.16 M/UL (ref 3.8–5.2)
RBC #/AREA URNS HPF: ABNORMAL /HPF (ref 0–5)
SODIUM SERPL-SCNC: 140 MMOL/L (ref 136–145)
SP GR UR REFRACTOMETRY: 1.02 (ref 1–1.03)
UROBILINOGEN UR QL STRIP.AUTO: 1 EU/DL (ref 0.2–1)
WBC # BLD AUTO: 10.2 K/UL (ref 3.6–11)
WBC URNS QL MICRO: >100 /HPF (ref 0–4)

## 2024-11-14 PROCEDURE — 99283 EMERGENCY DEPT VISIT LOW MDM: CPT

## 2024-11-14 PROCEDURE — 85025 COMPLETE CBC W/AUTO DIFF WBC: CPT

## 2024-11-14 PROCEDURE — 87186 SC STD MICRODIL/AGAR DIL: CPT

## 2024-11-14 PROCEDURE — 87088 URINE BACTERIA CULTURE: CPT

## 2024-11-14 PROCEDURE — 81001 URINALYSIS AUTO W/SCOPE: CPT

## 2024-11-14 PROCEDURE — 87086 URINE CULTURE/COLONY COUNT: CPT

## 2024-11-14 PROCEDURE — 36415 COLL VENOUS BLD VENIPUNCTURE: CPT

## 2024-11-14 PROCEDURE — 80053 COMPREHEN METABOLIC PANEL: CPT

## 2024-11-14 PROCEDURE — 51701 INSERT BLADDER CATHETER: CPT

## 2024-11-14 RX ORDER — CEPHALEXIN 500 MG/1
500 CAPSULE ORAL 3 TIMES DAILY
Qty: 30 CAPSULE | Refills: 0 | Status: SHIPPED | OUTPATIENT
Start: 2024-11-14 | End: 2024-11-24

## 2024-11-14 ASSESSMENT — LIFESTYLE VARIABLES
HOW MANY STANDARD DRINKS CONTAINING ALCOHOL DO YOU HAVE ON A TYPICAL DAY: PATIENT DOES NOT DRINK
HOW OFTEN DO YOU HAVE A DRINK CONTAINING ALCOHOL: NEVER

## 2024-11-14 ASSESSMENT — ENCOUNTER SYMPTOMS
COUGH: 0
SORE THROAT: 0
VOMITING: 0

## 2024-11-14 ASSESSMENT — PAIN - FUNCTIONAL ASSESSMENT: PAIN_FUNCTIONAL_ASSESSMENT: NONE - DENIES PAIN

## 2024-11-14 NOTE — CARE COORDINATION
11/14/24  4:13 PM    CM received consultation due to pt having barrier to accessing prescribed oral vancomycin at her local [facility]. CM met with pt and spouse at bedside, after discussing with MD, pt is already on course of ABT Flagyl which is back up preference for Vancomycin. Pt reports that the \"hold up\" at the pharmacy is a pre-authorization requested by her insurance that her PCP was supposed to complete, however the pharmacy has not been able to confirm and pt is still unable to fill the prescription stating the out of pocket cost is too much. CM provided patient with good RX card to use at her pharmacy in replace of insurance and by pass the pre authorization, and discount the medicine. Pt able to confirm pharmacy accepts. CM cleared consult, all CM needs met.    Meredith Carmona MA, BSW, ACM-SW  [facility]      Available via [employer]

## 2024-11-14 NOTE — ED TRIAGE NOTES
Patient to ER for complaints of diarrhea x 2-3 weeks.     Right hip surgery done last month.     Report seeing PCP, stool sample showed c-diff.     Dr. Villalba in triage to assess patient.

## 2024-11-14 NOTE — ED PROVIDER NOTES
with a voice recognition program.  Efforts were made to edit the dictations but occasionally words are mis-transcribed.)    Andrés Villalba MD (electronically signed)  Emergency Attending Physician / Physician Assistant / Nurse Practitioner           Andrés Villalba MD  11/14/24 8639       Andrés Villalba MD  11/14/24 3826

## 2024-11-16 LAB
BACTERIA SPEC CULT: ABNORMAL
CC UR VC: ABNORMAL
SERVICE CMNT-IMP: ABNORMAL

## 2024-12-30 ENCOUNTER — TELEPHONE (OUTPATIENT)
Age: 79
End: 2024-12-30

## 2024-12-30 ENCOUNTER — PATIENT MESSAGE (OUTPATIENT)
Age: 79
End: 2024-12-30

## 2024-12-30 DIAGNOSIS — I49.5 SSS (SICK SINUS SYNDROME) (HCC): ICD-10-CM

## 2024-12-30 DIAGNOSIS — I50.32 CHRONIC DIASTOLIC HEART FAILURE (HCC): Primary | ICD-10-CM

## 2024-12-30 DIAGNOSIS — I48.0 PAROXYSMAL ATRIAL FIBRILLATION (HCC): ICD-10-CM

## 2024-12-30 DIAGNOSIS — I10 HTN (HYPERTENSION), BENIGN: ICD-10-CM

## 2024-12-30 RX ORDER — BUMETANIDE 1 MG/1
1 TABLET ORAL DAILY
Qty: 90 TABLET | Refills: 3 | Status: SHIPPED | OUTPATIENT
Start: 2024-12-30 | End: 2024-12-30 | Stop reason: SDUPTHER

## 2024-12-30 RX ORDER — NEBIVOLOL 20 MG/1
20 TABLET ORAL DAILY
Qty: 90 TABLET | Refills: 1 | Status: SHIPPED | OUTPATIENT
Start: 2024-12-30

## 2024-12-30 RX ORDER — POTASSIUM CHLORIDE 1500 MG/1
20 TABLET, EXTENDED RELEASE ORAL DAILY
Qty: 90 TABLET | Refills: 1 | Status: SHIPPED | OUTPATIENT
Start: 2024-12-30

## 2024-12-30 RX ORDER — AMLODIPINE BESYLATE 5 MG/1
5 TABLET ORAL DAILY
Qty: 90 TABLET | Refills: 1 | Status: SHIPPED | OUTPATIENT
Start: 2024-12-30

## 2024-12-30 RX ORDER — BUMETANIDE 1 MG/1
1 TABLET ORAL DAILY
Qty: 90 TABLET | Refills: 3 | Status: SHIPPED | OUTPATIENT
Start: 2024-12-30

## 2024-12-30 NOTE — TELEPHONE ENCOUNTER
VO per Haddad - refilled request. Sees Leticia HERRERA 11/3     Future Appointments   Date Time Provider Department Center   2/13/2025 11:20 AM Garcia Woods MD [facility] BS AMB   10/6/2025 10:40 AM PACEMAKER, RICHAR [facility] AMB   10/6/2025 11:00 AM Leticia Bui, APRN - NP [facility] AMB   11/3/2025  2:00 PM Gilda Sorenson MD [facility] BS AMB

## 2024-12-30 NOTE — TELEPHONE ENCOUNTER
Refill per VO of Dr. Sorenson  Last appt: 10/31/2024    Future Appointments   Date Time Provider Department Center   2/13/2025 11:20 AM Garcia Woods MD [facility]   10/6/2025 10:40 AM PACEMAKER, STFRANCES [facility]   10/6/2025 11:00 AM Leticia Bui, APRN - NP [facility]   11/3/2025  2:00 PM Carissa Sorenson MD [facility] AMB       Requested Prescriptions     Signed Prescriptions Disp Refills    bumetanide (BUMEX) 1 MG tablet 90 tablet 3     Sig: Take 1 tablet by mouth daily     Authorizing Provider: CARISSA SORENSON     Ordering User: DIEGO PRICE

## 2024-12-30 NOTE — TELEPHONE ENCOUNTER
Patient is calling because her prescription for Bumex 1 mg was sent to the wrong pharmacy.Patient would like to know if we can resend the medication to the correct pharmacy [facility].    [phone] patient

## 2024-12-30 NOTE — TELEPHONE ENCOUNTER
Refill per VO of Dr. Sorenson  Last appt: 10/31/2024    Future Appointments   Date Time Provider Department Center   2/13/2025 11:20 AM Garcia Woods MD [facility]   10/6/2025 10:40 AM PACEMAKER, [facility]   10/6/2025 11:00 AM Leticia Bui, APRN - NP [facility]   11/3/2025  2:00 PM Carissa Sorenson MD [facility]       Requested Prescriptions     Signed Prescriptions Disp Refills    bumetanide (BUMEX) 1 MG tablet 90 tablet 3     Sig: Take 1 tablet by mouth daily     Authorizing Provider: CARISSA SORENSON     Ordering User: SYD PACHECO

## 2025-02-13 ENCOUNTER — CLINICAL DOCUMENTATION (OUTPATIENT)
Age: 80
End: 2025-02-13

## 2025-02-13 ENCOUNTER — TELEMEDICINE (OUTPATIENT)
Age: 80
End: 2025-02-13
Payer: MEDICARE

## 2025-02-13 DIAGNOSIS — G47.33 OSA (OBSTRUCTIVE SLEEP APNEA): Primary | ICD-10-CM

## 2025-02-13 PROCEDURE — G8400 PT W/DXA NO RESULTS DOC: HCPCS | Performed by: SPECIALIST

## 2025-02-13 PROCEDURE — 1090F PRES/ABSN URINE INCON ASSESS: CPT | Performed by: SPECIALIST

## 2025-02-13 PROCEDURE — 1159F MED LIST DOCD IN RCRD: CPT | Performed by: SPECIALIST

## 2025-02-13 PROCEDURE — 1123F ACP DISCUSS/DSCN MKR DOCD: CPT | Performed by: SPECIALIST

## 2025-02-13 PROCEDURE — G8427 DOCREV CUR MEDS BY ELIG CLIN: HCPCS | Performed by: SPECIALIST

## 2025-02-13 PROCEDURE — 99213 OFFICE O/P EST LOW 20 MIN: CPT | Performed by: SPECIALIST

## 2025-02-13 ASSESSMENT — SLEEP AND FATIGUE QUESTIONNAIRES
HOW LIKELY ARE YOU TO NOD OFF OR FALL ASLEEP WHILE SITTING INACTIVE IN A PUBLIC PLACE: WOULD NEVER DOZE
HOW LIKELY ARE YOU TO NOD OFF OR FALL ASLEEP IN A CAR, WHILE STOPPED FOR A FEW MINUTES IN TRAFFIC: WOULD NEVER DOZE
DO YOU GENERALLY HAVE DIFFICULTY REMEMBERING THINGS BECAUSE YOU ARE SLEEPY OR TIRED: NO
HOW LIKELY ARE YOU TO NOD OFF OR FALL ASLEEP WHILE SITTING INACTIVE IN A PUBLIC PLACE: WOULD NEVER DOZE
HAS YOUR MOOD BEEN AFFECTED BECAUSE YOU ARE SLEEPY OR TIRED: NO
HOW LIKELY ARE YOU TO NOD OFF OR FALL ASLEEP WHILE SITTING AND TALKING TO SOMEONE: WOULD NEVER DOZE
HOW LIKELY ARE YOU TO NOD OFF OR FALL ASLEEP WHEN YOU ARE A PASSENGER IN A CAR FOR AN HOUR WITHOUT A BREAK: SLIGHT CHANCE OF DOZING
HOW LIKELY ARE YOU TO NOD OFF OR FALL ASLEEP WHILE SITTING AND READING: SLIGHT CHANCE OF DOZING
DO YOU HAVE DIFFICULTY VISITING YOUR FAMILY OR FRIENDS IN THEIR HOME BECAUSE YOU BECOME SLEEPY OR TIRED: NO
HOW LIKELY ARE YOU TO NOD OFF OR FALL ASLEEP WHILE SITTING AND READING: SLIGHT CHANCE OF DOZING
DO YOU HAVE DIFFICULTY CONCENTRATING ON THE THINGS YOU DO BECAUSE YOU ARE SLEEPY OR TIRED: NO
DO YOU HAVE DIFFICULTY BEING AS ACTIVE AS YOU WANT TO BE IN THE EVENING BECAUSE YOU ARE SLEEPY OR TIRED: NO
HOW LIKELY ARE YOU TO NOD OFF OR FALL ASLEEP IN A CAR, WHILE STOPPED FOR A FEW MINUTES IN TRAFFIC: WOULD NEVER DOZE
HOW LIKELY ARE YOU TO NOD OFF OR FALL ASLEEP WHEN YOU ARE A PASSENGER IN A CAR FOR AN HOUR WITHOUT A BREAK: SLIGHT CHANCE OF DOZING
HOW LIKELY ARE YOU TO NOD OFF OR FALL ASLEEP WHILE SITTING AND TALKING TO SOMEONE: WOULD NEVER DOZE
DO YOU HAVE DIFFICULTY OPERATING A MOTOR VEHICLE FOR SHORT DISTANCES (LESS THAN 100 MILES) BECAUSE YOU BECOME SLEEPY: NO
DO YOU HAVE DIFFICULTY BEING AS ACTIVE AS YOU WANT TO BE IN THE MORNING BECAUSE YOU ARE SLEEPY OR TIRED: NO
HOW LIKELY ARE YOU TO NOD OFF OR FALL ASLEEP WHILE WATCHING TV: SLIGHT CHANCE OF DOZING
DO YOU HAVE DIFFICULTY OPERATING A MOTOR VEHICLE FOR LONG DISTANCES (GREATER THAN 100 MILES) BECAUSE YOU BECOME SLEEPY: NO
HAS YOUR RELATIONSHIP WITH FAMILY, FRIENDS OR WORK COLLEAGUES BEEN AFFECTED BECAUSE YOU ARE SLEEPY OR TIRED: NO
HOW LIKELY ARE YOU TO NOD OFF OR FALL ASLEEP WHILE LYING DOWN TO REST IN THE AFTERNOON WHEN CIRCUMSTANCES PERMIT: WOULD NEVER DOZE
HOW LIKELY ARE YOU TO NOD OFF OR FALL ASLEEP WHILE LYING DOWN TO REST IN THE AFTERNOON WHEN CIRCUMSTANCES PERMIT: WOULD NEVER DOZE
HOW LIKELY ARE YOU TO NOD OFF OR FALL ASLEEP WHILE SITTING QUIETLY AFTER LUNCH WITHOUT ALCOHOL: WOULD NEVER DOZE
HOW LIKELY ARE YOU TO NOD OFF OR FALL ASLEEP WHILE WATCHING TV: SLIGHT CHANCE OF DOZING
HOW LIKELY ARE YOU TO NOD OFF OR FALL ASLEEP WHILE SITTING QUIETLY AFTER LUNCH WITHOUT ALCOHOL: WOULD NEVER DOZE
DO YOU HAVE DIFFICULTY WATCHING A MOVIE OR VIDEO BECAUSE YOU BECOME SLEEPY OR TIRED: NO
FOSQ SCORE: 20
ESS TOTAL SCORE: 3

## 2025-02-13 NOTE — PROGRESS NOTES
[id number]  [facility] [place]  [address] [address]  Dept: [phone]              [address] [place]  [place] [address]  Tel.  [phone]  Fax. [phone] [address] [place]  [place] [address]  Tel.  [phone]  Fax. [phone] [address] [place] [address]  Tel.  [phone]  Fax. [phone]     Mavis Tejeda, was evaluated through a synchronous (real-time) audio-video encounter. The patient (or guardian if applicable) is aware that this is a billable service, which includes applicable co-pays. This Virtual Visit was conducted with patient's (and/or legal guardian's) consent. Patient identification was verified, and a caregiver was present when appropriate.   The patient was located at Home: [address] [address]  Provider was located at Home (Appt Dept State): [place]  Confirm you are appropriately licensed, registered, or certified to deliver care in the state where the patient is located as indicated above. If you are not or unsure, please re-schedule the visit: Yes, I confirm.      Total time spent for this encounter: Greater thanGreater than   20  minutes spent in direct video patient care, chart review and planning    --Garcia Woods MD on 2/13/2025 at 4:44 PM    An electronic signature was used to authenticate this note.     Chief Complaint       Chief Complaint   Patient presents with    Sleep Problem     Yearly follow up         HPI        Mavis Tejeda is a 79 y.o. female seen for follow-up. She was evaluated with a sleep study which demonstrated 72 respiratory events  of which 52 hypopnea and 20 obstructive apnea.  The  overall AHI was 16/h.  Events more prominent in REM sleep with the REM-related AHI of  60 per hour.  Minimal SaO2 79%.  Mild- moderate snoring noted.     Recommendation: APAP 5-15 cm.  Currently 7-15 cm.      Device set up date:

## 2025-02-23 PROCEDURE — 93294 REM INTERROG EVL PM/LDLS PM: CPT | Performed by: INTERNAL MEDICINE

## 2025-03-15 DIAGNOSIS — I49.5 SSS (SICK SINUS SYNDROME) (HCC): ICD-10-CM

## 2025-03-15 DIAGNOSIS — I50.32 CHRONIC DIASTOLIC HEART FAILURE (HCC): ICD-10-CM

## 2025-03-15 DIAGNOSIS — I48.0 PAROXYSMAL ATRIAL FIBRILLATION (HCC): ICD-10-CM

## 2025-03-15 DIAGNOSIS — I10 HTN (HYPERTENSION), BENIGN: ICD-10-CM

## 2025-03-17 RX ORDER — NEBIVOLOL 20 MG/1
20 TABLET ORAL DAILY
Qty: 90 TABLET | Refills: 3 | Status: SHIPPED | OUTPATIENT
Start: 2025-03-17

## 2025-04-03 ENCOUNTER — CLINICAL DOCUMENTATION (OUTPATIENT)
Age: 80
End: 2025-04-03

## 2025-04-03 NOTE — PROGRESS NOTES
Prior Auth forms have [facility] completed [facility]

## 2025-04-18 ENCOUNTER — APPOINTMENT (OUTPATIENT)
Facility: HOSPITAL | Age: 80
End: 2025-04-18
Payer: MEDICARE

## 2025-04-18 ENCOUNTER — HOSPITAL ENCOUNTER (EMERGENCY)
Facility: HOSPITAL | Age: 80
Discharge: HOME | End: 2025-04-18
Attending: EMERGENCY MEDICINE
Payer: MEDICARE

## 2025-04-18 VITALS
DIASTOLIC BLOOD PRESSURE: 68 MMHG | HEART RATE: 82 BPM | OXYGEN SATURATION: 96 % | WEIGHT: 218.03 LBS | BODY MASS INDEX: 37.22 KG/M2 | SYSTOLIC BLOOD PRESSURE: 148 MMHG | HEIGHT: 64 IN | TEMPERATURE: 98 F | RESPIRATION RATE: 17 BRPM

## 2025-04-18 DIAGNOSIS — J06.9 ACUTE UPPER RESPIRATORY INFECTION: Primary | ICD-10-CM

## 2025-04-18 LAB
FLUAV RNA SPEC QL NAA+PROBE: NOT DETECTED
FLUBV RNA SPEC QL NAA+PROBE: NOT DETECTED
SARS-COV-2 RNA RESP QL NAA+PROBE: NOT DETECTED
SOURCE: NORMAL

## 2025-04-18 PROCEDURE — 87636 SARSCOV2 & INF A&B AMP PRB: CPT

## 2025-04-18 PROCEDURE — 99284 EMERGENCY DEPT VISIT MOD MDM: CPT

## 2025-04-18 PROCEDURE — 71046 X-RAY EXAM CHEST 2 VIEWS: CPT

## 2025-04-18 RX ORDER — CODEINE PHOSPHATE AND GUAIFENESIN 10; 100 MG/5ML; MG/5ML
5 SOLUTION ORAL 3 TIMES DAILY PRN
Qty: 47 ML | Refills: 0 | Status: SHIPPED | OUTPATIENT
Start: 2025-04-18 | End: 2025-04-21

## 2025-04-18 ASSESSMENT — PAIN - FUNCTIONAL ASSESSMENT: PAIN_FUNCTIONAL_ASSESSMENT: NONE - DENIES PAIN

## 2025-04-18 NOTE — ED PROVIDER NOTES
[facility] EM[facility]ENCY DEPARTMENT  EMERGENCY DEPARTMENT ENCOUNTER      Pt Name: Mavis Tejeda  MRN: [id number]  Birthdate 1945  Date of evaluation: 4/18/2025  Provider: Andrés Abdi MD      HISTORY OF PRESENT ILLNESS      79-year-old female history of HFpEF, A-fib, GERD, hypertension presents to the emergency department chief complaint of cough and fever.  Symptoms for the last few days.   sick with similar.  Recent trip to [facility].    The history is provided by the patient and medical records.           Nursing Notes were reviewed.    REVIEW OF SYSTEMS         Review of Systems        PAST MEDICAL HISTORY     Past Medical History:   Diagnosis Date   • (HFpEF) heart failure with preserved ejection fraction (HCC)    • Anxiety and depression    • Atrial fibrillation (HCC)    • Chronic anticoagulation    • GERD (gastroesophageal reflux disease)    • Hypertension    • Hypothyroid    • Migraines    • Pacemaker    • SSS (sick sinus syndrome) (HCC)          SURGICAL HISTORY       Past Surgical History:   Procedure Laterality Date   • ANESTH,SURGERY OF SHOULDER      left   • CARDIAC PROCEDURE N/A 12/14/2023    Intracardiac echocardiogram performed by Vero Haddad MD at [facility] CARDIAC CATH LAB   • CHOLECYSTECTOMY     • EP DEVICE PROCEDURE N/A 12/14/2023    Ablation A-fib w complete ep study performed by Vero Haddad MD at [facility] CARDIAC CATH LAB   • EP DEVICE PROCEDURE N/A 12/14/2023    Ep 3d mapping performed by Vero Haddad MD at [facility] CARDIAC CATH LAB   • EP DEVICE PROCEDURE N/A 12/14/2023    Drug stimulation performed by Vero Haddad MD at [facility] CARDIAC CATH LAB   • HYSTERECTOMY (CERVIX STATUS UNKNOWN)     • INVASIVE VASCULAR N/A 12/14/2023    Ultrasound guided vascular access performed by Vero Haddad MD at [facility] CARDIAC CATH LAB   • ORTHOPEDIC SURGERY      Right shoulder surgery   • PACEMAKER     • JASMYN AND BSO (CERVIX REMOVED)           CURRENT MEDICATIONS       Previous Medications    AMLODIPINE (NORVASC) 5 MG

## 2025-04-18 NOTE — ED TRIAGE NOTES
Pt ambulates to treatment are with cane c/o cough , chills, trouble catching breath and sore throat since yesterday. Pt states  has had similar symptoms for a couple days. Pt states she was recently on a cruise.

## 2025-05-14 ENCOUNTER — PATIENT MESSAGE (OUTPATIENT)
Age: 80
End: 2025-05-14

## 2025-05-30 DIAGNOSIS — I48.0 PAROXYSMAL ATRIAL FIBRILLATION (HCC): ICD-10-CM

## 2025-05-30 DIAGNOSIS — I50.32 CHRONIC DIASTOLIC HEART FAILURE (HCC): ICD-10-CM

## 2025-05-30 DIAGNOSIS — I49.5 SSS (SICK SINUS SYNDROME) (HCC): ICD-10-CM

## 2025-05-30 DIAGNOSIS — I10 HTN (HYPERTENSION), BENIGN: ICD-10-CM

## 2025-05-30 RX ORDER — POTASSIUM CHLORIDE 1500 MG/1
20 TABLET, EXTENDED RELEASE ORAL DAILY
Qty: 90 TABLET | Refills: 1 | Status: SHIPPED | OUTPATIENT
Start: 2025-05-30

## 2025-05-30 RX ORDER — RIVAROXABAN 20 MG/1
20 TABLET, FILM COATED ORAL DAILY
Qty: 90 TABLET | Refills: 1 | Status: SHIPPED | OUTPATIENT
Start: 2025-05-30

## 2025-05-30 NOTE — TELEPHONE ENCOUNTER
Ordered Per  Verbal Order.     Last appt: 10/31/2024   Future Appointments   Date Time Provider Department Center   10/6/2025 10:40 AM PACEMAKER, [facility]  AMB   10/6/2025 11:00 AM [facility]   11/3/2025  2:00 PM Gilda Sorenson MD [facility] AMB       Requested Prescriptions     Pending Prescriptions Disp Refills    rivaroxaban (XARELTO) 20 MG TABS tablet [Pharmacy Med Name: Xarelto 20mg Tablet] 90 tablet 1     Sig: Take 1 tablet by mouth daily with breakfast.    potassium chloride (KLOR-CON M) 20 MEQ extended release tablet [Pharmacy Med Name: Potassium Chloride 20mEq Extended-Release Tablet] 90 tablet 1     Sig: Take 1 tablet by mouth daily.         Prior labs and Blood pressures:  BP Readings from Last 3 Encounters:   04/18/25 (!) 148/68   11/14/24 (!) 178/89   10/31/24 (!) 118/58     Lab Results   Component Value Date/Time     11/14/2024 02:09 PM    K 4.1 11/14/2024 02:09 PM     11/14/2024 02:09 PM    CO2 27 11/14/2024 02:09 PM    BUN 10 11/14/2024 02:09 PM    GFRAA >60 02/16/2020 10:44 PM      Lab Results   Component Value Date/Time    TSH 2.10 09/08/2023 11:15 PM

## 2025-06-02 ENCOUNTER — HOSPITAL ENCOUNTER (EMERGENCY)
Facility: HOSPITAL | Age: 80
Discharge: HOME | End: 2025-06-02
Attending: EMERGENCY MEDICINE
Payer: MEDICARE

## 2025-06-02 ENCOUNTER — APPOINTMENT (OUTPATIENT)
Facility: HOSPITAL | Age: 80
End: 2025-06-02
Payer: MEDICARE

## 2025-06-02 VITALS
SYSTOLIC BLOOD PRESSURE: 167 MMHG | BODY MASS INDEX: 33.63 KG/M2 | RESPIRATION RATE: 18 BRPM | HEIGHT: 64 IN | TEMPERATURE: 98.8 F | WEIGHT: 197 LBS | HEART RATE: 85 BPM | DIASTOLIC BLOOD PRESSURE: 72 MMHG | OXYGEN SATURATION: 98 %

## 2025-06-02 DIAGNOSIS — S52.502A CLOSED FRACTURE OF DISTAL END OF LEFT RADIUS, UNSPECIFIED FRACTURE MORPHOLOGY, INITIAL ENCOUNTER: Primary | ICD-10-CM

## 2025-06-02 PROCEDURE — 29125 APPL SHORT ARM SPLINT STATIC: CPT

## 2025-06-02 PROCEDURE — 99283 EMERGENCY DEPT VISIT LOW MDM: CPT

## 2025-06-02 PROCEDURE — 73110 X-RAY EXAM OF WRIST: CPT

## 2025-06-02 ASSESSMENT — PAIN - FUNCTIONAL ASSESSMENT
PAIN_FUNCTIONAL_ASSESSMENT: NONE - DENIES PAIN
PAIN_FUNCTIONAL_ASSESSMENT: ACTIVITIES ARE NOT PREVENTED

## 2025-06-02 ASSESSMENT — PAIN SCALES - GENERAL: PAINLEVEL_OUTOF10: 4

## 2025-06-02 ASSESSMENT — PAIN DESCRIPTION - LOCATION: LOCATION: WRIST

## 2025-06-02 ASSESSMENT — PAIN DESCRIPTION - PAIN TYPE: TYPE: ACUTE PAIN

## 2025-06-02 ASSESSMENT — PAIN DESCRIPTION - ONSET: ONSET: SUDDEN

## 2025-06-02 ASSESSMENT — PAIN DESCRIPTION - ORIENTATION: ORIENTATION: LEFT

## 2025-06-02 ASSESSMENT — PAIN DESCRIPTION - DESCRIPTORS: DESCRIPTORS: ACHING

## 2025-06-02 NOTE — ED TRIAGE NOTES
Pt ambulatory to the treatment area with no difficulty states she lost her balance and fell on her left wrist.States it is painful and kind of swelling.She broke the same wrist in March and has physiotherapy ongoing.

## 2025-06-03 NOTE — ED PROVIDER NOTES
Right shoulder surgery    PACEMAKER      JASMYN AND BSO (CERVIX REMOVED)           CURRENT MEDICATIONS       Previous Medications    AMLODIPINE (NORVASC) 5 MG TABLET    Take 1 tablet by mouth daily    ASCORBIC ACID (VITAMIN C) 1000 MG TABLET    Take 0.5 tablets by mouth    BIOTIN 2.5 MG CAPS    Take 2 capsules by mouth daily Taking 5000 mcq daily    BUMETANIDE (BUMEX) 1 MG TABLET    Take 1 tablet by mouth daily    CALCIUM CITRATE-VITAMIN D (CALCIUM CITRATE +D PO)    Take by mouth Calcium 400 mg & Vitamin D 500 IU    DENOSUMAB (PROLIA SC)    Inject into the skin every 6 months    ESTRADIOL (ESTRACE) 0.1 MG/GM VAGINAL CREAM    Place 2 g vaginally daily Twice weekly    GABAPENTIN (NEURONTIN) 300 MG CAPSULE    Take 2 capsules by mouth 3 times daily.    LORATADINE (CLARITIN) 10 MG TABLET    Take 1 tablet by mouth daily    MIRABEGRON (MYRBETRIQ) 50 MG TB24    Take 50 mg by mouth daily    MULTIPLE VITAMIN (MULTIVITAMIN ADULT PO)    Take by mouth    MULTIPLE VITAMINS-MINERALS (PRESERVISION AREDS PO)    Take 1 tablet by mouth    NEBIVOLOL (BYSTOLIC) 20 MG TABS TABLET    Take 1 tablet by mouth daily.    OMEPRAZOLE (PRILOSEC) 20 MG DELAYED RELEASE CAPSULE    Take 1 capsule by mouth daily    POTASSIUM CHLORIDE (KLOR-CON M) 20 MEQ EXTENDED RELEASE TABLET    Take 1 tablet by mouth daily.    PROCHLORPERAZINE (COMPAZINE) 5 MG TABLET    Take 1 tablet by mouth every 6 hours as needed for Nausea    RIMEGEPANT SULFATE (NURTEC PO)    Take 75 mg by mouth as needed    RIVAROXABAN (XARELTO) 20 MG TABS TABLET    Take 1 tablet by mouth daily with breakfast.    TRAMADOL (ULTRAM) 50 MG TABLET    Take 1 tablet by mouth 2 times daily as needed.       ALLERGIES     Patient has no known allergies.    FAMILY HISTORY       Family History   Problem Relation Age of Onset    Heart Attack Other     Hypertension Other     Stroke Other     Migraines Mother     Cancer Mother         melanoma    Heart Disease Father     Hypertension Father     Stroke

## 2025-06-03 NOTE — DISCHARGE INSTRUCTIONS
You were seen emergency department today for left wrist pain after falling and landing on your wrist.  Your x-ray today showed that you had a break in your left radius bone, which is where you have broken it before.  We do not have any old x-rays to compare it to.  Because you are having pain there and you did fall on it today, we are going to put you in a splint and have you follow-up with your orthopedist.

## 2025-08-08 DIAGNOSIS — I10 HTN (HYPERTENSION), BENIGN: ICD-10-CM

## 2025-08-08 DIAGNOSIS — I49.5 SSS (SICK SINUS SYNDROME) (HCC): ICD-10-CM

## 2025-08-08 DIAGNOSIS — I50.32 CHRONIC DIASTOLIC HEART FAILURE (HCC): ICD-10-CM

## 2025-08-08 DIAGNOSIS — I48.0 PAROXYSMAL ATRIAL FIBRILLATION (HCC): ICD-10-CM

## 2025-08-08 RX ORDER — AMLODIPINE BESYLATE 5 MG/1
5 TABLET ORAL DAILY
Qty: 90 TABLET | Refills: 0 | Status: SHIPPED | OUTPATIENT
Start: 2025-08-08

## (undated) DEVICE — TUBING PMP FOR CARTO SYS SMARTABLATE

## (undated) DEVICE — CABLE CATH L10FT RED PIN CONN 34-34 FOR THERMOCOOL

## (undated) DEVICE — PINNACLE INTRODUCER SHEATH: Brand: PINNACLE

## (undated) DEVICE — CATHETER EP 7FR L115CM 2-8-2MM SPC TIP 2MM 10 ELECTRD F L

## (undated) DEVICE — ROYAL SILK SURGICAL GOWN, XXL: Brand: CONVERTORS

## (undated) DEVICE — SHEATH INTRO 8.5FR L71CM 8.5FR DIL GWIRE L180CM DIA0.032IN

## (undated) DEVICE — ELECTRODE,RADIOTRANSLUCENT,FOAM,5PK: Brand: MEDLINE

## (undated) DEVICE — PERCLOSE PROGLIDE™ SUTURE-MEDIATED CLOSURE SYSTEM: Brand: PERCLOSE PROGLIDE™

## (undated) DEVICE — HEART CATH-MRMC: Brand: MEDLINE INDUSTRIES, INC.

## (undated) DEVICE — PATCH REF EXT FOR CARTO 3 SYS (EA = 6 PACKS)

## (undated) DEVICE — ELECTRODE PT RET AD L9FT HI MOIST COND ADH HYDRGEL CORDED

## (undated) DEVICE — MEDI-TRACE CADENCE ADULT, DEFIBRILLATION ELECTRODE -RTS  (10 PR/PK) - PHYSIO-CONTROL: Brand: MEDI-TRACE CADENCE

## (undated) DEVICE — Device: Brand: RFP-100A CONNECTOR CABLE

## (undated) DEVICE — CATHETER MAP D CRV 3-3-3-3-3 MM SPC GALAXY OCTARAY

## (undated) DEVICE — CATHETER ABLAT 8FR L115CM 1-6-2MM SPC TIP 3.5MM DF CRV

## (undated) DEVICE — APPLICATOR MEDICATED 10.5 CC SOLUTION CLR STRL CHLORAPREP

## (undated) DEVICE — PAD GROUNDING ADLT ADH FOIL 9FT CORD UNIV

## (undated) DEVICE — CABLE CATH L2.7M CONNECTS TO CARTO 3 SYS PIU FOR LASSO ECO

## (undated) DEVICE — WASTE KIT - ST MARY: Brand: MEDLINE INDUSTRIES, INC.

## (undated) DEVICE — Device: Brand: NRG TRANSSEPTAL NEEDLE

## (undated) DEVICE — COVER CATH ACUNAV ULTRASOUND 5X72IN ANTI STATIC

## (undated) DEVICE — PROVE COVER: Brand: UNBRANDED

## (undated) DEVICE — INTRODUCER SHTH 11FR CANN L23CM DIL TIP 45MM YEL W/O MINI

## (undated) DEVICE — PRESSURE MONITORING SET: Brand: TRUWAVE

## (undated) DEVICE — CATHETER US 8FR L90CM GRN TIP OVERLAY FOR GE-VIVID I VIVID

## (undated) DEVICE — 3M™ TEGADERM™ TRANSPARENT FILM DRESSING FRAME STYLE, 1626W, 4 IN X 4-3/4 IN (10 CM X 12 CM), 50/CT 4CT/CASE: Brand: 3M™ TEGADERM™